# Patient Record
Sex: FEMALE | Race: WHITE | HISPANIC OR LATINO | Employment: UNEMPLOYED | URBAN - METROPOLITAN AREA
[De-identification: names, ages, dates, MRNs, and addresses within clinical notes are randomized per-mention and may not be internally consistent; named-entity substitution may affect disease eponyms.]

---

## 2017-06-06 ENCOUNTER — HOSPITAL ENCOUNTER (INPATIENT)
Facility: HOSPITAL | Age: 42
LOS: 7 days | Discharge: HOME | DRG: 753 | End: 2017-06-13
Attending: PSYCHIATRY & NEUROLOGY | Admitting: PSYCHIATRY & NEUROLOGY
Payer: COMMERCIAL

## 2017-06-06 ENCOUNTER — HOSPITAL ENCOUNTER (EMERGENCY)
Facility: HOSPITAL | Age: 42
End: 2017-06-06
Attending: EMERGENCY MEDICINE | Admitting: EMERGENCY MEDICINE
Payer: COMMERCIAL

## 2017-06-06 VITALS
SYSTOLIC BLOOD PRESSURE: 128 MMHG | TEMPERATURE: 97.6 F | RESPIRATION RATE: 16 BRPM | OXYGEN SATURATION: 97 % | WEIGHT: 190 LBS | DIASTOLIC BLOOD PRESSURE: 80 MMHG | HEART RATE: 77 BPM

## 2017-06-06 DIAGNOSIS — F43.10 PTSD (POST-TRAUMATIC STRESS DISORDER): ICD-10-CM

## 2017-06-06 DIAGNOSIS — F17.200 NICOTINE DEPENDENCE: ICD-10-CM

## 2017-06-06 DIAGNOSIS — J30.2 SEASONAL ALLERGIES: ICD-10-CM

## 2017-06-06 DIAGNOSIS — F31.60 BIPOLAR 1 DISORDER, MIXED (HCC): ICD-10-CM

## 2017-06-06 DIAGNOSIS — K21.9 GERD (GASTROESOPHAGEAL REFLUX DISEASE): ICD-10-CM

## 2017-06-06 DIAGNOSIS — G43.909 MIGRAINE: Primary | ICD-10-CM

## 2017-06-06 DIAGNOSIS — G47.00 INSOMNIA: ICD-10-CM

## 2017-06-06 DIAGNOSIS — R44.0 AUDITORY HALLUCINATIONS: ICD-10-CM

## 2017-06-06 DIAGNOSIS — F32.A DEPRESSED: Primary | ICD-10-CM

## 2017-06-06 DIAGNOSIS — K59.00 CONSTIPATION: ICD-10-CM

## 2017-06-06 DIAGNOSIS — F41.1 GENERALIZED ANXIETY DISORDER: ICD-10-CM

## 2017-06-06 DIAGNOSIS — E03.9 HYPOTHYROIDISM: ICD-10-CM

## 2017-06-06 DIAGNOSIS — R45.851 SUICIDAL THOUGHTS: ICD-10-CM

## 2017-06-06 PROBLEM — F14.10 COCAINE ABUSE (HCC): Status: ACTIVE | Noted: 2017-06-06

## 2017-06-06 LAB
AMPHETAMINES SERPL QL SCN: NEGATIVE
BACTERIA UR QL AUTO: ABNORMAL /HPF
BARBITURATES UR QL: NEGATIVE
BENZODIAZ UR QL: POSITIVE
BILIRUB UR QL STRIP: NEGATIVE
CLARITY UR: CLEAR
COCAINE UR QL: POSITIVE
COLOR UR: YELLOW
ETHANOL EXG-MCNC: 0 MG/DL
GLUCOSE UR STRIP-MCNC: NEGATIVE MG/DL
HCG UR QL: NEGATIVE
HGB UR QL STRIP.AUTO: ABNORMAL
KETONES UR STRIP-MCNC: ABNORMAL MG/DL
LEUKOCYTE ESTERASE UR QL STRIP: NEGATIVE
METHADONE UR QL: NEGATIVE
MUCOUS THREADS UR QL AUTO: ABNORMAL
NITRITE UR QL STRIP: NEGATIVE
NON-SQ EPI CELLS URNS QL MICRO: ABNORMAL /HPF
OPIATES UR QL SCN: NEGATIVE
PCP UR QL: NEGATIVE
PH UR STRIP.AUTO: 5.5 [PH] (ref 4.5–8)
PROT UR STRIP-MCNC: NEGATIVE MG/DL
RBC #/AREA URNS AUTO: ABNORMAL /HPF
SP GR UR STRIP.AUTO: >=1.03 (ref 1–1.03)
THC UR QL: NEGATIVE
UROBILINOGEN UR QL STRIP.AUTO: 1 E.U./DL
WBC #/AREA URNS AUTO: ABNORMAL /HPF

## 2017-06-06 PROCEDURE — 81001 URINALYSIS AUTO W/SCOPE: CPT | Performed by: EMERGENCY MEDICINE

## 2017-06-06 PROCEDURE — 99285 EMERGENCY DEPT VISIT HI MDM: CPT

## 2017-06-06 PROCEDURE — 80307 DRUG TEST PRSMV CHEM ANLYZR: CPT | Performed by: EMERGENCY MEDICINE

## 2017-06-06 PROCEDURE — 82075 ASSAY OF BREATH ETHANOL: CPT | Performed by: EMERGENCY MEDICINE

## 2017-06-06 PROCEDURE — 81025 URINE PREGNANCY TEST: CPT | Performed by: EMERGENCY MEDICINE

## 2017-06-06 RX ORDER — LORAZEPAM 0.5 MG/1
0.5 TABLET ORAL ONCE
Status: COMPLETED | OUTPATIENT
Start: 2017-06-06 | End: 2017-06-06

## 2017-06-06 RX ORDER — FLUOXETINE HYDROCHLORIDE 20 MG/1
20 CAPSULE ORAL DAILY
COMMUNITY
End: 2017-06-13 | Stop reason: HOSPADM

## 2017-06-06 RX ORDER — ESCITALOPRAM OXALATE 10 MG/1
10 TABLET ORAL DAILY
Status: DISCONTINUED | OUTPATIENT
Start: 2017-06-06 | End: 2017-06-07

## 2017-06-06 RX ORDER — LEVOTHYROXINE SODIUM 0.03 MG/1
50 TABLET ORAL
Status: DISCONTINUED | OUTPATIENT
Start: 2017-06-06 | End: 2017-06-13 | Stop reason: HOSPADM

## 2017-06-06 RX ORDER — HALOPERIDOL 5 MG
5 TABLET ORAL EVERY 6 HOURS PRN
Status: DISCONTINUED | OUTPATIENT
Start: 2017-06-06 | End: 2017-06-13 | Stop reason: HOSPADM

## 2017-06-06 RX ORDER — RISPERIDONE 1 MG/1
1 TABLET, ORALLY DISINTEGRATING ORAL
Status: DISCONTINUED | OUTPATIENT
Start: 2017-06-06 | End: 2017-06-13 | Stop reason: HOSPADM

## 2017-06-06 RX ORDER — RISPERIDONE 2 MG/1
2 TABLET, FILM COATED ORAL
Status: DISCONTINUED | OUTPATIENT
Start: 2017-06-06 | End: 2017-06-13 | Stop reason: HOSPADM

## 2017-06-06 RX ORDER — ACETAMINOPHEN 325 MG/1
650 TABLET ORAL EVERY 6 HOURS PRN
Status: CANCELLED | OUTPATIENT
Start: 2017-06-06

## 2017-06-06 RX ORDER — HYDROXYZINE PAMOATE 50 MG/1
50 CAPSULE ORAL 3 TIMES DAILY
COMMUNITY
End: 2017-06-13 | Stop reason: HOSPADM

## 2017-06-06 RX ORDER — OLANZAPINE 5 MG/1
5 TABLET ORAL DAILY
COMMUNITY
End: 2017-06-13 | Stop reason: HOSPADM

## 2017-06-06 RX ORDER — TOPIRAMATE 50 MG/1
150 TABLET, FILM COATED ORAL 2 TIMES DAILY
COMMUNITY
End: 2017-06-13 | Stop reason: HOSPADM

## 2017-06-06 RX ORDER — NICOTINE 21 MG/24HR
1 PATCH, TRANSDERMAL 24 HOURS TRANSDERMAL DAILY
Status: DISCONTINUED | OUTPATIENT
Start: 2017-06-06 | End: 2017-06-13 | Stop reason: HOSPADM

## 2017-06-06 RX ORDER — ALPRAZOLAM 1 MG/1
1 TABLET ORAL 3 TIMES DAILY PRN
COMMUNITY
End: 2017-06-13 | Stop reason: HOSPADM

## 2017-06-06 RX ORDER — LITHIUM CARBONATE 450 MG
450 TABLET, EXTENDED RELEASE ORAL 2 TIMES DAILY
Status: DISCONTINUED | OUTPATIENT
Start: 2017-06-06 | End: 2017-06-10

## 2017-06-06 RX ORDER — LORATADINE 10 MG/1
10 TABLET ORAL DAILY
Status: DISCONTINUED | OUTPATIENT
Start: 2017-06-06 | End: 2017-06-13 | Stop reason: HOSPADM

## 2017-06-06 RX ORDER — LORAZEPAM 1 MG/1
1 TABLET ORAL ONCE
Status: COMPLETED | OUTPATIENT
Start: 2017-06-06 | End: 2017-06-06

## 2017-06-06 RX ORDER — HALOPERIDOL 5 MG
5 TABLET ORAL EVERY 6 HOURS PRN
Status: CANCELLED | OUTPATIENT
Start: 2017-06-06

## 2017-06-06 RX ORDER — LEVOTHYROXINE SODIUM 0.05 MG/1
50 TABLET ORAL
COMMUNITY
End: 2017-06-13 | Stop reason: HOSPADM

## 2017-06-06 RX ORDER — TRAZODONE HYDROCHLORIDE 50 MG/1
50 TABLET ORAL
Status: CANCELLED | OUTPATIENT
Start: 2017-06-06

## 2017-06-06 RX ORDER — BENZTROPINE MESYLATE 0.5 MG/1
1 TABLET ORAL EVERY 6 HOURS PRN
Status: CANCELLED | OUTPATIENT
Start: 2017-06-06

## 2017-06-06 RX ORDER — LITHIUM CARBONATE 450 MG
450 TABLET, EXTENDED RELEASE ORAL 2 TIMES DAILY
COMMUNITY
End: 2017-06-13 | Stop reason: HOSPADM

## 2017-06-06 RX ORDER — ACETAMINOPHEN 325 MG/1
650 TABLET ORAL EVERY 6 HOURS PRN
Status: DISCONTINUED | OUTPATIENT
Start: 2017-06-06 | End: 2017-06-13 | Stop reason: HOSPADM

## 2017-06-06 RX ORDER — LORAZEPAM 2 MG/ML
1 INJECTION INTRAMUSCULAR EVERY 6 HOURS PRN
Status: CANCELLED | OUTPATIENT
Start: 2017-06-06

## 2017-06-06 RX ORDER — LORAZEPAM 1 MG/1
1 TABLET ORAL EVERY 6 HOURS PRN
Status: DISCONTINUED | OUTPATIENT
Start: 2017-06-06 | End: 2017-06-13 | Stop reason: HOSPADM

## 2017-06-06 RX ORDER — TRAZODONE HYDROCHLORIDE 50 MG/1
50 TABLET ORAL
Status: DISCONTINUED | OUTPATIENT
Start: 2017-06-06 | End: 2017-06-13 | Stop reason: HOSPADM

## 2017-06-06 RX ORDER — BENZTROPINE MESYLATE 1 MG/ML
1 INJECTION INTRAMUSCULAR; INTRAVENOUS EVERY 6 HOURS PRN
Status: CANCELLED | OUTPATIENT
Start: 2017-06-06

## 2017-06-06 RX ORDER — PRAZOSIN HYDROCHLORIDE 1 MG/1
1 CAPSULE ORAL
Status: DISCONTINUED | OUTPATIENT
Start: 2017-06-06 | End: 2017-06-13 | Stop reason: HOSPADM

## 2017-06-06 RX ORDER — CETIRIZINE HYDROCHLORIDE 10 MG/1
10 TABLET ORAL DAILY
Status: ON HOLD | COMMUNITY
End: 2017-06-13

## 2017-06-06 RX ORDER — NICOTINE 21 MG/24HR
1 PATCH, TRANSDERMAL 24 HOURS TRANSDERMAL DAILY
Status: CANCELLED | OUTPATIENT
Start: 2017-06-06

## 2017-06-06 RX ORDER — HALOPERIDOL 5 MG/ML
5 INJECTION INTRAMUSCULAR EVERY 6 HOURS PRN
Status: CANCELLED | OUTPATIENT
Start: 2017-06-06

## 2017-06-06 RX ORDER — LORAZEPAM 1 MG/1
1 TABLET ORAL EVERY 6 HOURS PRN
Status: CANCELLED | OUTPATIENT
Start: 2017-06-06

## 2017-06-06 RX ORDER — BENZTROPINE MESYLATE 1 MG/1
1 TABLET ORAL EVERY 6 HOURS PRN
Status: DISCONTINUED | OUTPATIENT
Start: 2017-06-06 | End: 2017-06-13 | Stop reason: HOSPADM

## 2017-06-06 RX ORDER — ALBUTEROL SULFATE 90 UG/1
2 AEROSOL, METERED RESPIRATORY (INHALATION) EVERY 6 HOURS PRN
COMMUNITY
End: 2017-11-27

## 2017-06-06 RX ORDER — BENZTROPINE MESYLATE 1 MG/ML
1 INJECTION INTRAMUSCULAR; INTRAVENOUS EVERY 6 HOURS PRN
Status: DISCONTINUED | OUTPATIENT
Start: 2017-06-06 | End: 2017-06-13 | Stop reason: HOSPADM

## 2017-06-06 RX ORDER — RISPERIDONE 1 MG/1
1 TABLET, ORALLY DISINTEGRATING ORAL
Status: CANCELLED | OUTPATIENT
Start: 2017-06-06

## 2017-06-06 RX ORDER — HALOPERIDOL 5 MG/ML
5 INJECTION INTRAMUSCULAR EVERY 6 HOURS PRN
Status: DISCONTINUED | OUTPATIENT
Start: 2017-06-06 | End: 2017-06-13 | Stop reason: HOSPADM

## 2017-06-06 RX ORDER — LORAZEPAM 1 MG/1
1 TABLET ORAL 3 TIMES DAILY
Status: DISCONTINUED | OUTPATIENT
Start: 2017-06-06 | End: 2017-06-07

## 2017-06-06 RX ORDER — ALBUTEROL SULFATE 90 UG/1
2 AEROSOL, METERED RESPIRATORY (INHALATION) EVERY 6 HOURS PRN
Status: DISCONTINUED | OUTPATIENT
Start: 2017-06-06 | End: 2017-06-13 | Stop reason: HOSPADM

## 2017-06-06 RX ORDER — LORAZEPAM 2 MG/ML
1 INJECTION INTRAMUSCULAR EVERY 6 HOURS PRN
Status: DISCONTINUED | OUTPATIENT
Start: 2017-06-06 | End: 2017-06-13 | Stop reason: HOSPADM

## 2017-06-06 RX ADMIN — ACETAMINOPHEN 650 MG: 325 TABLET ORAL at 16:51

## 2017-06-06 RX ADMIN — TOPIRAMATE 150 MG: 100 TABLET, FILM COATED ORAL at 18:01

## 2017-06-06 RX ADMIN — NICOTINE 7 MG: 7 PATCH, EXTENDED RELEASE TRANSDERMAL at 03:29

## 2017-06-06 RX ADMIN — LORAZEPAM 1 MG: 1 TABLET ORAL at 16:50

## 2017-06-06 RX ADMIN — LITHIUM CARBONATE 450 MG: 450 TABLET, EXTENDED RELEASE ORAL at 18:02

## 2017-06-06 RX ADMIN — PRAZOSIN HYDROCHLORIDE 1 MG: 1 CAPSULE ORAL at 21:32

## 2017-06-06 RX ADMIN — LORAZEPAM 0.5 MG: 0.5 TABLET ORAL at 03:29

## 2017-06-06 RX ADMIN — LEVOTHYROXINE SODIUM 50 MCG: 25 TABLET ORAL at 10:50

## 2017-06-06 RX ADMIN — NICOTINE 1 PATCH: 14 PATCH, EXTENDED RELEASE TRANSDERMAL at 10:52

## 2017-06-06 RX ADMIN — TOPIRAMATE 150 MG: 100 TABLET, FILM COATED ORAL at 10:49

## 2017-06-06 RX ADMIN — LITHIUM CARBONATE 450 MG: 450 TABLET, EXTENDED RELEASE ORAL at 10:52

## 2017-06-06 RX ADMIN — LORAZEPAM 1 MG: 1 TABLET ORAL at 10:52

## 2017-06-06 RX ADMIN — LORATADINE 10 MG: 10 TABLET ORAL at 10:52

## 2017-06-06 RX ADMIN — LORAZEPAM 1 MG: 1 TABLET ORAL at 06:29

## 2017-06-06 RX ADMIN — LORAZEPAM 1 MG: 1 TABLET ORAL at 21:33

## 2017-06-06 RX ADMIN — ESCITALOPRAM OXALATE 10 MG: 10 TABLET ORAL at 10:50

## 2017-06-06 RX ADMIN — RISPERIDONE 2 MG: 2 TABLET ORAL at 21:32

## 2017-06-07 LAB
ALBUMIN SERPL BCP-MCNC: 3.4 G/DL (ref 3.5–5)
ALP SERPL-CCNC: 62 U/L (ref 46–116)
ALT SERPL W P-5'-P-CCNC: 22 U/L (ref 12–78)
ANION GAP SERPL CALCULATED.3IONS-SCNC: 9 MMOL/L (ref 4–13)
AST SERPL W P-5'-P-CCNC: 17 U/L (ref 5–45)
BASOPHILS # BLD AUTO: 0.04 THOUSANDS/ΜL (ref 0–0.1)
BASOPHILS NFR BLD AUTO: 1 % (ref 0–1)
BILIRUB SERPL-MCNC: 0.2 MG/DL (ref 0.2–1)
BUN SERPL-MCNC: 10 MG/DL (ref 5–25)
CALCIUM SERPL-MCNC: 8.9 MG/DL (ref 8.3–10.1)
CHLORIDE SERPL-SCNC: 109 MMOL/L (ref 100–108)
CHOLEST SERPL-MCNC: 166 MG/DL (ref 50–200)
CO2 SERPL-SCNC: 24 MMOL/L (ref 21–32)
CREAT SERPL-MCNC: 1.14 MG/DL (ref 0.6–1.3)
EOSINOPHIL # BLD AUTO: 0.34 THOUSAND/ΜL (ref 0–0.61)
EOSINOPHIL NFR BLD AUTO: 5 % (ref 0–6)
ERYTHROCYTE [DISTWIDTH] IN BLOOD BY AUTOMATED COUNT: 14.5 % (ref 11.6–15.1)
GFR SERPL CREATININE-BSD FRML MDRD: 52.8 ML/MIN/1.73SQ M
GLUCOSE P FAST SERPL-MCNC: 87 MG/DL (ref 65–99)
GLUCOSE SERPL-MCNC: 87 MG/DL (ref 65–140)
HCT VFR BLD AUTO: 38 % (ref 34.8–46.1)
HDLC SERPL-MCNC: 51 MG/DL (ref 40–60)
HGB BLD-MCNC: 12.1 G/DL (ref 11.5–15.4)
LDLC SERPL CALC-MCNC: 99 MG/DL (ref 0–100)
LITHIUM SERPL-SCNC: 0.5 MMOL/L (ref 0.5–1)
LYMPHOCYTES # BLD AUTO: 2.52 THOUSANDS/ΜL (ref 0.6–4.47)
LYMPHOCYTES NFR BLD AUTO: 40 % (ref 14–44)
MCH RBC QN AUTO: 28.9 PG (ref 26.8–34.3)
MCHC RBC AUTO-ENTMCNC: 31.8 G/DL (ref 31.4–37.4)
MCV RBC AUTO: 91 FL (ref 82–98)
MONOCYTES # BLD AUTO: 0.44 THOUSAND/ΜL (ref 0.17–1.22)
MONOCYTES NFR BLD AUTO: 7 % (ref 4–12)
NEUTROPHILS # BLD AUTO: 2.93 THOUSANDS/ΜL (ref 1.85–7.62)
NEUTS SEG NFR BLD AUTO: 47 % (ref 43–75)
PLATELET # BLD AUTO: 228 THOUSANDS/UL (ref 149–390)
PMV BLD AUTO: 10.2 FL (ref 8.9–12.7)
POTASSIUM SERPL-SCNC: 3.5 MMOL/L (ref 3.5–5.3)
PROT SERPL-MCNC: 6.9 G/DL (ref 6.4–8.2)
RBC # BLD AUTO: 4.18 MILLION/UL (ref 3.81–5.12)
SODIUM SERPL-SCNC: 142 MMOL/L (ref 136–145)
T3 SERPL-MCNC: 0.8 NG/ML (ref 0.6–1.8)
T4 SERPL-MCNC: 8.2 UG/DL (ref 4.7–13.3)
TRIGL SERPL-MCNC: 78 MG/DL
TSH SERPL DL<=0.05 MIU/L-ACNC: 2.1 UIU/ML (ref 0.36–3.74)
WBC # BLD AUTO: 6.27 THOUSAND/UL (ref 4.31–10.16)

## 2017-06-07 PROCEDURE — 84443 ASSAY THYROID STIM HORMONE: CPT | Performed by: PSYCHIATRY & NEUROLOGY

## 2017-06-07 PROCEDURE — 84480 ASSAY TRIIODOTHYRONINE (T3): CPT | Performed by: PSYCHIATRY & NEUROLOGY

## 2017-06-07 PROCEDURE — 80053 COMPREHEN METABOLIC PANEL: CPT | Performed by: PSYCHIATRY & NEUROLOGY

## 2017-06-07 PROCEDURE — 84436 ASSAY OF TOTAL THYROXINE: CPT | Performed by: PSYCHIATRY & NEUROLOGY

## 2017-06-07 PROCEDURE — 86592 SYPHILIS TEST NON-TREP QUAL: CPT | Performed by: PSYCHIATRY & NEUROLOGY

## 2017-06-07 PROCEDURE — 80178 ASSAY OF LITHIUM: CPT | Performed by: PSYCHIATRY & NEUROLOGY

## 2017-06-07 PROCEDURE — 85025 COMPLETE CBC W/AUTO DIFF WBC: CPT | Performed by: PSYCHIATRY & NEUROLOGY

## 2017-06-07 PROCEDURE — 80061 LIPID PANEL: CPT | Performed by: PSYCHIATRY & NEUROLOGY

## 2017-06-07 RX ORDER — TOPIRAMATE 100 MG/1
200 TABLET, FILM COATED ORAL
Status: DISCONTINUED | OUTPATIENT
Start: 2017-06-08 | End: 2017-06-13 | Stop reason: HOSPADM

## 2017-06-07 RX ORDER — TOPIRAMATE 100 MG/1
100 TABLET, FILM COATED ORAL DAILY
Status: DISCONTINUED | OUTPATIENT
Start: 2017-06-07 | End: 2017-06-13 | Stop reason: HOSPADM

## 2017-06-07 RX ORDER — BUTALBITAL, ACETAMINOPHEN AND CAFFEINE 50; 325; 40 MG/1; MG/1; MG/1
1 TABLET ORAL EVERY 4 HOURS PRN
Status: DISCONTINUED | OUTPATIENT
Start: 2017-06-07 | End: 2017-06-13 | Stop reason: HOSPADM

## 2017-06-07 RX ORDER — LORAZEPAM 0.5 MG/1
0.5 TABLET ORAL 3 TIMES DAILY
Status: DISCONTINUED | OUTPATIENT
Start: 2017-06-07 | End: 2017-06-12

## 2017-06-07 RX ADMIN — LITHIUM CARBONATE 450 MG: 450 TABLET, EXTENDED RELEASE ORAL at 19:19

## 2017-06-07 RX ADMIN — LORAZEPAM 0.5 MG: 0.5 TABLET ORAL at 16:26

## 2017-06-07 RX ADMIN — TOPIRAMATE 150 MG: 100 TABLET, FILM COATED ORAL at 19:19

## 2017-06-07 RX ADMIN — TOPIRAMATE 100 MG: 100 TABLET, FILM COATED ORAL at 13:18

## 2017-06-07 RX ADMIN — PRAZOSIN HYDROCHLORIDE 1 MG: 1 CAPSULE ORAL at 22:05

## 2017-06-07 RX ADMIN — LORATADINE 10 MG: 10 TABLET ORAL at 09:15

## 2017-06-07 RX ADMIN — LORAZEPAM 0.5 MG: 0.5 TABLET ORAL at 22:00

## 2017-06-07 RX ADMIN — MAGNESIUM HYDROXIDE 30 ML: 400 SUSPENSION ORAL at 19:23

## 2017-06-07 RX ADMIN — ESCITALOPRAM OXALATE 10 MG: 10 TABLET ORAL at 09:15

## 2017-06-07 RX ADMIN — LORAZEPAM 1 MG: 1 TABLET ORAL at 09:15

## 2017-06-07 RX ADMIN — LEVOTHYROXINE SODIUM 50 MCG: 25 TABLET ORAL at 06:04

## 2017-06-07 RX ADMIN — RISPERIDONE 2 MG: 2 TABLET ORAL at 22:05

## 2017-06-07 RX ADMIN — LITHIUM CARBONATE 450 MG: 450 TABLET, EXTENDED RELEASE ORAL at 09:15

## 2017-06-07 RX ADMIN — NICOTINE 1 PATCH: 14 PATCH, EXTENDED RELEASE TRANSDERMAL at 09:16

## 2017-06-07 RX ADMIN — ACETAMINOPHEN 650 MG: 325 TABLET ORAL at 19:22

## 2017-06-07 RX ADMIN — TOPIRAMATE 150 MG: 100 TABLET, FILM COATED ORAL at 09:15

## 2017-06-08 LAB — RPR SER QL: NORMAL

## 2017-06-08 RX ORDER — ONDANSETRON 4 MG/1
4 TABLET, ORALLY DISINTEGRATING ORAL EVERY 6 HOURS PRN
Status: DISCONTINUED | OUTPATIENT
Start: 2017-06-08 | End: 2017-06-13 | Stop reason: HOSPADM

## 2017-06-08 RX ORDER — HYDROXYZINE HYDROCHLORIDE 25 MG/1
25 TABLET, FILM COATED ORAL EVERY 6 HOURS PRN
Status: DISCONTINUED | OUTPATIENT
Start: 2017-06-08 | End: 2017-06-13 | Stop reason: HOSPADM

## 2017-06-08 RX ADMIN — TOPIRAMATE 200 MG: 100 TABLET, FILM COATED ORAL at 17:07

## 2017-06-08 RX ADMIN — LORAZEPAM 0.5 MG: 0.5 TABLET ORAL at 16:31

## 2017-06-08 RX ADMIN — HYDROXYZINE HYDROCHLORIDE 25 MG: 25 TABLET ORAL at 21:59

## 2017-06-08 RX ADMIN — LITHIUM CARBONATE 450 MG: 450 TABLET, EXTENDED RELEASE ORAL at 17:07

## 2017-06-08 RX ADMIN — LORAZEPAM 0.5 MG: 0.5 TABLET ORAL at 21:59

## 2017-06-08 RX ADMIN — LORATADINE 10 MG: 10 TABLET ORAL at 09:20

## 2017-06-08 RX ADMIN — NICOTINE 1 PATCH: 14 PATCH, EXTENDED RELEASE TRANSDERMAL at 09:20

## 2017-06-08 RX ADMIN — PRAZOSIN HYDROCHLORIDE 1 MG: 1 CAPSULE ORAL at 21:59

## 2017-06-08 RX ADMIN — ONDANSETRON 4 MG: 4 TABLET, ORALLY DISINTEGRATING ORAL at 22:00

## 2017-06-08 RX ADMIN — RISPERIDONE 2 MG: 2 TABLET ORAL at 22:00

## 2017-06-08 RX ADMIN — LITHIUM CARBONATE 450 MG: 450 TABLET, EXTENDED RELEASE ORAL at 09:21

## 2017-06-08 RX ADMIN — TOPIRAMATE 100 MG: 100 TABLET, FILM COATED ORAL at 09:20

## 2017-06-08 RX ADMIN — MAGNESIUM HYDROXIDE 30 ML: 400 SUSPENSION ORAL at 17:06

## 2017-06-08 RX ADMIN — LORAZEPAM 0.5 MG: 0.5 TABLET ORAL at 09:20

## 2017-06-08 RX ADMIN — LEVOTHYROXINE SODIUM 50 MCG: 25 TABLET ORAL at 06:07

## 2017-06-09 RX ORDER — DULOXETIN HYDROCHLORIDE 30 MG/1
30 CAPSULE, DELAYED RELEASE ORAL DAILY
Status: DISCONTINUED | OUTPATIENT
Start: 2017-06-09 | End: 2017-06-13 | Stop reason: HOSPADM

## 2017-06-09 RX ADMIN — MAGNESIUM HYDROXIDE 30 ML: 400 SUSPENSION ORAL at 17:46

## 2017-06-09 RX ADMIN — LORATADINE 10 MG: 10 TABLET ORAL at 09:27

## 2017-06-09 RX ADMIN — TOPIRAMATE 200 MG: 100 TABLET, FILM COATED ORAL at 17:44

## 2017-06-09 RX ADMIN — RISPERIDONE 2 MG: 2 TABLET ORAL at 21:22

## 2017-06-09 RX ADMIN — TOPIRAMATE 100 MG: 100 TABLET, FILM COATED ORAL at 09:27

## 2017-06-09 RX ADMIN — NICOTINE 1 PATCH: 14 PATCH, EXTENDED RELEASE TRANSDERMAL at 09:27

## 2017-06-09 RX ADMIN — LITHIUM CARBONATE 450 MG: 450 TABLET, EXTENDED RELEASE ORAL at 17:44

## 2017-06-09 RX ADMIN — LITHIUM CARBONATE 450 MG: 450 TABLET, EXTENDED RELEASE ORAL at 09:27

## 2017-06-09 RX ADMIN — LORAZEPAM 0.5 MG: 0.5 TABLET ORAL at 09:26

## 2017-06-09 RX ADMIN — LORAZEPAM 0.5 MG: 0.5 TABLET ORAL at 21:22

## 2017-06-09 RX ADMIN — DULOXETINE 30 MG: 30 CAPSULE, DELAYED RELEASE ORAL at 10:07

## 2017-06-09 RX ADMIN — LEVOTHYROXINE SODIUM 50 MCG: 25 TABLET ORAL at 06:00

## 2017-06-09 RX ADMIN — PRAZOSIN HYDROCHLORIDE 1 MG: 1 CAPSULE ORAL at 21:20

## 2017-06-09 RX ADMIN — LORAZEPAM 0.5 MG: 0.5 TABLET ORAL at 16:12

## 2017-06-10 RX ORDER — LITHIUM CARBONATE 300 MG/1
600 TABLET, FILM COATED, EXTENDED RELEASE ORAL
Status: DISCONTINUED | OUTPATIENT
Start: 2017-06-10 | End: 2017-06-13 | Stop reason: HOSPADM

## 2017-06-10 RX ORDER — POLYETHYLENE GLYCOL 3350 17 G/17G
17 POWDER, FOR SOLUTION ORAL ONCE
Status: COMPLETED | OUTPATIENT
Start: 2017-06-10 | End: 2017-06-10

## 2017-06-10 RX ORDER — LITHIUM CARBONATE 450 MG
450 TABLET, EXTENDED RELEASE ORAL EVERY MORNING
Status: DISCONTINUED | OUTPATIENT
Start: 2017-06-11 | End: 2017-06-13 | Stop reason: HOSPADM

## 2017-06-10 RX ORDER — LANOLIN ALCOHOL/MO/W.PET/CERES
6 CREAM (GRAM) TOPICAL
Status: DISCONTINUED | OUTPATIENT
Start: 2017-06-10 | End: 2017-06-13 | Stop reason: HOSPADM

## 2017-06-10 RX ADMIN — NICOTINE 1 PATCH: 14 PATCH, EXTENDED RELEASE TRANSDERMAL at 09:57

## 2017-06-10 RX ADMIN — LORAZEPAM 0.5 MG: 0.5 TABLET ORAL at 16:15

## 2017-06-10 RX ADMIN — LEVOTHYROXINE SODIUM 50 MCG: 25 TABLET ORAL at 06:10

## 2017-06-10 RX ADMIN — LORAZEPAM 0.5 MG: 0.5 TABLET ORAL at 09:54

## 2017-06-10 RX ADMIN — LORATADINE 10 MG: 10 TABLET ORAL at 09:54

## 2017-06-10 RX ADMIN — TOPIRAMATE 100 MG: 100 TABLET, FILM COATED ORAL at 09:54

## 2017-06-10 RX ADMIN — DULOXETINE 30 MG: 30 CAPSULE, DELAYED RELEASE ORAL at 09:54

## 2017-06-10 RX ADMIN — MELATONIN TAB 3 MG 6 MG: 3 TAB at 21:12

## 2017-06-10 RX ADMIN — POLYETHYLENE GLYCOL 3350 17 G: 17 POWDER, FOR SOLUTION ORAL at 16:58

## 2017-06-10 RX ADMIN — RISPERIDONE 2 MG: 2 TABLET ORAL at 21:12

## 2017-06-10 RX ADMIN — PRAZOSIN HYDROCHLORIDE 1 MG: 1 CAPSULE ORAL at 21:11

## 2017-06-10 RX ADMIN — TOPIRAMATE 200 MG: 100 TABLET, FILM COATED ORAL at 17:57

## 2017-06-10 RX ADMIN — LITHIUM CARBONATE 450 MG: 450 TABLET, EXTENDED RELEASE ORAL at 17:57

## 2017-06-10 RX ADMIN — LITHIUM CARBONATE 600 MG: 300 TABLET, FILM COATED, EXTENDED RELEASE ORAL at 21:11

## 2017-06-10 RX ADMIN — LITHIUM CARBONATE 450 MG: 450 TABLET, EXTENDED RELEASE ORAL at 09:54

## 2017-06-10 RX ADMIN — LORAZEPAM 0.5 MG: 0.5 TABLET ORAL at 21:12

## 2017-06-11 RX ORDER — PANTOPRAZOLE SODIUM 40 MG/1
40 TABLET, DELAYED RELEASE ORAL
Status: DISCONTINUED | OUTPATIENT
Start: 2017-06-11 | End: 2017-06-13 | Stop reason: HOSPADM

## 2017-06-11 RX ORDER — POLYETHYLENE GLYCOL 3350 17 G/17G
17 POWDER, FOR SOLUTION ORAL ONCE
Status: COMPLETED | OUTPATIENT
Start: 2017-06-11 | End: 2017-06-11

## 2017-06-11 RX ADMIN — TOPIRAMATE 200 MG: 100 TABLET, FILM COATED ORAL at 17:40

## 2017-06-11 RX ADMIN — TOPIRAMATE 100 MG: 100 TABLET, FILM COATED ORAL at 09:28

## 2017-06-11 RX ADMIN — RISPERIDONE 2 MG: 2 TABLET ORAL at 21:43

## 2017-06-11 RX ADMIN — LORATADINE 10 MG: 10 TABLET ORAL at 09:28

## 2017-06-11 RX ADMIN — PRAZOSIN HYDROCHLORIDE 1 MG: 1 CAPSULE ORAL at 21:43

## 2017-06-11 RX ADMIN — MELATONIN TAB 3 MG 6 MG: 3 TAB at 21:43

## 2017-06-11 RX ADMIN — LORAZEPAM 0.5 MG: 0.5 TABLET ORAL at 09:28

## 2017-06-11 RX ADMIN — LITHIUM CARBONATE 600 MG: 300 TABLET, FILM COATED, EXTENDED RELEASE ORAL at 17:40

## 2017-06-11 RX ADMIN — LORAZEPAM 1 MG: 1 TABLET ORAL at 19:44

## 2017-06-11 RX ADMIN — LORAZEPAM 0.5 MG: 0.5 TABLET ORAL at 21:42

## 2017-06-11 RX ADMIN — PANTOPRAZOLE SODIUM 40 MG: 40 TABLET, DELAYED RELEASE ORAL at 12:17

## 2017-06-11 RX ADMIN — POLYETHYLENE GLYCOL 3350 17 G: 17 POWDER, FOR SOLUTION ORAL at 12:17

## 2017-06-11 RX ADMIN — HYDROXYZINE HYDROCHLORIDE 25 MG: 25 TABLET ORAL at 17:44

## 2017-06-11 RX ADMIN — LITHIUM CARBONATE 450 MG: 450 TABLET, EXTENDED RELEASE ORAL at 09:28

## 2017-06-11 RX ADMIN — LORAZEPAM 0.5 MG: 0.5 TABLET ORAL at 16:15

## 2017-06-11 RX ADMIN — LEVOTHYROXINE SODIUM 50 MCG: 25 TABLET ORAL at 06:03

## 2017-06-11 RX ADMIN — NICOTINE 1 PATCH: 14 PATCH, EXTENDED RELEASE TRANSDERMAL at 09:32

## 2017-06-11 RX ADMIN — DULOXETINE 30 MG: 30 CAPSULE, DELAYED RELEASE ORAL at 09:28

## 2017-06-12 LAB — LITHIUM SERPL-SCNC: 0.9 MMOL/L (ref 0.5–1)

## 2017-06-12 PROCEDURE — 80178 ASSAY OF LITHIUM: CPT | Performed by: PSYCHIATRY & NEUROLOGY

## 2017-06-12 RX ORDER — LORAZEPAM 1 MG/1
1 TABLET ORAL 3 TIMES DAILY
Status: DISCONTINUED | OUTPATIENT
Start: 2017-06-12 | End: 2017-06-13 | Stop reason: HOSPADM

## 2017-06-12 RX ORDER — DOCUSATE SODIUM 100 MG/1
100 CAPSULE, LIQUID FILLED ORAL 2 TIMES DAILY
Status: DISCONTINUED | OUTPATIENT
Start: 2017-06-12 | End: 2017-06-13 | Stop reason: HOSPADM

## 2017-06-12 RX ORDER — POLYETHYLENE GLYCOL 3350 17 G/17G
17 POWDER, FOR SOLUTION ORAL DAILY
Status: DISCONTINUED | OUTPATIENT
Start: 2017-06-12 | End: 2017-06-13 | Stop reason: HOSPADM

## 2017-06-12 RX ADMIN — HYDROXYZINE HYDROCHLORIDE 25 MG: 25 TABLET ORAL at 06:07

## 2017-06-12 RX ADMIN — LEVOTHYROXINE SODIUM 50 MCG: 25 TABLET ORAL at 06:00

## 2017-06-12 RX ADMIN — MAGNESIUM HYDROXIDE 30 ML: 400 SUSPENSION ORAL at 09:25

## 2017-06-12 RX ADMIN — DULOXETINE 30 MG: 30 CAPSULE, DELAYED RELEASE ORAL at 09:23

## 2017-06-12 RX ADMIN — LORAZEPAM 0.5 MG: 0.5 TABLET ORAL at 09:23

## 2017-06-12 RX ADMIN — TOPIRAMATE 200 MG: 100 TABLET, FILM COATED ORAL at 17:14

## 2017-06-12 RX ADMIN — MELATONIN TAB 3 MG 6 MG: 3 TAB at 21:15

## 2017-06-12 RX ADMIN — LORAZEPAM 1 MG: 1 TABLET ORAL at 21:13

## 2017-06-12 RX ADMIN — PRAZOSIN HYDROCHLORIDE 1 MG: 1 CAPSULE ORAL at 21:13

## 2017-06-12 RX ADMIN — RISPERIDONE 2 MG: 2 TABLET ORAL at 21:13

## 2017-06-12 RX ADMIN — LORATADINE 10 MG: 10 TABLET ORAL at 09:23

## 2017-06-12 RX ADMIN — LITHIUM CARBONATE 600 MG: 300 TABLET, FILM COATED, EXTENDED RELEASE ORAL at 17:14

## 2017-06-12 RX ADMIN — PANTOPRAZOLE SODIUM 40 MG: 40 TABLET, DELAYED RELEASE ORAL at 06:00

## 2017-06-12 RX ADMIN — LITHIUM CARBONATE 450 MG: 450 TABLET, EXTENDED RELEASE ORAL at 09:22

## 2017-06-12 RX ADMIN — TOPIRAMATE 100 MG: 100 TABLET, FILM COATED ORAL at 09:23

## 2017-06-12 RX ADMIN — DOCUSATE SODIUM 100 MG: 100 CAPSULE, LIQUID FILLED ORAL at 11:05

## 2017-06-12 RX ADMIN — DOCUSATE SODIUM 100 MG: 100 CAPSULE, LIQUID FILLED ORAL at 17:15

## 2017-06-12 RX ADMIN — NICOTINE 1 PATCH: 14 PATCH, EXTENDED RELEASE TRANSDERMAL at 09:24

## 2017-06-12 RX ADMIN — POLYETHYLENE GLYCOL 3350 17 G: 17 POWDER, FOR SOLUTION ORAL at 11:05

## 2017-06-12 RX ADMIN — LORAZEPAM 1 MG: 1 TABLET ORAL at 15:57

## 2017-06-13 VITALS
BODY MASS INDEX: 34.96 KG/M2 | OXYGEN SATURATION: 97 % | HEIGHT: 62 IN | TEMPERATURE: 98.4 F | SYSTOLIC BLOOD PRESSURE: 105 MMHG | WEIGHT: 190 LBS | RESPIRATION RATE: 18 BRPM | DIASTOLIC BLOOD PRESSURE: 64 MMHG | HEART RATE: 91 BPM

## 2017-06-13 LAB — LITHIUM SERPL-SCNC: 1 MMOL/L (ref 0.5–1)

## 2017-06-13 PROCEDURE — 80178 ASSAY OF LITHIUM: CPT | Performed by: PHYSICIAN ASSISTANT

## 2017-06-13 RX ORDER — TOPIRAMATE 100 MG/1
100 TABLET, FILM COATED ORAL DAILY
Qty: 1 TABLET | Refills: 0
Start: 2017-06-13 | End: 2018-09-19

## 2017-06-13 RX ORDER — LEVOTHYROXINE SODIUM 0.05 MG/1
50 TABLET ORAL
Qty: 30 TABLET | Refills: 0 | Status: SHIPPED | OUTPATIENT
Start: 2017-06-13 | End: 2018-09-19

## 2017-06-13 RX ORDER — CETIRIZINE HYDROCHLORIDE 10 MG/1
10 TABLET ORAL DAILY
Qty: 30 TABLET | Refills: 0 | Status: SHIPPED | OUTPATIENT
Start: 2017-06-13 | End: 2018-09-19

## 2017-06-13 RX ORDER — PRAZOSIN HYDROCHLORIDE 1 MG/1
1 CAPSULE ORAL
Qty: 30 CAPSULE | Refills: 0 | Status: SHIPPED | OUTPATIENT
Start: 2017-06-13 | End: 2018-09-19

## 2017-06-13 RX ORDER — RISPERIDONE 2 MG/1
2 TABLET, FILM COATED ORAL
Qty: 30 TABLET | Refills: 0 | Status: SHIPPED | OUTPATIENT
Start: 2017-06-13 | End: 2018-09-19

## 2017-06-13 RX ORDER — LANOLIN ALCOHOL/MO/W.PET/CERES
6 CREAM (GRAM) TOPICAL
Qty: 60 TABLET | Refills: 0 | Status: SHIPPED | OUTPATIENT
Start: 2017-06-13 | End: 2018-09-19

## 2017-06-13 RX ORDER — LORAZEPAM 1 MG/1
1 TABLET ORAL 3 TIMES DAILY
Qty: 30 TABLET | Refills: 2 | Status: SHIPPED | OUTPATIENT
Start: 2017-06-13 | End: 2018-09-19

## 2017-06-13 RX ORDER — PANTOPRAZOLE SODIUM 40 MG/1
40 TABLET, DELAYED RELEASE ORAL
Qty: 30 TABLET | Refills: 0 | Status: SHIPPED | OUTPATIENT
Start: 2017-06-13 | End: 2018-09-19

## 2017-06-13 RX ORDER — POLYETHYLENE GLYCOL 3350 17 G/17G
17 POWDER, FOR SOLUTION ORAL DAILY
Qty: 30 EACH | Refills: 0 | Status: SHIPPED | OUTPATIENT
Start: 2017-06-13 | End: 2018-09-19

## 2017-06-13 RX ORDER — DULOXETIN HYDROCHLORIDE 30 MG/1
30 CAPSULE, DELAYED RELEASE ORAL DAILY
Qty: 30 CAPSULE | Refills: 0 | Status: SHIPPED | OUTPATIENT
Start: 2017-06-13 | End: 2018-09-19

## 2017-06-13 RX ORDER — LITHIUM CARBONATE 450 MG
450 TABLET, EXTENDED RELEASE ORAL EVERY MORNING
Qty: 30 TABLET | Refills: 0 | Status: SHIPPED | OUTPATIENT
Start: 2017-06-13 | End: 2018-09-19

## 2017-06-13 RX ORDER — NICOTINE 21 MG/24HR
1 PATCH, TRANSDERMAL 24 HOURS TRANSDERMAL DAILY
Qty: 28 PATCH | Refills: 1 | Status: SHIPPED | OUTPATIENT
Start: 2017-06-13 | End: 2018-09-19

## 2017-06-13 RX ORDER — LITHIUM CARBONATE 300 MG/1
600 TABLET, FILM COATED, EXTENDED RELEASE ORAL
Qty: 60 TABLET | Refills: 0 | Status: SHIPPED | OUTPATIENT
Start: 2017-06-13 | End: 2018-09-19

## 2017-06-13 RX ADMIN — DULOXETINE 30 MG: 30 CAPSULE, DELAYED RELEASE ORAL at 08:43

## 2017-06-13 RX ADMIN — LEVOTHYROXINE SODIUM 50 MCG: 25 TABLET ORAL at 06:03

## 2017-06-13 RX ADMIN — TOPIRAMATE 100 MG: 100 TABLET, FILM COATED ORAL at 08:43

## 2017-06-13 RX ADMIN — PANTOPRAZOLE SODIUM 40 MG: 40 TABLET, DELAYED RELEASE ORAL at 05:37

## 2017-06-13 RX ADMIN — LITHIUM CARBONATE 450 MG: 450 TABLET, EXTENDED RELEASE ORAL at 08:44

## 2017-06-13 RX ADMIN — LORATADINE 10 MG: 10 TABLET ORAL at 08:43

## 2017-06-13 RX ADMIN — DOCUSATE SODIUM 100 MG: 100 CAPSULE, LIQUID FILLED ORAL at 08:43

## 2017-06-13 RX ADMIN — LORAZEPAM 1 MG: 1 TABLET ORAL at 08:43

## 2017-06-13 RX ADMIN — NICOTINE 1 PATCH: 14 PATCH, EXTENDED RELEASE TRANSDERMAL at 08:42

## 2017-06-13 RX ADMIN — POLYETHYLENE GLYCOL 3350 17 G: 17 POWDER, FOR SOLUTION ORAL at 08:42

## 2017-11-27 ENCOUNTER — HOSPITAL ENCOUNTER (EMERGENCY)
Facility: HOSPITAL | Age: 42
Discharge: HOME | End: 2017-11-27
Payer: COMMERCIAL

## 2017-11-27 ENCOUNTER — APPOINTMENT (EMERGENCY)
Dept: RADIOLOGY | Facility: HOSPITAL | Age: 42
End: 2017-11-27
Payer: COMMERCIAL

## 2017-11-27 VITALS
TEMPERATURE: 97.5 F | OXYGEN SATURATION: 98 % | BODY MASS INDEX: 37.73 KG/M2 | HEIGHT: 62 IN | HEART RATE: 71 BPM | RESPIRATION RATE: 18 BRPM | DIASTOLIC BLOOD PRESSURE: 68 MMHG | WEIGHT: 205 LBS | SYSTOLIC BLOOD PRESSURE: 126 MMHG

## 2017-11-27 DIAGNOSIS — J20.9 ACUTE BRONCHITIS: Primary | ICD-10-CM

## 2017-11-27 PROCEDURE — 71020 HB CHEST X-RAY 2VW FRONTAL&LATL: CPT

## 2017-11-27 PROCEDURE — 99283 EMERGENCY DEPT VISIT LOW MDM: CPT

## 2017-11-27 RX ORDER — AZITHROMYCIN 250 MG/1
TABLET, FILM COATED ORAL
Qty: 6 TABLET | Refills: 0 | Status: SHIPPED | OUTPATIENT
Start: 2017-11-27 | End: 2018-09-19

## 2017-11-27 RX ORDER — ALBUTEROL SULFATE 90 UG/1
2 AEROSOL, METERED RESPIRATORY (INHALATION) EVERY 6 HOURS PRN
Qty: 1 INHALER | Refills: 0 | Status: SHIPPED | OUTPATIENT
Start: 2017-11-27 | End: 2017-12-07

## 2017-11-27 RX ORDER — PROMETHAZINE HYDROCHLORIDE AND CODEINE PHOSPHATE 6.25; 1 MG/5ML; MG/5ML
5 SYRUP ORAL EVERY 4 HOURS PRN
Qty: 50 ML | Refills: 0 | Status: SHIPPED | OUTPATIENT
Start: 2017-11-27 | End: 2017-12-07

## 2017-11-27 NOTE — DISCHARGE INSTRUCTIONS
Tylenol or Motrin for fevers/pain  Saline spray for congestion you may use Mucinex for cough and congestion increase, fluids follow-up with the family doctor  Return to the emergency department for worsening symptoms  Acute Bronchitis   WHAT YOU SHOULD KNOW:   Acute bronchitis is swelling and irritation in the air passages of your lungs  This irritation may cause you to cough or have other breathing problems  Acute bronchitis often starts because of another viral illness, such as a cold or the flu  The illness spreads from your nose and throat to your windpipe and airways  Bronchitis is often called a chest cold  Acute bronchitis lasts about 2 weeks and is usually not a serious illness  AFTER YOU LEAVE:   Medicines:   · Ibuprofen or acetaminophen:  These medicines help lower a fever  They are available without a doctor's order  Ask your healthcare provider which medicine is right for you  Ask how much to take and how often to take it  Follow directions  These medicines can cause stomach bleeding if not taken correctly  Ibuprofen can cause kidney damage  Do not take ibuprofen if you have kidney disease, an ulcer, or allergies to aspirin  Acetaminophen can cause liver damage  Do not drink alcohol if you take acetaminophen  · Cough medicine: This medicine helps loosen mucus in your lungs and make it easier to cough up  This can help you breathe easier  · Inhalers: You may need one or more inhalers to help you breathe easier and cough less  An inhaler gives your medicine in a mist form so that you can breathe it into your lungs  Ask your healthcare provider to show you how to use your inhaler correctly  · Steroid medicine:  Steroid medicine helps open your air passages so you can breathe easier  · Take your medicine as directed  Call your healthcare provider if you think your medicine is not helping or if you have side effects  Tell him if you are allergic to any medicine   Keep a list of the medicines, vitamins, and herbs you take  Include the amounts, and when and why you take them  Bring the list or the pill bottles to follow-up visits  Carry your medicine list with you in case of an emergency  How to use an inhaler:   · Shake the inhaler well to make sure you get the correct amount of medicine per puff  Remove the cover from your inhaler's mouthpiece  If you are using a spacer, connect your inhaler to the flat end of the spacer  · Exhale as much air from your lungs as you can  Put the mouthpiece in your mouth past your front teeth and rest it on the top of your tongue  Do not block the mouthpiece opening with your tongue  · Breathe in through your mouth at a slow and steady rate  As you do this, press the inhaler to release the puff of medicine  Finish breathing in slowly and deeply as you inhale the medicine  When your lungs are full, hold your breath for 10 seconds  Then breathe out slowly through puckered lips or through your nose  · If you need to take more puffs, wait at least 1 minute between each puff  · Rinse your mouth with water after you use the inhaler  This may keep you from getting a mouth infection or irritation  · Follow the instructions that come with your inhaler to clean it  You should clean your inhaler at least once a week  Ways to care for yourself:   · Avoid alcohol:  Alcohol dulls your urge to cough and sneeze  When you have bronchitis, you need to be able to cough and sneeze to clear your air passages  Alcohol also causes your body to lose fluid  This can make the mucus in your lungs thicker and harder to cough up  · Avoid irritants in the air:  Do not smoke or allow others to smoke around you  Avoid chemicals, fumes, and dust  Wear a face mask if you must work around dust or fumes  Stay inside on days when air pollution levels are high  If you have allergies, stay inside when pollen counts are high  Avoid aerosol products   This includes spray-on deodorant, bug spray, and hair spray  · Drink more liquids:  Most people should drink at least 8 eight-ounce cups of water a day  You may need to drink more liquids when you have acute bronchitis  Liquids help keep your air passages moist and help you cough up mucus  · Get more rest:  You may feel like resting more  Slowly start to do more each day  Rest when you feel it is needed  · Eat healthy foods:  Eat a variety healthy foods every day  Your diet should include fruits, vegetables, breads, and protein (such as chicken, fish, and beans)  Dairy products (such as milk, cheese, and ice cream) can sometimes increase the amount of mucus your body makes  Ask if you should decrease your intake of dairy products  · Use a humidifier:  Use a cool mist humidifier to increase air moisture in your home  This may make it easier for you to breathe and help decrease your cough  Decrease your risk of acute bronchitis:   · Get the vaccinations you need:  Ask your healthcare provider if you should get vaccinated against the flu or pneumonia  · Avoid things that may irritate your lungs:  Stay inside or cover your mouth and nose with a scarf when you are outside during cold weather  You should also stay inside on days when air pollution levels are high  If you have allergies, stay inside when pollen counts are high  Avoid using aerosol products in your home  This includes spray-on deodorant, bug spray, and hair spray  · Avoid the spread of germs:        [facility] your hands often with soap and water  Carry germ-killing gel with you  You can use the gel to clean your hands when there is no soap and water available  ¨ Do not touch your eyes, nose, or mouth unless you have washed your hands first     ¨ Always cover your mouth when you cough  Cough into a tissue or your shirtsleeve so you do not spread germs from your hands  ¨ Try to avoid people who have a cold or the flu   If you are sick, stay away from others as much as possible  Follow up with your healthcare provider as directed:  Write down questions you have so you will remember to ask them during your follow-up visits  Contact your healthcare provider if:   · You have a fever  · Your skin becomes itchy or you have a rash after you take your medicine  · Your breathing problems do not go away or get worse  · Your cough does not get better with treatment  · You cough up blood  · You have questions or concerns about your condition or care  Seek care immediately or call 911 if:   · You faint  · Your lips or fingernails turn blue  · You feel like you are not getting enough air when you breathe  · You have swelling of your lips, tongue, or throat that makes it hard to breathe or swallow  © 2014 [address] is for End User's use only and may not be sold, redistributed or otherwise used for commercial purposes  All illustrations and images included in CareNotes® are the copyrighted property of [employer] M [employer]  or [address]  The above information is an  only  It is not intended as medical advice for individual conditions or treatments  Talk to your doctor, nurse or pharmacist before following any medical regimen to see if it is safe and effective for you

## 2017-11-27 NOTE — ED PROVIDER NOTES
History  Chief Complaint   Patient presents with    URI     productive cough x 3 days, with yellow sputum, subjective fevers, hoarse voice  Patient presents to the emergency department for upper respiratory symptoms  She has been coughing for 2 weeks seen initially seen by [facility] told to take allergy medication  Patient has a history of asthma and is using her albuterol inhaler  She is also using DayQuil and NyQuil without relief  Patient states her cough has been getting worse especially over the past 3-4 days and now has become productive  Patient also has had a couple episodes of post-tussive emesis  +smoking hx - no smoking x 5 days  Reports trying to quit  Prior to Admission Medications   Prescriptions Last Dose Informant Patient Reported? Taking?    DULoxetine (CYMBALTA) 30 mg delayed release capsule   No No   Sig: Take 1 capsule by mouth daily At 9AM   LORazepam (ATIVAN) 1 mg tablet   No No   Sig: Take 1 tablet by mouth 3 (three) times a day At 9AM, 4PM, and 9PM   cetirizine (ZyrTEC) 10 mg tablet   No No   Sig: Take 1 tablet by mouth daily At 9AM   levothyroxine 50 mcg tablet   No No   Sig: Take 1 tablet by mouth daily before breakfast   lithium carbonate (LITHOBID) 300 mg CR tablet   No No   Sig: Take 2 tablets by mouth daily after dinner   lithium carbonate (LITHOBID) 450 mg CR tablet   No No   Sig: Take 1 tablet by mouth every morning At 9AM   melatonin 3 mg   No No   Sig: Take 2 tablets by mouth daily at bedtime   nicotine (NICODERM CQ) 14 mg/24hr TD 24 hr patch   No No   Sig: Place 1 patch on the skin daily   pantoprazole (PROTONIX) 40 mg tablet   No No   Sig: Take 1 tablet by mouth daily in the early morning Before breakfast   polyethylene glycol (MIRALAX) 17 g packet   No No   Sig: Take 17 g by mouth daily   prazosin (MINIPRESS) 1 mg capsule   No No   Sig: Take 1 capsule by mouth daily at bedtime   risperiDONE (RisperDAL) 2 mg tablet   No No   Sig: Take 1 tablet by mouth daily at bedtime   topiramate (TOPAMAX) 100 mg tablet   No No   Sig: Take 1 tablet by mouth daily At 9AM   topiramate (TOPAMAX) 200 MG tablet   No No   Sig: Take 1 tablet by mouth daily after dinner      Facility-Administered Medications: None       Past Medical History:   Diagnosis Date    Anxiety     Asthma     Bipolar disorder ([facility] [address] )     Depression     Disease of thyroid gland     Endometriosis     Psychiatric illness     Psychosis     PTSD (post-traumatic stress disorder) 6/6/2017    Self-injurious behavior     Suicide attempt        Past Surgical History:   Procedure Laterality Date    ABDOMINAL SURGERY      HERNIA REPAIR         History reviewed  No pertinent family history  I have reviewed and agree with the history as documented  Social History   Substance Use Topics    Smoking status: Current Some Day Smoker     Packs/day: 0 50     Types: Cigarettes    Smokeless tobacco: Never Used    Alcohol use Yes      Comment: occasionally drinks socially        Review of Systems   All other systems reviewed and are negative  Physical Exam  ED Triage Vitals [11/27/17 1652]   Temperature Pulse Respirations Blood Pressure SpO2   97 5 °F (36 4 °C) 71 18 126/68 98 %      Temp Source Heart Rate Source Patient Position - Orthostatic VS BP Location FiO2 (%)   Temporal Monitor -- Right arm --      Pain Score       3           Orthostatic Vital Signs  Vitals:    11/27/17 1652   BP: 126/68   Pulse: 71       Physical Exam   Constitutional: She appears well-developed and well-nourished  HENT:   Head: Normocephalic  Right Ear: External ear normal    Left Ear: External ear normal    Mouth/Throat: Oropharynx is clear and moist    Eyes: Conjunctivae and EOM are normal    Neck: Neck supple  Cardiovascular: Normal rate, regular rhythm and normal heart sounds  Pulmonary/Chest: Effort normal and breath sounds normal  She exhibits tenderness  Abdominal: Soft   Bowel sounds are normal    Musculoskeletal: Normal range of motion  Neurological: She is alert  Skin: Skin is warm  Psychiatric: She has a normal mood and affect  Her behavior is normal    Nursing note and vitals reviewed  ED Medications  Medications - No data to display    Diagnostic Studies  Results Reviewed     None                 XR chest 2 views   ED Interpretation by Moe Dorado PA-C (11/27 1735)   ASHLEY                 Procedures  Procedures       Phone Contacts  ED Phone Contact    ED Course  ED Course as of Nov 27 1745 Mon Nov 27, 2017   1743 PAPDMP - codiene filled 12/2016  MDM  Number of Diagnoses or Management Options  Acute bronchitis: new and requires workup  Diagnosis management comments: Will x-ray to evaluate for pneumonia       Amount and/or Complexity of Data Reviewed  Independent visualization of images, tracings, or specimens: yes    Risk of Complications, Morbidity, and/or Mortality  General comments: Pt asking for cough meds to sleep   Instructions reviewed  Patient Progress  Patient progress: stable    CritCare Time    Disposition  Final diagnoses:   Acute bronchitis     Time reflects when diagnosis was documented in both MDM as applicable and the Disposition within this note     Time User Action Codes Description Comment    11/27/2017  5:35 PM Maddy Werner [[id number]] Acute bronchitis       ED Disposition     ED Disposition Condition Comment    Discharge  Clarisa Encarnacion discharge to home/self care      Condition at discharge: Good        Follow-up Information     Follow up With Specialties Details Why Contact Info    Elaine Ramirez DO    [facility] [address]  [phone]          Discharge Medication List as of 11/27/2017  5:36 PM      START taking these medications    Details   albuterol (PROVENTIL HFA,VENTOLIN HFA) 90 mcg/act inhaler Inhale 2 puffs every 6 (six) hours as needed for wheezing for up to 10 days, Starting Mon 11/27/2017, Until Thu 12/7/2017, Normal      azithromycin (ZITHROMAX) 250 mg tablet 2 PO QD X 1 day then 1 PO QD, Print         CONTINUE these medications which have NOT CHANGED    Details   cetirizine (ZyrTEC) 10 mg tablet Take 1 tablet by mouth daily At 9AM, Starting Tue 6/13/2017, Print      DULoxetine (CYMBALTA) 30 mg delayed release capsule Take 1 capsule by mouth daily At 9AM, Starting Tue 6/13/2017, Print      levothyroxine 50 mcg tablet Take 1 tablet by mouth daily before breakfast, Starting Tue 6/13/2017, Print      !! lithium carbonate (LITHOBID) 300 mg CR tablet Take 2 tablets by mouth daily after dinner, Starting Tue 6/13/2017, Print      !! lithium carbonate (LITHOBID) 450 mg CR tablet Take 1 tablet by mouth every morning At 9AM, Starting Tue 6/13/2017, Print      LORazepam (ATIVAN) 1 mg tablet Take 1 tablet by mouth 3 (three) times a day At 9AM, 4PM, and 9PM, Starting Tue 6/13/2017, Print      melatonin 3 mg Take 2 tablets by mouth daily at bedtime, Starting Tue 6/13/2017, Print      nicotine (NICODERM CQ) 14 mg/24hr TD 24 hr patch Place 1 patch on the skin daily, Starting Tue 6/13/2017, Print      pantoprazole (PROTONIX) 40 mg tablet Take 1 tablet by mouth daily in the early morning Before breakfast, Starting Tue 6/13/2017, Print      polyethylene glycol (MIRALAX) 17 g packet Take 17 g by mouth daily, Starting Tue 6/13/2017, Print      prazosin (MINIPRESS) 1 mg capsule Take 1 capsule by mouth daily at bedtime, Starting Tue 6/13/2017, Print      risperiDONE (RisperDAL) 2 mg tablet Take 1 tablet by mouth daily at bedtime, Starting Tue 6/13/2017, Print      !! topiramate (TOPAMAX) 100 mg tablet Take 1 tablet by mouth daily At 9AM, Starting Tue 6/13/2017, No Print      !! topiramate (TOPAMAX) 200 MG tablet Take 1 tablet by mouth daily after dinner, Starting Tue 6/13/2017, No Print       !! - Potential duplicate medications found  Please discuss with provider  No discharge procedures on file      ED Provider  Electronically Signed by           Dafne Sheets PA-C  11/27/17 1734       Maddy Werner PA-C  11/27/17 3225

## 2018-02-08 ENCOUNTER — HOSPITAL ENCOUNTER (EMERGENCY)
Facility: HOSPITAL | Age: 43
Discharge: HOME | End: 2018-02-08
Admitting: EMERGENCY MEDICINE
Payer: COMMERCIAL

## 2018-02-08 ENCOUNTER — APPOINTMENT (EMERGENCY)
Dept: RADIOLOGY | Facility: HOSPITAL | Age: 43
End: 2018-02-08
Payer: COMMERCIAL

## 2018-02-08 VITALS
BODY MASS INDEX: 39.32 KG/M2 | RESPIRATION RATE: 20 BRPM | DIASTOLIC BLOOD PRESSURE: 65 MMHG | WEIGHT: 215 LBS | HEART RATE: 82 BPM | TEMPERATURE: 98.9 F | OXYGEN SATURATION: 100 % | SYSTOLIC BLOOD PRESSURE: 112 MMHG

## 2018-02-08 DIAGNOSIS — F31.60 BIPOLAR 1 DISORDER, MIXED (HCC): ICD-10-CM

## 2018-02-08 DIAGNOSIS — R05.9 COUGH: Primary | ICD-10-CM

## 2018-02-08 DIAGNOSIS — F41.1 GENERALIZED ANXIETY DISORDER: ICD-10-CM

## 2018-02-08 PROCEDURE — 99283 EMERGENCY DEPT VISIT LOW MDM: CPT

## 2018-02-08 PROCEDURE — 71046 X-RAY EXAM CHEST 2 VIEWS: CPT

## 2018-02-08 RX ORDER — ACETAMINOPHEN 500 MG
500 TABLET ORAL EVERY 6 HOURS PRN
Qty: 30 TABLET | Refills: 0 | Status: SHIPPED | OUTPATIENT
Start: 2018-02-08 | End: 2018-09-19

## 2018-02-08 RX ORDER — DEXAMETHASONE 4 MG/1
8 TABLET ORAL ONCE
Status: COMPLETED | OUTPATIENT
Start: 2018-02-08 | End: 2018-02-08

## 2018-02-08 RX ORDER — DEXAMETHASONE 6 MG/1
6 TABLET ORAL
Qty: 9 TABLET | Refills: 0 | Status: SHIPPED | OUTPATIENT
Start: 2018-02-08 | End: 2018-09-19

## 2018-02-08 RX ORDER — IBUPROFEN 400 MG/1
400 TABLET ORAL EVERY 6 HOURS PRN
Qty: 30 TABLET | Refills: 0 | Status: SHIPPED | OUTPATIENT
Start: 2018-02-08 | End: 2018-09-19

## 2018-02-08 RX ORDER — DEXAMETHASONE 4 MG/1
6 TABLET ORAL ONCE
Status: DISCONTINUED | OUTPATIENT
Start: 2018-02-08 | End: 2018-02-08

## 2018-02-08 RX ORDER — LORATADINE 10 MG/1
10 TABLET ORAL DAILY
Qty: 6 TABLET | Refills: 0 | Status: SHIPPED | OUTPATIENT
Start: 2018-02-08 | End: 2018-09-19

## 2018-02-08 RX ORDER — ALBUTEROL SULFATE 90 UG/1
2 AEROSOL, METERED RESPIRATORY (INHALATION) ONCE
Status: COMPLETED | OUTPATIENT
Start: 2018-02-08 | End: 2018-02-08

## 2018-02-08 RX ORDER — GUAIFENESIN 600 MG
1200 TABLET, EXTENDED RELEASE 12 HR ORAL EVERY 12 HOURS SCHEDULED
Qty: 10 TABLET | Refills: 0 | Status: SHIPPED | OUTPATIENT
Start: 2018-02-08 | End: 2018-09-19

## 2018-02-08 RX ADMIN — DEXAMETHASONE 8 MG: 4 TABLET ORAL at 22:38

## 2018-02-08 RX ADMIN — ALBUTEROL SULFATE 2 PUFF: 90 AEROSOL, METERED RESPIRATORY (INHALATION) at 22:37

## 2018-02-09 NOTE — DISCHARGE INSTRUCTIONS
Acute Cough   AMBULATORY CARE:   An acute cough  can last up to 3 weeks  Common causes of an acute cough include a cold, allergies, or a lung infection  Seek care immediately if:   · You have trouble breathing or feel short of breath  · You cough up blood, or you see blood in your mucus  · You faint or feel weak or dizzy  · You have chest pain when you cough or take a deep breath  · You have new wheezing  Contact your healthcare provider if:   · You have a fever  · Your cough lasts longer than 4 weeks  · Your symptoms do not improve with treatment  · You have questions or concerns about your condition or care  Treatment:  An acute cough usually goes away on its own  Ask your healthcare provider about medicines you can take to decrease your cough  You may need medicine to stop the cough, decrease swelling in your airways, or help open your airways  Medicine may also be given to help you cough up mucus  If you have an infection caused by bacteria, you may need antibiotics  Manage your symptoms:   · Do not smoke and stay away from others who smoke  Nicotine and other chemicals in cigarettes and cigars can cause lung damage and make your cough worse  Ask your healthcare provider for information if you currently smoke and need help to quit  E-cigarettes or smokeless tobacco still contain nicotine  Talk to your healthcare provider before you use these products  · Drink extra liquids as directed  Liquids will help thin and loosen mucus so you can cough it up  Liquids will also help prevent dehydration  Examples of good liquids to drink include water, fruit juice, and broth  Do not drink liquids that contain caffeine  Caffeine can increase your risk for dehydration  Ask your healthcare provider how much liquid to drink each day  · Rest as directed  Do not do activities that make your cough worse, such as exercise  · Use a humidifier or vaporizer    Use a cool mist humidifier or a vaporizer to increase air moisture in your home  This may make it easier for you to breathe and help decrease your cough  · Eat 2 to 5 mL of honey 2 times each day  Honey can help thin mucus and decrease your cough  · Use cough drops or lozenges  These can help decrease throat irritation and your cough  Follow up with your healthcare provider as directed:  Write down your questions so you remember to ask them during your visits  © 2017 [facility] Information is for End User's use only and may not be sold, redistributed or otherwise used for commercial purposes  All illustrations and images included in CareNotes® are the copyrighted property of [employer]  or [employer]  The above information is an  only  It is not intended as medical advice for individual conditions or treatments  Talk to your doctor, nurse or pharmacist before following any medical regimen to see if it is safe and effective for you

## 2018-02-09 NOTE — ED PROVIDER NOTES
History  Chief Complaint   Patient presents with    Cough     yellow mucus with cough for one week  "chest burning with the coughing "       Cough   Cough characteristics:  Non-productive  Sputum characteristics:  Yellow  Severity:  Mild  Onset quality:  Gradual  Duration:  1 week  Timing:  Constant  Progression:  Worsening  Chronicity:  New  Smoker: yes    Context: upper respiratory infection    Relieved by:  Nothing  Worsened by:  Nothing  Associated symptoms: sinus congestion    Associated symptoms: no chest pain and no chills        Prior to Admission Medications   Prescriptions Last Dose Informant Patient Reported? Taking?    DULoxetine (CYMBALTA) 30 mg delayed release capsule   No No   Sig: Take 1 capsule by mouth daily At 9AM   LORazepam (ATIVAN) 1 mg tablet   No No   Sig: Take 1 tablet by mouth 3 (three) times a day At 9AM, 4PM, and 9PM   azithromycin (ZITHROMAX) 250 mg tablet   No No   Si PO QD X 1 day then 1 PO QD   cetirizine (ZyrTEC) 10 mg tablet   No No   Sig: Take 1 tablet by mouth daily At 9AM   levothyroxine 50 mcg tablet   No No   Sig: Take 1 tablet by mouth daily before breakfast   lithium carbonate (LITHOBID) 300 mg CR tablet   No No   Sig: Take 2 tablets by mouth daily after dinner   lithium carbonate (LITHOBID) 450 mg CR tablet   No No   Sig: Take 1 tablet by mouth every morning At 9AM   melatonin 3 mg   No No   Sig: Take 2 tablets by mouth daily at bedtime   nicotine (NICODERM CQ) 14 mg/24hr TD 24 hr patch   No No   Sig: Place 1 patch on the skin daily   pantoprazole (PROTONIX) 40 mg tablet   No No   Sig: Take 1 tablet by mouth daily in the early morning Before breakfast   polyethylene glycol (MIRALAX) 17 g packet   No No   Sig: Take 17 g by mouth daily   prazosin (MINIPRESS) 1 mg capsule   No No   Sig: Take 1 capsule by mouth daily at bedtime   risperiDONE (RisperDAL) 2 mg tablet   No No   Sig: Take 1 tablet by mouth daily at bedtime   topiramate (TOPAMAX) 100 mg tablet   No No   Sig: Take 1 tablet by mouth daily At 9AM   topiramate (TOPAMAX) 200 MG tablet   No No   Sig: Take 1 tablet by mouth daily after dinner      Facility-Administered Medications: None       Past Medical History:   Diagnosis Date    Anxiety     Asthma     Bipolar disorder ([address] )     Depression     Disease of thyroid gland     Endometriosis     Psychiatric illness     Psychosis     PTSD (post-traumatic stress disorder) 6/6/2017    Self-injurious behavior     Suicide attempt        Past Surgical History:   Procedure Laterality Date    ABDOMINAL SURGERY      HERNIA REPAIR         History reviewed  No pertinent family history  I have reviewed and agree with the history as documented  Social History   Substance Use Topics    Smoking status: Current Some Day Smoker     Packs/day: 0 50     Types: Cigarettes    Smokeless tobacco: Never Used    Alcohol use Yes      Comment: occasionally drinks socially        Review of Systems   Constitutional: Negative for chills  HENT: Positive for sinus pressure  Eyes: Negative for pain  Respiratory: Positive for cough  Cardiovascular: Negative for chest pain  Gastrointestinal: Negative for abdominal pain  Genitourinary: Negative for dysuria  Musculoskeletal: Negative for back pain  Allergic/Immunologic: Negative for food allergies  Neurological: Negative for light-headedness  Hematological: Negative for adenopathy  Psychiatric/Behavioral: Positive for behavioral problems  Physical Exam  ED Triage Vitals [02/08/18 2007]   Temperature Pulse Respirations Blood Pressure SpO2   98 9 °F (37 2 °C) 82 20 112/65 100 %      Temp src Heart Rate Source Patient Position - Orthostatic VS BP Location FiO2 (%)   -- Monitor -- Right arm --      Pain Score       5           Orthostatic Vital Signs  Vitals:    02/08/18 2007   BP: 112/65   Pulse: 82       Physical Exam   Constitutional: She appears well-developed and well-nourished  No distress     HENT:   Head: Normocephalic and atraumatic  Eyes: Conjunctivae are normal    Neck: Neck supple  No JVD present  Cardiovascular: Normal rate and regular rhythm  Pulmonary/Chest: Effort normal    Abdominal: Soft  Musculoskeletal: She exhibits no edema or deformity  Neurological: She is alert  Skin: Skin is warm  Capillary refill takes less than 2 seconds  She is not diaphoretic  No erythema  Psychiatric: She has a normal mood and affect  ED Medications  Medications   albuterol (PROVENTIL HFA,VENTOLIN HFA) inhaler 2 puff (2 puffs Inhalation Given 2/8/18 2237)   dexamethasone (DECADRON) tablet 8 mg (8 mg Oral Given 2/8/18 2238)       Diagnostic Studies  Results Reviewed     None                 XR chest 2 views   ED Interpretation by Deneen Johnson PA-C (02/08 2249)   No change when compared to prior  No evidence of acute process appreciated  Final Result by Hawa Quiñones MD (02/09 0086)      No active pulmonary disease  Workstation performed: [id number]                    Procedures  Procedures       Phone Contacts  ED Phone Contact    ED Course  ED Course                                MDM  Number of Diagnoses or Management Options  Bipolar 1 disorder, mixed ([facility] [address] ):   Cough:   Generalized anxiety disorder:   Diagnosis management comments: 43-year-old female presents emergency department for nonproductive cough for about one week  Patient denies home management  Patient with benign exam at this time will treat conservatively  Educated patient of diagnosis and home management  Encourage close follow-up with primary care  Encourage patient take medicines as prescribed  Educated patient persistent or worsening signs symptoms and to follow up with primary care and/or return to the emergency department  Patient admits understanding and agreement         Amount and/or Complexity of Data Reviewed  Tests in the radiology section of CPT®: ordered and reviewed      CritCare Time    Disposition  Final diagnoses:   Cough   Bipolar 1 disorder, mixed ([address] )   Generalized anxiety disorder     Time reflects when diagnosis was documented in both MDM as applicable and the Disposition within this note     Time User Action Codes Description Comment    2/8/2018 10:30 PM Baylee Castelan Add [R05] Cough     2/8/2018 10:30 PM Baylee Hookerman Add [F31 60] Bipolar 1 disorder, mixed ([facility] [address] )     2/8/2018 10:30 PM Baylee Castelan Add [F41 1] Generalized anxiety disorder       ED Disposition     ED Disposition Condition Comment    Discharge  Storm Caper discharge to home/self care      Condition at discharge: Stable        Follow-up Information     Follow up With Specialties Details Why [address] [facility] Emergency Department Emergency Medicine   [address] [facility]  [phone] [place] ED, [address] [facility] , [place], [place], [address] [place] [address],   In 1 week  [place] [address]  [phone]           Discharge Medication List as of 2/8/2018 11:47 PM      START taking these medications    Details   acetaminophen (TYLENOL) 500 mg tablet Take 1 tablet (500 mg total) by mouth every 6 (six) hours as needed for mild pain, moderate pain, headaches or fever, Starting u 2/8/2018, Normal      dexamethasone (DECADRON) 6 mg tablet Take 1 tablet (6 mg total) by mouth daily with breakfast, Starting u 2/8/2018, Normal      guaiFENesin (MUCINEX) 600 mg 12 hr tablet Take 2 tablets (1,200 mg total) by mouth every 12 (twelve) hours, Starting Thu 2/8/2018, Normal      ibuprofen (MOTRIN) 400 mg tablet Take 1 tablet (400 mg total) by mouth every 6 (six) hours as needed for mild pain, moderate pain or fever, Starting Thu 2/8/2018, Normal      loratadine (CLARITIN) 10 mg tablet Take 1 tablet (10 mg total) by mouth daily, Starting Thu 2/8/2018, Normal         CONTINUE these medications which have NOT CHANGED    Details   azithromycin (ZITHROMAX) 250 mg tablet 2 PO QD X 1 day then 1 PO QD, Print      cetirizine (ZyrTEC) 10 mg tablet Take 1 tablet by mouth daily At 9AM, Starting Tue 6/13/2017, Print      DULoxetine (CYMBALTA) 30 mg delayed release capsule Take 1 capsule by mouth daily At 9AM, Starting Tue 6/13/2017, Print      levothyroxine 50 mcg tablet Take 1 tablet by mouth daily before breakfast, Starting Tue 6/13/2017, Print      !! lithium carbonate (LITHOBID) 300 mg CR tablet Take 2 tablets by mouth daily after dinner, Starting Tue 6/13/2017, Print      !! lithium carbonate (LITHOBID) 450 mg CR tablet Take 1 tablet by mouth every morning At 9AM, Starting Tue 6/13/2017, Print      LORazepam (ATIVAN) 1 mg tablet Take 1 tablet by mouth 3 (three) times a day At 9AM, 4PM, and 9PM, Starting Tue 6/13/2017, Print      melatonin 3 mg Take 2 tablets by mouth daily at bedtime, Starting Tue 6/13/2017, Print      nicotine (NICODERM CQ) 14 mg/24hr TD 24 hr patch Place 1 patch on the skin daily, Starting Tue 6/13/2017, Print      pantoprazole (PROTONIX) 40 mg tablet Take 1 tablet by mouth daily in the early morning Before breakfast, Starting Tue 6/13/2017, Print      polyethylene glycol (MIRALAX) 17 g packet Take 17 g by mouth daily, Starting Tue 6/13/2017, Print      prazosin (MINIPRESS) 1 mg capsule Take 1 capsule by mouth daily at bedtime, Starting Tue 6/13/2017, Print      risperiDONE (RisperDAL) 2 mg tablet Take 1 tablet by mouth daily at bedtime, Starting Tue 6/13/2017, Print      !! topiramate (TOPAMAX) 100 mg tablet Take 1 tablet by mouth daily At 9AM, Starting Tue 6/13/2017, No Print      !! topiramate (TOPAMAX) 200 MG tablet Take 1 tablet by mouth daily after dinner, Starting Tue 6/13/2017, No Print       !! - Potential duplicate medications found  Please discuss with provider  No discharge procedures on file      ED Provider  Electronically Signed by           Andrea Ramirez PA-C  02/10/18 9554

## 2018-02-09 NOTE — ED NOTES
Pt given spacer with inhaler, instructed on use  Pt verbalizes understanding, return demonstration complete        Carlee Givens, GARRETT  02/08/18 5363

## 2018-09-19 ENCOUNTER — HOSPITAL ENCOUNTER (EMERGENCY)
Facility: HOSPITAL | Age: 43
Discharge: HOME | End: 2018-09-19
Attending: EMERGENCY MEDICINE | Admitting: EMERGENCY MEDICINE
Payer: COMMERCIAL

## 2018-09-19 ENCOUNTER — APPOINTMENT (EMERGENCY)
Dept: CT IMAGING | Facility: HOSPITAL | Age: 43
End: 2018-09-19
Payer: COMMERCIAL

## 2018-09-19 VITALS
RESPIRATION RATE: 16 BRPM | HEART RATE: 86 BPM | BODY MASS INDEX: 42.03 KG/M2 | HEIGHT: 62 IN | DIASTOLIC BLOOD PRESSURE: 78 MMHG | TEMPERATURE: 97.6 F | WEIGHT: 228.4 LBS | OXYGEN SATURATION: 97 % | SYSTOLIC BLOOD PRESSURE: 122 MMHG

## 2018-09-19 DIAGNOSIS — G43.909 MIGRAINE: ICD-10-CM

## 2018-09-19 DIAGNOSIS — R51.9 HEADACHE: Primary | ICD-10-CM

## 2018-09-19 PROCEDURE — 70450 CT HEAD/BRAIN W/O DYE: CPT

## 2018-09-19 PROCEDURE — 96365 THER/PROPH/DIAG IV INF INIT: CPT

## 2018-09-19 PROCEDURE — 96367 TX/PROPH/DG ADDL SEQ IV INF: CPT

## 2018-09-19 PROCEDURE — 96375 TX/PRO/DX INJ NEW DRUG ADDON: CPT

## 2018-09-19 PROCEDURE — 99284 EMERGENCY DEPT VISIT MOD MDM: CPT

## 2018-09-19 RX ORDER — MAGNESIUM SULFATE HEPTAHYDRATE 40 MG/ML
INJECTION, SOLUTION INTRAVENOUS
Status: COMPLETED
Start: 2018-09-19 | End: 2018-09-19

## 2018-09-19 RX ORDER — METOCLOPRAMIDE HYDROCHLORIDE 5 MG/ML
10 INJECTION INTRAMUSCULAR; INTRAVENOUS ONCE
Status: COMPLETED | OUTPATIENT
Start: 2018-09-19 | End: 2018-09-19

## 2018-09-19 RX ORDER — MAGNESIUM SULFATE HEPTAHYDRATE 40 MG/ML
2 INJECTION, SOLUTION INTRAVENOUS ONCE
Status: COMPLETED | OUTPATIENT
Start: 2018-09-19 | End: 2018-09-19

## 2018-09-19 RX ORDER — DIPHENHYDRAMINE HYDROCHLORIDE 50 MG/ML
INJECTION INTRAMUSCULAR; INTRAVENOUS
Status: COMPLETED
Start: 2018-09-19 | End: 2018-09-19

## 2018-09-19 RX ORDER — METOCLOPRAMIDE HYDROCHLORIDE 5 MG/ML
INJECTION INTRAMUSCULAR; INTRAVENOUS
Status: COMPLETED
Start: 2018-09-19 | End: 2018-09-19

## 2018-09-19 RX ORDER — BUTALBITAL, ACETAMINOPHEN AND CAFFEINE 50; 325; 40 MG/1; MG/1; MG/1
1 TABLET ORAL EVERY 4 HOURS PRN
Qty: 30 TABLET | Refills: 0 | Status: SHIPPED | OUTPATIENT
Start: 2018-09-19 | End: 2020-02-06

## 2018-09-19 RX ORDER — DIPHENHYDRAMINE HYDROCHLORIDE 50 MG/ML
25 INJECTION INTRAMUSCULAR; INTRAVENOUS ONCE
Status: COMPLETED | OUTPATIENT
Start: 2018-09-19 | End: 2018-09-19

## 2018-09-19 RX ORDER — ALPRAZOLAM 1 MG/1
1 TABLET ORAL 3 TIMES DAILY
COMMUNITY
Start: 2018-03-15 | End: 2019-04-19 | Stop reason: SDUPTHER

## 2018-09-19 RX ADMIN — MAGNESIUM SULFATE HEPTAHYDRATE 2 G: 40 INJECTION, SOLUTION INTRAVENOUS at 21:53

## 2018-09-19 RX ADMIN — DIPHENHYDRAMINE HYDROCHLORIDE 25 MG: 50 INJECTION INTRAMUSCULAR; INTRAVENOUS at 21:14

## 2018-09-19 RX ADMIN — METOCLOPRAMIDE HYDROCHLORIDE 10 MG: 5 INJECTION INTRAMUSCULAR; INTRAVENOUS at 21:15

## 2018-09-19 RX ADMIN — SODIUM CHLORIDE 1000 ML: 9 INJECTION, SOLUTION INTRAVENOUS at 21:14

## 2018-09-19 RX ADMIN — SODIUM CHLORIDE 500 MG: 9 INJECTION, SOLUTION INTRAVENOUS at 21:16

## 2018-09-19 RX ADMIN — DIPHENHYDRAMINE HYDROCHLORIDE 25 MG: 50 INJECTION, SOLUTION INTRAMUSCULAR; INTRAVENOUS at 21:14

## 2018-09-19 RX ADMIN — METOCLOPRAMIDE 10 MG: 5 INJECTION, SOLUTION INTRAMUSCULAR; INTRAVENOUS at 21:15

## 2018-09-20 NOTE — ED NOTES
Patient given pillow, lights dimmed for comfort  Magnesium and solu-medrol not compatible together, will admin separately       Ervin Villanueva RN  09/19/18 8198

## 2018-09-20 NOTE — DISCHARGE INSTRUCTIONS
Acute Headache, Ambulatory Care   GENERAL INFORMATION:   An acute headache  is pain or discomfort that starts suddenly and gets worse quickly  The cause of an acute headache may not be known  It may be triggered by stress, fatigue, hormones, food, or trauma  Common related symptoms include the following:   · Fever    · Sinus pressure    · Loss of memory    · Nausea or vomiting    · Problems with your vision, such as watery or red eyes, loss of vision, or pain in bright light    · Stiff neck    · Tenderness of the head and neck area    · Trouble staying awake, or being less alert than usual     · Weakness or less energy  Seek immediate care for the following symptoms:   · Severe pain    · A headache that occurs after a blow to the head, a fall, or other trauma     · Confusion or forgetfulness    · Numbness on one side of your face or body  Treatment for an acute headache  may include medicine to decrease pain  You may also need biofeedback or cognitive behavioral therapy  Ask your healthcare provider about these and other treatments for an acute headache  Manage my symptoms:   · Apply heat  on your head for 20 to 30 minutes every 2 hours for as many days as directed  Heat helps decrease pain and muscle spasms  You may alternate heat and ice  · Apply ice  on your head for 15 to 20 minutes every hour or as directed  Use an ice pack, or put crushed ice in a plastic bag  Cover it with a towel  Ice helps decrease pain  · Relax your muscles  Lie down in a comfortable position and close your eyes  Relax your muscles slowly  Start at your toes and work your way up your body  · Keep a record of your headaches  Write down when your headaches start and stop  Include your symptoms and what you were doing when the headache began  Record what you ate or drank for 24 hours before the headache started  Describe the pain and where it hurts  Keep track of what you did to treat your headache and whether it worked    Follow up with your healthcare provider as directed:  Bring your headache record with you when you see your healthcare provider  Write down your questions so you remember to ask them during your visits  CARE AGREEMENT:   You have the right to help plan your care  Learn about your health condition and how it may be treated  Discuss treatment options with your caregivers to decide what care you want to receive  You always have the right to refuse treatment  The above information is an  only  It is not intended as medical advice for individual conditions or treatments  Talk to your doctor, nurse or pharmacist before following any medical regimen to see if it is safe and effective for you  © 2014 [address] is for End User's use only and may not be sold, redistributed or otherwise used for commercial purposes  All illustrations and images included in CareNotes® are the copyrighted property of A D A M [employer]  or [employer]

## 2018-09-20 NOTE — ED TRIAGE NOTES
"I have a headache, it started 4 days ago, I think I have migraine headaches  I took a sinus headache pill this afternoon, took nothing else before that    I cannot take immetrex, last time I was here took that and it made the headache worse "

## 2018-09-24 NOTE — ED PROVIDER NOTES
History  Chief Complaint   Patient presents with    Headache     headache times 4 days       History provided by:  Patient  Headache   Pain location:  Generalized  Quality:  Dull  Radiates to:  Does not radiate  Onset quality:  Gradual  Duration:  4 days  Timing:  Constant  Progression:  Worsening  Chronicity:  New  Relieved by:  Nothing  Worsened by:  Nothing  Ineffective treatments:  None tried  Associated symptoms: no abdominal pain, no back pain, no blurred vision, no congestion, no cough, no diarrhea, no dizziness, no eye pain, no fever, no myalgias, no nausea, no neck pain, no neck stiffness, no numbness, no paresthesias, no photophobia, no sore throat, no swollen glands, no syncope, no tingling and no weakness        Prior to Admission Medications   Prescriptions Last Dose Informant Patient Reported? Taking? ALPRAZolam (XANAX) 1 mg tablet   Yes Yes   Sig: Take 1 mg by mouth Three times a day      Facility-Administered Medications: None       Past Medical History:   Diagnosis Date    Anxiety     Asthma     Bipolar disorder ([facility] [address] )     Depression     Disease of thyroid gland     Endometriosis     Psychiatric illness     Psychosis     PTSD (post-traumatic stress disorder) 6/6/2017    Self-injurious behavior     Suicide attempt [facility])        Past Surgical History:   Procedure Laterality Date    ABDOMINAL SURGERY      HERNIA REPAIR         History reviewed  No pertinent family history  I have reviewed and agree with the history as documented  Social History   Substance Use Topics    Smoking status: Current Some Day Smoker     Packs/day: 0 50     Types: Cigarettes    Smokeless tobacco: Never Used    Alcohol use No        Review of Systems   Constitutional: Negative for chills and fever  HENT: Negative for congestion, rhinorrhea, sore throat and trouble swallowing  Eyes: Negative for blurred vision, photophobia and pain     Respiratory: Negative for cough, shortness of breath, wheezing and stridor  Cardiovascular: Negative for chest pain, leg swelling and syncope  Gastrointestinal: Negative for abdominal pain, diarrhea and nausea  Endocrine: Negative for polyuria  Genitourinary: Negative for dysuria, flank pain and urgency  Musculoskeletal: Negative for back pain, joint swelling, myalgias, neck pain and neck stiffness  Skin: Negative for rash  Allergic/Immunologic: Negative for immunocompromised state  Neurological: Positive for headaches  Negative for dizziness, syncope, weakness, numbness and paresthesias  Psychiatric/Behavioral: Negative for confusion and suicidal ideas  All other systems reviewed and are negative  Physical Exam  Physical Exam   Constitutional: She is oriented to person, place, and time  She appears well-developed and well-nourished  HENT:   Head: Normocephalic and atraumatic  Eyes: EOM are normal  Pupils are equal, round, and reactive to light  Neck: Normal range of motion  Neck supple  Cardiovascular: Normal rate and regular rhythm  Exam reveals no friction rub  No murmur heard  Pulmonary/Chest: Breath sounds normal  No respiratory distress  She has no wheezes  She has no rales  Abdominal: Soft  Bowel sounds are normal  She exhibits no distension  There is no tenderness  Musculoskeletal: Normal range of motion  She exhibits no edema or tenderness  Neurological: She is alert and oriented to person, place, and time  GCS 15  AAOx4  No focal neuro deficits  CN II-XII intact  PERRL  EOMI  Crescencio Hinton No pronator drift   strength 5/5 bilaterally  B/L UE strength 5/5 throughout  Finger to nose normal  Cerebellar function normal  Ambulates without difficulty  B/L LE strength 5/5 throughout  Gross sensation to b/l upper and lower extremities intact  Skin: Skin is warm  No rash noted  Psychiatric: She has a normal mood and affect  Nursing note and vitals reviewed        Vital Signs  ED Triage Vitals [09/19/18 2029]   Temperature Pulse Respirations Blood Pressure SpO2   97 6 °F (36 4 °C) 88 18 156/100 99 %      Temp Source Heart Rate Source Patient Position - Orthostatic VS BP Location FiO2 (%)   Temporal Monitor Sitting Left arm --      Pain Score       9           Vitals:    09/19/18 2029 09/19/18 2239   BP: 156/100 122/78   Pulse: 88 86   Patient Position - Orthostatic VS: Sitting Sitting       Visual Acuity      ED Medications  Medications   sodium chloride 0 9 % bolus 1,000 mL (0 mL Intravenous Stopped 9/19/18 2239)   diphenhydrAMINE (BENADRYL) injection 25 mg (25 mg Intravenous Given 9/19/18 2114)   metoclopramide (REGLAN) injection 10 mg (10 mg Intravenous Given 9/19/18 2115)   methylPREDNISolone sodium succinate (Solu-MEDROL) 500 mg in sodium chloride 0 9 % 250 mL IVPB (0 mg Intravenous Stopped 9/19/18 2153)   magnesium sulfate 2 g/50 mL IVPB (premix) 2 g (0 g Intravenous Stopped 9/19/18 2239)       Diagnostic Studies  Results Reviewed     None                 CT head wo contrast   Final Result by Oanh Jalloh DO (09/19 2130)   No acute intracranial abnormality  Workstation performed: [id number]                    Procedures  Procedures       Phone Contacts  ED Phone Contact    ED Course                               MDM  Number of Diagnoses or Management Options  Headache: new and requires workup  Migraine: new and requires workup  Diagnosis management comments: 79-year-old female presents to the emergency department with a previous history of headaches as well as migraines in the past she presents with noted symptoms of headache over the last 4 days with no improvement of symptoms as an outpatient management with medications  No neurological deficits at this point time no blurry vision but noted sensitivities to light her MRI was performed several several years ago when she saw a neurologist in the past however no recent imaging of head within the system    CT scan to be performed at this point time which is unremarkable  Medications given with improvement symptoms at this point plan outpatient management follow up with Neurology given strict instructions when to return back to the emergency department  Pt re-examined and evaluated after testing and treatment  Spoke with the patient and feeling improved and sxs have resolved  Will discharge home with close f/u with pcp and instructed to return to the ED if sxs worsen or continue  Pt agrees with the plan for discharge and feels comfortable to go home with proper f/u  Advised to return for worsening or additional problems  Diagnostic tests were reviewed and questions answered  Diagnosis, care plan and treatment options were discussed  The patient understand instructions and will follow up as directed  Amount and/or Complexity of Data Reviewed  Clinical lab tests: ordered and reviewed  Tests in the radiology section of CPT®: ordered and reviewed  Review and summarize past medical records: yes      CritCare Time    Disposition  Final diagnoses:   Headache   Migraine     Time reflects when diagnosis was documented in both MDM as applicable and the Disposition within this note     Time User Action Codes Description Comment    9/19/2018 10:33 PM Raffi Sherwood [R51] Headache     9/19/2018 10:33 PM Raffi Sherwood [G43 909] Migraine       ED Disposition     ED Disposition Condition Comment    Discharge  Jannetabraham Magui discharge to home/self care      Condition at discharge: Stable        Follow-up Information     Follow up With Specialties Details Why Contact Maria G Yee, DO Family Medicine Call in 2 days If symptoms worsen [address] [place] [address]  [phone]            Discharge Medication List as of 9/19/2018 10:34 PM      START taking these medications    Details   butalbital-acetaminophen-caffeine (FIORICET,ESGIC) -40 mg per tablet Take 1 tablet by mouth every 4 (four) hours as needed for headaches, Starting Wed 9/19/2018, Print         CONTINUE these medications which have NOT CHANGED    Details   acetaminophen (TYLENOL) 500 mg tablet Take 1 tablet (500 mg total) by mouth every 6 (six) hours as needed for mild pain, moderate pain, headaches or fever, Starting Thu 2/8/2018, Normal      azithromycin (ZITHROMAX) 250 mg tablet 2 PO QD X 1 day then 1 PO QD, Print      cetirizine (ZyrTEC) 10 mg tablet Take 1 tablet by mouth daily At 9AM, Starting Tue 6/13/2017, Print      dexamethasone (DECADRON) 6 mg tablet Take 1 tablet (6 mg total) by mouth daily with breakfast, Starting Thu 2/8/2018, Normal      DULoxetine (CYMBALTA) 30 mg delayed release capsule Take 1 capsule by mouth daily At 9AM, Starting Tue 6/13/2017, Print      guaiFENesin (MUCINEX) 600 mg 12 hr tablet Take 2 tablets (1,200 mg total) by mouth every 12 (twelve) hours, Starting Thu 2/8/2018, Normal      ibuprofen (MOTRIN) 400 mg tablet Take 1 tablet (400 mg total) by mouth every 6 (six) hours as needed for mild pain, moderate pain or fever, Starting Thu 2/8/2018, Normal      levothyroxine 50 mcg tablet Take 1 tablet by mouth daily before breakfast, Starting Tue 6/13/2017, Print      !! lithium carbonate (LITHOBID) 300 mg CR tablet Take 2 tablets by mouth daily after dinner, Starting Tue 6/13/2017, Print      !! lithium carbonate (LITHOBID) 450 mg CR tablet Take 1 tablet by mouth every morning At 9AM, Starting Tue 6/13/2017, Print      loratadine (CLARITIN) 10 mg tablet Take 1 tablet (10 mg total) by mouth daily, Starting Thu 2/8/2018, Normal      LORazepam (ATIVAN) 1 mg tablet Take 1 tablet by mouth 3 (three) times a day At 9AM, 4PM, and 9PM, Starting Tue 6/13/2017, Print      melatonin 3 mg Take 2 tablets by mouth daily at bedtime, Starting Tue 6/13/2017, Print      nicotine (NICODERM CQ) 14 mg/24hr TD 24 hr patch Place 1 patch on the skin daily, Starting Tue 6/13/2017, Print      pantoprazole (PROTONIX) 40 mg tablet Take 1 tablet by mouth daily in the early morning Before breakfast, Starting Tue 6/13/2017, Print      polyethylene glycol (MIRALAX) 17 g packet Take 17 g by mouth daily, Starting Tue 6/13/2017, Print      prazosin (MINIPRESS) 1 mg capsule Take 1 capsule by mouth daily at bedtime, Starting Tue 6/13/2017, Print      risperiDONE (RisperDAL) 2 mg tablet Take 1 tablet by mouth daily at bedtime, Starting Tue 6/13/2017, Print      !! topiramate (TOPAMAX) 100 mg tablet Take 1 tablet by mouth daily At 9AM, Starting Tue 6/13/2017, No Print      !! topiramate (TOPAMAX) 200 MG tablet Take 1 tablet by mouth daily after dinner, Starting Tue 6/13/2017, No Print       !! - Potential duplicate medications found  Please discuss with provider  No discharge procedures on file      ED Provider  Electronically Signed by           Eva Arias DO  09/24/18 5268

## 2018-10-22 ENCOUNTER — OFFICE VISIT (OUTPATIENT)
Dept: FAMILY MEDICINE CLINIC | Facility: CLINIC | Age: 43
End: 2018-10-22
Payer: COMMERCIAL

## 2018-10-22 VITALS
WEIGHT: 234 LBS | RESPIRATION RATE: 16 BRPM | TEMPERATURE: 97.6 F | HEIGHT: 64 IN | OXYGEN SATURATION: 96 % | HEART RATE: 87 BPM | BODY MASS INDEX: 39.95 KG/M2 | SYSTOLIC BLOOD PRESSURE: 138 MMHG | DIASTOLIC BLOOD PRESSURE: 80 MMHG

## 2018-10-22 DIAGNOSIS — G43.811 OTHER MIGRAINE WITH STATUS MIGRAINOSUS, INTRACTABLE: Primary | ICD-10-CM

## 2018-10-22 DIAGNOSIS — H66.90 ACUTE OTITIS MEDIA, UNSPECIFIED OTITIS MEDIA TYPE: ICD-10-CM

## 2018-10-22 DIAGNOSIS — Z23 NEED FOR VACCINATION: ICD-10-CM

## 2018-10-22 PROCEDURE — 99213 OFFICE O/P EST LOW 20 MIN: CPT | Performed by: PHYSICIAN ASSISTANT

## 2018-10-22 PROCEDURE — 3008F BODY MASS INDEX DOCD: CPT | Performed by: PHYSICIAN ASSISTANT

## 2018-10-22 RX ORDER — AMOXICILLIN 500 MG/1
500 CAPSULE ORAL EVERY 12 HOURS SCHEDULED
Qty: 20 CAPSULE | Refills: 0 | Status: SHIPPED | OUTPATIENT
Start: 2018-10-22 | End: 2018-11-01

## 2018-10-22 NOTE — PROGRESS NOTES
Assessment/Plan:       Diagnoses and all orders for this visit:    Other migraine with status migrainosus, intractable  -     Ambulatory referral to Neurology; Future    Acute otitis media, unspecified otitis media type  Comments:  - can take ibuprofen for pain   Orders:  -     amoxicillin (AMOXIL) 500 mg capsule; Take 1 capsule (500 mg total) by mouth every 12 (twelve) hours for 10 days    Need for vaccination  -     Cancel: TDAP VACCINE GREATER THAN OR EQUAL TO 6YO IM  -     Cancel: SYRINGE/SINGLE-DOSE VIAL: influenza vaccine, [id number], quadrivalent, 0 5 mL, preservative-free, for patients 3+ yr (FLUZONE)          Subjective: Patient needs a referral to neurology for her migraines  Migraines associated with photophobia, nausea, and vomiting  She was given Fioricet in the ER to keep her migraines controlled until she could see neurology, but she states this medication has not been effective  She is also complaining of left ear pain and sore throat that started about one day ago  She denies cough, SOB  Deferring flu shot and cannot get Tdap at this time because it is contraindicated in patients with migraines  Patient ID: Juan Diego Napoles is a 41 y o  female  HPI    The following portions of the patient's history were reviewed and updated as appropriate: She  has a past medical history of Anxiety; Asthma; Bipolar disorder ([facility] [address] ); Depression; Disease of thyroid gland; Endometriosis; Psychiatric illness; Psychosis ([facility] [address] ); PTSD (post-traumatic stress disorder) (6/6/2017); Self-injurious behavior; and Suicide attempt ([facility] [address] )  Patient Active Problem List    Diagnosis Date Noted    Bipolar 1 disorder, mixed ([facility] [address] ) 06/06/2017    Generalized anxiety disorder 06/06/2017    Cocaine abuse ([facility] [address] ) 06/06/2017    PTSD (post-traumatic stress disorder) 06/06/2017     She  has a past surgical history that includes Abdominal surgery and Hernia repair  Her family history is not on file    She  reports that she has been smoking Cigarettes  She has been smoking about 0 25 packs per day  She has never used smokeless tobacco  She reports that she does not drink alcohol or use drugs  Current Outpatient Prescriptions   Medication Sig Dispense Refill    ALPRAZolam (XANAX) 1 mg tablet Take 1 mg by mouth Three times a day      amoxicillin (AMOXIL) 500 mg capsule Take 1 capsule (500 mg total) by mouth every 12 (twelve) hours for 10 days 20 capsule 0    butalbital-acetaminophen-caffeine (FIORICET,ESGIC) -40 mg per tablet Take 1 tablet by mouth every 4 (four) hours as needed for headaches 30 tablet 0     No current facility-administered medications for this visit  Current Outpatient Prescriptions on File Prior to Visit   Medication Sig    ALPRAZolam (XANAX) 1 mg tablet Take 1 mg by mouth Three times a day    butalbital-acetaminophen-caffeine (FIORICET,ESGIC) -40 mg per tablet Take 1 tablet by mouth every 4 (four) hours as needed for headaches     No current facility-administered medications on file prior to visit  She is allergic to aleve [naproxen]; benzonatate; guaifenesin; and latuda [lurasidone]       Review of Systems   Constitutional: Negative  HENT: Positive for ear pain and sore throat  Respiratory: Negative for cough, shortness of breath and wheezing  Cardiovascular: Negative for chest pain and palpitations  Gastrointestinal: Positive for nausea and vomiting  Negative for diarrhea  Musculoskeletal: Negative for back pain and myalgias  Skin: Negative for color change and pallor  Neurological: Positive for headaches  Negative for dizziness, tremors, syncope and weakness  Objective:      /80   Pulse 87   Temp 97 6 °F (36 4 °C)   Resp 16   Ht 5' 4 25" (1 632 m)   Wt 106 kg (234 lb)   SpO2 96%   BMI 39 85 kg/m²          Physical Exam   Constitutional: She is oriented to person, place, and time  She appears well-developed and well-nourished  No distress     HENT:   Head: Normocephalic and atraumatic  Right Ear: External ear normal    Left Ear: External ear normal    Eyes: Pupils are equal, round, and reactive to light  Conjunctivae and EOM are normal    Right ear canal and TM appear to be normal; left ear canal and TM are erythematous and outer ear is painful to the touch   Neck: Normal range of motion  Neck supple  Cardiovascular: Normal rate, regular rhythm and normal heart sounds  Pulmonary/Chest: Effort normal and breath sounds normal  No respiratory distress  Lymphadenopathy:     She has cervical adenopathy  Neurological: She is alert and oriented to person, place, and time  No cranial nerve deficit  Skin: Skin is warm and dry  She is not diaphoretic

## 2018-10-24 ENCOUNTER — OFFICE VISIT (OUTPATIENT)
Dept: NEUROLOGY | Facility: CLINIC | Age: 43
End: 2018-10-24
Payer: COMMERCIAL

## 2018-10-24 VITALS
HEART RATE: 76 BPM | HEIGHT: 63 IN | BODY MASS INDEX: 41.75 KG/M2 | RESPIRATION RATE: 18 BRPM | DIASTOLIC BLOOD PRESSURE: 75 MMHG | WEIGHT: 235.6 LBS | SYSTOLIC BLOOD PRESSURE: 126 MMHG

## 2018-10-24 DIAGNOSIS — G43.019 INTRACTABLE MIGRAINE WITHOUT AURA AND WITHOUT STATUS MIGRAINOSUS: Primary | ICD-10-CM

## 2018-10-24 DIAGNOSIS — G43.811 OTHER MIGRAINE WITH STATUS MIGRAINOSUS, INTRACTABLE: ICD-10-CM

## 2018-10-24 PROCEDURE — 99243 OFF/OP CNSLTJ NEW/EST LOW 30: CPT | Performed by: PSYCHIATRY & NEUROLOGY

## 2018-10-24 RX ORDER — SERTRALINE HYDROCHLORIDE 25 MG/1
25 TABLET, FILM COATED ORAL EVERY MORNING
Refills: 1 | COMMUNITY
Start: 2018-10-17 | End: 2019-02-25 | Stop reason: DRUGHIGH

## 2018-10-24 RX ORDER — TOPIRAMATE 50 MG/1
TABLET, FILM COATED ORAL
Qty: 60 TABLET | Refills: 2 | Status: SHIPPED | OUTPATIENT
Start: 2018-10-24 | End: 2018-12-26 | Stop reason: SDUPTHER

## 2018-10-24 RX ORDER — LEVOTHYROXINE SODIUM 0.05 MG/1
50 TABLET ORAL DAILY
COMMUNITY
End: 2019-04-19 | Stop reason: DRUGHIGH

## 2018-10-24 RX ORDER — ATOMOXETINE 40 MG/1
40 CAPSULE ORAL DAILY
COMMUNITY
End: 2022-02-01

## 2018-10-24 RX ORDER — CETIRIZINE HYDROCHLORIDE 10 MG/1
10 TABLET ORAL EVERY 24 HOURS
COMMUNITY
Start: 2018-10-03 | End: 2019-09-24 | Stop reason: SDUPTHER

## 2018-10-24 RX ORDER — TOPIRAMATE 25 MG/1
25 TABLET ORAL 2 TIMES DAILY
Refills: 1 | COMMUNITY
Start: 2018-10-17 | End: 2018-10-24 | Stop reason: DRUGHIGH

## 2018-10-24 NOTE — PROGRESS NOTES
Patient is here today as a new patient for her  headaches    Patient ID: Marti Corrales is a 41 y o  female  Assessment/Plan:    Intractable migraine without aura and without status migrainosus  Patient provides a history quite typical for migraine without aura originating in her mid to late 25s  Unfortunately, in the recent 6+ months, she has had escalating issues where she is experiencing 3-4 days per week with headache  Interestingly, when Psychiatry was originally treating her for her bipolar disease, she was on topiramate at a dose of 100 mg twice daily with her headaches at that point in time well controlled  Unfortunately, her psychiatrist passed away and topiramate coverage was discontinued until recently when her new psychiatrist placed her on 25 mg daily  This has had no benefit as yet  Hopefully, as the topiramate is advanced, her headaches will come under improved control   --advance topiramate to 25 mg twice daily  --when her current supply of 25 mg tablets has been used up she will then switch to topiramate 50 mg tablets 1 twice daily  --she was instructed to keep a headache diary  --she was given a handout with a migraine avoidance diet to review  --she was told to use her Fioricet very sparingly, much less so than currently, as it can create the environment for rebound headaches  --instructed to call the office in 2 weeks with a status update  Prior to leaving the office she did comment that she would like to have an additional problem evaluated, i e  apparent issues with the distal extremity intermittent numbness  She will be brought back for an additional appointment for further evaluation  She will follow up in 5-6 weeks  Subjective:    HPI  Patient, 41years of age and right-handed, presents for further evaluation regarding ongoing headache difficulties  She relates that her headaches started in her mid to late 25s    These headaches have generally been right hemicranial although occasionally spread to a holocranial location  The quality is described as sharp and pulsatile and severity as moderately severe to at times severe  Her headaches are unassociated with any specific aura  With her headaches she does experience light sensitivity as well as nausea and occasional vomiting  Her headaches when present will generally persist for a day and on occasion longer  In the past 6+ months she has been experiencing probably 3-4 days per week with headache  Interestingly, a past psychiatrist, treating her bipolar disease, had her on topiramate at a schedule of 100 mg twice daily  She recalls at that point in time her headaches being well controlled  Unfortunately, that psychiatrist passed away and up until recently she was not on topiramate until her new psychiatrist just recently started 25 mg daily  This has as yet had no significant benefit  Recent studies reviewed  CT brain unremarkable  CBC with hemoglobin 12 1, hematocrit 38 0, white count 6 27 and platelet count 371  TSH normal at 2 095  CMP essentially unremarkable      Past Medical History:   Diagnosis Date    Anxiety     Asthma     Bipolar disorder ([facility] [facility] )     Depression     Disease of thyroid gland     Endometriosis     Psychiatric illness     Psychosis ([facility] [place] [facility] )     PTSD (post-traumatic stress disorder) 6/6/2017    Self-injurious behavior     Suicide attempt ([facility] [place] [facility] )      Past Surgical History:   Procedure Laterality Date    ABDOMINAL SURGERY      HERNIA REPAIR       Social History     Social History    Marital status: Single     Spouse name: N/A    Number of children: N/A    Years of education: N/A     Social History Main Topics    Smoking status: Current Every Day Smoker     Packs/day: 0 25     Types: Cigarettes    Smokeless tobacco: Never Used    Alcohol use No    Drug use: No    Sexual activity: Not Asked     Other Topics Concern    None     Social History Narrative    None History reviewed  No pertinent family history  Allergies   Allergen Reactions    Aleve [Naproxen] Shortness Of Breath    Benzonatate Swelling     Throat closing    Guaifenesin Other (See Comments)     Note - patient states she IS able to take robitussin  [place] [Lurasidone] Swelling      stuttering        Current Outpatient Prescriptions:     ALPRAZolam (XANAX) 1 mg tablet, Take 1 mg by mouth Three times a day, Disp: , Rfl:     amoxicillin (AMOXIL) 500 mg capsule, Take 1 capsule (500 mg total) by mouth every 12 (twelve) hours for 10 days, Disp: 20 capsule, Rfl: 0    atoMOXetine (STRATTERA) 40 mg capsule, Take 40 mg by mouth daily, Disp: , Rfl:     butalbital-acetaminophen-caffeine (FIORICET,ESGIC) -40 mg per tablet, Take 1 tablet by mouth every 4 (four) hours as needed for headaches, Disp: 30 tablet, Rfl: 0    cetirizine (ZyrTEC) 10 mg tablet, Take 10 mg by mouth every 24 hours, Disp: , Rfl:     levothyroxine 50 mcg tablet, Take 50 mcg by mouth daily, Disp: , Rfl:     sertraline (ZOLOFT) 25 mg tablet, 25 mg every morning, Disp: , Rfl: 1    topiramate (TOPAMAX) 50 MG tablet, By oral route take 1 tablet twice daily or as directed, Disp: 60 tablet, Rfl: 2    Objective:    Blood pressure 126/75, pulse 76, resp  rate 18, height 5' 3" (1 6 m), weight 107 kg (235 lb 9 6 oz)  Physical Exam  Head normocephalic  Eyes nonicteric  No audible anterior neck bruits or head bruits  Lungs clear to auscultation  Rhythm regular  GI (abdomen) soft nontender with bowel sounds present  Lower extremities with no significant edema  Neurological Exam  Alert  Pleasantly and appropriately conversational   Fully oriented  No dysarthria  Unremarkable spontaneous gait  Able to tandem walk  Able heel and toe stand bilaterally  Romberg maneuver performed unremarkably  Cranial Nerves:   I: Olfactory sense intact bilaterally  II: Visual fields full to confrontation   Pupils equal, round, reactive to light with normal accomodation  Fundus:   Bilaterally marginated discs  III,IV,VI: Extraocular muscles EOMI, no nystagmus  V: [place] and pterygoid strength  Sensation in the V1 through V3 distributions intact to pinprick and light touch bilaterally  VII: Face is symmetric with no weakness noted  VIII: Audition intact to finger rub bilaterally  IX/X: Uvula midline  Soft palate elevation symmetric  XI: Trapezius and SCM strength 5/5 bilaterally  XII: Tongue midline with no atrophy or fasciculations with appropriate movement  Accurate with finger-to-nose and heel-to-shin maneuvers bilaterally  Full symmetrical strength throughout the 4 extremities with no upper extremity drift  Sensory testing grossly intact to pin, position and vibration throughout  Muscle stretch reflexes bilaterally 1+ throughout the upper extremities, at the knees and at the ankles  Toe response is downgoing bilaterally  ROS:    Review of Systems   Constitutional: Positive for fatigue  Negative for appetite change and fever  HENT: Negative  Negative for hearing loss, tinnitus, trouble swallowing and voice change  Eyes: Negative  Negative for photophobia and pain  Respiratory: Negative  Negative for shortness of breath  Cardiovascular: Negative  Negative for palpitations  Gastrointestinal: Positive for constipation  Negative for nausea and vomiting  Endocrine: Negative  Negative for cold intolerance and heat intolerance  Genitourinary: Positive for frequency and urgency  Negative for dysuria  Musculoskeletal: Positive for neck pain  Negative for myalgias  Skin: Negative  Negative for rash  Allergic/Immunologic: Negative  Neurological: Positive for numbness (hands and feet) and headaches (migraine's)  Negative for dizziness, tremors, seizures, syncope, facial asymmetry, speech difficulty, weakness and light-headedness  Hematological: Negative  Does not bruise/bleed easily  Psychiatric/Behavioral: Positive for agitation, behavioral problems, confusion, hallucinations and sleep disturbance  The patient is nervous/anxious and is hyperactive  I personally reviewed the ROS that was entered by the medical assistant

## 2018-10-24 NOTE — LETTER
October 24, 2018     Liudmila Sanderson PA-C  [facility]    Patient: Mike Lang   YOB: 1975   Date of Visit: 10/24/2018       Dear Dr Adams Nicely:    Thank you for referring Mike Lang to me for evaluation  Below are my notes for this consultation  If you have questions, please do not hesitate to call me  I look forward to following your patient along with you           Sincerely,        Estrellita Salas MD        CC: No Recipients

## 2018-10-24 NOTE — PATIENT INSTRUCTIONS
Using year current topiramate 25 mg tablets changed to 1 tablet in the morning and 1 tablet in the evening until the supply is gone  Then switch to topiramate 50 mg tablets taking 1 in the morning and 1 in the evening daily  Please use your Fioricet very, very sparingly as it can create the environment for increased headaches if use too much  Please keep a headache diary  Call in 2 weeks after you begin the new topiramate schedule with a status update

## 2018-10-24 NOTE — ASSESSMENT & PLAN NOTE
Patient provides a history quite typical for migraine without aura originating in her mid to late 25s  Unfortunately, in the recent 6+ months, she has had escalating issues where she is experiencing 3-4 days per week with headache  Interestingly, when Psychiatry was originally treating her for her bipolar disease, she was on topiramate at a dose of 100 mg twice daily with her headaches at that point in time well controlled  Unfortunately, her psychiatrist passed away and topiramate coverage was discontinued until recently when her new psychiatrist placed her on 25 mg daily  This has had no benefit as yet  Hopefully, as the topiramate is advanced, her headaches will come under improved control   --advance topiramate to 25 mg twice daily  --when her current supply of 25 mg tablets has been used up she will then switch to topiramate 50 mg tablets 1 twice daily  --she was instructed to keep a headache diary  --she was given a handout with a migraine avoidance diet to review  --she was told to use her Fioricet very sparingly, much less so than currently, as it can create the environment for rebound headaches  --instructed to call the office in 2 weeks with a status update  Prior to leaving the office she did comment that she would like to have an additional problem evaluated, i e  apparent issues with the distal extremity intermittent numbness  She will be brought back for an additional appointment for further evaluation

## 2018-12-26 ENCOUNTER — OFFICE VISIT (OUTPATIENT)
Dept: NEUROLOGY | Facility: CLINIC | Age: 43
End: 2018-12-26
Payer: COMMERCIAL

## 2018-12-26 VITALS
HEIGHT: 63 IN | BODY MASS INDEX: 41.78 KG/M2 | SYSTOLIC BLOOD PRESSURE: 124 MMHG | DIASTOLIC BLOOD PRESSURE: 88 MMHG | RESPIRATION RATE: 18 BRPM | HEART RATE: 76 BPM | WEIGHT: 235.8 LBS

## 2018-12-26 DIAGNOSIS — R20.2 RIGHT LEG PARESTHESIAS: ICD-10-CM

## 2018-12-26 DIAGNOSIS — G56.01 RIGHT CARPAL TUNNEL SYNDROME: ICD-10-CM

## 2018-12-26 DIAGNOSIS — G43.019 INTRACTABLE MIGRAINE WITHOUT AURA AND WITHOUT STATUS MIGRAINOSUS: Primary | ICD-10-CM

## 2018-12-26 PROCEDURE — 99214 OFFICE O/P EST MOD 30 MIN: CPT | Performed by: PSYCHIATRY & NEUROLOGY

## 2018-12-26 RX ORDER — ALBUTEROL SULFATE 90 UG/1
2 AEROSOL, METERED RESPIRATORY (INHALATION) EVERY 4 HOURS PRN
COMMUNITY
Start: 2015-10-16 | End: 2019-05-29 | Stop reason: SDUPTHER

## 2018-12-26 RX ORDER — TOPIRAMATE 50 MG/1
TABLET, FILM COATED ORAL
Qty: 90 TABLET | Refills: 2 | Status: SHIPPED | OUTPATIENT
Start: 2018-12-26 | End: 2019-10-08 | Stop reason: DRUGHIGH

## 2018-12-26 NOTE — ASSESSMENT & PLAN NOTE
Unclear as to origin  No findings on examination to suggest a central or radicular origin  No findings on examination to support a sensory polyneuropathy  Will need further testing to more properly assess  --EMG/NCV right lower extremity

## 2018-12-26 NOTE — ASSESSMENT & PLAN NOTE
History quite suggestive as well as potential additional support in the presence of a positive Phalen's maneuver  Fortunately at this point in time maintains full testable strength and pin appreciation in the right median distribution  --EMG/NCV right upper extremity  --to wear a neutral position right wrist splint while sleeping   --will add TSH to her above blood work

## 2018-12-26 NOTE — PROGRESS NOTES
Patient is here today for her headaches    Patient ID: Lindsay Loomis is a 41 y o  female  Assessment/Plan:    Intractable migraine without aura and without status migrainosus  Improving with now a 50% reduction in headache frequency and a 50% reduction in headache intensity  Tolerating her current topiramate schedule with no adverse side effects  --advance topiramate to 50 mg tablets one in the morning and two late evening daily  --to continue a headache diary  --with her now chronically on topiramate, CBC and CMP  --to call the office in 3-4 weeks with a status report or before then should she have any questions or concerns  Right carpal tunnel syndrome  History quite suggestive as well as potential additional support in the presence of a positive Phalen's maneuver  Fortunately at this point in time maintains full testable strength and pin appreciation in the right median distribution  --EMG/NCV right upper extremity  --to wear a neutral position right wrist splint while sleeping   --will add TSH to her above blood work  Right leg paresthesias  Unclear as to origin  No findings on examination to suggest a central or radicular origin  No findings on examination to support a sensory polyneuropathy  Will need further testing to more properly assess  --EMG/NCV right lower extremity  I spent a total of 30 min with the patient with greater than 50% of that time spent counseling and coordinating her care, specifically discussing her diagnosis, additional tests, and discussing the case with her care team, as detailed above  She will follow up in eight weeks  Subjective:    HPI  Patient, 41years of age, is being followed here for her migraine headache circumstance, characterized as migraine without aura  She has done better with the advancement in her topiramate to most recently 50 mg twice daily    Her headache frequency is now down from four on average per week to two and her intensity of each headache is significantly reduced  She is tolerating the topiramate schedule without any adverse side effects  She is, however, hopeful for yet better control  She brought to my attention today additional issues she would like addressed  Approximately 5-6 months ago, she experienced the appearance of a tingling numbness involving the sole of the right foot  This has had a persistent presence  It has been unassociated with any evolving weakness  She experiences no similar symptoms on the left  She has had no significant low back or neck area pain  An additional issue is that of several months ago the appearance intermittently of numbness involving the right hand  his predominantly apparent when she awakens from sleep  She is quickly able to [facility] it off    This has been unassociated with any evolving weakness  Has not had electrodiagnostic studies performed with regard to her sensory symptoms  In review of past studies, in the not too distant past she had an unremarkable CT brain  Past Medical History:   Diagnosis Date    Anxiety     Asthma     [facility] disorder ([facility] [address] )     Depression     Disease of thyroid gland     Endometriosis     Psychiatric illness     Psychosis ([facility] )     PTSD (post-traumatic stress disorder) 6/6/2017    Right carpal tunnel syndrome 12/26/2018    Self-injurious behavior     Suicide attempt [facility])      Past Surgical History:   Procedure Laterality Date    ABDOMINAL SURGERY      HERNIA REPAIR       Social History     Social History    Marital status: Single     Spouse name: N/A    Number of children: N/A    Years of education: N/A     Social History Main Topics    Smoking status: Current Every Day Smoker     Packs/day: 0 25     Types: Cigarettes    Smokeless tobacco: Never Used    Alcohol use No    Drug use: No    Sexual activity: Not Asked     Other Topics Concern    None     Social History Narrative    None     History reviewed   No pertinent family history  Allergies   Allergen Reactions    Aleve [Naproxen] Shortness Of Breath    Benzonatate Swelling     Throat closing    Guaifenesin Other (See Comments)     Note - patient states she IS able to take robitussin  [place] [Lurasidone] Swelling      stuttering        Current Outpatient Prescriptions:     albuterol (VENTOLIN HFA) 90 mcg/act inhaler, Inhale 2 puffs every 4 (four) hours as needed, Disp: , Rfl:     ALPRAZolam (XANAX) 1 mg tablet, Take 1 mg by mouth Three times a day, Disp: , Rfl:     atoMOXetine (STRATTERA) 40 mg capsule, Take 40 mg by mouth daily, Disp: , Rfl:     butalbital-acetaminophen-caffeine (FIORICET,ESGIC) -40 mg per tablet, Take 1 tablet by mouth every 4 (four) hours as needed for headaches, Disp: 30 tablet, Rfl: 0    cetirizine (ZyrTEC) 10 mg tablet, Take 10 mg by mouth every 24 hours, Disp: , Rfl:     levothyroxine 50 mcg tablet, Take 50 mcg by mouth daily, Disp: , Rfl:     sertraline (ZOLOFT) 25 mg tablet, 25 mg every morning, Disp: , Rfl: 1    topiramate (TOPAMAX) 50 MG tablet, By oral route take 1 tablet in the morning and two tablets in the late evening daily, Disp: 90 tablet, Rfl: 2    Objective:    Blood pressure 124/88, pulse 76, resp  rate 18, height 5' 2 5" (1 588 m), weight 107 kg (235 lb 12 8 oz)  Physical Exam  Lungs clear to auscultation  Rhythm regular  Tinel's maneuvers are negative bilaterally  However, Phalen's maneuver positive on her symptomatic right side  No evident hand atrophy noted  Negative tarsal tunnel tap  Neurological Exam  Alert  Pleasantly interactive  Fully oriented  Unremarkable spontaneous gait  Cranial nerves two through 12 tested and grossly intact  Funduscopic examination with marginated discs  Accurate with finger-to-nose bilaterally  Full symmetrical strength throughout the four extremities  Sensory testing with pin revealed full appreciation in the median distributions bilaterally    However, she did describe a subtle reduction in pin appreciation over the right sole as compared to left  Muscle stretch reflexes bilaterally two at biceps, brachioradialis and triceps and bilaterally 2+ at knees and ankles  Toe response is downgoing bilaterally  ROS:    Review of Systems   Constitutional: Negative  Negative for appetite change and fever  HENT: Negative  Negative for hearing loss, tinnitus, trouble swallowing and voice change  Eyes: Negative  Negative for photophobia and pain  Respiratory: Negative  Negative for shortness of breath  Cardiovascular: Negative  Negative for palpitations  Gastrointestinal: Positive for constipation  Negative for nausea and vomiting  Endocrine: Negative  Negative for cold intolerance and heat intolerance  Genitourinary: Positive for frequency and urgency  Negative for dysuria  Musculoskeletal: Negative  Negative for myalgias and neck pain  Skin: Negative  Negative for rash  Neurological: Positive for numbness (right foot and right hand) and headaches  Negative for dizziness, tremors, seizures, syncope, facial asymmetry, speech difficulty, weakness and light-headedness  Hematological: Negative  Does not bruise/bleed easily  Psychiatric/Behavioral: Positive for sleep disturbance  Negative for confusion and hallucinations  The patient is nervous/anxious  I personally reviewed the ROS that was entered by the medical assistant  *Please note this document was created using voice recognition software and may contain sound-alike word errors  *

## 2018-12-26 NOTE — ASSESSMENT & PLAN NOTE
Improving with now a 50% reduction in headache frequency and a 50% reduction in headache intensity  Tolerating her current topiramate schedule with no adverse side effects  --advance topiramate to 50 mg tablets one in the morning and two late evening daily  --to continue a headache diary  --with her now chronically on topiramate, CBC and CMP  --to call the office in 3-4 weeks with a status report or before then should she have any questions or concerns

## 2018-12-26 NOTE — PATIENT INSTRUCTIONS
Change topiramate using 50 mg tablets to one tablet in the morning and two tablets late evening daily  Please continue to keep a headache diary  Call the office in 3-4 weeks with a status report or before then should she have any questions or concerns  Continue to try to drink extra fluids while on topiramate  Please wear your right wrist splint while sleeping

## 2018-12-26 NOTE — LETTER
December 26, 2018     [facility], PA-C  [address]    Patient: Sb Lopez   YOB: 1975   Date of Visit: 12/26/2018       Dear Dr Jose Francis:    Thank you for referring Sb Lopez to me for evaluation  Below are my notes for this consultation  If you have questions, please do not hesitate to call me  I look forward to following your patient along with you  Sincerely,        Sylvester Zaragoza MD        CC: No Recipients  Sylvester Zaragoza MD  12/26/2018  8:52 AM  Sign at close encounter  Patient is here today for her headaches    Patient ID: Sb Lopez is a 41 y o  female  Assessment/Plan:    Intractable migraine without aura and without status migrainosus  Improving with now a 50% reduction in headache frequency and a 50% reduction in headache intensity  Tolerating her current topiramate schedule with no adverse side effects  --advance topiramate to 50 mg tablets one in the morning and two late evening daily  --to continue a headache diary  --with her now chronically on topiramate, CBC and CMP  --to call the office in 3-4 weeks with a status report or before then should she have any questions or concerns  Right carpal tunnel syndrome  History quite suggestive as well as potential additional support in the presence of a positive Phalen's maneuver  Fortunately at this point in time maintains full testable strength and pin appreciation in the right median distribution  --EMG/NCV right upper extremity  --to wear a neutral position right wrist splint while sleeping   --will add TSH to her above blood work  Right leg paresthesias  Unclear as to origin  No findings on examination to suggest a central or radicular origin  No findings on examination to support a sensory polyneuropathy  Will need further testing to more properly assess  --EMG/NCV right lower extremity      I spent a total of 30 min with the patient with greater than 50% of that time spent counseling and coordinating her care, specifically discussing her diagnosis, additional tests, and discussing the case with her care team, as detailed above  She will follow up in eight weeks  Subjective:    HPI  Patient, 41years of age, is being followed here for her migraine headache circumstance, characterized as migraine without aura  She has done better with the advancement in her topiramate to most recently 50 mg twice daily  Her headache frequency is now down from four on average per week to two and her intensity of each headache is significantly reduced  She is tolerating the topiramate schedule without any adverse side effects  She is, however, hopeful for yet better control  She brought to my attention today additional issues she would like addressed  Approximately 5-6 months ago, she experienced the appearance of a tingling numbness involving the sole of the right foot  This has had a persistent presence  It has been unassociated with any evolving weakness  She experiences no similar symptoms on the left  She has had no significant low back or neck area pain  An additional issue is that of several months ago the appearance intermittently of numbness involving the right hand  his predominantly apparent when she awakens from sleep  She is quickly able to [facility] it off    This has been unassociated with any evolving weakness  Has not had electrodiagnostic studies performed with regard to her sensory symptoms  In review of past studies, in the not too distant past she had an unremarkable CT brain      Past Medical History:   Diagnosis Date    Anxiety     Asthma     [facility]     Depression     Disease of thyroid gland     Endometriosis     Psychiatric illness     Psychosis ([facility]     PTSD (post-traumatic stress disorder) 6/6/2017    Right carpal tunnel syndrome 12/26/2018    Self-injurious behavior     [facility])      Past Surgical History:   Procedure Laterality Date    ABDOMINAL SURGERY      HERNIA REPAIR       Social History     Social History    Marital status: Single     Spouse name: N/A    Number of children: N/A    Years of education: N/A     Social History Main Topics    Smoking status: Current Every Day Smoker     Packs/day: 0 25     Types: Cigarettes    Smokeless tobacco: Never Used    Alcohol use No    Drug use: No    Sexual activity: Not Asked     Other Topics Concern    None     Social History Narrative    None     History reviewed  No pertinent family history  Allergies   Allergen Reactions    Aleve [Naproxen] Shortness Of Breath    Benzonatate Swelling     Throat closing    Guaifenesin Other (See Comments)     Note - patient states she IS able to take robitussin  Andres Bang [Lurasidone] Swelling      stuttering        Current Outpatient Prescriptions:     albuterol (VENTOLIN HFA) 90 mcg/act inhaler, Inhale 2 puffs every 4 (four) hours as needed, Disp: , Rfl:     ALPRAZolam (XANAX) 1 mg tablet, Take 1 mg by mouth Three times a day, Disp: , Rfl:     atoMOXetine (STRATTERA) 40 mg capsule, Take 40 mg by mouth daily, Disp: , Rfl:     butalbital-acetaminophen-caffeine (FIORICET,ESGIC) -40 mg per tablet, Take 1 tablet by mouth every 4 (four) hours as needed for headaches, Disp: 30 tablet, Rfl: 0    cetirizine (ZyrTEC) 10 mg tablet, Take 10 mg by mouth every 24 hours, Disp: , Rfl:     levothyroxine 50 mcg tablet, Take 50 mcg by mouth daily, Disp: , Rfl:     sertraline (ZOLOFT) 25 mg tablet, 25 mg every morning, Disp: , Rfl: 1    topiramate (TOPAMAX) 50 MG tablet, By oral route take 1 tablet in the morning and two tablets in the late evening daily, Disp: 90 tablet, Rfl: 2    Objective:    Blood pressure 124/88, pulse 76, resp  rate 18, height 5' 2 5" (1 588 m), weight 107 kg (235 lb 12 8 oz)  Physical Exam  Lungs clear to auscultation  Rhythm regular  Tinel's maneuvers are negative bilaterally  However, Phalen's maneuver positive on her symptomatic right side  No evident hand atrophy noted  Negative tarsal tunnel tap  Neurological Exam  Alert  Pleasantly interactive  Fully oriented  Unremarkable spontaneous gait  Cranial nerves two through 12 tested and grossly intact  Funduscopic examination with marginated discs  Accurate with finger-to-nose bilaterally  Full symmetrical strength throughout the four extremities  Sensory testing with pin revealed full appreciation in the median distributions bilaterally  However, she did describe a subtle reduction in pin appreciation over the right sole as compared to left  Muscle stretch reflexes bilaterally two at biceps, brachioradialis and triceps and bilaterally 2+ at knees and ankles  Toe response is downgoing bilaterally  ROS:    Review of Systems   Constitutional: Negative  Negative for appetite change and fever  HENT: Negative  Negative for hearing loss, tinnitus, trouble swallowing and voice change  Eyes: Negative  Negative for photophobia and pain  Respiratory: Negative  Negative for shortness of breath  Cardiovascular: Negative  Negative for palpitations  Gastrointestinal: Positive for constipation  Negative for nausea and vomiting  Endocrine: Negative  Negative for cold intolerance and heat intolerance  Genitourinary: Positive for frequency and urgency  Negative for dysuria  Musculoskeletal: Negative  Negative for myalgias and neck pain  Skin: Negative  Negative for rash  Neurological: Positive for numbness (right foot and right hand) and headaches  Negative for dizziness, tremors, seizures, syncope, facial asymmetry, speech difficulty, weakness and light-headedness  Hematological: Negative  Does not bruise/bleed easily  Psychiatric/Behavioral: Positive for sleep disturbance  Negative for confusion and hallucinations  The patient is nervous/anxious          I personally reviewed the ROS that was entered by the medical assistant  *Please note this document was created using voice recognition software and may contain sound-alike word errors  *

## 2019-02-25 ENCOUNTER — OFFICE VISIT (OUTPATIENT)
Dept: NEUROLOGY | Facility: CLINIC | Age: 44
End: 2019-02-25
Payer: COMMERCIAL

## 2019-02-25 VITALS
BODY MASS INDEX: 41.25 KG/M2 | HEART RATE: 84 BPM | WEIGHT: 232.8 LBS | RESPIRATION RATE: 18 BRPM | SYSTOLIC BLOOD PRESSURE: 146 MMHG | HEIGHT: 63 IN | DIASTOLIC BLOOD PRESSURE: 88 MMHG

## 2019-02-25 DIAGNOSIS — G43.019 INTRACTABLE MIGRAINE WITHOUT AURA AND WITHOUT STATUS MIGRAINOSUS: Primary | ICD-10-CM

## 2019-02-25 DIAGNOSIS — R20.2 RIGHT LEG PARESTHESIAS: ICD-10-CM

## 2019-02-25 DIAGNOSIS — G56.01 RIGHT CARPAL TUNNEL SYNDROME: ICD-10-CM

## 2019-02-25 PROCEDURE — 99213 OFFICE O/P EST LOW 20 MIN: CPT | Performed by: PSYCHIATRY & NEUROLOGY

## 2019-02-25 RX ORDER — FLUTICASONE PROPIONATE 50 MCG
1 SPRAY, SUSPENSION (ML) NASAL
COMMUNITY
Start: 2015-10-08 | End: 2019-05-29 | Stop reason: SDUPTHER

## 2019-02-25 RX ORDER — SERTRALINE HYDROCHLORIDE 100 MG/1
100 TABLET, FILM COATED ORAL DAILY
COMMUNITY
Start: 2019-02-18 | End: 2019-04-19

## 2019-02-25 NOTE — PATIENT INSTRUCTIONS
Change topiramate using 50 mg tablets to 1 tablet in the morning and 2 tablets in the late evening daily  Continue to keep a headache diary  Please have your blood work done

## 2019-02-25 NOTE — LETTER
February 25, 2019     [facility], [address]  [address] [address] [address] [employer]    Patient: Rg Pratt   YOB: 1975   Date of Visit: 2/25/2019       Dear Dr Regina Batres:    Thank you for referring Rg Pratt to me for evaluation  Below are my notes for this consultation  If you have questions, please do not hesitate to call me  I look forward to following your patient along with you  Sincerely,        Edna Oh MD        CC: No Recipients  Edna Oh MD  2/25/2019  3:01 PM  Sign at close encounter  Patient is here today for her migraine's    Patient ID: Rg Pratt is a 41 y o  female  Assessment/Plan:    Intractable migraine without aura and without status migrainosus  Certainly compared to pre initiation of topiramate doing better  However, unfortunately, did not as directed advanced her topiramate to 50 mg in the morning and 100 mg in the evening daily and has continued just 50 mg twice daily  Her headache circumstance has been cut in half, but certainly she would like to see yet further improvement  I am hopeful that will occur as she advances under instruction her topiramate dose  --reviewed for her the appropriate topiramate schedule of 50 mg tablets 1 in the morning and 2 in the late evening daily  --to continue a headache diary  --did not have her CBC and CMP performed as requested on last appointment but will do so now  Right carpal tunnel syndrome  Suspected by history  Kit continues to maintain full testable distal median motor and sensory function  --scheduled for electrodiagnostic study on March 13th   --also will have her TSH drawn along with her above outlined blood work  Right leg paresthesias  Still intermittently symptomatic  Origin unclear  --await upcoming electrodiagnostic study  She will follow up in 6 weeks  Subjective:    HPI  Patient, 41years of age, is followed here primarily for her migraine    She has gone from an intractable circumstance of 12-15 migraines on a monthly basis to now 8-9  Perhaps, she has not had further improvement over her last appointment as she did not follow the instructions when last seen to increase her topiramate to 50 mg in the morning and 100 mg in the evening and has maintained just 50 mg twice daily  With regard to her additional symptoms, she has a suspected right carpal tunnel syndrome, still intermittently symptomatic and also describes paresthesias involving her right lower extremity, origin unclear  She is scheduled for electrodiagnostic evaluation for both on March 13th  She was scheduled to have CBC, CMP and TSH performed but has not yet done so      Past Medical History:   Diagnosis Date    Anxiety     Asthma     [facility]     Depression     Disease of thyroid gland     Endometriosis     Psychiatric illness     Psychosis ([facility] )     PTSD (post-traumatic stress disorder) 6/6/2017    Right carpal tunnel syndrome 12/26/2018    Self-injurious behavior     [facility])      Past Surgical History:   Procedure Laterality Date    ABDOMINAL SURGERY      HERNIA REPAIR       Social History     Socioeconomic History    Marital status: Single     Spouse name: None    Number of children: None    Years of education: None    Highest education level: None   Occupational History    None   Social Needs    Financial resource strain: None    Food insecurity:     Worry: None     Inability: None    Transportation needs:     Medical: None     Non-medical: None   Tobacco Use    Smoking status: Current Every Day Smoker     Packs/day: 0 25     Types: Cigarettes    Smokeless tobacco: Never Used   Substance and Sexual Activity    Alcohol use: No    Drug use: No    Sexual activity: None   Lifestyle    Physical activity:     Days per week: None     Minutes per session: None    Stress: None   Relationships    Social connections:     Talks on phone: None Gets together: None     Attends Taoism service: None     Active member of club or organization: None     Attends meetings of clubs or organizations: None     Relationship status: None    Intimate partner violence:     Fear of current or ex partner: None     Emotionally abused: None     Physically abused: None     Forced sexual activity: None   Other Topics Concern    None   Social History Narrative    None     History reviewed  No pertinent family history  Allergies   Allergen Reactions    Aleve [Naproxen] Shortness Of Breath    Benzonatate Swelling     Throat closing    Guaifenesin Other (See Comments)     Note - patient states she IS able to take robitussin  Ryan Favors [Lurasidone] Swelling      stuttering        Current Outpatient Medications:     albuterol (VENTOLIN HFA) 90 mcg/act inhaler, Inhale 2 puffs every 4 (four) hours as needed, Disp: , Rfl:     ALPRAZolam (XANAX) 1 mg tablet, Take 1 mg by mouth Three times a day, Disp: , Rfl:     atoMOXetine (STRATTERA) 40 mg capsule, Take 40 mg by mouth daily, Disp: , Rfl:     butalbital-acetaminophen-caffeine (FIORICET,ESGIC) -40 mg per tablet, Take 1 tablet by mouth every 4 (four) hours as needed for headaches, Disp: 30 tablet, Rfl: 0    cetirizine (ZyrTEC) 10 mg tablet, Take 10 mg by mouth every 24 hours, Disp: , Rfl:     fluticasone (FLONASE) 50 mcg/act nasal spray, 1 spray into each nostril, Disp: , Rfl:     fluticasone-salmeterol (ADVAIR DISKUS) 250-50 mcg/dose inhaler, Inhale 1 puff, Disp: , Rfl:     levothyroxine 50 mcg tablet, Take 50 mcg by mouth daily, Disp: , Rfl:     sertraline (ZOLOFT) 100 mg tablet, , Disp: , Rfl:     topiramate (TOPAMAX) 50 MG tablet, By oral route take 1 tablet in the morning and two tablets in the late evening daily, Disp: 90 tablet, Rfl: 2    Objective:    Blood pressure 146/88, pulse 84, resp  rate 18, height 5' 2 5" (1 588 m), weight 106 kg (232 lb 12 8 oz)      Physical Exam  Lungs clear to auscultation  Rhythm regular  Neurological Exam  Alert  Pleasantly interactive  Fully oriented  Unremarkable spontaneous gait  Cranial nerves 2-12 tested and grossly intact  Funduscopic examination with marginated discs bilaterally  Accurate with finger-to-nose and heel-to-shin maneuvers bilaterally  Full symmetrical strength throughout the 4 extremities including fully retained strength in the distal right median distribution  With sensory testing she again had full pin appreciation throughout although once again described modest reduction in appreciation in the right median distribution as compared to left  Muscle stretch reflexes bilaterally 2 at biceps, brachioradialis and triceps, bilaterally 2 at knees and bilaterally 2+ at ankles  Toe response downgoing bilaterally  ROS:    Review of Systems   Constitutional: Positive for fatigue  Negative for appetite change and fever  HENT: Negative  Negative for hearing loss, tinnitus, trouble swallowing and voice change  Eyes: Negative  Negative for photophobia and pain  Respiratory: Negative  Negative for shortness of breath  Cardiovascular: Negative  Negative for palpitations  Gastrointestinal: Negative  Negative for nausea and vomiting  Endocrine: Negative  Negative for cold intolerance and heat intolerance  Genitourinary: Positive for frequency and urgency  Negative for dysuria  Musculoskeletal: Negative  Negative for myalgias and neck pain  Skin: Negative  Negative for rash  Neurological: Positive for dizziness (at times), light-headedness (at times), numbness (right foot and hand) and headaches  Negative for tremors, seizures, syncope, facial asymmetry, speech difficulty and weakness  Hematological: Negative  Does not bruise/bleed easily  Psychiatric/Behavioral: Positive for sleep disturbance  Negative for confusion and hallucinations  The patient is nervous/anxious          I personally reviewed the ROS that was entered by the medical assistant  *Please note this document was created using voice recognition software and may contain sound-alike word errors  *

## 2019-02-25 NOTE — ASSESSMENT & PLAN NOTE
Certainly compared to pre initiation of topiramate doing better  However, unfortunately, did not as directed advanced her topiramate to 50 mg in the morning and 100 mg in the evening daily and has continued just 50 mg twice daily  Her headache circumstance has been cut in half, but certainly she would like to see yet further improvement  I am hopeful that will occur as she advances under instruction her topiramate dose  --reviewed for her the appropriate topiramate schedule of 50 mg tablets 1 in the morning and 2 in the late evening daily  --to continue a headache diary  --did not have her CBC and CMP performed as requested on last appointment but will do so now

## 2019-02-25 NOTE — PROGRESS NOTES
Patient is here today for her migraine's    Patient ID: Marjan Mckeon is a 41 y o  female  Assessment/Plan:    Intractable migraine without aura and without status migrainosus  Certainly compared to pre initiation of topiramate doing better  However, unfortunately, did not as directed advanced her topiramate to 50 mg in the morning and 100 mg in the evening daily and has continued just 50 mg twice daily  Her headache circumstance has been cut in half, but certainly she would like to see yet further improvement  I am hopeful that will occur as she advances under instruction her topiramate dose  --reviewed for her the appropriate topiramate schedule of 50 mg tablets 1 in the morning and 2 in the late evening daily  --to continue a headache diary  --did not have her CBC and CMP performed as requested on last appointment but will do so now  Right carpal tunnel syndrome  Suspected by history  Kit continues to maintain full testable distal median motor and sensory function  --scheduled for electrodiagnostic study on March 13th   --also will have her TSH drawn along with her above outlined blood work  Right leg paresthesias  Still intermittently symptomatic  Origin unclear  --await upcoming electrodiagnostic study  She will follow up in 6 weeks  Subjective:    HPI  Patient, 41years of age, is followed here primarily for her migraine  She has gone from an intractable circumstance of 12-15 migraines on a monthly basis to now 8-9  Perhaps, she has not had further improvement over her last appointment as she did not follow the instructions when last seen to increase her topiramate to 50 mg in the morning and 100 mg in the evening and has maintained just 50 mg twice daily  With regard to her additional symptoms, she has a suspected right carpal tunnel syndrome, still intermittently symptomatic and also describes paresthesias involving her right lower extremity, origin unclear    She is scheduled for electrodiagnostic evaluation for both on March 13th  She was scheduled to have CBC, CMP and TSH performed but has not yet done so  Past Medical History:   Diagnosis Date    Anxiety     Asthma     [facility] disorder ([facility] )     Depression     Disease of thyroid gland     Endometriosis     Psychiatric illness     Psychosis ([facility] )     PTSD (post-traumatic stress disorder) 6/6/2017    Right carpal tunnel syndrome 12/26/2018    Self-injurious behavior     Su[facility] attempt [facility])      Past Surgical History:   Procedure Laterality Date    ABDOMINAL SURGERY      HERNIA REPAIR       Social History     Socioeconomic History    Marital status: Single     Spouse name: None    Number of children: None    Years of education: None    Highest education level: None   Occupational History    None   Social Needs    Financial resource strain: None    Food insecurity:     Worry: None     Inability: None    Transportation needs:     Medical: None     Non-medical: None   Tobacco Use    Smoking status: Current Every Day Smoker     Packs/day: 0 25     Types: Cigarettes    Smokeless tobacco: Never Used   Substance and Sexual Activity    Alcohol use: No    Drug use: No    Sexual activity: None   Lifestyle    Physical activity:     Days per week: None     Minutes per session: None    Stress: None   Relationships    Social connections:     Talks on phone: None     Gets together: None     Attends Rastafari service: None     Active member of club or organization: None     Attends meetings of clubs or organizations: None     Relationship status: None    Intimate partner violence:     Fear of current or ex partner: None     Emotionally abused: None     Physically abused: None     Forced sexual activity: None   Other Topics Concern    None   Social History Narrative    None     History reviewed  No pertinent family history    Allergies   Allergen Reactions    Aleve [Naproxen] Shortness Of Breath    Benzonatate Swelling     Throat closing    Guaifenesin Other (See Comments)     Note - patient states she IS able to take robitussin  Latrell Shames [Lurasidone] Swelling      stuttering        Current Outpatient Medications:     albuterol (VENTOLIN HFA) 90 mcg/act inhaler, Inhale 2 puffs every 4 (four) hours as needed, Disp: , Rfl:     ALPRAZolam (XANAX) 1 mg tablet, Take 1 mg by mouth Three times a day, Disp: , Rfl:     atoMOXetine (STRATTERA) 40 mg capsule, Take 40 mg by mouth daily, Disp: , Rfl:     butalbital-acetaminophen-caffeine (FIORICET,ESGIC) -40 mg per tablet, Take 1 tablet by mouth every 4 (four) hours as needed for headaches, Disp: 30 tablet, Rfl: 0    cetirizine (ZyrTEC) 10 mg tablet, Take 10 mg by mouth every 24 hours, Disp: , Rfl:     fluticasone (FLONASE) 50 mcg/act nasal spray, 1 spray into each nostril, Disp: , Rfl:     fluticasone-salmeterol (ADVAIR DISKUS) 250-50 mcg/dose inhaler, Inhale 1 puff, Disp: , Rfl:     levothyroxine 50 mcg tablet, Take 50 mcg by mouth daily, Disp: , Rfl:     sertraline (ZOLOFT) 100 mg tablet, , Disp: , Rfl:     topiramate (TOPAMAX) 50 MG tablet, By oral route take 1 tablet in the morning and two tablets in the late evening daily, Disp: 90 tablet, Rfl: 2    Objective:    Blood pressure 146/88, pulse 84, resp  rate 18, height 5' 2 5" (1 588 m), weight 106 kg (232 lb 12 8 oz)  Physical Exam  Lungs clear to auscultation  Rhythm regular  Neurological Exam  Alert  Pleasantly interactive  Fully oriented  Unremarkable spontaneous gait  Cranial nerves 2-12 tested and grossly intact  Funduscopic examination with marginated discs bilaterally  Accurate with finger-to-nose and heel-to-shin maneuvers bilaterally  Full symmetrical strength throughout the 4 extremities including fully retained strength in the distal right median distribution    With sensory testing she again had full pin appreciation throughout although once again described modest reduction in appreciation in the right median distribution as compared to left  Muscle stretch reflexes bilaterally 2 at biceps, brachioradialis and triceps, bilaterally 2 at knees and bilaterally 2+ at ankles  Toe response downgoing bilaterally  ROS:    Review of Systems   Constitutional: Positive for fatigue  Negative for appetite change and fever  HENT: Negative  Negative for hearing loss, tinnitus, trouble swallowing and voice change  Eyes: Negative  Negative for photophobia and pain  Respiratory: Negative  Negative for shortness of breath  Cardiovascular: Negative  Negative for palpitations  Gastrointestinal: Negative  Negative for nausea and vomiting  Endocrine: Negative  Negative for cold intolerance and heat intolerance  Genitourinary: Positive for frequency and urgency  Negative for dysuria  Musculoskeletal: Negative  Negative for myalgias and neck pain  Skin: Negative  Negative for rash  Neurological: Positive for dizziness (at times), light-headedness (at times), numbness (right foot and hand) and headaches  Negative for tremors, seizures, syncope, facial asymmetry, speech difficulty and weakness  Hematological: Negative  Does not bruise/bleed easily  Psychiatric/Behavioral: Positive for sleep disturbance  Negative for confusion and hallucinations  The patient is nervous/anxious  I personally reviewed the ROS that was entered by the medical assistant  *Please note this document was created using voice recognition software and may contain sound-alike word errors  *

## 2019-03-13 ENCOUNTER — HOSPITAL ENCOUNTER (OUTPATIENT)
Dept: NEUROLOGY | Facility: CLINIC | Age: 44
Discharge: HOME | End: 2019-03-13
Payer: COMMERCIAL

## 2019-03-13 DIAGNOSIS — R20.2 RIGHT LEG PARESTHESIAS: ICD-10-CM

## 2019-03-13 DIAGNOSIS — G56.01 RIGHT CARPAL TUNNEL SYNDROME: ICD-10-CM

## 2019-03-13 PROCEDURE — 95886 MUSC TEST DONE W/N TEST COMP: CPT | Performed by: PHYSICAL MEDICINE & REHABILITATION

## 2019-03-13 PROCEDURE — 95912 NRV CNDJ TEST 11-12 STUDIES: CPT | Performed by: PHYSICAL MEDICINE & REHABILITATION

## 2019-03-13 NOTE — PROCEDURES
Nani Gomes   [facility] [address], [address]   [phone]        Name: Joey Brennan  Patient ID: [id number]   Age: 41 Years 8 Months  YOB: 1975   Account Number:    Gender: Female   Technologist:    Date of Exam: 3/13/2019 11:11   Referring Physician: Martina Jacobs MD  Temperature: 29 RUE, 28 RLE   Examining Physician: Latha Peacock MD  Height:                 Patient History:   41 y o female with chronic right hand and foot numbness  It is affecting digit 4 primarily in the upper extremity  Ongoing for approximately 1 year  Denies neck and back pain  Referred for carpal tunnel vs neuropathy evaluation  5/5 bilateral finger flexion/abduction, wrist extension, elbow flexion/extension, shoulder abduction, hip flexion, knee extension, dorsi/plantarflexion, EHL  negative phalen right         MNC      Nerve / Sites Muscle Latency Ref  Amplitude Ref  Duration Rel Amp Distance Lat Diff Velocity Ref  ms ms mV mV ms % mm ms m/s m/s   R Median - APB      Wrist APB 5 26 ?4 20 9 3 ?4 0 7 24 100 70         Elbow APB 9 22  9 4  7 29 101 190 3 96 48 ?50   R Ulnar - ADM      Wrist ADM 2 40 ?3 30 9 4 ?5 0 6 93 100 70         B  Elbow ADM 5 42  8 2  6 61 87 4 175 3 02 58 ?50      A  Elbow ADM 6 77  8 1  6 88 98 5 100 1 35 74 ?49            4 38     R Peroneal - EDB      Ankle EDB 3 80 ?5 50 2 0 ?2 0 5 16 100 80         Fib head EDB 9 90  1 9  5 57 94 6 260 6 09 43 ?40      Pop fossa EDB 12 03  1 8  5 26 96 6 100 2 14 47 ?40            8 23     R Tibial - AH      Ankle AH 4 69 ?6 00 4 9 ?4 0 4 69 100 100          SNC      Nerve / Sites Rec  Site Onset Lat Peak Lat Ref  Amp Ref  Distance Peak Diff Velocity Ref       ms ms ms µV µV mm ms m/s m/s   R Median - Digit II (Antidromic)      Wrist Dig II 3 59 5 10 ?3 50 10 5 ?10 0 130  36 ?50   R Ulnar - Digit V (Antidromic)      Wrist Dig V 1 61 2 45 ?3 10 37 9 ?10 0 110  68 ?50   R Radial - Anatomical snuff box (Forearm) Forearm Wrist 1 35 2 19 ?2 90 23 7 ?10 0 100  74 ?50   R Sural - Ankle (Calf)      Calf Ankle 2 71 3 44 ?4 00 7 7 ?6 0 140  52 ?40   R Superficial peroneal - Ankle      Lat leg Ankle 2 14 2 97 ?3 50 6 6 ?6 0 100  47 ?40   L Medial plantar, Lateral plantar - Ankle (Medial, lateral sole)      Medial plantar Sole Ankle 4 27 4 95 ?3 70 3 4 ?2 0 140  33 ?45      Lateral plantar Sole Ankle NR NR ?3 70 NR ?2 0 140  NR ?45           NR     R Medial plantar, Lateral plantar - Ankle (Medial, lateral sole)      Medial plantar Sole Ankle 2 60 3 33 ?3 70 4 0 ?2 0 140  54 ?45      Lateral plantar Sole Ankle NR NR ?3 70 NR ?2 0 140  NR ?45           NR         EMG         Needle EMG Examination     Insertional Spontaneous MUAP   Muscle Nerve Roots Activity Fib PSW Fasc Dur  Amp Poly Config Recruitment   R  Deltoid Axillary C5-C6 Normal None None None Normal Normal None Normal Normal   R  Biceps brachii Musculocutaneous C5-C6 Normal None None None Normal Normal None Normal Normal   R  Triceps brachii Radial C6-C8 Normal None None None Normal Normal None Normal Normal   R  Pronator teres Median C6-C7 Normal None None None Normal Normal None Normal Normal   R  First dorsal interosseous Ulnar C8-T1 Normal None None None Normal Normal None Normal Normal   R  Abductor pollicis brevis Median B9-D5 Normal None None None Normal Normal None Normal Normal   R  Vastus medialis Femoral L2-L4 Normal None None None Normal Normal None Normal Normal   R  Tibialis anterior Peroneal L4-L5 Normal None None None Normal Normal None Normal Normal   R  Gastrocnemius (Medial head) Tibial S1-S2 Normal None None None Normal Normal None Normal Normal   R  Peroneus longus Perineal L5-S1 Normal None None None Normal Normal None Normal Normal   R  Gluteus medius Superior gluteal L4-S1 Normal None None None Normal Normal None Normal Normal   R   Biceps femoris (short head) Sciatic (peroneal division) L5-S2 Normal None None None Normal Normal None Normal Normal R  Abductor hallucis Tibial S1-S2 Normal None None None Normal Normal None Normal Normal         Findings:   Motor:  Right median compound motor action potential (CMAP) demonstrated prolonged distal latency, normal amplitude, and decreased conduction velocity across the forearm  Right ulnar compound motor action potential (CMAP) demonstrated normal distal latency, normal amplitude, and normal conduction velocity across the elbow and forearm  Right peroneal compound motor action potential (CMAP) to the extensor digitorum brevis demonstrated normal distal latency, normal amplitude, and normal conduction velocity across the fibular head and lower leg  Right tibial compound motor action potential (CMAP) to the abductor hallucis demonstrated normal distal latency, normal amplitude, and normal conduction velocity across the lower leg  Sensory:  Right median sensory nerve action potential (SNAP) demonstrated normal amplitude and prolonged peak latency  Right ulnar sensory nerve action potential (SNAP) demonstrated normal amplitude and normal peak latency  Right radial sensory nerve action potential (SNAP) demonstrated normal amplitude and normal peak latency  Right sural sensory nerve action potential (SNAP) demonstrated normal peak latency and normal amplitude  Right superficial peroneal sensory nerve action potential (SNAP) demonstrated normal peak latency and normal amplitude  Right medial plantar SNAP demonstrated normal amplitude and normal peak latency  Left medial plantar SNAP demonstrated normal amplitude and prolonged peak latency  Left and right lateral plantar SNAPs unobtainable  EMG:  A disposable monopolar needle was used to study selected muscles in the right upper extremity including the deltoid, biceps, triceps, pronator teres, first dorsal interosseous, and abductor pollicis brevis   All muscles tested demonstrated normal insertional activity, no abnormal spontaneous activity, and normal volitional motor unit action potentials  A disposable monopolar needle was used to study selected muscles in the right lower extremity including the tibialis anterior, medial gastrocnemius, peroneus longus, vastus medialis, abductor hallucis, and gluteus medius  All muscles tested demonstrated normal insertional activity, no abnormal spontaneous activity, and normal volitional motor unit action potentials  Impression: Abnormal study  1  There is electrodiagnostic evidence of a right median demyelinating sensorimotor mononeuropathy across the wrist, most consistent with clinical diagnosis of moderate right carpal tunnel syndrome  2  There is no electrodiagnostic evidence of a right ulnar, radial, peroneal, tibial, or sural mononeuropathy  In particular, there is no evidence of a right tibial entrapment mononeuropathy across the ankle to suggest tarsal tunnel syndrome  Although the right lateral plantar sensory study was absent, it is of unclear clinical significance when absent bilaterally  3  There is no electrodiagnostic evidence of a right brachial plexopathy or cervical radiculopathy  4  There is no electrodiagnostic evidence of a right lumbosacral plexopathy or lumbar radiculopathy               ___________________________  Willie Willett MD

## 2019-04-04 ENCOUNTER — TELEPHONE (OUTPATIENT)
Dept: FAMILY MEDICINE CLINIC | Facility: CLINIC | Age: 44
End: 2019-04-04

## 2019-04-05 DIAGNOSIS — G56.01 CARPAL TUNNEL SYNDROME ON RIGHT: Primary | ICD-10-CM

## 2019-04-08 ENCOUNTER — OFFICE VISIT (OUTPATIENT)
Dept: NEUROLOGY | Facility: CLINIC | Age: 44
End: 2019-04-08
Payer: COMMERCIAL

## 2019-04-08 VITALS
BODY MASS INDEX: 43.06 KG/M2 | HEART RATE: 88 BPM | HEIGHT: 62 IN | SYSTOLIC BLOOD PRESSURE: 124 MMHG | DIASTOLIC BLOOD PRESSURE: 90 MMHG | WEIGHT: 234 LBS | RESPIRATION RATE: 16 BRPM

## 2019-04-08 DIAGNOSIS — G43.009 MIGRAINE WITHOUT AURA AND WITHOUT STATUS MIGRAINOSUS, NOT INTRACTABLE: Primary | ICD-10-CM

## 2019-04-08 DIAGNOSIS — G56.01 CARPAL TUNNEL SYNDROME ON RIGHT: ICD-10-CM

## 2019-04-08 PROCEDURE — 99213 OFFICE O/P EST LOW 20 MIN: CPT | Performed by: PSYCHIATRY & NEUROLOGY

## 2019-04-15 ENCOUNTER — HOSPITAL ENCOUNTER (EMERGENCY)
Facility: HOSPITAL | Age: 44
Discharge: HOME | End: 2019-04-15
Attending: EMERGENCY MEDICINE
Payer: COMMERCIAL

## 2019-04-15 VITALS
OXYGEN SATURATION: 96 % | RESPIRATION RATE: 18 BRPM | WEIGHT: 234.13 LBS | HEART RATE: 99 BPM | BODY MASS INDEX: 42.75 KG/M2 | SYSTOLIC BLOOD PRESSURE: 148 MMHG | DIASTOLIC BLOOD PRESSURE: 80 MMHG

## 2019-04-15 DIAGNOSIS — R07.89 LEFT-SIDED CHEST WALL PAIN: Primary | ICD-10-CM

## 2019-04-15 DIAGNOSIS — M94.0 COSTOCHONDRITIS, ACUTE: ICD-10-CM

## 2019-04-15 LAB
ALBUMIN SERPL BCP-MCNC: 4.2 G/DL (ref 3–5.2)
ALP SERPL-CCNC: 83 U/L (ref 43–122)
ALT SERPL W P-5'-P-CCNC: 16 U/L (ref 9–52)
ANION GAP SERPL CALCULATED.3IONS-SCNC: 9 MMOL/L (ref 5–14)
AST SERPL W P-5'-P-CCNC: 21 U/L (ref 14–36)
ATRIAL RATE: 87 BPM
BASOPHILS # BLD AUTO: 0.1 THOUSANDS/ΜL (ref 0–0.1)
BASOPHILS NFR BLD AUTO: 1 % (ref 0–1)
BILIRUB SERPL-MCNC: 0.2 MG/DL
BUN SERPL-MCNC: 16 MG/DL (ref 5–25)
CALCIUM SERPL-MCNC: 9.1 MG/DL (ref 8.4–10.2)
CHLORIDE SERPL-SCNC: 107 MMOL/L (ref 97–108)
CO2 SERPL-SCNC: 24 MMOL/L (ref 22–30)
CREAT SERPL-MCNC: 0.97 MG/DL (ref 0.6–1.2)
D-DIMER: 0.32 MG/L
EOSINOPHIL # BLD AUTO: 0.2 THOUSAND/ΜL (ref 0–0.4)
EOSINOPHIL NFR BLD AUTO: 2 % (ref 0–6)
ERYTHROCYTE [DISTWIDTH] IN BLOOD BY AUTOMATED COUNT: 14.5 %
GFR SERPL CREATININE-BSD FRML MDRD: 73 ML/MIN/1.73SQ M
GLUCOSE SERPL-MCNC: 98 MG/DL (ref 70–99)
HCT VFR BLD AUTO: 39.3 % (ref 36–46)
HGB BLD-MCNC: 12.8 G/DL (ref 12–16)
LYMPHOCYTES # BLD AUTO: 3 THOUSANDS/ΜL (ref 0.5–4)
LYMPHOCYTES NFR BLD AUTO: 34 % (ref 25–45)
MCH RBC QN AUTO: 29.5 PG (ref 26–34)
MCHC RBC AUTO-ENTMCNC: 32.6 G/DL (ref 31–36)
MCV RBC AUTO: 91 FL (ref 80–100)
MONOCYTES # BLD AUTO: 0.5 THOUSAND/ΜL (ref 0.2–0.9)
MONOCYTES NFR BLD AUTO: 6 % (ref 1–10)
NEUTROPHILS # BLD AUTO: 5 THOUSANDS/ΜL (ref 1.8–7.8)
NEUTS SEG NFR BLD AUTO: 57 % (ref 45–65)
P AXIS: 38 DEGREES
PLATELET # BLD AUTO: 241 THOUSANDS/UL (ref 150–450)
PMV BLD AUTO: 8.4 FL (ref 8.9–12.7)
POTASSIUM SERPL-SCNC: 3.8 MMOL/L (ref 3.6–5)
PR INTERVAL: 140 MS
PROT SERPL-MCNC: 7.3 G/DL (ref 5.9–8.4)
QRS AXIS: 22 DEGREES
QRSD INTERVAL: 78 MS
QT INTERVAL: 372 MS
QTC INTERVAL: 447 MS
RBC # BLD AUTO: 4.34 MILLION/UL (ref 4–5.2)
SODIUM SERPL-SCNC: 140 MMOL/L (ref 137–147)
T WAVE AXIS: 28 DEGREES
VENTRICULAR RATE: 87 BPM
WBC # BLD AUTO: 8.8 THOUSAND/UL (ref 4.5–11)

## 2019-04-15 PROCEDURE — 36415 COLL VENOUS BLD VENIPUNCTURE: CPT | Performed by: EMERGENCY MEDICINE

## 2019-04-15 PROCEDURE — 99285 EMERGENCY DEPT VISIT HI MDM: CPT

## 2019-04-15 PROCEDURE — 99284 EMERGENCY DEPT VISIT MOD MDM: CPT | Performed by: EMERGENCY MEDICINE

## 2019-04-15 PROCEDURE — 85025 COMPLETE CBC W/AUTO DIFF WBC: CPT | Performed by: EMERGENCY MEDICINE

## 2019-04-15 PROCEDURE — 85379 FIBRIN DEGRADATION QUANT: CPT | Performed by: EMERGENCY MEDICINE

## 2019-04-15 PROCEDURE — 80053 COMPREHEN METABOLIC PANEL: CPT | Performed by: EMERGENCY MEDICINE

## 2019-04-15 PROCEDURE — 96372 THER/PROPH/DIAG INJ SC/IM: CPT

## 2019-04-15 PROCEDURE — 93010 ELECTROCARDIOGRAM REPORT: CPT | Performed by: INTERNAL MEDICINE

## 2019-04-15 PROCEDURE — 93005 ELECTROCARDIOGRAM TRACING: CPT

## 2019-04-15 RX ORDER — IBUPROFEN 600 MG/1
600 TABLET ORAL EVERY 8 HOURS PRN
Qty: 30 TABLET | Refills: 0 | Status: SHIPPED | OUTPATIENT
Start: 2019-04-15 | End: 2020-08-17 | Stop reason: SDUPTHER

## 2019-04-15 RX ORDER — KETOROLAC TROMETHAMINE 30 MG/ML
30 INJECTION, SOLUTION INTRAMUSCULAR; INTRAVENOUS ONCE
Status: COMPLETED | OUTPATIENT
Start: 2019-04-15 | End: 2019-04-15

## 2019-04-15 RX ADMIN — KETOROLAC TROMETHAMINE 30 MG: 30 INJECTION, SOLUTION INTRAMUSCULAR; INTRAVENOUS at 00:40

## 2019-04-16 ENCOUNTER — APPOINTMENT (OUTPATIENT)
Dept: LAB | Facility: IMAGING CENTER | Age: 44
End: 2019-04-16
Payer: COMMERCIAL

## 2019-04-16 ENCOUNTER — TRANSCRIBE ORDERS (OUTPATIENT)
Dept: ADMINISTRATIVE | Facility: HOSPITAL | Age: 44
End: 2019-04-16

## 2019-04-16 DIAGNOSIS — G43.019 INTRACTABLE MIGRAINE WITHOUT AURA AND WITHOUT STATUS MIGRAINOSUS: ICD-10-CM

## 2019-04-16 DIAGNOSIS — G56.01 RIGHT CARPAL TUNNEL SYNDROME: ICD-10-CM

## 2019-04-16 LAB
ALBUMIN SERPL BCP-MCNC: 4.1 G/DL (ref 3.5–5)
ALP SERPL-CCNC: 111 U/L (ref 46–116)
ALT SERPL W P-5'-P-CCNC: 28 U/L (ref 12–78)
ANION GAP SERPL CALCULATED.3IONS-SCNC: 7 MMOL/L (ref 4–13)
AST SERPL W P-5'-P-CCNC: 17 U/L (ref 5–45)
BASOPHILS # BLD AUTO: 0.07 THOUSANDS/ΜL (ref 0–0.1)
BASOPHILS NFR BLD AUTO: 1 % (ref 0–1)
BILIRUB SERPL-MCNC: 0.18 MG/DL (ref 0.2–1)
BUN SERPL-MCNC: 20 MG/DL (ref 5–25)
CALCIUM SERPL-MCNC: 8.8 MG/DL (ref 8.3–10.1)
CHLORIDE SERPL-SCNC: 108 MMOL/L (ref 100–108)
CO2 SERPL-SCNC: 24 MMOL/L (ref 21–32)
CREAT SERPL-MCNC: 1.02 MG/DL (ref 0.6–1.3)
EOSINOPHIL # BLD AUTO: 0.26 THOUSAND/ΜL (ref 0–0.61)
EOSINOPHIL NFR BLD AUTO: 3 % (ref 0–6)
ERYTHROCYTE [DISTWIDTH] IN BLOOD BY AUTOMATED COUNT: 13.5 % (ref 11.6–15.1)
GFR SERPL CREATININE-BSD FRML MDRD: 68 ML/MIN/1.73SQ M
GLUCOSE P FAST SERPL-MCNC: 93 MG/DL (ref 65–99)
HCT VFR BLD AUTO: 42.7 % (ref 34.8–46.1)
HGB BLD-MCNC: 13.6 G/DL (ref 11.5–15.4)
IMM GRANULOCYTES # BLD AUTO: 0.05 THOUSAND/UL (ref 0–0.2)
IMM GRANULOCYTES NFR BLD AUTO: 1 % (ref 0–2)
LYMPHOCYTES # BLD AUTO: 2.39 THOUSANDS/ΜL (ref 0.6–4.47)
LYMPHOCYTES NFR BLD AUTO: 25 % (ref 14–44)
MCH RBC QN AUTO: 29.4 PG (ref 26.8–34.3)
MCHC RBC AUTO-ENTMCNC: 31.9 G/DL (ref 31.4–37.4)
MCV RBC AUTO: 92 FL (ref 82–98)
MONOCYTES # BLD AUTO: 0.62 THOUSAND/ΜL (ref 0.17–1.22)
MONOCYTES NFR BLD AUTO: 7 % (ref 4–12)
NEUTROPHILS # BLD AUTO: 6.13 THOUSANDS/ΜL (ref 1.85–7.62)
NEUTS SEG NFR BLD AUTO: 63 % (ref 43–75)
NRBC BLD AUTO-RTO: 0 /100 WBCS
PLATELET # BLD AUTO: 270 THOUSANDS/UL (ref 149–390)
PMV BLD AUTO: 10.5 FL (ref 8.9–12.7)
POTASSIUM SERPL-SCNC: 4 MMOL/L (ref 3.5–5.3)
PROT SERPL-MCNC: 8.2 G/DL (ref 6.4–8.2)
RBC # BLD AUTO: 4.62 MILLION/UL (ref 3.81–5.12)
SODIUM SERPL-SCNC: 139 MMOL/L (ref 136–145)
T4 FREE SERPL-MCNC: 0.82 NG/DL (ref 0.76–1.46)
TSH SERPL DL<=0.05 MIU/L-ACNC: 8.76 UIU/ML (ref 0.36–3.74)
WBC # BLD AUTO: 9.52 THOUSAND/UL (ref 4.31–10.16)

## 2019-04-16 PROCEDURE — 36415 COLL VENOUS BLD VENIPUNCTURE: CPT

## 2019-04-16 PROCEDURE — 84439 ASSAY OF FREE THYROXINE: CPT

## 2019-04-16 PROCEDURE — 84443 ASSAY THYROID STIM HORMONE: CPT

## 2019-04-16 PROCEDURE — 80053 COMPREHEN METABOLIC PANEL: CPT

## 2019-04-16 PROCEDURE — 85025 COMPLETE CBC W/AUTO DIFF WBC: CPT

## 2019-04-19 ENCOUNTER — OFFICE VISIT (OUTPATIENT)
Dept: FAMILY MEDICINE CLINIC | Facility: CLINIC | Age: 44
End: 2019-04-19
Payer: COMMERCIAL

## 2019-04-19 VITALS
HEART RATE: 88 BPM | RESPIRATION RATE: 16 BRPM | BODY MASS INDEX: 43.06 KG/M2 | WEIGHT: 234 LBS | DIASTOLIC BLOOD PRESSURE: 80 MMHG | OXYGEN SATURATION: 98 % | TEMPERATURE: 98.8 F | HEIGHT: 62 IN | SYSTOLIC BLOOD PRESSURE: 132 MMHG

## 2019-04-19 DIAGNOSIS — F41.1 GENERALIZED ANXIETY DISORDER: ICD-10-CM

## 2019-04-19 DIAGNOSIS — E66.01 CLASS 3 SEVERE OBESITY DUE TO EXCESS CALORIES WITHOUT SERIOUS COMORBIDITY WITH BODY MASS INDEX (BMI) OF 45.0 TO 49.9 IN ADULT (HCC): ICD-10-CM

## 2019-04-19 DIAGNOSIS — E03.9 ACQUIRED HYPOTHYROIDISM: Primary | ICD-10-CM

## 2019-04-19 PROCEDURE — 99214 OFFICE O/P EST MOD 30 MIN: CPT | Performed by: NURSE PRACTITIONER

## 2019-04-19 RX ORDER — ALPRAZOLAM 1 MG/1
1 TABLET ORAL 3 TIMES DAILY PRN
Qty: 50 TABLET | Refills: 0 | Status: SHIPPED | OUTPATIENT
Start: 2019-04-19 | End: 2019-05-20 | Stop reason: SDUPTHER

## 2019-04-19 RX ORDER — LEVOTHYROXINE SODIUM 0.1 MG/1
100 TABLET ORAL
Qty: 30 TABLET | Refills: 5 | Status: SHIPPED | OUTPATIENT
Start: 2019-04-19 | End: 2019-11-11 | Stop reason: SDUPTHER

## 2019-04-26 ENCOUNTER — TELEPHONE (OUTPATIENT)
Dept: FAMILY MEDICINE CLINIC | Facility: CLINIC | Age: 44
End: 2019-04-26

## 2019-05-15 ENCOUNTER — TELEPHONE (OUTPATIENT)
Dept: FAMILY MEDICINE CLINIC | Facility: CLINIC | Age: 44
End: 2019-05-15

## 2019-05-15 DIAGNOSIS — F41.1 GENERALIZED ANXIETY DISORDER: ICD-10-CM

## 2019-05-16 RX ORDER — ALPRAZOLAM 1 MG/1
1 TABLET ORAL 3 TIMES DAILY PRN
Qty: 50 TABLET | Refills: 0 | OUTPATIENT
Start: 2019-05-16

## 2019-05-17 DIAGNOSIS — F41.1 GENERALIZED ANXIETY DISORDER: ICD-10-CM

## 2019-05-20 DIAGNOSIS — F41.1 GENERALIZED ANXIETY DISORDER: ICD-10-CM

## 2019-05-20 RX ORDER — ALPRAZOLAM 1 MG/1
TABLET ORAL
Qty: 50 TABLET | Refills: 0 | Status: SHIPPED | OUTPATIENT
Start: 2019-05-20 | End: 2019-06-18 | Stop reason: SDUPTHER

## 2019-05-20 RX ORDER — ALPRAZOLAM 1 MG/1
1 TABLET ORAL 3 TIMES DAILY PRN
Qty: 30 TABLET | Refills: 0 | OUTPATIENT
Start: 2019-05-20

## 2019-05-24 DIAGNOSIS — F41.1 GENERALIZED ANXIETY DISORDER: ICD-10-CM

## 2019-05-24 RX ORDER — ALPRAZOLAM 1 MG/1
TABLET ORAL
Qty: 50 TABLET | Refills: 0 | OUTPATIENT
Start: 2019-05-24

## 2019-05-29 ENCOUNTER — TRANSCRIBE ORDERS (OUTPATIENT)
Dept: ADMINISTRATIVE | Facility: HOSPITAL | Age: 44
End: 2019-05-29

## 2019-05-29 ENCOUNTER — OFFICE VISIT (OUTPATIENT)
Dept: FAMILY MEDICINE CLINIC | Facility: CLINIC | Age: 44
End: 2019-05-29
Payer: COMMERCIAL

## 2019-05-29 ENCOUNTER — APPOINTMENT (OUTPATIENT)
Dept: LAB | Facility: IMAGING CENTER | Age: 44
End: 2019-05-29
Payer: COMMERCIAL

## 2019-05-29 VITALS
WEIGHT: 236.2 LBS | RESPIRATION RATE: 16 BRPM | HEIGHT: 62 IN | HEART RATE: 86 BPM | SYSTOLIC BLOOD PRESSURE: 126 MMHG | BODY MASS INDEX: 43.47 KG/M2 | OXYGEN SATURATION: 98 % | DIASTOLIC BLOOD PRESSURE: 80 MMHG | TEMPERATURE: 98.2 F

## 2019-05-29 DIAGNOSIS — E66.01 CLASS 3 SEVERE OBESITY DUE TO EXCESS CALORIES WITHOUT SERIOUS COMORBIDITY WITH BODY MASS INDEX (BMI) OF 45.0 TO 49.9 IN ADULT (HCC): ICD-10-CM

## 2019-05-29 DIAGNOSIS — J02.9 SORE THROAT: Primary | ICD-10-CM

## 2019-05-29 DIAGNOSIS — H66.001 NON-RECURRENT ACUTE SUPPURATIVE OTITIS MEDIA OF RIGHT EAR WITHOUT SPONTANEOUS RUPTURE OF TYMPANIC MEMBRANE: ICD-10-CM

## 2019-05-29 DIAGNOSIS — E03.9 ACQUIRED HYPOTHYROIDISM: ICD-10-CM

## 2019-05-29 DIAGNOSIS — Z12.31 ENCOUNTER FOR SCREENING MAMMOGRAM FOR MALIGNANT NEOPLASM OF BREAST: ICD-10-CM

## 2019-05-29 DIAGNOSIS — J01.10 ACUTE NON-RECURRENT FRONTAL SINUSITIS: ICD-10-CM

## 2019-05-29 DIAGNOSIS — J30.2 SEASONAL ALLERGIES: ICD-10-CM

## 2019-05-29 LAB
CHOLEST SERPL-MCNC: 178 MG/DL (ref 50–200)
HDLC SERPL-MCNC: 41 MG/DL (ref 40–60)
LDLC SERPL CALC-MCNC: 117 MG/DL (ref 0–100)
S PYO AG THROAT QL: NEGATIVE
TRIGL SERPL-MCNC: 100 MG/DL
TSH SERPL DL<=0.05 MIU/L-ACNC: 0.75 UIU/ML (ref 0.36–3.74)

## 2019-05-29 PROCEDURE — 36415 COLL VENOUS BLD VENIPUNCTURE: CPT

## 2019-05-29 PROCEDURE — 87880 STREP A ASSAY W/OPTIC: CPT | Performed by: NURSE PRACTITIONER

## 2019-05-29 PROCEDURE — 99214 OFFICE O/P EST MOD 30 MIN: CPT | Performed by: NURSE PRACTITIONER

## 2019-05-29 PROCEDURE — 84443 ASSAY THYROID STIM HORMONE: CPT

## 2019-05-29 PROCEDURE — 3008F BODY MASS INDEX DOCD: CPT | Performed by: NURSE PRACTITIONER

## 2019-05-29 PROCEDURE — 3725F SCREEN DEPRESSION PERFORMED: CPT | Performed by: NURSE PRACTITIONER

## 2019-05-29 PROCEDURE — 80061 LIPID PANEL: CPT

## 2019-05-29 RX ORDER — FLUTICASONE PROPIONATE 50 MCG
1 SPRAY, SUSPENSION (ML) NASAL DAILY
Qty: 1 BOTTLE | Refills: 2 | Status: SHIPPED | OUTPATIENT
Start: 2019-05-29 | End: 2020-02-06 | Stop reason: SDUPTHER

## 2019-05-29 RX ORDER — AMOXICILLIN 500 MG/1
500 TABLET, FILM COATED ORAL 2 TIMES DAILY
Qty: 14 TABLET | Refills: 0 | Status: SHIPPED | OUTPATIENT
Start: 2019-05-29 | End: 2019-06-05

## 2019-05-29 RX ORDER — ALBUTEROL SULFATE 90 UG/1
2 AEROSOL, METERED RESPIRATORY (INHALATION) EVERY 4 HOURS PRN
Qty: 1 INHALER | Refills: 2 | Status: SHIPPED | OUTPATIENT
Start: 2019-05-29 | End: 2020-07-09 | Stop reason: SDUPTHER

## 2019-05-29 RX ORDER — CROMOLYN SODIUM 40 MG/ML
1 SOLUTION/ DROPS OPHTHALMIC 4 TIMES DAILY
Qty: 10 ML | Refills: 1 | Status: SHIPPED | OUTPATIENT
Start: 2019-05-29 | End: 2020-08-17

## 2019-06-02 ENCOUNTER — HOSPITAL ENCOUNTER (EMERGENCY)
Facility: HOSPITAL | Age: 44
Discharge: HOME | End: 2019-06-02
Attending: EMERGENCY MEDICINE
Payer: COMMERCIAL

## 2019-06-02 VITALS
WEIGHT: 235.89 LBS | HEART RATE: 100 BPM | BODY MASS INDEX: 43.08 KG/M2 | TEMPERATURE: 98.5 F | OXYGEN SATURATION: 100 % | RESPIRATION RATE: 18 BRPM | SYSTOLIC BLOOD PRESSURE: 131 MMHG | DIASTOLIC BLOOD PRESSURE: 77 MMHG

## 2019-06-02 DIAGNOSIS — H10.10 ALLERGIC CONJUNCTIVITIS: Primary | ICD-10-CM

## 2019-06-02 PROCEDURE — 99283 EMERGENCY DEPT VISIT LOW MDM: CPT

## 2019-06-02 PROCEDURE — 99282 EMERGENCY DEPT VISIT SF MDM: CPT | Performed by: PHYSICIAN ASSISTANT

## 2019-06-17 ENCOUNTER — TELEPHONE (OUTPATIENT)
Dept: FAMILY MEDICINE CLINIC | Facility: CLINIC | Age: 44
End: 2019-06-17

## 2019-06-17 RX ORDER — HYDROXYZINE PAMOATE 50 MG/1
50 CAPSULE ORAL 2 TIMES DAILY PRN
Refills: 1 | COMMUNITY
Start: 2019-05-31 | End: 2022-02-01

## 2019-06-17 RX ORDER — DULOXETIN HYDROCHLORIDE 60 MG/1
CAPSULE, DELAYED RELEASE ORAL
Refills: 1 | COMMUNITY
Start: 2019-05-31 | End: 2020-08-17 | Stop reason: ALTCHOICE

## 2019-06-17 RX ORDER — ALPRAZOLAM 0.5 MG/1
0.5 TABLET ORAL 2 TIMES DAILY PRN
Refills: 1 | COMMUNITY
Start: 2019-05-31 | End: 2020-05-11 | Stop reason: ALTCHOICE

## 2019-06-24 ENCOUNTER — TELEPHONE (OUTPATIENT)
Dept: FAMILY MEDICINE CLINIC | Facility: CLINIC | Age: 44
End: 2019-06-24

## 2019-07-19 ENCOUNTER — TELEPHONE (OUTPATIENT)
Dept: FAMILY MEDICINE CLINIC | Facility: CLINIC | Age: 44
End: 2019-07-19

## 2019-07-19 DIAGNOSIS — G43.009 MIGRAINE WITHOUT AURA AND WITHOUT STATUS MIGRAINOSUS, NOT INTRACTABLE: ICD-10-CM

## 2019-07-19 DIAGNOSIS — G56.01 CARPAL TUNNEL SYNDROME ON RIGHT: Primary | ICD-10-CM

## 2019-07-19 DIAGNOSIS — G43.811 OTHER MIGRAINE WITH STATUS MIGRAINOSUS, INTRACTABLE: ICD-10-CM

## 2019-07-19 NOTE — TELEPHONE ENCOUNTER
Deb Tompkins from [facility] neurology l/m requesting referral as pt has appt with Dr Paola Martinez 7/25/19  Dx: g56 01, g43 009  Referral placed, neurology informed

## 2019-09-24 DIAGNOSIS — J30.2 SEASONAL ALLERGIES: Primary | ICD-10-CM

## 2019-09-25 RX ORDER — CETIRIZINE HYDROCHLORIDE 10 MG/1
TABLET, FILM COATED ORAL
Qty: 90 TABLET | Refills: 1 | Status: SHIPPED | OUTPATIENT
Start: 2019-09-25 | End: 2020-03-30 | Stop reason: SDUPTHER

## 2019-10-07 DIAGNOSIS — G43.019 INTRACTABLE MIGRAINE WITHOUT AURA AND WITHOUT STATUS MIGRAINOSUS: ICD-10-CM

## 2019-10-08 DIAGNOSIS — G43.009 MIGRAINE WITHOUT AURA AND WITHOUT STATUS MIGRAINOSUS, NOT INTRACTABLE: Primary | ICD-10-CM

## 2019-10-08 RX ORDER — TOPIRAMATE 50 MG/1
TABLET, FILM COATED ORAL
Qty: 90 TABLET | Refills: 0 | OUTPATIENT
Start: 2019-10-08

## 2019-10-08 RX ORDER — TOPIRAMATE 100 MG/1
100 TABLET, FILM COATED ORAL 2 TIMES DAILY
Qty: 60 TABLET | Refills: 3 | Status: SHIPPED | OUTPATIENT
Start: 2019-10-08 | End: 2020-03-26 | Stop reason: SDUPTHER

## 2019-10-08 NOTE — TELEPHONE ENCOUNTER
Telephone call to the patient  Patient stated she takes Topiramate 100 mg 1 tablet in the am and 1 tablet in the pm  # 60 with 3 refills  He appointment with you is on 123/26/19

## 2019-10-08 NOTE — TELEPHONE ENCOUNTER
Please check patient's actual topiramate schedule please  I believe she is actually taking 100 mg twice daily  Thanks

## 2019-10-17 ENCOUNTER — APPOINTMENT (EMERGENCY)
Dept: RADIOLOGY | Facility: HOSPITAL | Age: 44
End: 2019-10-17
Payer: COMMERCIAL

## 2019-10-17 ENCOUNTER — HOSPITAL ENCOUNTER (EMERGENCY)
Facility: HOSPITAL | Age: 44
Discharge: HOME | End: 2019-10-17
Attending: EMERGENCY MEDICINE | Admitting: EMERGENCY MEDICINE
Payer: COMMERCIAL

## 2019-10-17 VITALS
DIASTOLIC BLOOD PRESSURE: 56 MMHG | RESPIRATION RATE: 16 BRPM | SYSTOLIC BLOOD PRESSURE: 110 MMHG | OXYGEN SATURATION: 98 % | TEMPERATURE: 98.1 F | HEART RATE: 82 BPM | WEIGHT: 233.69 LBS | BODY MASS INDEX: 42.67 KG/M2

## 2019-10-17 DIAGNOSIS — R51.9 HEADACHE: ICD-10-CM
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
## 2019-10-17 PROCEDURE — 99284 EMERGENCY DEPT VISIT MOD MDM: CPT | Performed by: EMERGENCY MEDICINE

## 2019-10-17 PROCEDURE — 99283 EMERGENCY DEPT VISIT LOW MDM: CPT

## 2019-10-17 PROCEDURE — 96361 HYDRATE IV INFUSION ADD-ON: CPT

## 2019-10-17 PROCEDURE — 90715 TDAP VACCINE 7 YRS/> IM: CPT | Performed by: EMERGENCY MEDICINE

## 2019-10-17 PROCEDURE — 73090 X-RAY EXAM OF FOREARM: CPT

## 2019-10-17 PROCEDURE — 96374 THER/PROPH/DIAG INJ IV PUSH: CPT

## 2019-10-17 PROCEDURE — 90471 IMMUNIZATION ADMIN: CPT

## 2019-10-17 RX ORDER — METOCLOPRAMIDE HYDROCHLORIDE 5 MG/ML
10 INJECTION INTRAMUSCULAR; INTRAVENOUS ONCE
Status: COMPLETED | OUTPATIENT
Start: 2019-10-17 | End: 2019-10-17

## 2019-10-17 RX ORDER — AMOXICILLIN AND CLAVULANATE POTASSIUM 875; 125 MG/1; MG/1
1 TABLET, FILM COATED ORAL EVERY 12 HOURS
Qty: 14 TABLET | Refills: 0 | Status: SHIPPED | OUTPATIENT
Start: 2019-10-17 | End: 2019-10-24

## 2019-10-17 RX ORDER — ACETAMINOPHEN 325 MG/1
650 TABLET ORAL ONCE
Status: DISCONTINUED | OUTPATIENT
Start: 2019-10-17 | End: 2019-10-17

## 2019-10-17 RX ADMIN — METOCLOPRAMIDE 10 MG: 5 INJECTION, SOLUTION INTRAMUSCULAR; INTRAVENOUS at 22:15

## 2019-10-17 RX ADMIN — TETANUS TOXOID, REDUCED DIPHTHERIA TOXOID AND ACELLULAR PERTUSSIS VACCINE, ADSORBED 0.5 ML: 5; 2.5; 8; 8; 2.5 SUSPENSION INTRAMUSCULAR at 22:11

## 2019-10-17 RX ADMIN — SODIUM CHLORIDE 1000 ML: 0.9 INJECTION, SOLUTION INTRAVENOUS at 22:10

## 2019-10-18 NOTE — ED PROVIDER NOTES
History  Chief Complaint   Patient presents with    Cat Scratch     Cat scratches on right arm  No s/s infx  Cat not utd on vaccines  Reporting nausea and headache  A 68-year-old female with past medical history of anxiety, asthma, bipolar disorder, depression, endometriosis, PTSD, hypothyroidism and migraines; presents with a cat scratch to her right forearm that she sustained yesterday  Patient states her cat attempted to run outside, and upon attempting to stop him she sustained scratches to her right forearm  Patient believes they were scratches and not bites  Patient did clean the wound with peroxide  Patient has not noticed redness or drainage since sustaining the wounds yesterday  Patient reports it is her cat (of 2 years), who was a predominantly indoor cat  Cat is however not up-to-date on vaccines  Patient also complains of a headache, consistent with prior migraines that began yesterday  Patient states the headache was of gradual onset  Headache is associated with nausea and vomiting, along with photophobia and phonophobia  Patient otherwise denies dizziness, lightheadedness, fever, chills, neck pain/stiffness, visual disturbances, chest pain, shortness of breath, abdominal pain, diarrhea, peripheral edema, paresthesias and focal weakness  Patient has been taking her Topamax for her migraines  A/P:  Cat bite/scratch to right forearm  Wounds are scabbed over at this time  No signs of infection  Will obtain x-ray to rule out foreign body/teeth  Will plan to start Augmentin  Will update tetanus  Discussed the possibility of receiving the rabies vaccine series, however patient opts to hold as it is her personal cat and she will continue to observe at home  Headache, consistent with prior migraines  Neurologically intact  Will treat with Reglan and Tylenol (as patient has allergy to Naprosyn)        History provided by:  Patient      Prior to Admission Medications   Prescriptions Last Dose Informant Patient Reported? Taking?    ALL DAY ALLERGY 10 MG tablet   No No   Sig: TAKE ONE TABLET BY MOUTH ONCE DAILY AS NEEDED FOR ALLERGIES   ALPRAZolam (XANAX) 0 5 mg tablet   Yes No   Sig: Take 0 5 mg by mouth 2 (two) times a day as needed   DULoxetine (CYMBALTA) 60 mg delayed release capsule   Yes No   Sig: TAKE 1 CAPSULE BY MOUTH DAIY   albuterol (VENTOLIN HFA) 90 mcg/act inhaler   No No   Sig: Inhale 2 puffs every 4 (four) hours as needed for wheezing   atoMOXetine (STRATTERA) 40 mg capsule   Yes No   Sig: Take 40 mg by mouth daily   butalbital-acetaminophen-caffeine (FIORICET,ESGIC) -40 mg per tablet   No No   Sig: Take 1 tablet by mouth every 4 (four) hours as needed for headaches   cromolyn (OPTICROM) 4 % ophthalmic solution   No No   Sig: Administer 1 drop to both eyes 4 (four) times a day   fluticasone (FLONASE) 50 mcg/act nasal spray   No No   Si spray into each nostril daily   fluticasone-salmeterol (ADVAIR DISKUS) 250-50 mcg/dose inhaler   Yes No   Sig: Inhale 1 puff   hydrOXYzine pamoate (VISTARIL) 50 mg capsule   Yes No   Sig: Take 50 mg by mouth 2 (two) times a day as needed   ibuprofen (MOTRIN) 600 mg tablet   No No   Sig: Take 1 tablet (600 mg total) by mouth every 8 (eight) hours as needed for mild pain or fever   levothyroxine 100 mcg tablet   No No   Sig: Take 1 tablet (100 mcg total) by mouth daily in the early morning   topiramate (TOPAMAX) 100 mg tablet   No No   Sig: Take 1 tablet (100 mg total) by mouth 2 (two) times a day      Facility-Administered Medications: None       Past Medical History:   Diagnosis Date    Anxiety     Asthma     Bipolar disorder (HCC)     Depression     Disease of thyroid gland     Endometriosis     Psychiatric illness     Psychosis ([facility] [address] [facility] )     PTSD (post-traumatic stress disorder) 2017    Right carpal tunnel syndrome 2018    Self-injurious behavior     [facility] attempt [facility])        Past Surgical History:   Procedure Laterality Date    ABDOMINAL SURGERY      HERNIA REPAIR         History reviewed  No pertinent family history  I have reviewed and agree with the history as documented  Social History     Tobacco Use    Smoking status: Current Some Day Smoker     Packs/day: 0 25     Types: Cigarettes    Smokeless tobacco: Never Used   Substance Use Topics    Alcohol use: No    Drug use: No        Review of Systems   Eyes: Positive for photophobia  Gastrointestinal: Positive for nausea and vomiting  Skin: Positive for wound  Neurological: Positive for headaches  All other systems reviewed and are negative  Physical Exam  Physical Exam  General Appearance: alert and oriented, nad, non toxic appearing  Skin:  Warm, dry    Scattered linear scabbed abrasions to right forearm; no active bleeding or drainage; no surrounding erythema  HEENT: atraumatic, normocephalic  Neck: Supple, trachea midline  Cardiac: RRR; no murmurs, rub, gallops  Pulmonary: lungs CTAB; no wheezes, rales, rhonchi  Gastrointestinal: abdomen soft, nontender, nondistended; no guarding or rebound tenderness; good bowel sounds, no mass or bruits  Extremities:  no pedal edema, 2+ pulses; no calf tenderness, no clubbing, no cyanosis  Neuro:  no focal motor or sensory deficits, CN 2-12 grossly intact  Psych:  Normal mood and affect, normal judgement and insight      Vital Signs  ED Triage Vitals [10/17/19 2059]   Temperature Pulse Respirations Blood Pressure SpO2   98 1 °F (36 7 °C) 71 16 128/59 99 %      Temp Source Heart Rate Source Patient Position - Orthostatic VS BP Location FiO2 (%)   Temporal Monitor Sitting Right arm --      Pain Score       No Pain           Vitals:    10/17/19 2059 10/17/19 2305   BP: 128/59 110/56   Pulse: 71 82   Patient Position - Orthostatic VS: Sitting Lying         Visual Acuity      ED Medications  Medications   metoclopramide (REGLAN) injection 10 mg (10 mg Intravenous Given 10/17/19 2215)   sodium chloride 0 9 % bolus 1,000 mL (1,000 mL Intravenous New Bag 10/17/19 2210)   tetanus-diphtheria-acellular pertussis (BOOSTRIX) IM injection 0 5 mL (0 5 mL Intramuscular Given 10/17/19 2211)       Diagnostic Studies  Results Reviewed     None                 XR forearm 2 views RIGHT   Final Result by Carola Villalobos DO (10/17 2134)      No acute osseous abnormality  No radiopaque foreign body identified  Workstation performed: [id number]                    Procedures  Procedures       ED Course  ED Course as of Oct 17 2321   Thu Oct 17, 2019   2204 Pt refusing tylenol, reporting it gives her nose bleeds  Will proceed with IVF and reglan  2303 Pt reports feeling better at this time, requesting discharge home  Will start augmentin for cat bite ppx  MDM    Disposition  Final diagnoses:   Cat scratch   Headache     Time reflects when diagnosis was documented in both MDM as applicable and the Disposition within this note     Time User Action Codes Description Comment    10/17/2019 11:04 PM Fantasma Sherwood [[id number] [id number]] Cat scratch     10/17/2019 11:04 PM [place], [facility] Headache       ED Disposition     ED Disposition Condition Date/Time Comment    Discharge Stable Thu Oct 17, 2019 11:04 PM Loan Schreiber discharge to home/self care              Follow-up Information     Follow up With Specialties Details Why Contact Info Additional Information    JUSTO Osborn Nurse Practitioner Schedule an appointment as soon as possible for a visit in 2 days For re-evaluation [address]  [address] Jonoe Anum [address] [place] [address] - [address] [place] [address] Emergency Department Emergency Medicine Go to  If you develop redness or drainage from cat stratches AND/OR fevers, persistent vomiting [facility] [id number] [phone] [place] [facility], [address]  , [facility] [place], [place], [address]          Patient's Medications   Discharge Prescriptions AMOXICILLIN-CLAVULANATE (AUGMENTIN) 875-125 MG PER TABLET    Take 1 tablet by mouth every 12 (twelve) hours for 7 days       Start Date: 10/17/2019End Date: 10/24/2019       Order Dose: 1 tablet       Quantity: 14 tablet    Refills: 0     No discharge procedures on file      ED Provider  Electronically Signed by           Gorge Griffith DO  10/17/19 1056

## 2019-11-11 DIAGNOSIS — E03.9 ACQUIRED HYPOTHYROIDISM: ICD-10-CM

## 2019-11-11 NOTE — TELEPHONE ENCOUNTER
Pt requesting refill of levothhyroxine 100mcg    [facility]

## 2019-11-12 RX ORDER — LEVOTHYROXINE SODIUM 0.1 MG/1
100 TABLET ORAL
Qty: 30 TABLET | Refills: 0 | Status: SHIPPED | OUTPATIENT
Start: 2019-11-12 | End: 2020-02-08 | Stop reason: SDUPTHER

## 2019-11-12 NOTE — TELEPHONE ENCOUNTER
Patient was last seen in May  She is due for an appointment this month for her thyroid    I did give her a 30 day supply of the levothyroxine with no refills

## 2019-12-22 DIAGNOSIS — E03.9 ACQUIRED HYPOTHYROIDISM: ICD-10-CM

## 2019-12-26 ENCOUNTER — OFFICE VISIT (OUTPATIENT)
Dept: NEUROLOGY | Facility: CLINIC | Age: 44
End: 2019-12-26
Payer: COMMERCIAL

## 2019-12-26 VITALS
HEART RATE: 72 BPM | WEIGHT: 225 LBS | BODY MASS INDEX: 41.41 KG/M2 | SYSTOLIC BLOOD PRESSURE: 122 MMHG | DIASTOLIC BLOOD PRESSURE: 60 MMHG | HEIGHT: 62 IN

## 2019-12-26 DIAGNOSIS — G56.01 CARPAL TUNNEL SYNDROME ON RIGHT: ICD-10-CM

## 2019-12-26 DIAGNOSIS — G56.01 RIGHT CARPAL TUNNEL SYNDROME: Primary | ICD-10-CM

## 2019-12-26 DIAGNOSIS — G43.009 MIGRAINE WITHOUT AURA AND WITHOUT STATUS MIGRAINOSUS, NOT INTRACTABLE: ICD-10-CM

## 2019-12-26 PROCEDURE — 99213 OFFICE O/P EST LOW 20 MIN: CPT | Performed by: PSYCHIATRY & NEUROLOGY

## 2019-12-26 RX ORDER — BUSPIRONE HYDROCHLORIDE 5 MG/1
5 TABLET ORAL 2 TIMES DAILY
Refills: 1 | COMMUNITY
Start: 2019-09-20

## 2019-12-26 RX ORDER — CLONAZEPAM 0.5 MG/1
TABLET ORAL
COMMUNITY
Start: 2019-11-26

## 2019-12-26 RX ORDER — LEVOTHYROXINE SODIUM 0.1 MG/1
100 TABLET ORAL
Qty: 30 TABLET | Refills: 0 | OUTPATIENT
Start: 2019-12-26

## 2019-12-26 NOTE — ASSESSMENT & PLAN NOTE
Confirmed by electrodiagnostics  Continues to describe intermittent sensory symptoms, despite her use of splinting  Fortunately, continues to maintain full testable distal median motor and sensory function  Patient now interested in a more aggressive approach to treatment  --refer to Orthopedics for potential carpal tunnel release surgery

## 2019-12-26 NOTE — ASSESSMENT & PLAN NOTE
On the topiramate 100 mg twice daily she has actually done well  She describes perhaps once maybe twice per month having a migraine episode which she is able to sleep off after using an over-the-counter Benadryl  Describing no symptoms to suggest any adverse side effects from the topiramate  --she has an apparent family history of glaucoma and is being followed regularly by Ophthalmology as a result  She has an upcoming appointment with Dr Tami Garber on January 9th  I will be checking with Dr Maldonado prior to her appointment to discuss the safety of ongoing use of topiramate  --in the interim she will remain on topiramate 100 mg twice daily  She will be calling the office after the ophthalmology appointment with an update  --check CBC, CMP and topiramate level

## 2019-12-26 NOTE — PROGRESS NOTES
Patient ID: Sonia Mcelroy is a 45 y o  female  Assessment/Plan:    Migraine without aura and without status migrainosus, not intractable  On the topiramate 100 mg twice daily she has actually done well  She describes perhaps once maybe twice per month having a migraine episode which she is able to sleep off after using an over-the-counter Benadryl  Describing no symptoms to suggest any adverse side effects from the topiramate  --she has an apparent family history of glaucoma and is being followed regularly by Ophthalmology as a result  She has an upcoming appointment with Dr Justin Julian on January 9th  I will be checking with Dr Maldonado prior to her appointment to discuss the safety of ongoing use of topiramate  --in the interim she will remain on topiramate 100 mg twice daily  She will be calling the office after the ophthalmology appointment with an update  --check CBC, CMP and topiramate level  Right carpal tunnel syndrome  Confirmed by electrodiagnostics  Continues to describe intermittent sensory symptoms, despite her use of splinting  Fortunately, continues to maintain full testable distal median motor and sensory function  Patient now interested in a more aggressive approach to treatment  --refer to Orthopedics for potential carpal tunnel release surgery  She will follow up in 2 months  Subjective:    HPI  Patient, age 41 years, follows for her migraine as well as her right carpal tunnel syndrome, symptomatic  With regard to her migraines, she has been on topiramate 100 mg twice daily  With the increase in topiramate, she is now down to 1 and on occasion 2 migraines per month  Able to sleep it off after taking a Benadryl  Pleased with her improvement  No specific symptoms to suggest adverse side effects from the topiramate  Specifically no eye pain  Patient does have a family history of glaucoma apparently    She is being followed regularly by Ophthalmology to assure that she has no similar issues  Her next appointment is on January 9th  She promises to update us after that appointment  With regard to her right carpal tunnel, she continues to have intermittent sensory symptoms despite splinting  Fortunately, no static sensory symptoms or evolving weakness  However, now interested in a more aggressive approach to treatment      Past Medical History:   Diagnosis Date    Anxiety     Asthma     [facility] disorder ([facility] )     Depression     Disease of thyroid gland     Endometriosis     Psychiatric illness     Psychosis ([facility] )     PTSD (post-traumatic stress disorder) 6/6/2017    Right carpal tunnel syndrome 12/26/2018    Self-injurious behavior     Suicide attempt [facility])      Past Surgical History:   Procedure Laterality Date    ABDOMINAL SURGERY      HERNIA REPAIR       Social History     Socioeconomic History    Marital status: Single     Spouse name: None    Number of children: None    Years of education: None    Highest education level: None   Occupational History    None   Social Needs    Financial resource strain: None    Food insecurity:     Worry: None     Inability: None    Transportation needs:     Medical: None     Non-medical: None   Tobacco Use    Smoking status: Former Smoker     Packs/day: 0 25     Types: Cigarettes    Smokeless tobacco: Never Used   Substance and Sexual Activity    Alcohol use: No    Drug use: No    Sexual activity: None   Lifestyle    Physical activity:     Days per week: None     Minutes per session: None    Stress: None   Relationships    Social connections:     Talks on phone: None     Gets together: None     Attends Anglican service: None     Active member of club or organization: None     Attends meetings of clubs or organizations: None     Relationship status: None    Intimate partner violence:     Fear of current or ex partner: None     Emotionally abused: None     Physically abused: None     Forced sexual activity: None Other Topics Concern    None   Social History Narrative    Flu shot:no     Family History   Problem Relation Age of Onset    Hypertension Mother     Asthma Mother     Fibromyalgia Mother      Allergies   Allergen Reactions    Aleve [Naproxen] Shortness Of Breath    Benzonatate Swelling     Throat closing    Guaifenesin Other (See Comments)     Note - patient states she IS able to take robitussin   Latuda [Lurasidone] Swelling      stuttering     Imitrex [Sumatriptan]      Patient reports that this medication "makes me sick"         Current Outpatient Medications:     albuterol (VENTOLIN HFA) 90 mcg/act inhaler, Inhale 2 puffs every 4 (four) hours as needed for wheezing, Disp: 1 Inhaler, Rfl: 2    ALL DAY ALLERGY 10 MG tablet, TAKE ONE TABLET BY MOUTH ONCE DAILY AS NEEDED FOR ALLERGIES, Disp: 90 tablet, Rfl: 1    ALPRAZolam (XANAX) 0 5 mg tablet, Take 0 5 mg by mouth 2 (two) times a day as needed, Disp: , Rfl: 1    atoMOXetine (STRATTERA) 40 mg capsule, Take 40 mg by mouth daily, Disp: , Rfl:     busPIRone (BUSPAR) 5 mg tablet, Take 5 mg by mouth 2 (two) times a day, Disp: , Rfl: 1    butalbital-acetaminophen-caffeine (FIORICET,ESGIC) -40 mg per tablet, Take 1 tablet by mouth every 4 (four) hours as needed for headaches, Disp: 30 tablet, Rfl: 0    clonazePAM (KlonoPIN) 0 5 mg tablet, , Disp: , Rfl:     DULoxetine (CYMBALTA) 60 mg delayed release capsule, TAKE 1 CAPSULE BY MOUTH CHASITY, Disp: , Rfl: 1    fluticasone (FLONASE) 50 mcg/act nasal spray, 1 spray into each nostril daily, Disp: 1 Bottle, Rfl: 2    fluticasone-salmeterol (ADVAIR DISKUS) 250-50 mcg/dose inhaler, Inhale 1 puff, Disp: , Rfl:     hydrOXYzine pamoate (VISTARIL) 50 mg capsule, Take 50 mg by mouth 2 (two) times a day as needed, Disp: , Rfl: 1    ibuprofen (MOTRIN) 600 mg tablet, Take 1 tablet (600 mg total) by mouth every 8 (eight) hours as needed for mild pain or fever, Disp: 30 tablet, Rfl: 0    levothyroxine 100 mcg tablet, Take 1 tablet (100 mcg total) by mouth daily in the early morning, Disp: 30 tablet, Rfl: 0    topiramate (TOPAMAX) 100 mg tablet, Take 1 tablet (100 mg total) by mouth 2 (two) times a day, Disp: 60 tablet, Rfl: 3    cromolyn (OPTICROM) 4 % ophthalmic solution, Administer 1 drop to both eyes 4 (four) times a day (Patient not taking: Reported on 12/26/2019), Disp: 10 mL, Rfl: 1    Objective:    Blood pressure 122/60, pulse 72, height 5' 2 05" (1 576 m), weight 102 kg (225 lb)  Physical Exam  Head normocephalic  Eyes nonicteric  No audible head or anterior neck bruits  Lungs clear to auscultation  Rhythm regular  GI (abdomen) soft nontender with bowel sounds present  No significant lower extremity edema  Neurological Exam  Alert  Pleasantly interactive  Fully oriented  Spontaneous gait unremarkable  Cranial nerves 2-12 tested and grossly intact  Funduscopic examination with marginated discs bilaterally  Full symmetrical strength throughout the 4 extremities including fully retained distal median motor function on the right  Sensory testing grossly intact to pin and position throughout, including full pin appreciation in the right distal median distribution  Muscle stretch reflexes bilaterally 2 at biceps, brachioradialis and triceps, bilaterally 1+ at knees and bilaterally 2 at ankles  Toe response downgoing bilaterally  ROS:    Review of Systems   Constitutional: Negative  Negative for appetite change and fever  HENT: Negative  Negative for hearing loss, tinnitus, trouble swallowing and voice change  Eyes: Negative  Negative for photophobia and pain  Respiratory: Negative  Negative for shortness of breath  Cardiovascular: Negative  Negative for palpitations  Gastrointestinal: Negative  Negative for nausea and vomiting  Endocrine: Negative  Negative for cold intolerance and heat intolerance  Genitourinary: Negative  Negative for dysuria, frequency and urgency  Musculoskeletal: Negative  Negative for myalgias and neck pain  Skin: Negative  Negative for rash  Neurological: Negative  Negative for dizziness, tremors, seizures, syncope, facial asymmetry, speech difficulty, weakness, light-headedness, numbness and headaches  Hematological: Negative  Does not bruise/bleed easily  Psychiatric/Behavioral: Negative  Negative for confusion, hallucinations and sleep disturbance  I personally reviewed the ROS as entered by the medical assistant  *Please note this document was created using voice recognition software and may contain sound-alike word errors  *

## 2019-12-26 NOTE — LETTER
December 26, 2019     JUSTO Merrill    Patient: Radha Pop   YOB: 1975   Date of Visit: 12/26/2019       Dear Dr Azra Hall: Thank you for referring Radha Pop to me for evaluation  Below are my notes for this consultation  If you have questions, please do not hesitate to call me  I look forward to following your patient along with you  Sincerely,        Eugenio Lopez MD        CC: No Recipients  Eugenio Lopez MD  12/26/2019  2:23 PM  Signed  Patient ID: Radha Pop is a 45 y o  female  Assessment/Plan:    Migraine without aura and without status migrainosus, not intractable  On the topiramate 100 mg twice daily she has actually done well  She describes perhaps once maybe twice per month having a migraine episode which she is able to sleep off after using an over-the-counter Benadryl  Describing no symptoms to suggest any adverse side effects from the topiramate  --she has an apparent family history of glaucoma and is being followed regularly by Ophthalmology as a result  She has an upcoming appointment with Dr mAna Gold on January 9th  I will be checking with Dr Maldonado prior to her appointment to discuss the safety of ongoing use of topiramate  --in the interim she will remain on topiramate 100 mg twice daily  She will be calling the office after the ophthalmology appointment with an update  --check CBC, CMP and topiramate level  Right carpal tunnel syndrome  Confirmed by electrodiagnostics  Continues to describe intermittent sensory symptoms, despite her use of splinting  Fortunately, continues to maintain full testable distal median motor and sensory function  Patient now interested in a more aggressive approach to treatment  --refer to Orthopedics for potential carpal tunnel release surgery  She will follow up in 2 months      Subjective:    HPI  Patient, age 41 years, follows for her migraine as well as her right carpal tunnel syndrome, symptomatic  With regard to her migraines, she has been on topiramate 100 mg twice daily  With the increase in topiramate, she is now down to 1 and on occasion 2 migraines per month  Able to sleep it off after taking a Benadryl  Pleased with her improvement  No specific symptoms to suggest adverse side effects from the topiramate  Specifically no eye pain  Patient does have a family history of glaucoma apparently  She is being followed regularly by Ophthalmology to assure that she has no similar issues  Her next appointment is on January 9th  She promises to update us after that appointment  With regard to her right carpal tunnel, she continues to have intermittent sensory symptoms despite splinting  Fortunately, no static sensory symptoms or evolving weakness  However, now interested in a more aggressive approach to treatment      Past Medical History:   Diagnosis Date    Anxiety     Asthma     [facility]     Depression     Disease of thyroid gland     Endometriosis     Psychiatric illness     Psychosis ([facility] )     PTSD (post-traumatic stress disorder) 6/6/2017    Right carpal tunnel syndrome 12/26/2018    Self-injurious behavior     Suicide attempt [facility])      Past Surgical History:   Procedure Laterality Date    ABDOMINAL SURGERY      HERNIA REPAIR       Social History     Socioeconomic History    Marital status: Single     Spouse name: None    Number of children: None    Years of education: None    Highest education level: None   Occupational History    None   Social Needs    Financial resource strain: None    Food insecurity:     Worry: None     Inability: None    Transportation needs:     Medical: None     Non-medical: None   Tobacco Use    Smoking status: Former Smoker     Packs/day: 0 25     Types: Cigarettes    Smokeless tobacco: Never Used   Substance and Sexual Activity    Alcohol use: No    Drug use: No    Sexual activity: None   Lifestyle    Physical activity:     Days per week: None     Minutes per session: None    Stress: None   Relationships    Social connections:     Talks on phone: None     Gets together: None     Attends Protestant service: None     Active member of club or organization: None     Attends meetings of clubs or organizations: None     Relationship status: None    Intimate partner violence:     Fear of current or ex partner: None     Emotionally abused: None     Physically abused: None     Forced sexual activity: None   Other Topics Concern    None   Social History Narrative    Flu shot:no     Family History   Problem Relation Age of Onset    Hypertension Mother     Asthma Mother     Fibromyalgia Mother      Allergies   Allergen Reactions    Aleve [Naproxen] Shortness Of Breath    Benzonatate Swelling     Throat closing    Guaifenesin Other (See Comments)     Note - patient states she IS able to take robitussin   Latuda [Lurasidone] Swelling      stuttering     Imitrex [Sumatriptan]      Patient reports that this medication "makes me sick"         Current Outpatient Medications:     albuterol (VENTOLIN HFA) 90 mcg/act inhaler, Inhale 2 puffs every 4 (four) hours as needed for wheezing, Disp: 1 Inhaler, Rfl: 2    ALL DAY ALLERGY 10 MG tablet, TAKE ONE TABLET BY MOUTH ONCE DAILY AS NEEDED FOR ALLERGIES, Disp: 90 tablet, Rfl: 1    ALPRAZolam (XANAX) 0 5 mg tablet, Take 0 5 mg by mouth 2 (two) times a day as needed, Disp: , Rfl: 1    atoMOXetine (STRATTERA) 40 mg capsule, Take 40 mg by mouth daily, Disp: , Rfl:     busPIRone (BUSPAR) 5 mg tablet, Take 5 mg by mouth 2 (two) times a day, Disp: , Rfl: 1    butalbital-acetaminophen-caffeine (FIORICET,ESGIC) -40 mg per tablet, Take 1 tablet by mouth every 4 (four) hours as needed for headaches, Disp: 30 tablet, Rfl: 0    clonazePAM (KlonoPIN) 0 5 mg tablet, , Disp: , Rfl:     DULoxetine (CYMBALTA) 60 mg delayed release capsule, TAKE 1 CAPSULE BY MOUTH CHASITY, Disp: , Rfl: 1   fluticasone (FLONASE) 50 mcg/act nasal spray, 1 spray into each nostril daily, Disp: 1 Bottle, Rfl: 2    fluticasone-salmeterol (ADVAIR DISKUS) 250-50 mcg/dose inhaler, Inhale 1 puff, Disp: , Rfl:     hydrOXYzine pamoate (VISTARIL) 50 mg capsule, Take 50 mg by mouth 2 (two) times a day as needed, Disp: , Rfl: 1    ibuprofen (MOTRIN) 600 mg tablet, Take 1 tablet (600 mg total) by mouth every 8 (eight) hours as needed for mild pain or fever, Disp: 30 tablet, Rfl: 0    levothyroxine 100 mcg tablet, Take 1 tablet (100 mcg total) by mouth daily in the early morning, Disp: 30 tablet, Rfl: 0    topiramate (TOPAMAX) 100 mg tablet, Take 1 tablet (100 mg total) by mouth 2 (two) times a day, Disp: 60 tablet, Rfl: 3    cromolyn (OPTICROM) 4 % ophthalmic solution, Administer 1 drop to both eyes 4 (four) times a day (Patient not taking: Reported on 12/26/2019), Disp: 10 mL, Rfl: 1    Objective:    Blood pressure 122/60, pulse 72, height 5' 2 05" (1 576 m), weight 102 kg (225 lb)  Physical Exam  Head normocephalic  Eyes nonicteric  No audible head or anterior neck bruits  Lungs clear to auscultation  Rhythm regular  GI (abdomen) soft nontender with bowel sounds present  No significant lower extremity edema  Neurological Exam  Alert  Pleasantly interactive  Fully oriented  Spontaneous gait unremarkable  Cranial nerves 2-12 tested and grossly intact  Funduscopic examination with marginated discs bilaterally  Full symmetrical strength throughout the 4 extremities including fully retained distal median motor function on the right  Sensory testing grossly intact to pin and position throughout, including full pin appreciation in the right distal median distribution  Muscle stretch reflexes bilaterally 2 at biceps, brachioradialis and triceps, bilaterally 1+ at knees and bilaterally 2 at ankles  Toe response downgoing bilaterally  ROS:    Review of Systems   Constitutional: Negative    Negative for appetite change and fever  HENT: Negative  Negative for hearing loss, tinnitus, trouble swallowing and voice change  Eyes: Negative  Negative for photophobia and pain  Respiratory: Negative  Negative for shortness of breath  Cardiovascular: Negative  Negative for palpitations  Gastrointestinal: Negative  Negative for nausea and vomiting  Endocrine: Negative  Negative for cold intolerance and heat intolerance  Genitourinary: Negative  Negative for dysuria, frequency and urgency  Musculoskeletal: Negative  Negative for myalgias and neck pain  Skin: Negative  Negative for rash  Neurological: Negative  Negative for dizziness, tremors, seizures, syncope, facial asymmetry, speech difficulty, weakness, light-headedness, numbness and headaches  Hematological: Negative  Does not bruise/bleed easily  Psychiatric/Behavioral: Negative  Negative for confusion, hallucinations and sleep disturbance  I personally reviewed the ROS as entered by the medical assistant  *Please note this document was created using voice recognition software and may contain sound-alike word errors  *

## 2019-12-26 NOTE — PATIENT INSTRUCTIONS
Please call the office after your ophthalmology appointment on January 9th with an update as to whether or not there will be a potential problem with topiramate

## 2019-12-27 ENCOUNTER — TELEPHONE (OUTPATIENT)
Dept: NEUROLOGY | Facility: CLINIC | Age: 44
End: 2019-12-27

## 2019-12-27 NOTE — TELEPHONE ENCOUNTER
Discussed by phone with Ophthalmology, Lloyd Maxwell, and patient being monitored for possible glaucoma  He feels that okay to continue her on the topiramate  He will be rechecking her pressures in early January and getting back to the office here

## 2019-12-29 ENCOUNTER — HOSPITAL ENCOUNTER (EMERGENCY)
Facility: HOSPITAL | Age: 44
Discharge: HOME | End: 2019-12-29
Attending: EMERGENCY MEDICINE
Payer: COMMERCIAL

## 2019-12-29 VITALS
HEART RATE: 71 BPM | DIASTOLIC BLOOD PRESSURE: 93 MMHG | TEMPERATURE: 98 F | SYSTOLIC BLOOD PRESSURE: 155 MMHG | BODY MASS INDEX: 40.9 KG/M2 | RESPIRATION RATE: 14 BRPM | OXYGEN SATURATION: 97 % | WEIGHT: 223.99 LBS

## 2019-12-29 DIAGNOSIS — M54.9 BACK PAIN: Primary | ICD-10-CM

## 2019-12-29 DIAGNOSIS — R20.0 NUMBNESS OF RIGHT FOOT: ICD-10-CM

## 2019-12-29 LAB — GLUCOSE SERPL-MCNC: 81 MG/DL (ref 65–140)

## 2019-12-29 PROCEDURE — 99283 EMERGENCY DEPT VISIT LOW MDM: CPT

## 2019-12-29 PROCEDURE — 99282 EMERGENCY DEPT VISIT SF MDM: CPT | Performed by: STUDENT IN AN ORGANIZED HEALTH CARE EDUCATION/TRAINING PROGRAM

## 2019-12-29 PROCEDURE — 82948 REAGENT STRIP/BLOOD GLUCOSE: CPT

## 2019-12-29 NOTE — DISCHARGE INSTRUCTIONS
Please follow up with podiatry and the [facility] at the contact information below  Follow up with her primary care physician as well if her symptoms continue  Return to the emergency department for any worsening or otherwise concerning symptoms

## 2019-12-30 NOTE — ED ATTENDING ATTESTATION
12/29/2019  IYoly DO, saw and evaluated the patient  I have discussed the patient with the resident/non-physician practitioner and agree with the resident's/non-physician practitioner's findings, Plan of Care, and MDM as documented in the resident's/non-physician practitioner's note, except where noted  All available labs and Radiology studies were reviewed  I was present for key portions of any procedure(s) performed by the resident/non-physician practitioner and I was immediately available to provide assistance  At this point I agree with the current assessment done in the Emergency Department  I have conducted an independent evaluation of this patient a history and physical is as follows:    ED Course         Critical Care Time  Procedures    70-year-old female presents with a one-week history of atraumatic right foot pain and numbness  She feels like the foot is tight like a band around the front of her foot and then feels like the entire bottom of her right foot is numb  She denies new shoes  No increased activity  No swelling of the foot  On exam she is alert in no distress  She has had prior bunion surgery  The foot itself is not swollen  There is no erythema  She does have decreased sensation across the entire sole of the foot  No bony tenderness  Will refer to podiatry  Will also check blood sugar to rule out the possibility of peripheral neuropathy caused by diabetes

## 2019-12-31 ENCOUNTER — TELEPHONE (OUTPATIENT)
Dept: PHYSICAL THERAPY | Facility: OTHER | Age: 44
End: 2019-12-31

## 2019-12-31 NOTE — TELEPHONE ENCOUNTER
Voice mail/message left for patient to return call to [place] [id number] program including our hours of business and phone number  First attempt to contact patient  Deferred per protocol for f/u

## 2020-01-02 NOTE — ED PROVIDER NOTES
History  Chief Complaint   Patient presents with    Foot Swelling     right foot swelling, reports "feels tight in shoe" denies injury or trauma, pins and needlees sensation to foot  symptoms x 2 days  This is a 19-year-old female with a past medical history of bipolar disorder, depression/anxiety, and PTSD who presents to the emergency department this evening with one week right foot numbness  Patient states she has not suffered any trauma recently but the bottom of her right foot feels numb to the touch  She denies any difficulty walking  She does note some lower right-sided back pain as well in occasional shooting pain down her leg  Patient has not taken any medications for this and has not seen anyone for it as of yet  Patient denies any history of diabetes  Prior to Admission Medications   Prescriptions Last Dose Informant Patient Reported? Taking?    ALL DAY ALLERGY 10 MG tablet   No No   Sig: TAKE ONE TABLET BY MOUTH ONCE DAILY AS NEEDED FOR ALLERGIES   ALPRAZolam (XANAX) 0 5 mg tablet   Yes Yes   Sig: Take 0 5 mg by mouth 2 (two) times a day as needed   DULoxetine (CYMBALTA) 60 mg delayed release capsule   Yes No   Sig: TAKE 1 CAPSULE BY MOUTH DAIY   albuterol (VENTOLIN HFA) 90 mcg/act inhaler   No No   Sig: Inhale 2 puffs every 4 (four) hours as needed for wheezing   atoMOXetine (STRATTERA) 40 mg capsule   Yes No   Sig: Take 40 mg by mouth daily   busPIRone (BUSPAR) 5 mg tablet   Yes No   Sig: Take 5 mg by mouth 2 (two) times a day   butalbital-acetaminophen-caffeine (FIORICET,ESGIC) -40 mg per tablet   No No   Sig: Take 1 tablet by mouth every 4 (four) hours as needed for headaches   clonazePAM (KlonoPIN) 0 5 mg tablet   Yes No   cromolyn (OPTICROM) 4 % ophthalmic solution   No No   Sig: Administer 1 drop to both eyes 4 (four) times a day   Patient not taking: Reported on 2019   fluticasone (FLONASE) 50 mcg/act nasal spray   No No   Si spray into each nostril daily fluticasone-salmeterol (ADVAIR DISKUS) 250-50 mcg/dose inhaler   Yes No   Sig: Inhale 1 puff   hydrOXYzine pamoate (VISTARIL) 50 mg capsule   Yes No   Sig: Take 50 mg by mouth 2 (two) times a day as needed   ibuprofen (MOTRIN) 600 mg tablet   No Yes   Sig: Take 1 tablet (600 mg total) by mouth every 8 (eight) hours as needed for mild pain or fever   levothyroxine 100 mcg tablet   No No   Sig: Take 1 tablet (100 mcg total) by mouth daily in the early morning   topiramate (TOPAMAX) 100 mg tablet   No No   Sig: Take 1 tablet (100 mg total) by mouth 2 (two) times a day      Facility-Administered Medications: None       Past Medical History:   Diagnosis Date    Anxiety     Asthma     [facility] disorder ([facility] )     Depression     Disease of thyroid gland     Endometriosis     Psychiatric illness     Psychosis ([facility] )     PTSD (post-traumatic stress disorder) 6/6/2017    Right carpal tunnel syndrome 12/26/2018    Self-injurious behavior     [facility]icide attempt [facility])        Past Surgical History:   Procedure Laterality Date    ABDOMINAL SURGERY      HERNIA REPAIR         Family History   Problem Relation Age of Onset    Hypertension Mother     Asthma Mother     Fibromyalgia Mother      I have reviewed and agree with the history as documented  Social History     Tobacco Use    Smoking status: Former Smoker     Packs/day: 0 25     Types: Cigarettes    Smokeless tobacco: Never Used   Substance Use Topics    Alcohol use: No    Drug use: No        Review of Systems   Constitutional: Negative for chills, fatigue and fever  HENT: Negative for congestion, rhinorrhea, sinus pressure and sore throat  Eyes: Negative for visual disturbance  Respiratory: Negative for cough and shortness of breath  Cardiovascular: Negative for chest pain  Gastrointestinal: Negative for abdominal pain, constipation, diarrhea, nausea and vomiting  Genitourinary: Negative for dysuria, frequency, hematuria and urgency  Musculoskeletal: Negative for arthralgias and myalgias  Skin: Negative for color change and rash  Neurological: Positive for numbness  Negative for dizziness and light-headedness  Physical Exam  ED Triage Vitals [12/29/19 1704]   Temperature Pulse Respirations Blood Pressure SpO2   98 °F (36 7 °C) 71 14 155/93 97 %      Temp Source Heart Rate Source Patient Position - Orthostatic VS BP Location FiO2 (%)   Temporal Monitor -- Right arm --      Pain Score       No Pain             Orthostatic Vital Signs  Vitals:    12/29/19 1704   BP: 155/93   Pulse: 71       Physical Exam   Constitutional: She is oriented to person, place, and time  She appears well-developed and well-nourished  No distress  HENT:   Head: Normocephalic and atraumatic  Eyes: Pupils are equal, round, and reactive to light  Conjunctivae are normal    Cardiovascular: Normal rate, regular rhythm and normal heart sounds  Exam reveals no gallop and no friction rub  No murmur heard  Pulmonary/Chest: Effort normal and breath sounds normal  No respiratory distress  She has no wheezes  She has no rales  Abdominal: Soft  Bowel sounds are normal    Musculoskeletal: Normal range of motion  Neurological: She is alert and oriented to person, place, and time  Patient with two point discrimination of approximately 2-3 cm on plantar surface of her right foot  She had some difficulty determining sharp versus dull using a cotton tip applicator  Skin: Skin is warm and dry  She is not diaphoretic  Psychiatric: She has a normal mood and affect  Her behavior is normal    Nursing note and vitals reviewed        ED Medications  Medications - No data to display    Diagnostic Studies  Results Reviewed     Procedure Component Value Units Date/Time    Fingerstick Glucose (POCT) [[id number]]  (Normal) Collected:  12/29/19 1835    Lab Status:  Final result Updated:  12/29/19 1836     POC Glucose 81 mg/dl                  No orders to display Procedures  Procedures      ED Course                               MDM  Number of Diagnoses or Management Options  Back pain:   Numbness of right foot:   Diagnosis management comments: Unclear etiology of the patient's numbness at this time but suspect possible sciatica  Will refer the patient to [facility] for further workup and management  Patient discharged home in good condition instructions to return to the ED should she develop any worsening otherwise concerning symptoms  Disposition  Final diagnoses:   Back pain   Numbness of right foot     Time reflects when diagnosis was documented in both MDM as applicable and the Disposition within this note     Time User Action Codes Description Comment    12/29/2019  6:37 PM Qudeandrean Alosa Add [M54 9] Back pain     12/29/2019  6:37 PM Quillian Alosa Add [R20 0] Numbness of right foot       ED Disposition     ED Disposition Condition Date/Time Comment    Discharge Stable Sun Dec 29, 2019  6:37 PM Nohemi Metcalf discharge to home/self care              Follow-up Information     Follow up With Specialties Details Why Contact Info Additional Information    Frederick Olea Nurse Practitioner   [address]  [facility] [address] [facility] Emergency Department Emergency Medicine  If symptoms worsen [facility] [address]  [phone] [place] ED, [address] [address]  [address] [place], [place], [address]          Discharge Medication List as of 12/29/2019  6:42 PM      CONTINUE these medications which have NOT CHANGED    Details   ALPRAZolam Jacek Zuñiga) 0 5 mg tablet Take 0 5 mg by mouth 2 (two) times a day as needed, Starting Fri 5/31/2019, Historical Med      ibuprofen (MOTRIN) 600 mg tablet Take 1 tablet (600 mg total) by mouth every 8 (eight) hours as needed for mild pain or fever, Starting Mon 4/15/2019, Print      albuterol (VENTOLIN HFA) 90 mcg/act inhaler Inhale 2 puffs every 4 (four) hours as needed for wheezing, Starting Wed 5/29/2019, Normal      ALL DAY ALLERGY 10 MG tablet TAKE ONE TABLET BY MOUTH ONCE DAILY AS NEEDED FOR ALLERGIES, Normal      atoMOXetine (STRATTERA) 40 mg capsule Take 40 mg by mouth daily, Historical Med      busPIRone (BUSPAR) 5 mg tablet Take 5 mg by mouth 2 (two) times a day, Starting Fri 9/20/2019, Historical Med      butalbital-acetaminophen-caffeine (FIORICET,ESGIC) -40 mg per tablet Take 1 tablet by mouth every 4 (four) hours as needed for headaches, Starting Wed 9/19/2018, Print      clonazePAM (KlonoPIN) 0 5 mg tablet Starting Tue 11/26/2019, Historical Med      cromolyn (OPTICROM) 4 % ophthalmic solution Administer 1 drop to both eyes 4 (four) times a day, Starting Wed 5/29/2019, Normal      DULoxetine (CYMBALTA) 60 mg delayed release capsule TAKE 1 CAPSULE BY MOUTH CHASITY, Historical Med      fluticasone (FLONASE) 50 mcg/act nasal spray 1 spray into each nostril daily, Starting Wed 5/29/2019, Normal      fluticasone-salmeterol (ADVAIR DISKUS) 250-50 mcg/dose inhaler Inhale 1 puff, Starting Fri 5/12/2017, Historical Med      hydrOXYzine pamoate (VISTARIL) 50 mg capsule Take 50 mg by mouth 2 (two) times a day as needed, Starting Fri 5/31/2019, Historical Med      levothyroxine 100 mcg tablet Take 1 tablet (100 mcg total) by mouth daily in the early morning, Starting Tue 11/12/2019, Normal      topiramate (TOPAMAX) 100 mg tablet Take 1 tablet (100 mg total) by mouth 2 (two) times a day, Starting Tue 10/8/2019, Normal               ED Provider  Attending physically available and evaluated Alonnancy Norma BARNES managed the patient along with the ED Attending      Electronically Signed by         Romario Whitlock, MD  01/01/20 2203

## 2020-01-07 ENCOUNTER — TELEPHONE (OUTPATIENT)
Dept: PHYSICAL THERAPY | Facility: OTHER | Age: 45
End: 2020-01-07

## 2020-01-10 DIAGNOSIS — E03.9 ACQUIRED HYPOTHYROIDISM: ICD-10-CM

## 2020-01-10 RX ORDER — LEVOTHYROXINE SODIUM 0.1 MG/1
100 TABLET ORAL
Qty: 30 TABLET | Refills: 0 | OUTPATIENT
Start: 2020-01-10

## 2020-01-16 ENCOUNTER — APPOINTMENT (OUTPATIENT)
Dept: LAB | Facility: HOSPITAL | Age: 45
End: 2020-01-16
Payer: COMMERCIAL

## 2020-01-16 ENCOUNTER — TELEPHONE (OUTPATIENT)
Dept: PHYSICAL THERAPY | Facility: OTHER | Age: 45
End: 2020-01-16

## 2020-01-16 DIAGNOSIS — G43.009 MIGRAINE WITHOUT AURA AND WITHOUT STATUS MIGRAINOSUS, NOT INTRACTABLE: ICD-10-CM

## 2020-01-16 LAB
ALBUMIN SERPL BCP-MCNC: 4.3 G/DL (ref 3–5.2)
ALP SERPL-CCNC: 73 U/L (ref 43–122)
ALT SERPL W P-5'-P-CCNC: 31 U/L (ref 9–52)
ANION GAP SERPL CALCULATED.3IONS-SCNC: 10 MMOL/L (ref 5–14)
AST SERPL W P-5'-P-CCNC: 22 U/L (ref 14–36)
BASOPHILS # BLD AUTO: 0 THOUSANDS/ΜL (ref 0–0.1)
BASOPHILS NFR BLD AUTO: 1 % (ref 0–1)
BILIRUB SERPL-MCNC: 0.4 MG/DL
BUN SERPL-MCNC: 11 MG/DL (ref 5–25)
CALCIUM SERPL-MCNC: 9.2 MG/DL (ref 8.4–10.2)
CHLORIDE SERPL-SCNC: 104 MMOL/L (ref 97–108)
CO2 SERPL-SCNC: 26 MMOL/L (ref 22–30)
CREAT SERPL-MCNC: 1.06 MG/DL (ref 0.6–1.2)
EOSINOPHIL # BLD AUTO: 0.1 THOUSAND/ΜL (ref 0–0.4)
EOSINOPHIL NFR BLD AUTO: 1 % (ref 0–6)
ERYTHROCYTE [DISTWIDTH] IN BLOOD BY AUTOMATED COUNT: 14.6 %
GFR SERPL CREATININE-BSD FRML MDRD: 65 ML/MIN/1.73SQ M
GLUCOSE P FAST SERPL-MCNC: 83 MG/DL (ref 70–99)
HCT VFR BLD AUTO: 40.3 % (ref 36–46)
HGB BLD-MCNC: 13.3 G/DL (ref 12–16)
LYMPHOCYTES # BLD AUTO: 1.8 THOUSANDS/ΜL (ref 0.5–4)
LYMPHOCYTES NFR BLD AUTO: 27 % (ref 25–45)
MCH RBC QN AUTO: 29 PG (ref 26–34)
MCHC RBC AUTO-ENTMCNC: 32.9 G/DL (ref 31–36)
MCV RBC AUTO: 88 FL (ref 80–100)
MONOCYTES # BLD AUTO: 0.5 THOUSAND/ΜL (ref 0.2–0.9)
MONOCYTES NFR BLD AUTO: 7 % (ref 1–10)
NEUTROPHILS # BLD AUTO: 4.2 THOUSANDS/ΜL (ref 1.8–7.8)
NEUTS SEG NFR BLD AUTO: 64 % (ref 45–65)
PLATELET # BLD AUTO: 252 THOUSANDS/UL (ref 150–450)
PMV BLD AUTO: 8.6 FL (ref 8.9–12.7)
POTASSIUM SERPL-SCNC: 4.1 MMOL/L (ref 3.6–5)
PROT SERPL-MCNC: 7.9 G/DL (ref 5.9–8.4)
RBC # BLD AUTO: 4.57 MILLION/UL (ref 4–5.2)
SODIUM SERPL-SCNC: 140 MMOL/L (ref 137–147)
WBC # BLD AUTO: 6.7 THOUSAND/UL (ref 4.5–11)

## 2020-01-16 PROCEDURE — 85025 COMPLETE CBC W/AUTO DIFF WBC: CPT

## 2020-01-16 PROCEDURE — 80053 COMPREHEN METABOLIC PANEL: CPT

## 2020-01-16 PROCEDURE — 36415 COLL VENOUS BLD VENIPUNCTURE: CPT

## 2020-01-16 PROCEDURE — 80201 ASSAY OF TOPIRAMATE: CPT

## 2020-01-16 NOTE — TELEPHONE ENCOUNTER
Voice message left for patient to call back  Phone number and hours of business provided  This is the 3rd attempt to reach the patient  Referral closed

## 2020-01-20 LAB — TOPIRAMATE SERPL-MCNC: 2.5 UG/ML (ref 2–25)

## 2020-01-30 ENCOUNTER — TELEPHONE (OUTPATIENT)
Dept: FAMILY MEDICINE CLINIC | Facility: CLINIC | Age: 45
End: 2020-01-30

## 2020-01-30 DIAGNOSIS — E03.9 ACQUIRED HYPOTHYROIDISM: ICD-10-CM

## 2020-01-30 RX ORDER — LEVOTHYROXINE SODIUM 0.1 MG/1
100 TABLET ORAL
Qty: 30 TABLET | Refills: 0 | OUTPATIENT
Start: 2020-01-30

## 2020-01-30 NOTE — TELEPHONE ENCOUNTER
Emmy did leave a message on January 10th asking her to call for appointment  I do not see any upcoming appointments  Please call her again  If there is no return call on the 3rd attempt please let Calvin know    In the meantime I will refuse the prescription and ask the pharmacist to have the patient call us

## 2020-01-30 NOTE — TELEPHONE ENCOUNTER
pc to pt re: no show appt  Andres Padron states she did not call to change her pcp, she will call & then call back to sched another appt

## 2020-02-06 ENCOUNTER — OFFICE VISIT (OUTPATIENT)
Dept: FAMILY MEDICINE CLINIC | Facility: CLINIC | Age: 45
End: 2020-02-06

## 2020-02-06 VITALS
WEIGHT: 229 LBS | DIASTOLIC BLOOD PRESSURE: 72 MMHG | RESPIRATION RATE: 15 BRPM | OXYGEN SATURATION: 98 % | TEMPERATURE: 97 F | BODY MASS INDEX: 41.82 KG/M2 | SYSTOLIC BLOOD PRESSURE: 122 MMHG | HEART RATE: 78 BPM

## 2020-02-06 DIAGNOSIS — F43.10 PTSD (POST-TRAUMATIC STRESS DISORDER): ICD-10-CM

## 2020-02-06 DIAGNOSIS — E03.9 HYPOTHYROIDISM, UNSPECIFIED TYPE: ICD-10-CM

## 2020-02-06 DIAGNOSIS — E78.2 MIXED HYPERLIPIDEMIA: ICD-10-CM

## 2020-02-06 DIAGNOSIS — J45.20 MILD INTERMITTENT ASTHMA WITHOUT COMPLICATION: Primary | ICD-10-CM

## 2020-02-06 DIAGNOSIS — J01.10 ACUTE NON-RECURRENT FRONTAL SINUSITIS: ICD-10-CM

## 2020-02-06 DIAGNOSIS — I10 BENIGN ESSENTIAL HYPERTENSION: ICD-10-CM

## 2020-02-06 DIAGNOSIS — R05.9 COUGH: ICD-10-CM

## 2020-02-06 PROCEDURE — 3074F SYST BP LT 130 MM HG: CPT | Performed by: FAMILY MEDICINE

## 2020-02-06 PROCEDURE — 99213 OFFICE O/P EST LOW 20 MIN: CPT | Performed by: FAMILY MEDICINE

## 2020-02-06 PROCEDURE — 1036F TOBACCO NON-USER: CPT | Performed by: FAMILY MEDICINE

## 2020-02-06 PROCEDURE — 3078F DIAST BP <80 MM HG: CPT | Performed by: FAMILY MEDICINE

## 2020-02-06 PROCEDURE — T1015 CLINIC SERVICE: HCPCS | Performed by: FAMILY MEDICINE

## 2020-02-06 RX ORDER — FLUTICASONE PROPIONATE 110 UG/1
2 AEROSOL, METERED RESPIRATORY (INHALATION) 2 TIMES DAILY
Qty: 1 INHALER | Refills: 5 | Status: SHIPPED | OUTPATIENT
Start: 2020-02-06 | End: 2020-08-17

## 2020-02-06 RX ORDER — FLUTICASONE PROPIONATE 50 MCG
1 SPRAY, SUSPENSION (ML) NASAL DAILY
Qty: 1 BOTTLE | Refills: 2 | Status: SHIPPED | OUTPATIENT
Start: 2020-02-06 | End: 2021-06-30 | Stop reason: SDUPTHER

## 2020-02-06 NOTE — ASSESSMENT & PLAN NOTE
Lab Results   Component Value Date    [id number] 0 750 05/29/2019   Currently on levothyroxine 100  Will get TSH  Continue current management

## 2020-02-06 NOTE — PATIENT INSTRUCTIONS
Lifestyle Medicine Tip Sheet   1  Eat predominantly less processed foods such as fast food, T V  dinners, and an  2  Eat Close to [employer], [employer] or [employer])     3  Eat a predominantly plant based diet   a  Dark Leafy Greens   b  Fruits/Vegetables   c  Whole Grains: Whole wheat, barely, wheat berries, quinoa, steel cut oats, brown rice, whole wheat pasta  d  Legumes: kidney beans, hunter beans, white beans, black beans, garbanzo beans (chickpeas), lima beans (mature, dried), split peas, lentils, and edamame (green soybeans)       4  At least half of the plate should contain fruits or vegetables     5  Liquid should be predominantly water (limit soda and juice)     6  Watch portion size  7  Foods you should avoid or limit?   - Fats - Specifically saturated and trans-fats  They are found in margarines, many fast foods, and some store-bought baked goods  Saturated and Trans-fats can raise your cholesterol level and your chance of getting heart disease        - When you cook, it's best to use no oils but if needed try to limit the amount of oil used as oil contains many calories per volume and is very unhealthy when heated during cooking    - Sugar -Limit or avoid sugar, sweets, and refined grains  Refined grains are found in white bread, white rice, most forms of pasta, and most packaged "snack" foods    - Try not to cook with salt and avoid adding extra salt to your meals   - Meat - Studies have shown that eating a lot of red meat and poultry can increase your risk of certain health problems, including heart disease, diabetes, obesity and cancer  So try to limit the intake of it  8  Practice good sleep hygiene by getting 7-9 hours of sleep a night     9  Daily exercise minimum of 30 minutes (walking around the block)     10  Socialization (friends and family)   - Explore your neighborhood   Go to the park, spend time at [employer]   - Consider taking a class or volunteering to connect with new people     If you are interested you can read more about healthy food choices at the following websites:   a  [employer]  org   b  Home cooking recipes: https://www [employer]/   c  http://tristian info/   d  Familydoctor  org

## 2020-02-06 NOTE — ASSESSMENT & PLAN NOTE
Patient currently being seen by psychiatry and therapist every 2 weeks  Recommended she see a PTSD specialist and gave the PTSD  dayna

## 2020-02-06 NOTE — ASSESSMENT & PLAN NOTE
BP Readings from Last 1 Encounters:   02/06/20 122/72   Currently controlled  Continue current management  Reassess at next visit

## 2020-02-06 NOTE — PROGRESS NOTES
Assessment/Plan:    Mild intermittent asthma without complication  Will DC Advair  Will start Flovent  Continue Albuterol PRN  RTC in 4 weeks to reassess  Benign essential hypertension  BP Readings from Last 1 Encounters:   02/06/20 122/72   Currently controlled  Continue current management  Reassess at next visit  PTSD (post-traumatic stress disorder)  Patient currently being seen by psychiatry and therapist every 2 weeks  Recommended she see a PTSD specialist and gave the PTSD  dayna  Hypothyroidism  Lab Results   Component Value Date    [id number] 0 750 05/29/2019   Currently on levothyroxine 100  Will get TSH  Continue current management  Diagnoses and all orders for this visit:    Mild intermittent asthma without complication  -     fluticasone (FLOVENT HFA) 110 MCG/ACT inhaler; Inhale 2 puffs 2 (two) times a day Rinse mouth after use  Cough  -     XR chest pa & lateral; Future    Acute non-recurrent frontal sinusitis  Comments: Will treat with amoxicillin, flonase and Zyrtec  To increase fluids and take Tylenol for headache and fever  Orders:  -     fluticasone (FLONASE) 50 mcg/act nasal spray; 1 spray into each nostril daily    Benign essential hypertension    PTSD (post-traumatic stress disorder)    Hypothyroidism, unspecified type  -     TSH, 3rd generation; Future    Mixed hyperlipidemia  -     Lipid Panel with Direct LDL reflex; Future          Subjective:      Patient ID: Sonia Mcelroy is a 45 y o  female  Who presents for follow up on their chronic conditions  Complains of nothing  Patient Active Problem List:     Bipolar 1 disorder, mixed ([facility] ), controlled, that follows with Psychiatry, last hospitalization was 1 year ago, follows with therapist every 2 weeks, has a prior history of PTSD is not seeing a PTSD specialist patient downloaded PTSD  dayna in office today and states she will begin to use it       Generalized anxiety disorder     Cocaine abuse ([facility] ), in remission last use 2 years ago     PTSD (post-traumatic stress disorder)     Migraine without aura and without status migrainosus, not intractable, controlled with topiramate, currently sees neurology  Mild intermittent asthma without complication, on Advair, still using albuterol greater than 5 times per week, with now chronic cough, using albuterol for cough not shortness of breath or wheezing, after counseling patient on the crack use of albuterol agreed to only use albuterol for shortness of breath and wheezing, agreed to stop Advair and start Flovent  Benign essential hypertension, well controlled off medication  The following portions of the patient's history were reviewed and updated as appropriate: allergies, current medications, past family history, past medical history, past social history, past surgical history and problem list     Review of Systems   Constitutional: Negative for chills and fever  HENT: Negative for ear pain and sore throat  Eyes: Negative for pain and redness  Respiratory: Negative for cough and shortness of breath  Cardiovascular: Negative for chest pain, palpitations and leg swelling  Gastrointestinal: Negative for abdominal pain, diarrhea and nausea  Genitourinary: Negative for dysuria and hematuria  Musculoskeletal: Negative for back pain and neck pain  Neurological: Negative for dizziness and headaches  Psychiatric/Behavioral: Negative for dysphoric mood  The patient is not nervous/anxious  Objective:      /72 (BP Location: Right arm, Patient Position: Sitting, Cuff Size: Standard)   Pulse 78   Temp (!) 97 °F (36 1 °C) (Temporal)   Resp 15   Wt 104 kg (229 lb)   SpO2 98%   BMI 41 82 kg/m²          Physical Exam   Constitutional: She is oriented to person, place, and time  She appears well-developed and well-nourished  HENT:   Head: Normocephalic and atraumatic     Right Ear: External ear normal    Left Ear: External ear normal    Nose: Nose normal    Mouth/Throat: Oropharynx is clear and moist    Eyes: Pupils are equal, round, and reactive to light  Conjunctivae and EOM are normal    Neck: Normal range of motion  Neck supple  Cardiovascular: Normal rate, regular rhythm, normal heart sounds and intact distal pulses  Pulmonary/Chest: Effort normal and breath sounds normal  She has no wheezes  Abdominal: Soft  Bowel sounds are normal  There is no tenderness  Musculoskeletal: Normal range of motion  She exhibits no edema or tenderness  Lymphadenopathy:     She has no cervical adenopathy  Neurological: She is alert and oriented to person, place, and time  Skin: Skin is warm and dry  Psychiatric: She has a normal mood and affect   Her behavior is normal

## 2020-02-08 ENCOUNTER — NURSE TRIAGE (OUTPATIENT)
Dept: OTHER | Facility: OTHER | Age: 45
End: 2020-02-08

## 2020-02-08 ENCOUNTER — TELEPHONE (OUTPATIENT)
Dept: FAMILY MEDICINE CLINIC | Facility: CLINIC | Age: 45
End: 2020-02-08

## 2020-02-08 DIAGNOSIS — E03.9 ACQUIRED HYPOTHYROIDISM: ICD-10-CM

## 2020-02-08 RX ORDER — LEVOTHYROXINE SODIUM 0.1 MG/1
100 TABLET ORAL
Qty: 30 TABLET | Refills: 0 | Status: SHIPPED | OUTPATIENT
Start: 2020-02-08 | End: 2020-03-26 | Stop reason: SDUPTHER

## 2020-02-08 NOTE — TELEPHONE ENCOUNTER
Dr Penny Nino returned call at [phone]  He stated that he just sent in a refill for patient's levothroxine

## 2020-02-08 NOTE — TELEPHONE ENCOUNTER
Reason for Disposition   [1] Prescription not at pharmacy AND [2] was prescribed today by PCP    Answer Assessment - Initial Assessment Questions  1  SYMPTOMS: "Do you have any symptoms?"      Sore throat  2  SEVERITY: If symptoms are present, ask "Are they mild, moderate or severe?"      Persistent throat pain rated 7/10  Hasn't taken synthroid for 2 weeks because she doesn't have any medication  Discussed needing a refill with PCP on Wednesday, 2/5/2020    3  Other symptoms: Post nasal drip  Persistent, dry cough  Denies production of sputum      Protocols used: MEDICATION QUESTION CALL-ADULT-

## 2020-02-08 NOTE — TELEPHONE ENCOUNTER
Dr Paula Ramachandran was paged via [facility] Text @ [phone]: [phone]/ Nickie Barahona RN/ [employer]/ [facility]/ 1975/ refill of synthroid not at pharmacy, pt was seen in office this week

## 2020-02-08 NOTE — TELEPHONE ENCOUNTER
Regarding: Synthroid (send to [facility] on [address], [address])  ----- Message from Jeffrey Hogue sent at 2/8/2020 11:08 AM EST -----  "My Synthroid was not refilled   My throat hurts and I need it "

## 2020-02-08 NOTE — TELEPHONE ENCOUNTER
I called patient and relayed that Dr Meri Anderson send in the prescription and it should be ready to pick-up in about 30-45 minutes  Advised to call back if there are any further issues

## 2020-02-17 ENCOUNTER — APPOINTMENT (OUTPATIENT)
Dept: LAB | Facility: HOSPITAL | Age: 45
End: 2020-02-17
Payer: COMMERCIAL

## 2020-02-17 ENCOUNTER — HOSPITAL ENCOUNTER (OUTPATIENT)
Dept: RADIOLOGY | Facility: HOSPITAL | Age: 45
Discharge: HOME | End: 2020-02-17
Payer: COMMERCIAL

## 2020-02-17 DIAGNOSIS — R05.9 COUGH: ICD-10-CM

## 2020-02-17 DIAGNOSIS — E03.9 HYPOTHYROIDISM, UNSPECIFIED TYPE: ICD-10-CM

## 2020-02-17 DIAGNOSIS — E78.2 MIXED HYPERLIPIDEMIA: ICD-10-CM

## 2020-02-17 LAB
CHOLEST SERPL-MCNC: 195 MG/DL
HDLC SERPL-MCNC: 41 MG/DL
LDLC SERPL CALC-MCNC: 106 MG/DL
TRIGL SERPL-MCNC: 238 MG/DL
TSH SERPL DL<=0.05 MIU/L-ACNC: 0.98 UIU/ML (ref 0.47–4.68)

## 2020-02-17 PROCEDURE — 80061 LIPID PANEL: CPT

## 2020-02-17 PROCEDURE — 36415 COLL VENOUS BLD VENIPUNCTURE: CPT

## 2020-02-17 PROCEDURE — 84443 ASSAY THYROID STIM HORMONE: CPT

## 2020-02-17 PROCEDURE — 71046 X-RAY EXAM CHEST 2 VIEWS: CPT

## 2020-02-20 DIAGNOSIS — G47.30 SLEEP APNEA, UNSPECIFIED TYPE: Primary | ICD-10-CM

## 2020-03-25 ENCOUNTER — TELEPHONE (OUTPATIENT)
Dept: NEUROLOGY | Facility: CLINIC | Age: 45
End: 2020-03-25

## 2020-03-25 NOTE — TELEPHONE ENCOUNTER
I called and left a message on the patients voicemail asking the patient to return my call  I left a message offering the patient an telephone interview with Dr Siddhartha Mukherjee on the phone instead of coming into the office or she can come into the office for her appointment  I asked the patient to please return my call with her decision

## 2020-03-26 ENCOUNTER — TELEMEDICINE (OUTPATIENT)
Dept: NEUROLOGY | Facility: CLINIC | Age: 45
End: 2020-03-26
Payer: COMMERCIAL

## 2020-03-26 ENCOUNTER — TELEPHONE (OUTPATIENT)
Dept: NEUROLOGY | Facility: CLINIC | Age: 45
End: 2020-03-26

## 2020-03-26 DIAGNOSIS — E03.9 ACQUIRED HYPOTHYROIDISM: ICD-10-CM

## 2020-03-26 DIAGNOSIS — G43.009 MIGRAINE WITHOUT AURA AND WITHOUT STATUS MIGRAINOSUS, NOT INTRACTABLE: ICD-10-CM

## 2020-03-26 PROCEDURE — G2012 BRIEF CHECK IN BY MD/QHP: HCPCS | Performed by: PSYCHIATRY & NEUROLOGY

## 2020-03-26 RX ORDER — TOPIRAMATE 100 MG/1
100 TABLET, FILM COATED ORAL 2 TIMES DAILY
Qty: 60 TABLET | Refills: 3 | Status: SHIPPED | OUTPATIENT
Start: 2020-03-26 | End: 2020-07-06 | Stop reason: SDUPTHER

## 2020-03-26 RX ORDER — LEVOTHYROXINE SODIUM 0.1 MG/1
100 TABLET ORAL
Qty: 30 TABLET | Refills: 0 | Status: SHIPPED | OUTPATIENT
Start: 2020-03-26 | End: 2020-03-30 | Stop reason: SDUPTHER

## 2020-03-26 NOTE — PROGRESS NOTES
Virtual Regular Visit    Problem List Items Addressed This Visit        Cardiovascular and Mediastinum    Migraine without aura and without status migrainosus, not intractable     Continues to do very well on topiramate 100 mg twice daily with only a very infrequent migraine episode  Generally able to get away with simply acetaminophen as a symptomatic measure  Describes no symptoms that would suggest any adverse side effects from her topiramate  Has been followed by Ophthalmology who does not feel there is any problem with the continued use of topiramate  --blood work results reviewed with patient  --continue topiramate 100 mg tablets taking 1 tablet twice daily  Relevant Medications    topiramate (TOPAMAX) 100 mg tablet      Neurology follow-up set for 3 months or as needed  Reason for visit is to assess the status of her migraine headaches  Encounter provider Juan Mauro MD    Provider located at [facility] [address]      Recent Visits  Date Type Provider Dept   03/25/20 Telephone Inés Sahni MA [facility] [address]   Showing recent visits within past 7 days and meeting all other requirements     Today's Visits  Date Type Provider Dept   03/26/20 [address], MD [facility] [address]   Showing today's visits and meeting all other requirements     Future Appointments  No visits were found meeting these conditions  Showing future appointments within next 150 days and meeting all other requirements        After connecting through [employer], the patient was identified by name and date of birth  Luisa Magallanes was informed that this is a telemedicine visit and that the visit is being conducted through  which may not be secure and therefore, might not be HIPAA-compliant  My office door was closed  No one else was in the room    She acknowledged consent and understanding of privacy and security of the video platform  The patient has agreed to participate and understands they can discontinue the visit at any time  Subjective  Luisa Magallanes is a 45 y o  female who follows with her diagnosed migraine without aura  She has done very well on topiramate 100 mg twice daily  She reports only very infrequent migraine  She is usually able to take care of the migraine simply with the use of acetaminophen  She expresses no symptoms that would suggest any adverse side effects from the topiramate  She has been following with Ophthalmology  I did have the opportunity of discussing with her ophthalmologist and he does not feel there is any problem using the topiramate here  Patient also with a diagnosis of her right carpal tunnel syndrome electrodiagnostically demonstrated  Only occasional transient symptoms  No static motor or sensory difficulties  Is awaiting further evaluation by Orthopedics         Past Medical History:   Diagnosis Date    Anxiety     Asthma     [facility]     Depression     Disease of thyroid gland     Endometriosis     Psychiatric illness     Psychosis ([facility] )     PTSD (post-traumatic stress disorder) 6/6/2017    Right carpal tunnel syndrome 12/26/2018    Self-injurious behavior     [facility])        Past Surgical History:   Procedure Laterality Date    ABDOMINAL SURGERY      HERNIA REPAIR         Current Outpatient Medications   Medication Sig Dispense Refill    albuterol (VENTOLIN HFA) 90 mcg/act inhaler Inhale 2 puffs every 4 (four) hours as needed for wheezing 1 Inhaler 2    ALL DAY ALLERGY 10 MG tablet TAKE ONE TABLET BY MOUTH ONCE DAILY AS NEEDED FOR ALLERGIES 90 tablet 1    ALPRAZolam (XANAX) 0 5 mg tablet Take 0 5 mg by mouth 2 (two) times a day as needed  1    atoMOXetine (STRATTERA) 40 mg capsule Take 40 mg by mouth daily      busPIRone (BUSPAR) 5 mg tablet Take 5 mg by mouth 2 (two) times a day  1    clonazePAM (KlonoPIN) 0 5 mg tablet       cromolyn (OPTICROM) 4 % ophthalmic solution Administer 1 drop to both eyes 4 (four) times a day 10 mL 1    DULoxetine (CYMBALTA) 60 mg delayed release capsule TAKE 1 CAPSULE BY MOUTH DAIY  1    fluticasone (FLONASE) 50 mcg/act nasal spray 1 spray into each nostril daily 1 Bottle 2    fluticasone (FLOVENT HFA) 110 MCG/ACT inhaler Inhale 2 puffs 2 (two) times a day Rinse mouth after use  1 Inhaler 5    hydrOXYzine pamoate (VISTARIL) 50 mg capsule Take 50 mg by mouth 2 (two) times a day as needed  1    ibuprofen (MOTRIN) 600 mg tablet Take 1 tablet (600 mg total) by mouth every 8 (eight) hours as needed for mild pain or fever 30 tablet 0    levothyroxine 100 mcg tablet Take 1 tablet (100 mcg total) by mouth daily in the early morning 30 tablet 0    topiramate (TOPAMAX) 100 mg tablet Take 1 tablet (100 mg total) by mouth 2 (two) times a day 60 tablet 3     No current facility-administered medications for this visit  Allergies   Allergen Reactions    Aleve [Naproxen] Shortness Of Breath    Benzonatate Swelling     Throat closing    Guaifenesin Other (See Comments)     Note - patient states she IS able to take robitussin   Latuda [Lurasidone] Swelling      stuttering     Imitrex [Sumatriptan]      Patient reports that this medication "makes me sick"  Review of Systems   Constitutional: Negative  HENT: Positive for nosebleeds  Respiratory: Positive for cough and wheezing  Cardiovascular: Negative  Gastrointestinal: Negative  Endocrine: Negative  Genitourinary: Negative  Musculoskeletal: Positive for back pain  Allergic/Immunologic: Positive for environmental allergies  Neurological: Positive for headaches  Hematological: Negative  Psychiatric/Behavioral: Positive for dysphoric mood  The patient is nervous/anxious  I spent 9 minutes with the patient during this visit

## 2020-03-26 NOTE — ASSESSMENT & PLAN NOTE
Continues to do very well on topiramate 100 mg twice daily with only a very infrequent migraine episode  Generally able to get away with simply acetaminophen as a symptomatic measure  Describes no symptoms that would suggest any adverse side effects from her topiramate  Has been followed by Ophthalmology who does not feel there is any problem with the continued use of topiramate  --blood work results reviewed with patient  --continue topiramate 100 mg tablets taking 1 tablet twice daily  no

## 2020-03-30 ENCOUNTER — TELEMEDICINE (OUTPATIENT)
Dept: FAMILY MEDICINE CLINIC | Facility: CLINIC | Age: 45
End: 2020-03-30

## 2020-03-30 DIAGNOSIS — K21.9 GASTROESOPHAGEAL REFLUX DISEASE, ESOPHAGITIS PRESENCE NOT SPECIFIED: ICD-10-CM

## 2020-03-30 DIAGNOSIS — J30.2 SEASONAL ALLERGIES: ICD-10-CM

## 2020-03-30 DIAGNOSIS — E03.9 ACQUIRED HYPOTHYROIDISM: Primary | ICD-10-CM

## 2020-03-30 PROCEDURE — G2012 BRIEF CHECK IN BY MD/QHP: HCPCS | Performed by: FAMILY MEDICINE

## 2020-03-30 PROCEDURE — T1015 CLINIC SERVICE: HCPCS | Performed by: FAMILY MEDICINE

## 2020-03-30 RX ORDER — FAMOTIDINE 20 MG/1
20 TABLET, FILM COATED ORAL 2 TIMES DAILY
Qty: 60 TABLET | Refills: 1 | Status: SHIPPED | OUTPATIENT
Start: 2020-03-30 | End: 2020-08-17

## 2020-03-30 RX ORDER — LEVOTHYROXINE SODIUM 0.1 MG/1
100 TABLET ORAL
Qty: 30 TABLET | Refills: 0 | Status: SHIPPED | OUTPATIENT
Start: 2020-03-30 | End: 2020-05-18 | Stop reason: SDUPTHER

## 2020-03-30 RX ORDER — CETIRIZINE HYDROCHLORIDE 10 MG/1
10 TABLET ORAL DAILY
Qty: 90 TABLET | Refills: 1 | Status: SHIPPED | OUTPATIENT
Start: 2020-03-30 | End: 2020-07-09 | Stop reason: SDUPTHER

## 2020-03-30 NOTE — PROGRESS NOTES
Virtual Brief Visit    Problem List Items Addressed This Visit        Endocrine    Hypothyroidism - Primary    Relevant Medications    levothyroxine 100 mcg tablet      Other Visit Diagnoses     Seasonal allergies        Relevant Medications    cetirizine (All Day Allergy) 10 mg tablet    Gastroesophageal reflux disease, esophagitis presence not specified        Relevant Medications    famotidine (PEPCID) 20 mg tablet                Reason for visit is follow up on chronic conditions    Encounter provider Kirt Rooney MD    Provider located at [address] [place] [id number] [phone]      Recent Visits  No visits were found meeting these conditions  Showing recent visits within past 7 days and meeting all other requirements     Today's Visits  Date Type Provider Dept   03/30/20 Telemedicine Kirt Rooney MD  [facility]   Showing today's visits and meeting all other requirements     Future Appointments  Date Type Provider Dept   03/30/20 Telemedicine Kirt Rooney MD  [facility]   Showing future appointments within next 150 days and meeting all other requirements        After connecting through telephone, the patient was identified by name and date of birth  Torrie Green was informed that this is a telemedicine visit and that the visit is being conducted through telephone  My office door was closed  No one else was in the room  She acknowledged consent and understanding of privacy and security of the video platform  The patient has agreed to participate and understands they can discontinue the visit at any time  Patient is aware this is a billable service  Subjective  Torrie Grene is a 45 y o  female seen virtually for follow up on chronic conditions  She states she has had intermittent heart burn   She denies fever, chills, ear pain, eye pain, sore throat, cough, wheezing, shortness of breath, chest pain, palpitations, ab pain, nausea, vomiting, diarrhea, constipation, blood in stool, blood in urine, dysuria, leg swelling, or rash         Past Medical History:   Diagnosis Date    Anxiety     Asthma     Bipolar disorder ([facility] )     Depression     Disease of thyroid gland     Endometriosis     Psychiatric illness     Psychosis ([facility] )     PTSD (post-traumatic stress disorder) 6/6/2017    Right carpal tunnel syndrome 12/26/2018    Self-injurious behavior     Su[facility])        Past Surgical History:   Procedure Laterality Date    ABDOMINAL SURGERY      HERNIA REPAIR         Current Outpatient Medications   Medication Sig Dispense Refill    albuterol (VENTOLIN HFA) 90 mcg/act inhaler Inhale 2 puffs every 4 (four) hours as needed for wheezing 1 Inhaler 2    ALPRAZolam (XANAX) 0 5 mg tablet Take 0 5 mg by mouth 2 (two) times a day as needed  1    atoMOXetine (STRATTERA) 40 mg capsule Take 40 mg by mouth daily      busPIRone (BUSPAR) 5 mg tablet Take 5 mg by mouth 2 (two) times a day  1    cetirizine (All Day Allergy) 10 mg tablet Take 1 tablet (10 mg total) by mouth daily 90 tablet 1    clonazePAM (KlonoPIN) 0 5 mg tablet       cromolyn (OPTICROM) 4 % ophthalmic solution Administer 1 drop to both eyes 4 (four) times a day 10 mL 1    DULoxetine (CYMBALTA) 60 mg delayed release capsule TAKE 1 CAPSULE BY MOUTH DAIY  1    famotidine (PEPCID) 20 mg tablet Take 1 tablet (20 mg total) by mouth 2 (two) times a day 60 tablet 1    fluticasone (FLONASE) 50 mcg/act nasal spray 1 spray into each nostril daily 1 Bottle 2    fluticasone (FLOVENT HFA) 110 MCG/ACT inhaler Inhale 2 puffs 2 (two) times a day Rinse mouth after use   1 Inhaler 5    hydrOXYzine pamoate (VISTARIL) 50 mg capsule Take 50 mg by mouth 2 (two) times a day as needed  1    ibuprofen (MOTRIN) 600 mg tablet Take 1 tablet (600 mg total) by mouth every 8 (eight) hours as needed for mild pain or fever 30 tablet 0    levothyroxine 100 mcg tablet Take 1 tablet (100 mcg total) by mouth daily in the early morning 30 tablet 0    topiramate (TOPAMAX) 100 mg tablet Take 1 tablet (100 mg total) by mouth 2 (two) times a day 60 tablet 3     No current facility-administered medications for this visit  Allergies   Allergen Reactions    Aleve [Naproxen] Shortness Of Breath    Benzonatate Swelling     Throat closing    Guaifenesin Other (See Comments)     Note - patient states she IS able to take robitussin   Latuda [Lurasidone] Swelling      stuttering     Imitrex [Sumatriptan]      Patient reports that this medication "makes me sick"  Review of Systems   Constitutional: Negative for chills and fever  HENT: Negative for ear pain and sore throat  Eyes: Negative for pain and redness  Respiratory: Negative for cough and shortness of breath  Cardiovascular: Negative for chest pain, palpitations and leg swelling  Gastrointestinal: Negative for abdominal pain, diarrhea and nausea  Genitourinary: Negative for dysuria and hematuria  Musculoskeletal: Negative for back pain and neck pain  Neurological: Negative for dizziness and headaches  Psychiatric/Behavioral: Negative for dysphoric mood  The patient is not nervous/anxious  Telephone Exam:    No acute distress, no conversational dyspnea, no cough  I spent 15 minutes with the patient during this visit

## 2020-04-22 ENCOUNTER — TELEMEDICINE (OUTPATIENT)
Dept: FAMILY MEDICINE CLINIC | Facility: CLINIC | Age: 45
End: 2020-04-22

## 2020-04-22 DIAGNOSIS — Z20.822 SUSPECTED COVID-19 VIRUS INFECTION: ICD-10-CM

## 2020-04-22 DIAGNOSIS — Z20.828 EXPOSURE TO SARS-ASSOCIATED CORONAVIRUS: Primary | ICD-10-CM

## 2020-04-22 DIAGNOSIS — Z20.828 EXPOSURE TO SARS-ASSOCIATED CORONAVIRUS: ICD-10-CM

## 2020-04-22 PROCEDURE — 87635 SARS-COV-2 COVID-19 AMP PRB: CPT

## 2020-04-22 PROCEDURE — G2012 BRIEF CHECK IN BY MD/QHP: HCPCS | Performed by: FAMILY MEDICINE

## 2020-04-22 PROCEDURE — T1015 CLINIC SERVICE: HCPCS | Performed by: FAMILY MEDICINE

## 2020-04-23 ENCOUNTER — TELEPHONE (OUTPATIENT)
Dept: FAMILY MEDICINE CLINIC | Facility: CLINIC | Age: 45
End: 2020-04-23

## 2020-04-23 LAB — SARS-COV-2 RNA SPEC QL NAA+PROBE: DETECTED

## 2020-04-27 ENCOUNTER — TELEMEDICINE (OUTPATIENT)
Dept: FAMILY MEDICINE CLINIC | Facility: CLINIC | Age: 45
End: 2020-04-27

## 2020-04-27 DIAGNOSIS — U07.1 COVID-19 VIRUS INFECTION: Primary | ICD-10-CM

## 2020-04-27 PROCEDURE — T1015 CLINIC SERVICE: HCPCS | Performed by: INTERNAL MEDICINE

## 2020-04-27 PROCEDURE — G2012 BRIEF CHECK IN BY MD/QHP: HCPCS | Performed by: INTERNAL MEDICINE

## 2020-05-11 ENCOUNTER — TELEMEDICINE (OUTPATIENT)
Dept: SLEEP CENTER | Facility: CLINIC | Age: 45
End: 2020-05-11
Payer: COMMERCIAL

## 2020-05-11 VITALS — BODY MASS INDEX: 41.64 KG/M2 | WEIGHT: 235 LBS | HEIGHT: 63 IN

## 2020-05-11 DIAGNOSIS — G47.30 SLEEP APNEA, UNSPECIFIED TYPE: Primary | ICD-10-CM

## 2020-05-11 DIAGNOSIS — G47.21 CIRCADIAN RHYTHM SLEEP DISORDER, DELAYED SLEEP PHASE TYPE: ICD-10-CM

## 2020-05-11 PROCEDURE — 99213 OFFICE O/P EST LOW 20 MIN: CPT | Performed by: NURSE PRACTITIONER

## 2020-05-18 DIAGNOSIS — E03.9 ACQUIRED HYPOTHYROIDISM: ICD-10-CM

## 2020-05-19 RX ORDER — LEVOTHYROXINE SODIUM 0.1 MG/1
100 TABLET ORAL
Qty: 30 TABLET | Refills: 0 | Status: SHIPPED | OUTPATIENT
Start: 2020-05-19 | End: 2020-07-09 | Stop reason: SDUPTHER

## 2020-06-08 ENCOUNTER — TELEPHONE (OUTPATIENT)
Dept: FAMILY MEDICINE CLINIC | Facility: CLINIC | Age: 45
End: 2020-06-08

## 2020-06-08 DIAGNOSIS — J30.2 SEASONAL ALLERGIES: Primary | ICD-10-CM

## 2020-06-08 RX ORDER — OLOPATADINE HYDROCHLORIDE 1 MG/ML
1 SOLUTION/ DROPS OPHTHALMIC 2 TIMES DAILY
Qty: 5 ML | Refills: 3 | Status: SHIPPED | OUTPATIENT
Start: 2020-06-08 | End: 2020-08-17 | Stop reason: ALTCHOICE

## 2020-06-11 ENCOUNTER — TELEPHONE (OUTPATIENT)
Dept: NEUROLOGY | Facility: CLINIC | Age: 45
End: 2020-06-11

## 2020-06-11 ENCOUNTER — HOSPITAL ENCOUNTER (OUTPATIENT)
Dept: SLEEP CENTER | Facility: CLINIC | Age: 45
Discharge: HOME | End: 2020-06-11
Payer: COMMERCIAL

## 2020-06-11 DIAGNOSIS — G47.30 SLEEP APNEA, UNSPECIFIED TYPE: ICD-10-CM

## 2020-06-11 PROCEDURE — 95810 POLYSOM 6/> YRS 4/> PARAM: CPT | Performed by: INTERNAL MEDICINE

## 2020-06-11 PROCEDURE — 95810 POLYSOM 6/> YRS 4/> PARAM: CPT

## 2020-06-12 PROBLEM — G47.33 OSA (OBSTRUCTIVE SLEEP APNEA): Status: ACTIVE | Noted: 2020-05-11

## 2020-06-16 DIAGNOSIS — G47.30 SLEEP APNEA, UNSPECIFIED TYPE: Primary | ICD-10-CM

## 2020-06-18 ENCOUNTER — TELEPHONE (OUTPATIENT)
Dept: SLEEP CENTER | Facility: CLINIC | Age: 45
End: 2020-06-18

## 2020-06-23 ENCOUNTER — TELEPHONE (OUTPATIENT)
Dept: NEUROLOGY | Facility: CLINIC | Age: 45
End: 2020-06-23

## 2020-07-06 DIAGNOSIS — J30.2 SEASONAL ALLERGIES: ICD-10-CM

## 2020-07-06 DIAGNOSIS — K21.9 GASTROESOPHAGEAL REFLUX DISEASE, ESOPHAGITIS PRESENCE NOT SPECIFIED: Primary | ICD-10-CM

## 2020-07-06 DIAGNOSIS — E03.9 ACQUIRED HYPOTHYROIDISM: ICD-10-CM

## 2020-07-06 DIAGNOSIS — G43.009 MIGRAINE WITHOUT AURA AND WITHOUT STATUS MIGRAINOSUS, NOT INTRACTABLE: ICD-10-CM

## 2020-07-06 RX ORDER — TOPIRAMATE 100 MG/1
100 TABLET, FILM COATED ORAL 2 TIMES DAILY
Qty: 60 TABLET | Refills: 2 | Status: SHIPPED | OUTPATIENT
Start: 2020-07-06 | End: 2020-08-17 | Stop reason: SDUPTHER

## 2020-07-06 NOTE — TELEPHONE ENCOUNTER
[facility], omeprazole, albuterol and certrizine  I do not see omeprazole on pt's current medication list and other meds were previously sent to [address] [place] [address], including 6  Month supply of certrizine which pt should not need refill for until September 2020  LM on pt's VM requesting she call back and confirm she would like refills sent to [facility] and to advise her she has several refills left @ [address] [place] [address] Pharmacy for certrizine

## 2020-07-06 NOTE — TELEPHONE ENCOUNTER
[facility] called and states that pt switched pharmacy  Requesting topiramate refill be sent to [facility]  Rx entered   Pls review and sign off      thanks

## 2020-07-08 NOTE — TELEPHONE ENCOUNTER
[facility] [address] and spoke with pharmacist who advised me famotidine is on back order and there is a shortage so pt needs alternate therapy  Please advise on what can be sent to replace famotidine for the time being, thanks so much

## 2020-07-09 RX ORDER — CETIRIZINE HYDROCHLORIDE 10 MG/1
10 TABLET ORAL DAILY
Qty: 30 TABLET | Refills: 0 | Status: SHIPPED | OUTPATIENT
Start: 2020-07-09 | End: 2020-08-03

## 2020-07-09 RX ORDER — LEVOTHYROXINE SODIUM 0.1 MG/1
100 TABLET ORAL
Qty: 30 TABLET | Refills: 0 | Status: SHIPPED | OUTPATIENT
Start: 2020-07-09 | End: 2020-08-03

## 2020-07-09 RX ORDER — BUSPIRONE HYDROCHLORIDE 5 MG/1
5 TABLET ORAL 2 TIMES DAILY
Refills: 1 | OUTPATIENT
Start: 2020-07-09

## 2020-07-09 RX ORDER — OMEPRAZOLE 20 MG/1
20 CAPSULE, DELAYED RELEASE ORAL DAILY
Qty: 30 CAPSULE | Refills: 0 | Status: SHIPPED | OUTPATIENT
Start: 2020-07-09 | End: 2020-08-03

## 2020-07-09 RX ORDER — LEVOTHYROXINE SODIUM 0.1 MG/1
100 TABLET ORAL
Qty: 30 TABLET | Refills: 0 | OUTPATIENT
Start: 2020-07-09

## 2020-07-09 RX ORDER — ALBUTEROL SULFATE 90 UG/1
2 AEROSOL, METERED RESPIRATORY (INHALATION) EVERY 4 HOURS PRN
Qty: 1 INHALER | Refills: 2 | OUTPATIENT
Start: 2020-07-09

## 2020-07-09 RX ORDER — ATOMOXETINE 40 MG/1
40 CAPSULE ORAL DAILY
OUTPATIENT
Start: 2020-07-09

## 2020-07-09 RX ORDER — ALBUTEROL SULFATE 90 UG/1
2 AEROSOL, METERED RESPIRATORY (INHALATION) EVERY 4 HOURS PRN
Qty: 1 INHALER | Refills: 0 | Status: SHIPPED | OUTPATIENT
Start: 2020-07-09 | End: 2020-08-17

## 2020-07-09 RX ORDER — CLONAZEPAM 0.5 MG/1
TABLET ORAL
OUTPATIENT
Start: 2020-07-09

## 2020-07-09 NOTE — TELEPHONE ENCOUNTER
Spoke with Niesha Fox / Pharmacist @ [facility] and advised her of the meds Dr Musa Ng sent/approved and the ones that she did not approve would have to be requested by pt's psychiatrist   I advised her they haven't been filled in a while so I did not have info as to phone # of psychiatrist but pt should provide them with that info

## 2020-07-16 ENCOUNTER — TELEPHONE (OUTPATIENT)
Dept: FAMILY MEDICINE CLINIC | Facility: CLINIC | Age: 45
End: 2020-07-16

## 2020-07-16 DIAGNOSIS — G43.811 OTHER MIGRAINE WITH STATUS MIGRAINOSUS, INTRACTABLE: Primary | ICD-10-CM

## 2020-07-16 NOTE — TELEPHONE ENCOUNTER
Carola Emery contacted office requesting ambulatory physicians order for neurology       Dx [id number] [phone] [phone]

## 2020-08-03 DIAGNOSIS — J30.2 SEASONAL ALLERGIES: ICD-10-CM

## 2020-08-03 DIAGNOSIS — E03.9 ACQUIRED HYPOTHYROIDISM: ICD-10-CM

## 2020-08-03 DIAGNOSIS — K21.9 GASTROESOPHAGEAL REFLUX DISEASE, ESOPHAGITIS PRESENCE NOT SPECIFIED: ICD-10-CM

## 2020-08-03 RX ORDER — CETIRIZINE HYDROCHLORIDE 10 MG/1
10 TABLET ORAL DAILY
Qty: 30 TABLET | Refills: 0 | Status: SHIPPED | OUTPATIENT
Start: 2020-08-03 | End: 2020-08-17 | Stop reason: SDUPTHER

## 2020-08-03 RX ORDER — OMEPRAZOLE 20 MG/1
20 CAPSULE, DELAYED RELEASE ORAL DAILY
Qty: 30 CAPSULE | Refills: 0 | Status: SHIPPED | OUTPATIENT
Start: 2020-08-03 | End: 2020-08-17 | Stop reason: SDUPTHER

## 2020-08-03 RX ORDER — LEVOTHYROXINE SODIUM 0.1 MG/1
100 TABLET ORAL
Qty: 30 TABLET | Refills: 0 | Status: SHIPPED | OUTPATIENT
Start: 2020-08-03 | End: 2020-08-17 | Stop reason: SDUPTHER

## 2020-08-03 NOTE — TELEPHONE ENCOUNTER
Reviewed chart  Patient needs follow up appointment  Filled meds and asked staff to schedule follow up appointment

## 2020-08-15 DIAGNOSIS — J30.2 SEASONAL ALLERGIES: ICD-10-CM

## 2020-08-17 ENCOUNTER — OFFICE VISIT (OUTPATIENT)
Dept: FAMILY MEDICINE CLINIC | Facility: CLINIC | Age: 45
End: 2020-08-17

## 2020-08-17 VITALS
BODY MASS INDEX: 41.09 KG/M2 | OXYGEN SATURATION: 99 % | DIASTOLIC BLOOD PRESSURE: 80 MMHG | RESPIRATION RATE: 18 BRPM | SYSTOLIC BLOOD PRESSURE: 128 MMHG | HEART RATE: 83 BPM | TEMPERATURE: 96.6 F | WEIGHT: 231.9 LBS | HEIGHT: 63 IN

## 2020-08-17 DIAGNOSIS — J45.20 MILD INTERMITTENT ASTHMA WITHOUT COMPLICATION: ICD-10-CM

## 2020-08-17 DIAGNOSIS — N95.1 PERIMENOPAUSE: ICD-10-CM

## 2020-08-17 DIAGNOSIS — J30.2 SEASONAL ALLERGIES: ICD-10-CM

## 2020-08-17 DIAGNOSIS — K21.9 GASTROESOPHAGEAL REFLUX DISEASE, ESOPHAGITIS PRESENCE NOT SPECIFIED: ICD-10-CM

## 2020-08-17 DIAGNOSIS — M54.31 SCIATICA OF RIGHT SIDE: Primary | ICD-10-CM

## 2020-08-17 DIAGNOSIS — G43.009 MIGRAINE WITHOUT AURA AND WITHOUT STATUS MIGRAINOSUS, NOT INTRACTABLE: ICD-10-CM

## 2020-08-17 DIAGNOSIS — M94.0 COSTOCHONDRITIS, ACUTE: ICD-10-CM

## 2020-08-17 DIAGNOSIS — E03.9 ACQUIRED HYPOTHYROIDISM: ICD-10-CM

## 2020-08-17 PROCEDURE — 3079F DIAST BP 80-89 MM HG: CPT | Performed by: FAMILY MEDICINE

## 2020-08-17 PROCEDURE — 99213 OFFICE O/P EST LOW 20 MIN: CPT | Performed by: FAMILY MEDICINE

## 2020-08-17 PROCEDURE — 3008F BODY MASS INDEX DOCD: CPT | Performed by: FAMILY MEDICINE

## 2020-08-17 PROCEDURE — 1036F TOBACCO NON-USER: CPT | Performed by: FAMILY MEDICINE

## 2020-08-17 PROCEDURE — 3074F SYST BP LT 130 MM HG: CPT | Performed by: FAMILY MEDICINE

## 2020-08-17 RX ORDER — ALBUTEROL SULFATE 90 UG/1
2 AEROSOL, METERED RESPIRATORY (INHALATION) EVERY 4 HOURS PRN
Qty: 18 G | Refills: 2 | Status: SHIPPED | OUTPATIENT
Start: 2020-08-17 | End: 2021-06-10 | Stop reason: SDUPTHER

## 2020-08-17 RX ORDER — OMEPRAZOLE 20 MG/1
20 CAPSULE, DELAYED RELEASE ORAL DAILY
Qty: 30 CAPSULE | Refills: 0 | Status: SHIPPED | OUTPATIENT
Start: 2020-08-17 | End: 2020-09-21 | Stop reason: SDUPTHER

## 2020-08-17 RX ORDER — IBUPROFEN 600 MG/1
600 TABLET ORAL EVERY 8 HOURS PRN
Qty: 30 TABLET | Refills: 0 | Status: SHIPPED | OUTPATIENT
Start: 2020-08-17 | End: 2020-11-19 | Stop reason: SDUPTHER

## 2020-08-17 RX ORDER — CETIRIZINE HYDROCHLORIDE 10 MG/1
10 TABLET ORAL DAILY
Qty: 30 TABLET | Refills: 0 | Status: SHIPPED | OUTPATIENT
Start: 2020-08-17 | End: 2021-03-12

## 2020-08-17 RX ORDER — TOPIRAMATE 100 MG/1
100 TABLET, FILM COATED ORAL 2 TIMES DAILY
Qty: 60 TABLET | Refills: 2 | Status: SHIPPED | OUTPATIENT
Start: 2020-08-17 | End: 2020-09-28 | Stop reason: SDUPTHER

## 2020-08-17 RX ORDER — ALBUTEROL SULFATE 90 UG/1
2 AEROSOL, METERED RESPIRATORY (INHALATION) EVERY 4 HOURS PRN
Qty: 18 G | Refills: 2 | Status: SHIPPED | OUTPATIENT
Start: 2020-08-17 | End: 2020-08-17 | Stop reason: SDUPTHER

## 2020-08-17 RX ORDER — LEVOTHYROXINE SODIUM 0.1 MG/1
100 TABLET ORAL
Qty: 30 TABLET | Refills: 0 | Status: SHIPPED | OUTPATIENT
Start: 2020-08-17 | End: 2020-09-21 | Stop reason: SDUPTHER

## 2020-08-17 RX ORDER — VENLAFAXINE HYDROCHLORIDE 37.5 MG/1
37.5 CAPSULE, EXTENDED RELEASE ORAL
Qty: 30 CAPSULE | Refills: 1 | Status: SHIPPED | OUTPATIENT
Start: 2020-08-17 | End: 2022-02-01

## 2020-08-17 NOTE — PATIENT INSTRUCTIONS
Menopause   WHAT YOU NEED TO KNOW:   What is menopause? Menopause is a normal stage in a woman's life when her monthly periods stop  Menopause starts when the ovaries slowly stop making the female hormones estrogen and progesterone  After menopause, a woman is no longer able to become pregnant  A woman who has not had a period for a full year after the age of 39 is considered to be in menopause  Perimenopause is a stage before menopause that may cause signs and symptoms similar to menopause  Perimenopause can last an average of 4 to 5 years  What are the signs and symptoms of menopause? The signs and symptoms of menopause can be different from woman to woman:  · Menstrual period changes such as skipped periods or periods that are closer together, or lighter or heavier than usual    · Hot flashes (feeling warm, flushed, and sweaty)     · Mood changes such as irritability or decreased desire to have sex    · Breast changes such as tenderness or pain    · Hair changes such as thinning hair or increased hair on your face    · Vaginal changes such as increased dryness     · Urinary changes such as increased urinary tract infections (UTIs) or urgency (feeling that you need to urinate right away)    · Other symptoms such as headaches, trouble sleeping, fatigue, or heart palpitations (strong, fast heartbeats)  What do I need to know about menopause? · You can still get pregnant while you have periods  Continue to use birth control if you do not want to get pregnant  You may need to use birth control until it has been 1 year since your periods stopped  Ask your healthcare provider when you can stop using birth control to prevent pregnancy  · Hormone replacement therapy can be used to treat symptoms of menopause  Hormone replacement therapy (HRT) is medicine that replaces your low hormone levels  HRT contains estrogen and sometimes progestin  HRT has benefits and risks   HRT decreases your risk for bone fractures by helping to prevent osteoporosis  HRT also protects you from colon cancer  HRT may increase your risk for breast cancer, blood clots, heart disease, and stroke  Ask your healthcare provider if HRT is right for you  How can I live a healthy lifestyle during and after menopause? After menopause, your risk for heart disease and bone loss increases  Ask about these and other ways to stay healthy:  · Exercise regularly  Exercise helps you maintain a healthy weight  Exercise can also help to control your blood pressure and cholesterol levels  Include weight-bearing exercise for strong bones  Ask your healthcare provider about the best exercise plan for you  · Eat a variety of healthy foods  Include fruits, vegetables, whole grains (whole-wheat bread, pasta, and cereals), low-fat dairy, and lean protein foods (beans, poultry, and fish)  Limit foods high in sodium (salt)  Ask your healthcare provider for more information about a meal plan that is right for you  · Maintain a healthy weight  Check with your healthcare provider before you start any weight loss program      · Take supplements as directed  You may need extra calcium and vitamin D to help prevent osteoporosis  · Limit alcohol and caffeine  Alcohol and caffeine may worsen your symptoms  · Do not smoke  If you smoke, it is never too late to quit  You are more likely to have a heart attack, lung disease, blood clots, and cancer if you smoke  Ask your healthcare provider for information if you need help quitting  When should I contact my healthcare provider? · You have vaginal bleeding after menopause  · You have questions or concerns about your condition or care  CARE AGREEMENT:   You have the right to help plan your care  Learn about your health condition and how it may be treated  Discuss treatment options with your caregivers to decide what care you want to receive  You always have the right to refuse treatment   The above information is an  only  It is not intended as medical advice for individual conditions or treatments  Talk to your doctor, nurse or pharmacist before following any medical regimen to see if it is safe and effective for you  © 2017 [address] [employer] Information is for End User's use only and may not be sold, redistributed or otherwise used for commercial purposes  All illustrations and images included in CareNotes® are the copyrighted property of [employer]  or [employer]

## 2020-08-17 NOTE — PROGRESS NOTES
Assessment/Plan:    Sciatica of right side  Low back pain with chronic numbness/tingling in the right foot  S/p EMG study (2018) which showed no electrodiagnostic evidence of a right lumbosacral plexopathy or lumbar radiculopathy  Gabapentin and cymbalta have been minimally effective for pain relief  Will refer to physical therapy for additional management  Perimenopause  Vasomotor symptoms and age suggestive of perimenopause  Patient is amenorrheic due to history of endometrial ablation procedure  Unsure of what age her mother experienced menopause  Discussed options for symptomatic treatment including lifestyle changes: wearing lighter clothing, fan/AC at night to keep her cool, avoiding caffeine/alcohol  Also discussed medication management with venlafaxine if symptoms become particularly bothersome  Patient elected to try venlafaxine  Will discontinue cymbalta, which she was taking for sciatica pain  Mild intermittent asthma without complication  Currently managed with albuterol prn  Short acting beta agonist use for symptom control 1-2 times per week  Symptoms are currently controlled at this time  Avoid exposure to tobacco smoke, polluted air and other known asthma triggers  Monitor albuterol use to report back at follow up  Patient aware that increased albuterol use indicates poor control and may need further medication adjustment  Return in about 3 months (around 11/17/2020) for Next scheduled follow up asthma, perimenopause  Patient Instructions   Menopause   WHAT YOU NEED TO KNOW:   What is menopause? Menopause is a normal stage in a woman's life when her monthly periods stop  Menopause starts when the ovaries slowly stop making the female hormones estrogen and progesterone  After menopause, a woman is no longer able to become pregnant  A woman who has not had a period for a full year after the age of 39 is considered to be in menopause   Perimenopause is a stage before menopause that may cause signs and symptoms similar to menopause  Perimenopause can last an average of 4 to 5 years  What are the signs and symptoms of menopause? The signs and symptoms of menopause can be different from woman to woman:  · Menstrual period changes such as skipped periods or periods that are closer together, or lighter or heavier than usual    · Hot flashes (feeling warm, flushed, and sweaty)     · Mood changes such as irritability or decreased desire to have sex    · Breast changes such as tenderness or pain    · Hair changes such as thinning hair or increased hair on your face    · Vaginal changes such as increased dryness     · Urinary changes such as increased urinary tract infections (UTIs) or urgency (feeling that you need to urinate right away)    · Other symptoms such as headaches, trouble sleeping, fatigue, or heart palpitations (strong, fast heartbeats)  What do I need to know about menopause? · You can still get pregnant while you have periods  Continue to use birth control if you do not want to get pregnant  You may need to use birth control until it has been 1 year since your periods stopped  Ask your healthcare provider when you can stop using birth control to prevent pregnancy  · Hormone replacement therapy can be used to treat symptoms of menopause  Hormone replacement therapy (HRT) is medicine that replaces your low hormone levels  HRT contains estrogen and sometimes progestin  HRT has benefits and risks  HRT decreases your risk for bone fractures by helping to prevent osteoporosis  HRT also protects you from colon cancer  HRT may increase your risk for breast cancer, blood clots, heart disease, and stroke  Ask your healthcare provider if HRT is right for you  How can I live a healthy lifestyle during and after menopause? After menopause, your risk for heart disease and bone loss increases  Ask about these and other ways to stay healthy:  · Exercise regularly    Exercise helps you maintain a healthy weight  Exercise can also help to control your blood pressure and cholesterol levels  Include weight-bearing exercise for strong bones  Ask your healthcare provider about the best exercise plan for you  · Eat a variety of healthy foods  Include fruits, vegetables, whole grains (whole-wheat bread, pasta, and cereals), low-fat dairy, and lean protein foods (beans, poultry, and fish)  Limit foods high in sodium (salt)  Ask your healthcare provider for more information about a meal plan that is right for you  · Maintain a healthy weight  Check with your healthcare provider before you start any weight loss program      · Take supplements as directed  You may need extra calcium and vitamin D to help prevent osteoporosis  · Limit alcohol and caffeine  Alcohol and caffeine may worsen your symptoms  · Do not smoke  If you smoke, it is never too late to quit  You are more likely to have a heart attack, lung disease, blood clots, and cancer if you smoke  Ask your healthcare provider for information if you need help quitting  When should I contact my healthcare provider? · You have vaginal bleeding after menopause  · You have questions or concerns about your condition or care  CARE AGREEMENT:   You have the right to help plan your care  Learn about your health condition and how it may be treated  Discuss treatment options with your caregivers to decide what care you want to receive  You always have the right to refuse treatment  The above information is an  only  It is not intended as medical advice for individual conditions or treatments  Talk to your doctor, nurse or pharmacist before following any medical regimen to see if it is safe and effective for you  © 2017 [address] Information is for End User's use only and may not be sold, redistributed or otherwise used for commercial purposes   All illustrations and images included in CareNotes® are the copyrighted property of [employer] , [employer]  or [employer]  Diagnoses and all orders for this visit:    Sciatica of right side  -     Ambulatory referral to Physical Therapy; Future    Perimenopause  -     venlafaxine (EFFEXOR-XR) 37 5 mg 24 hr capsule; Take 1 capsule (37 5 mg total) by mouth daily with breakfast    Acquired hypothyroidism  Comments:  TSH is 8 7  Will adjust meds to 100mcg of levothyroxine  Will repeat TSH in 4 weeks  Orders:  -     levothyroxine 100 mcg tablet; Take 1 tablet (100 mcg total) by mouth daily in the early morning    Migraine without aura and without status migrainosus, not intractable  -     topiramate (TOPAMAX) 100 mg tablet; Take 1 tablet (100 mg total) by mouth 2 (two) times a day    Gastroesophageal reflux disease, esophagitis presence not specified  -     omeprazole (PriLOSEC) 20 mg delayed release capsule; Take 1 capsule (20 mg total) by mouth daily    Costochondritis, acute  -     ibuprofen (MOTRIN) 600 mg tablet; Take 1 tablet (600 mg total) by mouth every 8 (eight) hours as needed for mild pain or fever    Seasonal allergies  -     cetirizine (ZyrTEC) 10 mg tablet; Take 1 tablet (10 mg total) by mouth daily  -     albuterol (PROVENTIL HFA,VENTOLIN HFA) 90 mcg/act inhaler; Inhale 2 puffs every 4 (four) hours as needed for wheezing    Seasonal allergies  Comments: Will renew meds for seasonal allergies and asthma  Orders:  -     cetirizine (ZyrTEC) 10 mg tablet; Take 1 tablet (10 mg total) by mouth daily  -     albuterol (PROVENTIL HFA,VENTOLIN HFA) 90 mcg/act inhaler; Inhale 2 puffs every 4 (four) hours as needed for wheezing    Mild intermittent asthma without complication    Other orders  -     Cancel: Mammo screening bilateral w cad;  Future          Subjective:     Henok Chao is a 39 y o  female who  has a past medical history of Anxiety, Asthma, Bipolar disorder ([address] ), Depression, Disease of thyroid gland, Endometriosis, Psychiatric illness, Psychosis ([facility] ), PTSD (post-traumatic stress disorder), Right carpal tunnel syndrome, Self-injurious behavior, and Suicide attempt ([facility] )  She also has no past medical history of ADHD (attention deficit hyperactivity disorder), Alcohol abuse, Alcoholism ([facility] ), Anorexia nervosa, Autism spectrum disorder, Borderline personality disorder ([facility] ), Bulimia nervosa, Cancer ([facility] ), Chronic pain disorder, Head injury, HIV disease ([facility] ), Liver disease, Obsessive-compulsive disorder, Oppositional defiant disorder, Panic disorder, Schizoaffective disorder ([facility] ), Seizures ([facility] ), Violence, history of, Withdrawal symptoms, alcohol ([place] [address] ), or Withdrawal symptoms, drug or narcotic ([place] [address] )  who presented to the office today for low back pain and hot flashes  HPI    Her asthma is managed with albuterol prn  Previously prescribed flovent, but she has not been taking it  Her most symptomatic season is spring  She uses her inhaler on average 1-2 times per week  Low back pain with chronic numbness/tingling in the right foot  S/p EMG study (2018) which showed no electrodiagnostic evidence of a right lumbosacral plexopathy or lumbar radiculopathy  Gabapentin and cymbalta have been minimally effective for pain relief  Also complaining of hot flashes  These are particularly bothersome at night  Denies weight loss, fever, cough, myalgias  Menstruation stopped after an endometrial ablation years ago  She is not sure when her mother went through menopause  She denies any chest pain, SOB, palpitations, headaches, or any other symptoms at this time  Review of Systems   Constitutional: Negative for fatigue and fever  Hot flashes   Respiratory: Negative for cough and shortness of breath  Cardiovascular: Negative for chest pain and palpitations  Gastrointestinal: Negative for abdominal pain  Genitourinary: Negative for vaginal bleeding  Musculoskeletal: Positive for back pain  Negative for myalgias     Skin: Negative for rash    Neurological: Negative for dizziness and weakness  Objective:    /80 (BP Location: Left arm, Patient Position: Sitting, Cuff Size: Large)   Pulse 83   Temp (!) 96 6 °F (35 9 °C) (Temporal)   Resp 18   Ht 5' 2 5" (1 588 m)   Wt 105 kg (231 lb 14 4 oz)   SpO2 99%   BMI 41 74 kg/m²     Physical Exam  Constitutional:       General: She is not in acute distress  Appearance: She is well-developed  HENT:      Head: Normocephalic and atraumatic  Eyes:      General: No scleral icterus  Conjunctiva/sclera: Conjunctivae normal    Neck:      Musculoskeletal: Normal range of motion and neck supple  Trachea: No tracheal deviation  Cardiovascular:      Rate and Rhythm: Normal rate and regular rhythm  Heart sounds: Normal heart sounds  No murmur  No friction rub  Pulmonary:      Effort: Pulmonary effort is normal  No respiratory distress  Breath sounds: Normal breath sounds  No wheezing  Musculoskeletal:         General: No deformity  Lymphadenopathy:      Cervical: No cervical adenopathy  Skin:     General: Skin is warm and dry  Findings: No rash  Neurological:      Mental Status: She is alert and oriented to person, place, and time           Lynnea Schwab, MD  08/18/20  2:11 PM

## 2020-08-18 PROBLEM — N95.1 PERIMENOPAUSE: Status: ACTIVE | Noted: 2020-08-18

## 2020-08-18 PROBLEM — M54.31 SCIATICA OF RIGHT SIDE: Status: ACTIVE | Noted: 2020-08-18

## 2020-08-18 NOTE — ASSESSMENT & PLAN NOTE
Vasomotor symptoms and age suggestive of perimenopause  Patient is amenorrheic due to history of endometrial ablation procedure  Unsure of what age her mother experienced menopause  Discussed options for symptomatic treatment including lifestyle changes: wearing lighter clothing, fan/AC at night to keep her cool, avoiding caffeine/alcohol  Also discussed medication management with venlafaxine if symptoms become particularly bothersome  Patient elected to try venlafaxine  Will discontinue cymbalta, which she was taking for sciatica pain

## 2020-08-18 NOTE — ASSESSMENT & PLAN NOTE
Low back pain with chronic numbness/tingling in the right foot  S/p EMG study (2018) which showed no electrodiagnostic evidence of a right lumbosacral plexopathy or lumbar radiculopathy  Gabapentin and cymbalta have been minimally effective for pain relief  Will refer to physical therapy for additional management

## 2020-09-21 DIAGNOSIS — K21.9 GASTROESOPHAGEAL REFLUX DISEASE, ESOPHAGITIS PRESENCE NOT SPECIFIED: ICD-10-CM

## 2020-09-21 DIAGNOSIS — E03.9 ACQUIRED HYPOTHYROIDISM: ICD-10-CM

## 2020-09-21 RX ORDER — OMEPRAZOLE 20 MG/1
20 CAPSULE, DELAYED RELEASE ORAL DAILY
Qty: 30 CAPSULE | Refills: 0 | Status: SHIPPED | OUTPATIENT
Start: 2020-09-21 | End: 2020-10-22

## 2020-09-21 RX ORDER — LEVOTHYROXINE SODIUM 0.1 MG/1
100 TABLET ORAL
Qty: 30 TABLET | Refills: 0 | Status: SHIPPED | OUTPATIENT
Start: 2020-09-21 | End: 2020-10-22

## 2020-09-28 ENCOUNTER — OFFICE VISIT (OUTPATIENT)
Dept: NEUROLOGY | Facility: CLINIC | Age: 45
End: 2020-09-28
Payer: COMMERCIAL

## 2020-09-28 VITALS
BODY MASS INDEX: 42.69 KG/M2 | DIASTOLIC BLOOD PRESSURE: 80 MMHG | WEIGHT: 232 LBS | TEMPERATURE: 98.5 F | HEIGHT: 62 IN | SYSTOLIC BLOOD PRESSURE: 143 MMHG | HEART RATE: 77 BPM | RESPIRATION RATE: 18 BRPM

## 2020-09-28 DIAGNOSIS — G43.811 OTHER MIGRAINE WITH STATUS MIGRAINOSUS, INTRACTABLE: ICD-10-CM

## 2020-09-28 DIAGNOSIS — G43.009 MIGRAINE WITHOUT AURA AND WITHOUT STATUS MIGRAINOSUS, NOT INTRACTABLE: ICD-10-CM

## 2020-09-28 PROCEDURE — 1036F TOBACCO NON-USER: CPT | Performed by: PSYCHIATRY & NEUROLOGY

## 2020-09-28 PROCEDURE — 3077F SYST BP >= 140 MM HG: CPT | Performed by: PSYCHIATRY & NEUROLOGY

## 2020-09-28 PROCEDURE — 3079F DIAST BP 80-89 MM HG: CPT | Performed by: PSYCHIATRY & NEUROLOGY

## 2020-09-28 PROCEDURE — 99212 OFFICE O/P EST SF 10 MIN: CPT | Performed by: PSYCHIATRY & NEUROLOGY

## 2020-09-28 RX ORDER — TOPIRAMATE 100 MG/1
100 TABLET, FILM COATED ORAL 2 TIMES DAILY
Qty: 60 TABLET | Refills: 5 | Status: SHIPPED | OUTPATIENT
Start: 2020-09-28 | End: 2021-07-22 | Stop reason: SDUPTHER

## 2020-09-28 NOTE — LETTER
September 28, 2020     MD John Marie [address]    Patient: Kristie Osborne   YOB: 1975   Date of Visit: 9/28/2020       Dear Dr Sandra Yadav: Thank you for referring Kristie Osborne to me for evaluation  Below are my notes for this consultation  If you have questions, please do not hesitate to call me  I look forward to following your patient along with you  Sincerely,        Bobby Marshall MD        CC: MD Bobby Wise MD  9/28/2020  3:09 PM  Sign when Signing Visit  Patient ID: Kristie Osborne is a 39 y o  female  Assessment/Plan:    Migraine without aura and without status migrainosus, not intractable  She remains very pleased with her migraine control on her current topiramate schedule, consisting of 100 mg twice daily  Describes only a very rare migraine event  Expressing no symptoms to suggest any adverse side effects from the topiramate  Again has been seen by Ophthalmology does not feel there is any problem with the continued use of topiramate here  --with her chronically on topiramate, check CBC, CMP and topiramate level  --continue topiramate 100 mg twice daily  She will follow up in 6 months or as needed  Subjective:    HPI  Patient, 39years of age, continues her ongoing follow-up with Neurology given her diagnosed migraine without aura  She has done very well in the interval time since last seen  She has had only a very, very rare migraine  She continues on her topiramate 100 mg twice daily and offers no symptoms that would suggest any adverse side effects  She has followed with Ophthalmology and Ophthalmology does not feel there is any problem using the topiramate here  Has not had recent blood work performed      Past Medical History:   Diagnosis Date    Anxiety     Asthma     B[facility] )     Depression     Disease of thyroid gland     Endometriosis     Psychiatric illness     [facility] )     PTSD (post-traumatic stress disorder) 6/6/2017    Right carpal tunnel syndrome 12/26/2018    Self-injurious behavior     Suicide attempt [facility])      Past Surgical History:   Procedure Laterality Date    ABDOMINAL SURGERY      HERNIA REPAIR       Social History     Socioeconomic History    Marital status: Single     Spouse name: None    Number of children: None    Years of education: None    Highest education level: None   Occupational History    None   Social Needs    Financial resource strain: None    Food insecurity     Worry: None     Inability: None    Transportation needs     Medical: None     Non-medical: None   Tobacco Use    Smoking status: Former Smoker     Packs/day: 0 25     Types: Cigarettes    Smokeless tobacco: Never Used   Substance and Sexual Activity    Alcohol use: No    Drug use: No    Sexual activity: None   Lifestyle    Physical activity     Days per week: None     Minutes per session: None    Stress: None   Relationships    Social connections     Talks on phone: None     Gets together: None     Attends [employer] service: None     Active member of club or organization: None     Attends meetings of clubs or organizations: None     Relationship status: None    Intimate partner violence     Fear of current or ex partner: None     Emotionally abused: None     Physically abused: None     Forced sexual activity: None   Other Topics Concern    None   Social History Narrative    Flu shot:no     Family History   Problem Relation Age of Onset    Hypertension Mother     Asthma Mother     Fibromyalgia Mother      Allergies   Allergen Reactions    Aleve [Naproxen] Shortness Of Breath    Benzonatate Swelling     Throat closing    Guaifenesin Other (See Comments)     Note - patient states she IS able to take robitussin      Latuda [Lurasidone] Swelling      stuttering     Imitrex [Sumatriptan]      Patient reports that this medication "makes me sick"  Current Outpatient Medications:     albuterol (PROVENTIL HFA,VENTOLIN HFA) 90 mcg/act inhaler, Inhale 2 puffs every 4 (four) hours as needed for wheezing, Disp: 18 g, Rfl: 2    atoMOXetine (STRATTERA) 40 mg capsule, Take 40 mg by mouth daily, Disp: , Rfl:     busPIRone (BUSPAR) 5 mg tablet, Take 5 mg by mouth 2 (two) times a day, Disp: , Rfl: 1    cetirizine (ZyrTEC) 10 mg tablet, Take 1 tablet (10 mg total) by mouth daily, Disp: 30 tablet, Rfl: 0    clonazePAM (KlonoPIN) 0 5 mg tablet, , Disp: , Rfl:     fluticasone (FLONASE) 50 mcg/act nasal spray, 1 spray into each nostril daily, Disp: 1 Bottle, Rfl: 2    hydrOXYzine pamoate (VISTARIL) 50 mg capsule, Take 50 mg by mouth 2 (two) times a day as needed, Disp: , Rfl: 1    ibuprofen (MOTRIN) 600 mg tablet, Take 1 tablet (600 mg total) by mouth every 8 (eight) hours as needed for mild pain or fever, Disp: 30 tablet, Rfl: 0    levothyroxine 100 mcg tablet, Take 1 tablet (100 mcg total) by mouth daily in the early morning, Disp: 30 tablet, Rfl: 0    omeprazole (PriLOSEC) 20 mg delayed release capsule, Take 1 capsule (20 mg total) by mouth daily, Disp: 30 capsule, Rfl: 0    topiramate (TOPAMAX) 100 mg tablet, Take 1 tablet (100 mg total) by mouth 2 (two) times a day, Disp: 60 tablet, Rfl: 5    venlafaxine (EFFEXOR-XR) 37 5 mg 24 hr capsule, Take 1 capsule (37 5 mg total) by mouth daily with breakfast, Disp: 30 capsule, Rfl: 1    Objective:    Blood pressure 143/80, pulse 77, temperature 98 5 °F (36 9 °C), resp  rate 18, height 5' 2" (1 575 m), weight 105 kg (232 lb)  Physical Exam  BMI 42 43  Head normocephalic  Eyes nonicteric  No audible head or anterior neck bruits  Rhythm regular  Lungs clear to auscultation  GI (abdomen) soft nontender  Bowel sounds present  No significant lower extremity edema  Neurological Exam  Alert  Fully oriented  Pleasantly interactive  Spontaneous gait unremarkable    Romberg maneuver performed unremarkably  Cranial nerves 2-12 tested and grossly intact  Funduscopic examination with marginated discs bilaterally  Full symmetrical strength throughout the 4 extremities  Sensory testing grossly intact to pin throughout  Muscle stretch reflexes bilaterally 2 throughout the upper extremities, bilaterally 1+ at the knees and bilaterally 2 at the ankles  ROS:    Review of Systems   Constitutional: Negative  Negative for appetite change and fever  HENT: Negative  Negative for hearing loss, tinnitus, trouble swallowing and voice change  Eyes: Negative  Negative for photophobia and pain  Respiratory: Negative  Negative for shortness of breath  Cardiovascular: Negative  Negative for palpitations  Gastrointestinal: Negative  Negative for nausea and vomiting  Endocrine: Negative  Negative for cold intolerance  Genitourinary: Negative  Negative for dysuria, frequency and urgency  Musculoskeletal: Negative  Negative for myalgias and neck pain  Skin: Negative  Negative for rash  Allergic/Immunologic: Negative  Neurological: Positive for headaches  Negative for dizziness, tremors, seizures, syncope, facial asymmetry, speech difficulty, weakness, light-headedness and numbness  Hematological: Negative  Does not bruise/bleed easily  Psychiatric/Behavioral: Positive for sleep disturbance  Negative for confusion and hallucinations  The patient is nervous/anxious  I personally reviewed the ROS as entered by the medical assistant  *Please note this document was created using voice recognition software and may contain sound-alike word errors  *

## 2020-09-28 NOTE — ASSESSMENT & PLAN NOTE
She remains very pleased with her migraine control on her current topiramate schedule, consisting of 100 mg twice daily  Describes only a very rare migraine event  Expressing no symptoms to suggest any adverse side effects from the topiramate  Again has been seen by Ophthalmology does not feel there is any problem with the continued use of topiramate here  --with her chronically on topiramate, check CBC, CMP and topiramate level  --continue topiramate 100 mg twice daily

## 2020-09-28 NOTE — PROGRESS NOTES
Patient ID: Henok Chao is a 39 y o  female  Assessment/Plan:    Migraine without aura and without status migrainosus, not intractable  She remains very pleased with her migraine control on her current topiramate schedule, consisting of 100 mg twice daily  Describes only a very rare migraine event  Expressing no symptoms to suggest any adverse side effects from the topiramate  Again has been seen by Ophthalmology does not feel there is any problem with the continued use of topiramate here  --with her chronically on topiramate, check CBC, CMP and topiramate level  --continue topiramate 100 mg twice daily  She will follow up in 6 months or as needed  Subjective:    HPI  Patient, 39years of age, continues her ongoing follow-up with Neurology given her diagnosed migraine without aura  She has done very well in the interval time since last seen  She has had only a very, very rare migraine  She continues on her topiramate 100 mg twice daily and offers no symptoms that would suggest any adverse side effects  She has followed with Ophthalmology and Ophthalmology does not feel there is any problem using the topiramate here  Has not had recent blood work performed      Past Medical History:   Diagnosis Date    Anxiety     Asthma     [facility] disorder [facility]     Depression     Disease of thyroid gland     Endometriosis     Psychiatric illness     Psychosis ([facility] )     PTSD (post-traumatic stress disorder) 6/6/2017    Right carpal tunnel syndrome 12/26/2018    Self-injurious behavior     Suicide attempt [facility])      Past Surgical History:   Procedure Laterality Date    ABDOMINAL SURGERY      HERNIA REPAIR       Social History     Socioeconomic History    Marital status: Single     Spouse name: None    Number of children: None    Years of education: None    Highest education level: None   Occupational History    None   Social Needs    Financial resource strain: None    Food insecurity Worry: None     Inability: None    Transportation needs     Medical: None     Non-medical: None   Tobacco Use    Smoking status: Former Smoker     Packs/day: 0 25     Types: Cigarettes    Smokeless tobacco: Never Used   Substance and Sexual Activity    Alcohol use: No    Drug use: No    Sexual activity: None   Lifestyle    Physical activity     Days per week: None     Minutes per session: None    Stress: None   Relationships    Social connections     Talks on phone: None     Gets together: None     Attends L[employer] day service: None     Active member of club or organization: None     Attends meetings of clubs or organizations: None     Relationship status: None    Intimate partner violence     Fear of current or ex partner: None     Emotionally abused: None     Physically abused: None     Forced sexual activity: None   Other Topics Concern    None   Social History Narrative    Flu shot:no     Family History   Problem Relation Age of Onset    Hypertension Mother     Asthma Mother     Fibromyalgia Mother      Allergies   Allergen Reactions    Aleve [Naproxen] Shortness Of Breath    Benzonatate Swelling     Throat closing    Guaifenesin Other (See Comments)     Note - patient states she IS able to take robitussin   Latuda [Lurasidone] Swelling      stuttering     Imitrex [Sumatriptan]      Patient reports that this medication "makes me sick"         Current Outpatient Medications:     albuterol (PROVENTIL HFA,VENTOLIN HFA) 90 mcg/act inhaler, Inhale 2 puffs every 4 (four) hours as needed for wheezing, Disp: 18 g, Rfl: 2    atoMOXetine (STRATTERA) 40 mg capsule, Take 40 mg by mouth daily, Disp: , Rfl:     busPIRone (BUSPAR) 5 mg tablet, Take 5 mg by mouth 2 (two) times a day, Disp: , Rfl: 1    cetirizine (ZyrTEC) 10 mg tablet, Take 1 tablet (10 mg total) by mouth daily, Disp: 30 tablet, Rfl: 0    clonazePAM (KlonoPIN) 0 5 mg tablet, , Disp: , Rfl:     fluticasone (FLONASE) 50 mcg/act nasal spray, 1 spray into each nostril daily, Disp: 1 Bottle, Rfl: 2    hydrOXYzine pamoate (VISTARIL) 50 mg capsule, Take 50 mg by mouth 2 (two) times a day as needed, Disp: , Rfl: 1    ibuprofen (MOTRIN) 600 mg tablet, Take 1 tablet (600 mg total) by mouth every 8 (eight) hours as needed for mild pain or fever, Disp: 30 tablet, Rfl: 0    levothyroxine 100 mcg tablet, Take 1 tablet (100 mcg total) by mouth daily in the early morning, Disp: 30 tablet, Rfl: 0    omeprazole (PriLOSEC) 20 mg delayed release capsule, Take 1 capsule (20 mg total) by mouth daily, Disp: 30 capsule, Rfl: 0    topiramate (TOPAMAX) 100 mg tablet, Take 1 tablet (100 mg total) by mouth 2 (two) times a day, Disp: 60 tablet, Rfl: 5    venlafaxine (EFFEXOR-XR) 37 5 mg 24 hr capsule, Take 1 capsule (37 5 mg total) by mouth daily with breakfast, Disp: 30 capsule, Rfl: 1    Objective:    Blood pressure 143/80, pulse 77, temperature 98 5 °F (36 9 °C), resp  rate 18, height 5' 2" (1 575 m), weight 105 kg (232 lb)  Physical Exam  BMI 42 43  Head normocephalic  Eyes nonicteric  No audible head or anterior neck bruits  Rhythm regular  Lungs clear to auscultation  GI (abdomen) soft nontender  Bowel sounds present  No significant lower extremity edema  Neurological Exam  Alert  Fully oriented  Pleasantly interactive  Spontaneous gait unremarkable  Romberg maneuver performed unremarkably  Cranial nerves 2-12 tested and grossly intact  Funduscopic examination with marginated discs bilaterally  Full symmetrical strength throughout the 4 extremities  Sensory testing grossly intact to pin throughout  Muscle stretch reflexes bilaterally 2 throughout the upper extremities, bilaterally 1+ at the knees and bilaterally 2 at the ankles  ROS:    Review of Systems   Constitutional: Negative  Negative for appetite change and fever  HENT: Negative  Negative for hearing loss, tinnitus, trouble swallowing and voice change  Eyes: Negative    Negative for photophobia and pain  Respiratory: Negative  Negative for shortness of breath  Cardiovascular: Negative  Negative for palpitations  Gastrointestinal: Negative  Negative for nausea and vomiting  Endocrine: Negative  Negative for cold intolerance  Genitourinary: Negative  Negative for dysuria, frequency and urgency  Musculoskeletal: Negative  Negative for myalgias and neck pain  Skin: Negative  Negative for rash  Allergic/Immunologic: Negative  Neurological: Positive for headaches  Negative for dizziness, tremors, seizures, syncope, facial asymmetry, speech difficulty, weakness, light-headedness and numbness  Hematological: Negative  Does not bruise/bleed easily  Psychiatric/Behavioral: Positive for sleep disturbance  Negative for confusion and hallucinations  The patient is nervous/anxious  I personally reviewed the ROS as entered by the medical assistant  *Please note this document was created using voice recognition software and may contain sound-alike word errors  *

## 2020-10-22 DIAGNOSIS — E03.9 HYPOTHYROIDISM, UNSPECIFIED TYPE: Primary | ICD-10-CM

## 2020-10-22 DIAGNOSIS — E03.9 ACQUIRED HYPOTHYROIDISM: ICD-10-CM

## 2020-10-22 DIAGNOSIS — K21.9 GASTROESOPHAGEAL REFLUX DISEASE: ICD-10-CM

## 2020-10-22 RX ORDER — OMEPRAZOLE 20 MG/1
CAPSULE, DELAYED RELEASE ORAL
Qty: 30 CAPSULE | Refills: 0 | Status: SHIPPED | OUTPATIENT
Start: 2020-10-22 | End: 2020-11-25 | Stop reason: SDUPTHER

## 2020-10-22 RX ORDER — LEVOTHYROXINE SODIUM 0.1 MG/1
TABLET ORAL
Qty: 30 TABLET | Refills: 0 | Status: SHIPPED | OUTPATIENT
Start: 2020-10-22 | End: 2020-11-25 | Stop reason: SDUPTHER

## 2020-11-19 ENCOUNTER — OFFICE VISIT (OUTPATIENT)
Dept: FAMILY MEDICINE CLINIC | Facility: CLINIC | Age: 45
End: 2020-11-19

## 2020-11-19 VITALS
BODY MASS INDEX: 41.99 KG/M2 | HEIGHT: 62 IN | SYSTOLIC BLOOD PRESSURE: 128 MMHG | WEIGHT: 228.2 LBS | TEMPERATURE: 97.4 F | OXYGEN SATURATION: 98 % | DIASTOLIC BLOOD PRESSURE: 70 MMHG | RESPIRATION RATE: 18 BRPM | HEART RATE: 106 BPM

## 2020-11-19 DIAGNOSIS — N95.1 PERIMENOPAUSE: ICD-10-CM

## 2020-11-19 DIAGNOSIS — Z23 NEED FOR VACCINATION: ICD-10-CM

## 2020-11-19 DIAGNOSIS — G44.209 ACUTE NON INTRACTABLE TENSION-TYPE HEADACHE: Primary | ICD-10-CM

## 2020-11-19 DIAGNOSIS — K08.9 POOR DENTITION: ICD-10-CM

## 2020-11-19 DIAGNOSIS — K59.00 CONSTIPATION, UNSPECIFIED CONSTIPATION TYPE: ICD-10-CM

## 2020-11-19 PROCEDURE — 3078F DIAST BP <80 MM HG: CPT | Performed by: FAMILY MEDICINE

## 2020-11-19 PROCEDURE — 3074F SYST BP LT 130 MM HG: CPT | Performed by: FAMILY MEDICINE

## 2020-11-19 PROCEDURE — 99213 OFFICE O/P EST LOW 20 MIN: CPT | Performed by: FAMILY MEDICINE

## 2020-11-19 PROCEDURE — 1036F TOBACCO NON-USER: CPT | Performed by: FAMILY MEDICINE

## 2020-11-19 PROCEDURE — 3008F BODY MASS INDEX DOCD: CPT | Performed by: FAMILY MEDICINE

## 2020-11-19 RX ORDER — DIPHENHYDRAMINE HCL 25 MG
25 TABLET ORAL EVERY 6 HOURS PRN
Qty: 30 TABLET | Refills: 0 | Status: SHIPPED | OUTPATIENT
Start: 2020-11-19 | End: 2021-01-05 | Stop reason: SDUPTHER

## 2020-11-19 RX ORDER — ONDANSETRON 4 MG/1
4 TABLET, ORALLY DISINTEGRATING ORAL EVERY 6 HOURS PRN
Qty: 10 TABLET | Refills: 0 | Status: SHIPPED | OUTPATIENT
Start: 2020-11-19 | End: 2020-12-21

## 2020-11-19 RX ORDER — IBUPROFEN 600 MG/1
600 TABLET ORAL EVERY 8 HOURS PRN
Qty: 30 TABLET | Refills: 0 | Status: SHIPPED | OUTPATIENT
Start: 2020-11-19 | End: 2020-11-25 | Stop reason: SDUPTHER

## 2020-11-20 ENCOUNTER — TELEPHONE (OUTPATIENT)
Dept: FAMILY MEDICINE CLINIC | Facility: CLINIC | Age: 45
End: 2020-11-20

## 2020-11-20 PROBLEM — G44.209 ACUTE NON INTRACTABLE TENSION-TYPE HEADACHE: Status: ACTIVE | Noted: 2020-11-20

## 2020-11-20 PROBLEM — Z23 NEED FOR VACCINATION: Status: ACTIVE | Noted: 2020-11-20

## 2020-11-20 PROBLEM — K59.00 CONSTIPATION: Status: ACTIVE | Noted: 2020-11-20

## 2020-11-20 PROBLEM — K08.9 POOR DENTITION: Status: ACTIVE | Noted: 2020-11-20

## 2020-11-24 DIAGNOSIS — E03.9 ACQUIRED HYPOTHYROIDISM: Primary | ICD-10-CM

## 2020-11-25 DIAGNOSIS — E03.9 ACQUIRED HYPOTHYROIDISM: ICD-10-CM

## 2020-11-25 DIAGNOSIS — G44.209 ACUTE NON INTRACTABLE TENSION-TYPE HEADACHE: ICD-10-CM

## 2020-11-25 DIAGNOSIS — K21.9 GASTROESOPHAGEAL REFLUX DISEASE: ICD-10-CM

## 2020-11-27 RX ORDER — OMEPRAZOLE 20 MG/1
20 CAPSULE, DELAYED RELEASE ORAL DAILY
Qty: 30 CAPSULE | Refills: 2 | Status: SHIPPED | OUTPATIENT
Start: 2020-11-27 | End: 2021-01-05 | Stop reason: SDUPTHER

## 2020-11-27 RX ORDER — LEVOTHYROXINE SODIUM 0.1 MG/1
100 TABLET ORAL EVERY MORNING
Qty: 30 TABLET | Refills: 2 | Status: SHIPPED | OUTPATIENT
Start: 2020-11-27 | End: 2021-01-05 | Stop reason: SDUPTHER

## 2020-11-27 RX ORDER — IBUPROFEN 600 MG/1
600 TABLET ORAL EVERY 8 HOURS PRN
Qty: 30 TABLET | Refills: 0 | Status: SHIPPED | OUTPATIENT
Start: 2020-11-27 | End: 2020-12-02 | Stop reason: SDUPTHER

## 2020-11-27 RX ORDER — LEVOTHYROXINE SODIUM 0.1 MG/1
TABLET ORAL
Qty: 30 TABLET | Refills: 0 | OUTPATIENT
Start: 2020-11-27

## 2020-12-02 DIAGNOSIS — G44.209 ACUTE NON INTRACTABLE TENSION-TYPE HEADACHE: ICD-10-CM

## 2020-12-02 RX ORDER — IBUPROFEN 600 MG/1
600 TABLET ORAL EVERY 8 HOURS PRN
Qty: 30 TABLET | Refills: 0 | Status: SHIPPED | OUTPATIENT
Start: 2020-12-02 | End: 2021-04-28 | Stop reason: SDUPTHER

## 2020-12-18 DIAGNOSIS — G44.209 ACUTE NON INTRACTABLE TENSION-TYPE HEADACHE: ICD-10-CM

## 2020-12-21 RX ORDER — ONDANSETRON 4 MG/1
TABLET, ORALLY DISINTEGRATING ORAL
Qty: 10 TABLET | Refills: 0 | Status: SHIPPED | OUTPATIENT
Start: 2020-12-21 | End: 2021-02-10

## 2021-01-05 DIAGNOSIS — E03.9 ACQUIRED HYPOTHYROIDISM: ICD-10-CM

## 2021-01-05 DIAGNOSIS — G44.209 ACUTE NON INTRACTABLE TENSION-TYPE HEADACHE: ICD-10-CM

## 2021-01-05 DIAGNOSIS — K21.9 GASTROESOPHAGEAL REFLUX DISEASE: ICD-10-CM

## 2021-01-06 RX ORDER — DIPHENHYDRAMINE HCL 25 MG
25 TABLET ORAL EVERY 6 HOURS PRN
Qty: 30 TABLET | Refills: 0 | Status: SHIPPED | OUTPATIENT
Start: 2021-01-06 | End: 2022-02-01

## 2021-01-06 RX ORDER — LEVOTHYROXINE SODIUM 0.1 MG/1
100 TABLET ORAL EVERY MORNING
Qty: 30 TABLET | Refills: 2 | Status: SHIPPED | OUTPATIENT
Start: 2021-01-06 | End: 2021-04-14 | Stop reason: SDUPTHER

## 2021-01-06 RX ORDER — OMEPRAZOLE 20 MG/1
20 CAPSULE, DELAYED RELEASE ORAL DAILY
Qty: 30 CAPSULE | Refills: 2 | Status: SHIPPED | OUTPATIENT
Start: 2021-01-06 | End: 2021-04-29 | Stop reason: SDUPTHER

## 2021-02-08 DIAGNOSIS — G44.209 ACUTE NON INTRACTABLE TENSION-TYPE HEADACHE: ICD-10-CM

## 2021-02-10 RX ORDER — ONDANSETRON 4 MG/1
TABLET, ORALLY DISINTEGRATING ORAL
Qty: 10 TABLET | Refills: 0 | Status: SHIPPED | OUTPATIENT
Start: 2021-02-10 | End: 2021-04-28 | Stop reason: SDUPTHER

## 2021-02-22 ENCOUNTER — TELEPHONE (OUTPATIENT)
Dept: FAMILY MEDICINE CLINIC | Facility: CLINIC | Age: 46
End: 2021-02-22

## 2021-03-12 DIAGNOSIS — J30.2 SEASONAL ALLERGIES: ICD-10-CM

## 2021-03-12 RX ORDER — CETIRIZINE HYDROCHLORIDE 10 MG/1
TABLET ORAL
Qty: 30 TABLET | Refills: 5 | Status: SHIPPED | OUTPATIENT
Start: 2021-03-12 | End: 2021-06-30

## 2021-04-14 ENCOUNTER — TELEPHONE (OUTPATIENT)
Dept: FAMILY MEDICINE CLINIC | Facility: CLINIC | Age: 46
End: 2021-04-14

## 2021-04-14 ENCOUNTER — OFFICE VISIT (OUTPATIENT)
Dept: FAMILY MEDICINE CLINIC | Facility: CLINIC | Age: 46
End: 2021-04-14

## 2021-04-14 VITALS
HEIGHT: 62 IN | BODY MASS INDEX: 43.79 KG/M2 | DIASTOLIC BLOOD PRESSURE: 82 MMHG | HEART RATE: 98 BPM | SYSTOLIC BLOOD PRESSURE: 116 MMHG | RESPIRATION RATE: 20 BRPM | WEIGHT: 238 LBS | OXYGEN SATURATION: 97 % | TEMPERATURE: 98 F

## 2021-04-14 DIAGNOSIS — E03.9 ACQUIRED HYPOTHYROIDISM: Primary | ICD-10-CM

## 2021-04-14 DIAGNOSIS — K59.00 CONSTIPATION, UNSPECIFIED CONSTIPATION TYPE: ICD-10-CM

## 2021-04-14 PROCEDURE — 99213 OFFICE O/P EST LOW 20 MIN: CPT | Performed by: FAMILY MEDICINE

## 2021-04-14 RX ORDER — LEVOTHYROXINE SODIUM 0.1 MG/1
100 TABLET ORAL EVERY MORNING
Qty: 30 TABLET | Refills: 2 | Status: SHIPPED | OUTPATIENT
Start: 2021-04-14 | End: 2021-08-10 | Stop reason: SDUPTHER

## 2021-04-14 NOTE — ASSESSMENT & PLAN NOTE
Has not had a bowel movement in 4 days, and reports mild abdominal pain  She has been drinking more water with no relief  Will provide prescription for psyllium  Encouraged to continue increased water intake for best effect

## 2021-04-14 NOTE — ASSESSMENT & PLAN NOTE
Managed with levothyroxine 100 mcg daily  Reviewed most recent TSH (Feb 2020)  Due for repeat  Orders placed in Sept 2020  Advised patient to have the level checked  Adjust dose if necessary

## 2021-04-14 NOTE — PROGRESS NOTES
Assessment/Plan:    Hypothyroidism  Managed with levothyroxine 100 mcg daily  Reviewed most recent TSH (Feb 2020)  Due for repeat  Orders placed in Sept 2020  Advised patient to have the level checked  Adjust dose if necessary  Constipation  Has not had a bowel movement in 4 days, and reports mild abdominal pain  She has been drinking more water with no relief  Will provide prescription for psyllium  Encouraged to continue increased water intake for best effect  Diagnoses and all orders for this visit:    Acquired hypothyroidism  Comments:  TSH is 8 7  Will adjust meds to 100mcg of levothyroxine  Will repeat TSH in 4 weeks  Orders:  -     levothyroxine 100 mcg tablet; Take 1 tablet (100 mcg total) by mouth every morning    Constipation, unspecified constipation type  -     psyllium (METAMUCIL SMOOTH TEXTURE) 28 % packet; Take 1 packet by mouth 2 (two) times a day    Other orders  -     Cancel: Mammo screening bilateral w cad; Future          Subjective:     Xiomara Mathur is a 39 y o  female who  has a past medical history of Anxiety, Asthma, Bipolar disorder ([address] ), Depression, Disease of thyroid gland, Endometriosis, Psychiatric illness, Psychosis ([address] ), PTSD (post-traumatic stress disorder), Right carpal tunnel syndrome, Self-injurious behavior, and Suicide attempt ([address] )  She also has no past medical history of ADHD (attention deficit hyperactivity disorder), Alcohol abuse, Alcoholism ([address] ), Anorexia nervosa, Autism spectrum disorder, Borderline personality disorder ([address] ), Bulimia nervosa, Cancer ([address] ), Chronic pain disorder, Head injury, HIV disease ([address] ), Liver disease, Obsessive-compulsive disorder, Oppositional defiant disorder, Panic disorder, Schizoaffective disorder ([address] ), Seizures ([address] ), Violence, history of, Withdrawal symptoms, alcohol ([address] ), or Withdrawal symptoms, drug or narcotic ([address] )  who presented to the office today for follow up      HPI    Her hypothyroidism is managed with levothyroxine 100 mcg daily  Last TSH was in Feb 2020  She has run out of her medication  She is asymptomatic  She is complaining today of mild abdominal pain  She has not had a bowel movement for the last 4 days  Her last bowel movement was hard  She has been drinking more water to help relieve the constipation to no avail  She is currently out of work due to her psychiatric history of bipolar 1 d/o, PTSD, DULCE MARIA  She sees her psychiatrist Dr Alice Horner once a month, and her therapist once a week  She presented with a form to be completed  Review of Systems   Constitutional: Negative for fatigue and fever  Respiratory: Negative for cough and shortness of breath  Cardiovascular: Negative for chest pain and palpitations  Gastrointestinal: Positive for abdominal pain and constipation  Musculoskeletal: Negative for back pain and myalgias  Skin: Negative for rash  Neurological: Negative for dizziness and weakness  Psychiatric/Behavioral: The patient is nervous/anxious  Objective:    /82 (BP Location: Right arm, Patient Position: Sitting, Cuff Size: Large)   Pulse 98   Temp 98 °F (36 7 °C) (Temporal)   Resp 20   Ht 5' 2" (1 575 m)   Wt 108 kg (238 lb)   SpO2 97%   BMI 43 53 kg/m²     Physical Exam  Constitutional:       General: She is not in acute distress  Appearance: She is well-developed  HENT:      Head: Normocephalic and atraumatic  Eyes:      General: No scleral icterus  Conjunctiva/sclera: Conjunctivae normal    Neck:      Musculoskeletal: Normal range of motion and neck supple  Trachea: No tracheal deviation  Cardiovascular:      Rate and Rhythm: Normal rate  Pulmonary:      Effort: Pulmonary effort is normal  No respiratory distress  Abdominal:      General: Bowel sounds are normal       Palpations: Abdomen is soft  There is no hepatomegaly or splenomegaly  Tenderness: There is no abdominal tenderness     Musculoskeletal:         General: No deformity  Skin:     General: Skin is warm and dry  Findings: No rash  Neurological:      Mental Status: She is alert and oriented to person, place, and time        Gait: Gait normal          Newton Carey MD  04/14/21  5:53 PM

## 2021-04-28 DIAGNOSIS — G44.209 ACUTE NON INTRACTABLE TENSION-TYPE HEADACHE: ICD-10-CM

## 2021-04-29 DIAGNOSIS — K21.9 GASTROESOPHAGEAL REFLUX DISEASE: ICD-10-CM

## 2021-04-29 RX ORDER — DIPHENHYDRAMINE HCL 25 MG
25 TABLET ORAL EVERY 6 HOURS PRN
Qty: 30 TABLET | Refills: 0 | OUTPATIENT
Start: 2021-04-29

## 2021-04-29 RX ORDER — IBUPROFEN 600 MG/1
600 TABLET ORAL EVERY 8 HOURS PRN
Qty: 30 TABLET | Refills: 0 | Status: SHIPPED | OUTPATIENT
Start: 2021-04-29 | End: 2022-02-01

## 2021-04-29 RX ORDER — ONDANSETRON 4 MG/1
4 TABLET, ORALLY DISINTEGRATING ORAL EVERY 8 HOURS PRN
Qty: 10 TABLET | Refills: 0 | Status: SHIPPED | OUTPATIENT
Start: 2021-04-29 | End: 2021-05-19

## 2021-04-29 NOTE — TELEPHONE ENCOUNTER
Pt called for refills on Zofran, Omeprazole and Benadryl  Pt has had zofran recently refilled Omeprazole request sent today however the Benadryl was denied  Ibuprofen 600 also recently approved as well

## 2021-04-30 RX ORDER — OMEPRAZOLE 20 MG/1
20 CAPSULE, DELAYED RELEASE ORAL DAILY
Qty: 30 CAPSULE | Refills: 2 | Status: SHIPPED | OUTPATIENT
Start: 2021-04-30 | End: 2021-08-05

## 2021-05-19 ENCOUNTER — HOSPITAL ENCOUNTER (EMERGENCY)
Facility: HOSPITAL | Age: 46
Discharge: HOME | End: 2021-05-19
Attending: EMERGENCY MEDICINE | Admitting: EMERGENCY MEDICINE
Payer: COMMERCIAL

## 2021-05-19 ENCOUNTER — APPOINTMENT (EMERGENCY)
Dept: RADIOLOGY | Facility: HOSPITAL | Age: 46
End: 2021-05-19
Payer: COMMERCIAL

## 2021-05-19 VITALS
HEART RATE: 106 BPM | TEMPERATURE: 98.3 F | BODY MASS INDEX: 43.1 KG/M2 | SYSTOLIC BLOOD PRESSURE: 141 MMHG | WEIGHT: 235.67 LBS | RESPIRATION RATE: 20 BRPM | DIASTOLIC BLOOD PRESSURE: 75 MMHG | OXYGEN SATURATION: 100 %

## 2021-05-19 DIAGNOSIS — J45.909 ASTHMATIC BRONCHITIS: Primary | ICD-10-CM

## 2021-05-19 LAB — SARS-COV-2 RNA RESP QL NAA+PROBE: NEGATIVE

## 2021-05-19 PROCEDURE — 99284 EMERGENCY DEPT VISIT MOD MDM: CPT | Performed by: EMERGENCY MEDICINE

## 2021-05-19 PROCEDURE — U0005 INFEC AGEN DETEC AMPLI PROBE: HCPCS | Performed by: EMERGENCY MEDICINE

## 2021-05-19 PROCEDURE — U0003 INFECTIOUS AGENT DETECTION BY NUCLEIC ACID (DNA OR RNA); SEVERE ACUTE RESPIRATORY SYNDROME CORONAVIRUS 2 (SARS-COV-2) (CORONAVIRUS DISEASE [COVID-19]), AMPLIFIED PROBE TECHNIQUE, MAKING USE OF HIGH THROUGHPUT TECHNOLOGIES AS DESCRIBED BY CMS-2020-01-R: HCPCS | Performed by: EMERGENCY MEDICINE

## 2021-05-19 PROCEDURE — 71045 X-RAY EXAM CHEST 1 VIEW: CPT

## 2021-05-19 PROCEDURE — 94640 AIRWAY INHALATION TREATMENT: CPT

## 2021-05-19 PROCEDURE — 99283 EMERGENCY DEPT VISIT LOW MDM: CPT

## 2021-05-19 RX ORDER — ALBUTEROL SULFATE 90 UG/1
2 AEROSOL, METERED RESPIRATORY (INHALATION) EVERY 4 HOURS PRN
Qty: 8 G | Refills: 0 | Status: SHIPPED | OUTPATIENT
Start: 2021-05-19 | End: 2021-11-05 | Stop reason: SDUPTHER

## 2021-05-19 RX ORDER — IPRATROPIUM BROMIDE AND ALBUTEROL SULFATE 2.5; .5 MG/3ML; MG/3ML
3 SOLUTION RESPIRATORY (INHALATION) ONCE
Status: COMPLETED | OUTPATIENT
Start: 2021-05-19 | End: 2021-05-19

## 2021-05-19 RX ORDER — AZITHROMYCIN 250 MG/1
TABLET, FILM COATED ORAL
Qty: 6 TABLET | Refills: 0 | Status: SHIPPED | OUTPATIENT
Start: 2021-05-19 | End: 2021-05-23

## 2021-05-19 RX ORDER — PREDNISONE 20 MG/1
60 TABLET ORAL ONCE
Status: COMPLETED | OUTPATIENT
Start: 2021-05-19 | End: 2021-05-19

## 2021-05-19 RX ORDER — PREDNISONE 20 MG/1
40 TABLET ORAL DAILY
Qty: 8 TABLET | Refills: 0 | Status: SHIPPED | OUTPATIENT
Start: 2021-05-19 | End: 2021-05-23

## 2021-05-19 RX ORDER — AZITHROMYCIN 250 MG/1
500 TABLET, FILM COATED ORAL ONCE
Status: COMPLETED | OUTPATIENT
Start: 2021-05-19 | End: 2021-05-19

## 2021-05-19 RX ADMIN — AZITHROMYCIN MONOHYDRATE 500 MG: 250 TABLET ORAL at 20:43

## 2021-05-19 RX ADMIN — IPRATROPIUM BROMIDE AND ALBUTEROL SULFATE 3 ML: 2.5; .5 SOLUTION RESPIRATORY (INHALATION) at 19:30

## 2021-05-19 RX ADMIN — PREDNISONE 60 MG: 20 TABLET ORAL at 19:29

## 2021-05-19 NOTE — ED PROVIDER NOTES
History  Chief Complaint   Patient presents with    Cough     patient reports productive cough for the past four days with yellow sputum production, thick  Asthma hx  no relief with inhaler  C/o productive cough and wheezing for the past 4 days, no fevers  Pt  Has a hx of asthma and states that it's been acting up without much relief from her inhaler  Nonsmoker, doesn't vape  No definite exposure to covid  Prior to Admission Medications   Prescriptions Last Dose Informant Patient Reported? Taking?    Banophen 25 MG capsule   Yes No   DULoxetine (CYMBALTA) 60 mg delayed release capsule   Yes No   albuterol (PROVENTIL HFA,VENTOLIN HFA) 90 mcg/act inhaler   No No   Sig: Inhale 2 puffs every 4 (four) hours as needed for wheezing   atoMOXetine (STRATTERA) 40 mg capsule   Yes No   Sig: Take 40 mg by mouth daily   busPIRone (BUSPAR) 15 mg tablet   Yes No   busPIRone (BUSPAR) 5 mg tablet   Yes No   Sig: Take 5 mg by mouth 2 (two) times a day   cetirizine (ZyrTEC) 10 mg tablet   No No   Sig: TAKE ONE TABLET BY MOUTH EVERY DAY   clonazePAM (KlonoPIN) 0 5 mg tablet   Yes No   diphenhydrAMINE (BENADRYL) 25 mg tablet   No No   Sig: Take 1 tablet (25 mg total) by mouth every 6 (six) hours as needed for itching   fluticasone (FLONASE) 50 mcg/act nasal spray   No No   Si spray into each nostril daily   hydrOXYzine pamoate (VISTARIL) 50 mg capsule   Yes No   Sig: Take 50 mg by mouth 2 (two) times a day as needed   ibuprofen (MOTRIN) 600 mg tablet   No No   Sig: Take 1 tablet (600 mg total) by mouth every 8 (eight) hours as needed for mild pain or fever   levothyroxine 100 mcg tablet   No No   Sig: Take 1 tablet (100 mcg total) by mouth every morning   omeprazole (PriLOSEC) 20 mg delayed release capsule   No No   Sig: Take 1 capsule (20 mg total) by mouth daily   psyllium (METAMUCIL SMOOTH TEXTURE) 28 % packet   No No   Sig: Take 1 packet by mouth 2 (two) times a day   topiramate (TOPAMAX) 100 mg tablet   No No Sig: Take 1 tablet (100 mg total) by mouth 2 (two) times a day   venlafaxine (EFFEXOR-XR) 37 5 mg 24 hr capsule   No No   Sig: Take 1 capsule (37 5 mg total) by mouth daily with breakfast      Facility-Administered Medications: None       Past Medical History:   Diagnosis Date    Anxiety     Asthma     Bipolar disorder ([facility] )     Depression     Disease of thyroid gland     Endometriosis     Psychiatric illness     Psychosis ([facility] )     PTSD (post-traumatic stress disorder) 6/6/2017    Right carpal tunnel syndrome 12/26/2018    Self-injurious behavior     [facility])        Past Surgical History:   Procedure Laterality Date    ABDOMINAL SURGERY      HERNIA REPAIR         Family History   Problem Relation Age of Onset    Hypertension Mother     Asthma Mother     Fibromyalgia Mother      I have reviewed and agree with the history as documented  E-Cigarette/Vaping     E-Cigarette/Vaping Substances     Social History     Tobacco Use    Smoking status: Former Smoker     Packs/day: 0 25     Types: Cigarettes    Smokeless tobacco: Never Used   Substance Use Topics    Alcohol use: No    Drug use: No       Review of Systems   Constitutional: Negative for appetite change, fatigue and fever  HENT: Negative for rhinorrhea and sore throat  Eyes: Negative for pain  Respiratory: Positive for cough and wheezing  Negative for shortness of breath  Cardiovascular: Negative for chest pain and leg swelling  Gastrointestinal: Negative for abdominal pain, diarrhea and vomiting  Genitourinary: Negative for dysuria and flank pain  Musculoskeletal: Negative for back pain and neck pain  Skin: Negative for rash  Neurological: Negative for syncope and headaches  Psychiatric/Behavioral:        Mood normal       Physical Exam  Physical Exam  Vitals signs and nursing note reviewed  Constitutional:       Appearance: She is well-developed  HENT:      Head: Normocephalic and atraumatic  Neck:      Musculoskeletal: Normal range of motion and neck supple  Cardiovascular:      Rate and Rhythm: Regular rhythm  Tachycardia present  Pulmonary:      Effort: Pulmonary effort is normal       Comments: Decreased breath sounds  Abdominal:      Palpations: Abdomen is soft  Tenderness: There is no abdominal tenderness  Musculoskeletal: Normal range of motion  Skin:     General: Skin is warm and dry  Neurological:      Mental Status: She is alert and oriented to person, place, and time  Vital Signs  ED Triage Vitals   Temperature Pulse Respirations Blood Pressure SpO2   05/19/21 1907 05/19/21 1900 05/19/21 1900 05/19/21 1900 05/19/21 1900   98 3 °F (36 8 °C) (!) 106 20 141/75 100 %      Temp Source Heart Rate Source Patient Position - Orthostatic VS BP Location FiO2 (%)   05/19/21 1907 05/19/21 1900 05/19/21 1900 05/19/21 1900 --   Oral Monitor Sitting Right arm       Pain Score       05/19/21 1900       7           Vitals:    05/19/21 1900   BP: 141/75   Pulse: (!) 106   Patient Position - Orthostatic VS: Sitting         Visual Acuity      ED Medications  Medications   predniSONE tablet 60 mg (60 mg Oral Given 5/19/21 1929)   ipratropium-albuterol (DUO-NEB) 0 5-2 5 mg/3 mL inhalation solution 3 mL (3 mL Nebulization Given 5/19/21 1930)   azithromycin (ZITHROMAX) tablet 500 mg (500 mg Oral Given 5/19/21 2043)       Diagnostic Studies  Results Reviewed     Procedure Component Value Units Date/Time    Novel Coronavirus (Covid-19),PCR [facility] - 24 Hour Routine [[id number]]  (Normal) Collected: 05/19/21 1928    Lab Status: Final result Specimen: Nares from Nasopharyngeal Swab Updated: 05/19/21 2041     SARS-CoV-2 Negative    Narrative:       The specimen collection materials, transport medium, and/or testing methodology utilized in the production of these test results have been proven to be reliable in a limited validation with an abbreviated program under the Emergency Utilization Authorization provided by the FDA  Testing reported as "Presumptive positive" will be confirmed with secondary testing to ensure result accuracy  Clinical caution and judgement should be used with the interpretation of these results with consideration of the clinical impression and other laboratory testing  Testing reported as "Positive" or "Negative" has been proven to be accurate according to standard laboratory validation requirements  All testing is performed with control materials showing appropriate reactivity at standard intervals  XR chest 1 view portable    (Results Pending)              Procedures  Procedures         ED Course                                           MDM  Number of Diagnoses or Management Options  Asthmatic bronchitis:   Risk of Complications, Morbidity, and/or Mortality  Presenting problems: moderate  General comments: CXR nad    Pt  Felt better in ER, pulse ox 100% RA, stable for outpt  Follow up        Disposition  Final diagnoses:   Asthmatic bronchitis     Time reflects when diagnosis was documented in both MDM as applicable and the Disposition within this note     Time User Action Codes Description Comment    5/19/2021  8:37 PM Sonal Tran Add [C57 522] Asthmatic bronchitis       ED Disposition     ED Disposition Condition Date/Time Comment    Discharge Stable Wed May 19, 2021  8:37 PM Derenda Clock discharge to home/self care              Follow-up Information     Follow up With Specialties Details Why Contact Info Additional [address]   [address], [address] [facility] [id number]  [address], [address], [address] [place], [place], [address]          Discharge Medication List as of 5/19/2021  8:39 PM      START taking these medications    Details   !! albuterol (PROVENTIL HFA,VENTOLIN HFA) 90 mcg/act inhaler Inhale 2 puffs every 4 (four) hours as needed for wheezing, Starting Wed 5/19/2021, Normal      azithromycin (ZITHROMAX) 250 mg tablet 1 tablet daily x 4 days, Normal      predniSONE 20 mg tablet Take 2 tablets (40 mg total) by mouth daily for 4 days, Starting Wed 5/19/2021, Until Sun 5/23/2021, Normal       !! - Potential duplicate medications found  Please discuss with provider        CONTINUE these medications which have NOT CHANGED    Details   !! albuterol (PROVENTIL HFA,VENTOLIN HFA) 90 mcg/act inhaler Inhale 2 puffs every 4 (four) hours as needed for wheezing, Starting Mon 8/17/2020, Normal      atoMOXetine (STRATTERA) 40 mg capsule Take 40 mg by mouth daily, Historical Med      Banophen 25 MG capsule Starting Thu 11/19/2020, Historical Med      !! busPIRone (BUSPAR) 15 mg tablet Starting Mon 11/9/2020, Historical Med      !! busPIRone (BUSPAR) 5 mg tablet Take 5 mg by mouth 2 (two) times a day, Starting Fri 9/20/2019, Historical Med      cetirizine (ZyrTEC) 10 mg tablet TAKE ONE TABLET BY MOUTH EVERY DAY, Normal      clonazePAM (KlonoPIN) 0 5 mg tablet Starting Tue 11/26/2019, Historical Med      diphenhydrAMINE (BENADRYL) 25 mg tablet Take 1 tablet (25 mg total) by mouth every 6 (six) hours as needed for itching, Starting Wed 1/6/2021, Normal      DULoxetine (CYMBALTA) 60 mg delayed release capsule Starting Mon 11/16/2020, Historical Med      fluticasone (FLONASE) 50 mcg/act nasal spray 1 spray into each nostril daily, Starting Thu 2/6/2020, Normal      hydrOXYzine pamoate (VISTARIL) 50 mg capsule Take 50 mg by mouth 2 (two) times a day as needed, Starting Fri 5/31/2019, Historical Med      ibuprofen (MOTRIN) 600 mg tablet Take 1 tablet (600 mg total) by mouth every 8 (eight) hours as needed for mild pain or fever, Starting Thu 4/29/2021, Normal      levothyroxine 100 mcg tablet Take 1 tablet (100 mcg total) by mouth every morning, Starting Wed 4/14/2021, Normal      omeprazole (PriLOSEC) 20 mg delayed release capsule Take 1 capsule (20 mg total) by mouth daily, Starting Fri 4/30/2021, Normal      psyllium (METAMUCIL SMOOTH TEXTURE) 28 % packet Take 1 packet by mouth 2 (two) times a day, Starting Wed 4/14/2021, Normal      topiramate (TOPAMAX) 100 mg tablet Take 1 tablet (100 mg total) by mouth 2 (two) times a day, Starting Mon 9/28/2020, Normal      venlafaxine (EFFEXOR-XR) 37 5 mg 24 hr capsule Take 1 capsule (37 5 mg total) by mouth daily with breakfast, Starting Mon 8/17/2020, Normal       !! - Potential duplicate medications found  Please discuss with provider  No discharge procedures on file      PDMP Review     None          ED Provider  Electronically Signed by           Luis E Hendrix MD  05/19/21 0501

## 2021-06-06 ENCOUNTER — APPOINTMENT (EMERGENCY)
Dept: RADIOLOGY | Facility: HOSPITAL | Age: 46
End: 2021-06-06
Payer: COMMERCIAL

## 2021-06-06 ENCOUNTER — HOSPITAL ENCOUNTER (EMERGENCY)
Facility: HOSPITAL | Age: 46
Discharge: HOME | End: 2021-06-06
Attending: EMERGENCY MEDICINE | Admitting: EMERGENCY MEDICINE
Payer: COMMERCIAL

## 2021-06-06 VITALS
BODY MASS INDEX: 43.06 KG/M2 | HEART RATE: 101 BPM | RESPIRATION RATE: 20 BRPM | OXYGEN SATURATION: 98 % | TEMPERATURE: 98.9 F | WEIGHT: 235.45 LBS | SYSTOLIC BLOOD PRESSURE: 170 MMHG | DIASTOLIC BLOOD PRESSURE: 75 MMHG

## 2021-06-06 DIAGNOSIS — J45.901 ASTHMA EXACERBATION: Primary | ICD-10-CM

## 2021-06-06 DIAGNOSIS — J98.01 COUGH DUE TO BRONCHOSPASM: ICD-10-CM

## 2021-06-06 LAB
EXT PREG TEST URINE: NEGATIVE
EXT. CONTROL ED NAV: NORMAL

## 2021-06-06 PROCEDURE — 94640 AIRWAY INHALATION TREATMENT: CPT

## 2021-06-06 PROCEDURE — 99283 EMERGENCY DEPT VISIT LOW MDM: CPT

## 2021-06-06 PROCEDURE — 81025 URINE PREGNANCY TEST: CPT | Performed by: EMERGENCY MEDICINE

## 2021-06-06 PROCEDURE — 99285 EMERGENCY DEPT VISIT HI MDM: CPT | Performed by: EMERGENCY MEDICINE

## 2021-06-06 PROCEDURE — 71046 X-RAY EXAM CHEST 2 VIEWS: CPT

## 2021-06-06 RX ORDER — PREDNISONE 20 MG/1
40 TABLET ORAL DAILY
Qty: 10 TABLET | Refills: 0 | Status: SHIPPED | OUTPATIENT
Start: 2021-06-06 | End: 2021-06-11

## 2021-06-06 RX ORDER — ALBUTEROL SULFATE 2.5 MG/3ML
5 SOLUTION RESPIRATORY (INHALATION) ONCE
Status: COMPLETED | OUTPATIENT
Start: 2021-06-06 | End: 2021-06-06

## 2021-06-06 RX ORDER — PREDNISONE 20 MG/1
60 TABLET ORAL ONCE
Status: COMPLETED | OUTPATIENT
Start: 2021-06-06 | End: 2021-06-06

## 2021-06-06 RX ORDER — ALBUTEROL SULFATE 2.5 MG/3ML
2.5 SOLUTION RESPIRATORY (INHALATION) EVERY 6 HOURS PRN
Qty: 30 ML | Refills: 0 | Status: SHIPPED | OUTPATIENT
Start: 2021-06-06 | End: 2021-06-10 | Stop reason: SDUPTHER

## 2021-06-06 RX ADMIN — ALBUTEROL SULFATE 5 MG: 2.5 SOLUTION RESPIRATORY (INHALATION) at 15:08

## 2021-06-06 RX ADMIN — ALBUTEROL SULFATE 5 MG: 2.5 SOLUTION RESPIRATORY (INHALATION) at 14:19

## 2021-06-06 RX ADMIN — IPRATROPIUM BROMIDE 0.5 MG: 0.5 SOLUTION RESPIRATORY (INHALATION) at 15:08

## 2021-06-06 RX ADMIN — PREDNISONE 60 MG: 20 TABLET ORAL at 14:18

## 2021-06-06 RX ADMIN — IPRATROPIUM BROMIDE 0.5 MG: 0.5 SOLUTION RESPIRATORY (INHALATION) at 14:19

## 2021-06-06 NOTE — ED PROVIDER NOTES
History  Chief Complaint   Patient presents with    Cough     Pt c/o persistant cough x 1 month, seen here in may for same  43 yo F h/o asthma presenting for evaluation of one month of cough  Pt reports having coughing fits and feeling short of breath  At same severity for the past 1 month  She was seen in the ER  for the same and completed 5 days of prednisone & a Z-kristel  She reports using her albuterol inhaler at home with minimal improvement  Worse with ambulation/exertion  Denies fevers, chills, leg pain/swelling, etc    H/o asthma, anxiety/depression  Has never seen a pulmonologist    MDM: 43 yo F with asthma presenting with SOB/cough- will treat with neb, steroids, cxr and reassess                 Prior to Admission Medications   Prescriptions Last Dose Informant Patient Reported? Taking?    Banophen 25 MG capsule   Yes No   DULoxetine (CYMBALTA) 60 mg delayed release capsule   Yes No   albuterol (PROVENTIL HFA,VENTOLIN HFA) 90 mcg/act inhaler   No No   Sig: Inhale 2 puffs every 4 (four) hours as needed for wheezing   albuterol (PROVENTIL HFA,VENTOLIN HFA) 90 mcg/act inhaler   No No   Sig: Inhale 2 puffs every 4 (four) hours as needed for wheezing   atoMOXetine (STRATTERA) 40 mg capsule   Yes No   Sig: Take 40 mg by mouth daily   busPIRone (BUSPAR) 15 mg tablet   Yes No   busPIRone (BUSPAR) 5 mg tablet   Yes No   Sig: Take 5 mg by mouth 2 (two) times a day   cetirizine (ZyrTEC) 10 mg tablet   No No   Sig: TAKE ONE TABLET BY MOUTH EVERY DAY   clonazePAM (KlonoPIN) 0 5 mg tablet   Yes No   diphenhydrAMINE (BENADRYL) 25 mg tablet   No No   Sig: Take 1 tablet (25 mg total) by mouth every 6 (six) hours as needed for itching   fluticasone (FLONASE) 50 mcg/act nasal spray   No No   Si spray into each nostril daily   hydrOXYzine pamoate (VISTARIL) 50 mg capsule   Yes No   Sig: Take 50 mg by mouth 2 (two) times a day as needed   ibuprofen (MOTRIN) 600 mg tablet   No No   Sig: Take 1 tablet (600 mg total) by mouth every 8 (eight) hours as needed for mild pain or fever   levothyroxine 100 mcg tablet   No No   Sig: Take 1 tablet (100 mcg total) by mouth every morning   omeprazole (PriLOSEC) 20 mg delayed release capsule   No No   Sig: Take 1 capsule (20 mg total) by mouth daily   psyllium (METAMUCIL SMOOTH TEXTURE) 28 % packet   No No   Sig: Take 1 packet by mouth 2 (two) times a day   topiramate (TOPAMAX) 100 mg tablet   No No   Sig: Take 1 tablet (100 mg total) by mouth 2 (two) times a day   venlafaxine (EFFEXOR-XR) 37 5 mg 24 hr capsule   No No   Sig: Take 1 capsule (37 5 mg total) by mouth daily with breakfast      Facility-Administered Medications: None       Past Medical History:   Diagnosis Date    Anxiety     Asthma     Bipolar disorder ([facility] )     Depression     Disease of thyroid gland     Endometriosis     Psychiatric illness     Psychosis ([facility] )     PTSD (post-traumatic stress disorder) 6/6/2017    Right carpal tunnel syndrome 12/26/2018    Self-injurious behavior     Suicide attempt [facility])        Past Surgical History:   Procedure Laterality Date    ABDOMINAL SURGERY      HERNIA REPAIR         Family History   Problem Relation Age of Onset    Hypertension Mother     Asthma Mother     Fibromyalgia Mother      I have reviewed and agree with the history as documented  E-Cigarette/Vaping     E-Cigarette/Vaping Substances     Social History     Tobacco Use    Smoking status: Former Smoker     Packs/day: 0 25     Types: Cigarettes    Smokeless tobacco: Never Used   Substance Use Topics    Alcohol use: No    Drug use: No       Review of Systems   Constitutional: Negative for chills, fever and unexpected weight change  HENT: Negative for ear pain, rhinorrhea and sore throat  Eyes: Negative for pain and visual disturbance  Respiratory: Positive for cough, shortness of breath and wheezing  Cardiovascular: Negative for chest pain and leg swelling     Gastrointestinal: Negative for abdominal pain, constipation, diarrhea, nausea and vomiting  Endocrine: Negative for polydipsia, polyphagia and polyuria  Genitourinary: Negative for dysuria, frequency, hematuria and urgency  Musculoskeletal: Negative for back pain, myalgias and neck pain  Skin: Negative for color change and rash  Allergic/Immunologic: Negative for environmental allergies and immunocompromised state  Neurological: Negative for dizziness, weakness, light-headedness, numbness and headaches  Hematological: Negative for adenopathy  Does not bruise/bleed easily  Psychiatric/Behavioral: Negative for agitation and confusion  All other systems reviewed and are negative  Physical Exam  Physical Exam  Vitals signs and nursing note reviewed  Constitutional:       Appearance: Normal appearance  She is well-developed  HENT:      Head: Normocephalic and atraumatic  Nose: Nose normal    Eyes:      Conjunctiva/sclera: Conjunctivae normal    Neck:      Musculoskeletal: Normal range of motion and neck supple  Cardiovascular:      Rate and Rhythm: Normal rate and regular rhythm  Heart sounds: Normal heart sounds  Pulmonary:      Effort: Pulmonary effort is normal  No respiratory distress  Breath sounds: No stridor  Wheezing present  No rales  Comments: Bronchospastic cough, nonproductive, diffuse expiratory wheezing, no increased work of breathing  Chest:      Chest wall: No tenderness  Abdominal:      General: There is no distension  Palpations: Abdomen is soft  Tenderness: There is no abdominal tenderness  There is no guarding or rebound  Musculoskeletal:         General: No tenderness or deformity  Right lower leg: No edema  Left lower leg: No edema  Comments: No calf swelling/ttp, no peripheral edema   Skin:     General: Skin is warm and dry  Findings: No rash  Neurological:      General: No focal deficit present        Mental Status: She is alert and oriented to person, place, and time  Motor: No abnormal muscle tone  Coordination: Coordination normal    Psychiatric:         Thought Content: Thought content normal          Judgment: Judgment normal          Vital Signs  ED Triage Vitals [06/06/21 1322]   Temperature Pulse Respirations Blood Pressure SpO2   98 9 °F (37 2 °C) 101 20 170/75 98 %      Temp Source Heart Rate Source Patient Position - Orthostatic VS BP Location FiO2 (%)   Oral Monitor -- -- --      Pain Score       7           Vitals:    06/06/21 1322   BP: 170/75   Pulse: 101         Visual Acuity      ED Medications  Medications   albuterol inhalation solution 5 mg (5 mg Nebulization Given 6/6/21 1419)   ipratropium (ATROVENT) 0 02 % inhalation solution 0 5 mg (0 5 mg Nebulization Given 6/6/21 1419)   predniSONE tablet 60 mg (60 mg Oral Given 6/6/21 1418)   albuterol inhalation solution 5 mg (5 mg Nebulization Given 6/6/21 1508)   ipratropium (ATROVENT) 0 02 % inhalation solution 0 5 mg (0 5 mg Nebulization Given 6/6/21 1508)       Diagnostic Studies  Results Reviewed     Procedure Component Value Units Date/Time    POCT pregnancy, urine [[id number]]  (Normal) Resulted: 06/06/21 1416    Lab Status: Final result Updated: 06/06/21 1514     EXT PREG TEST UR (Ref: Negative) negative     Control valid                 XR chest 2 views   Final Result by Minna Brannon MD (06/07 2039)   Interval development of small somewhat strand-like opacity lateral right mid lung field possibly representing early infiltrate   Continued follow-up recommended               Workstation performed: [id number]                    Procedures  Procedures         ED Course  ED Course as of Jun 08 0135   Sun Jun 06, 2021   1443 EKG: NSR @ 75 bpm, normal axis, normal intervals, nonspecific st changes, t wave inversions III, v1, v3, v3 is new from prior 3/20/21      1451 Reports feeling a little better after neb, increased breath sounds now, will give 2nd neb and reassess SBIRT 22yo+      Most Recent Value   SBIRT (22 yo +)   In order to provide better care to our patients, we are screening all of our patients for alcohol and drug use  Would it be okay to ask you these screening questions? No Filed at: 06/06/2021 1608                    [facility]  Number of Diagnoses or Management Options  Asthma exacerbation:   Cough due to bronchospasm:   Diagnosis management comments: 41 yo F h/o asthma presenting with cough/wheezing, sx improved with 2 duonebs  Pt has albuterol at home, provided with spacer as well as nebulizer machine  Placed on course of steroids  Instructed to f/u with PCP and pulm    Return precautions reviewed and pt expressed understanding with plan       Amount and/or Complexity of Data Reviewed  Tests in the radiology section of CPT®: ordered and reviewed  Review and summarize past medical records: yes  Independent visualization of images, tracings, or specimens: yes        Disposition  Final diagnoses:   Asthma exacerbation   Cough due to bronchospasm     Time reflects when diagnosis was documented in both MDM as applicable and the Disposition within this note     Time User Action Codes Description Comment    6/6/2021  3:43 PM Americo Terry Add [L16 [id number]] Asthma exacerbation     6/6/2021  3:44 PM Mayra Aschoff A Add [J98 [id number]] Cough due to bronchospasm       ED Disposition     ED Disposition Condition Date/Time Comment    Discharge Stable Sun Jun 6, 2021  3:43 PM Sal Mcmullen discharge to home/self care              Follow-up Information     Follow up With Specialties Details Why Contact Info Additional Information        Follow up with your family doctor     [address] Pulmonary [address] [facility] [address] [facility]   [address] [place]  [facility], [address] [address]          Discharge Medication List as of 6/6/2021  3:45 PM      START taking these medications    Details   predniSONE 20 mg tablet Take 2 tablets (40 mg total) by mouth daily for 5 days, Starting Sun 6/6/2021, Until Fri 6/11/2021, Print         CONTINUE these medications which have NOT CHANGED    Details   !! albuterol (PROVENTIL HFA,VENTOLIN HFA) 90 mcg/act inhaler Inhale 2 puffs every 4 (four) hours as needed for wheezing, Starting Mon 8/17/2020, Normal      !! albuterol (PROVENTIL HFA,VENTOLIN HFA) 90 mcg/act inhaler Inhale 2 puffs every 4 (four) hours as needed for wheezing, Starting Wed 5/19/2021, Normal      atoMOXetine (STRATTERA) 40 mg capsule Take 40 mg by mouth daily, Historical Med      Banophen 25 MG capsule Starting Thu 11/19/2020, Historical Med      !! busPIRone (BUSPAR) 15 mg tablet Starting Mon 11/9/2020, Historical Med      !! busPIRone (BUSPAR) 5 mg tablet Take 5 mg by mouth 2 (two) times a day, Starting Fri 9/20/2019, Historical Med      cetirizine (ZyrTEC) 10 mg tablet TAKE ONE TABLET BY MOUTH EVERY DAY, Normal      clonazePAM (KlonoPIN) 0 5 mg tablet Starting Tue 11/26/2019, Historical Med      diphenhydrAMINE (BENADRYL) 25 mg tablet Take 1 tablet (25 mg total) by mouth every 6 (six) hours as needed for itching, Starting Wed 1/6/2021, Normal      DULoxetine (CYMBALTA) 60 mg delayed release capsule Starting Mon 11/16/2020, Historical Med      fluticasone (FLONASE) 50 mcg/act nasal spray 1 spray into each nostril daily, Starting Thu 2/6/2020, Normal      hydrOXYzine pamoate (VISTARIL) 50 mg capsule Take 50 mg by mouth 2 (two) times a day as needed, Starting Fri 5/31/2019, Historical Med      ibuprofen (MOTRIN) 600 mg tablet Take 1 tablet (600 mg total) by mouth every 8 (eight) hours as needed for mild pain or fever, Starting Thu 4/29/2021, Normal      levothyroxine 100 mcg tablet Take 1 tablet (100 mcg total) by mouth every morning, Starting Wed 4/14/2021, Normal      omeprazole (PriLOSEC) 20 mg delayed release capsule Take 1 capsule (20 mg total) by mouth daily, Starting Fri 4/30/2021, Normal      psyllium (METAMUCIL SMOOTH TEXTURE) 28 % packet Take 1 packet by mouth 2 (two) times a day, Starting Wed 4/14/2021, Normal      topiramate (TOPAMAX) 100 mg tablet Take 1 tablet (100 mg total) by mouth 2 (two) times a day, Starting Mon 9/28/2020, Normal      venlafaxine (EFFEXOR-XR) 37 5 mg 24 hr capsule Take 1 capsule (37 5 mg total) by mouth daily with breakfast, Starting Mon 8/17/2020, Normal       !! - Potential duplicate medications found  Please discuss with provider  No discharge procedures on file      PDMP Review     None          ED Provider  Electronically Signed by           Arelis Butcher DO  06/08/21 0897

## 2021-06-06 NOTE — DISCHARGE INSTRUCTIONS
Take prednisone as prescribed  Use albuterol as needed  Honey or cold medications as needed for cough    Follow up with family doctor and pulmonologist    Return to ER if you develop any new or worsening symptoms

## 2021-06-10 ENCOUNTER — OFFICE VISIT (OUTPATIENT)
Dept: FAMILY MEDICINE CLINIC | Facility: CLINIC | Age: 46
End: 2021-06-10

## 2021-06-10 VITALS
BODY MASS INDEX: 43.43 KG/M2 | SYSTOLIC BLOOD PRESSURE: 130 MMHG | HEART RATE: 92 BPM | TEMPERATURE: 98.1 F | RESPIRATION RATE: 22 BRPM | HEIGHT: 62 IN | DIASTOLIC BLOOD PRESSURE: 88 MMHG | OXYGEN SATURATION: 96 % | WEIGHT: 236 LBS

## 2021-06-10 DIAGNOSIS — J45.901 ASTHMA EXACERBATION: Primary | ICD-10-CM

## 2021-06-10 DIAGNOSIS — J30.2 SEASONAL ALLERGIES: ICD-10-CM

## 2021-06-10 DIAGNOSIS — F17.200 TOBACCO DEPENDENCE: ICD-10-CM

## 2021-06-10 PROCEDURE — 99213 OFFICE O/P EST LOW 20 MIN: CPT | Performed by: FAMILY MEDICINE

## 2021-06-10 RX ORDER — ALBUTEROL SULFATE 2.5 MG/3ML
2.5 SOLUTION RESPIRATORY (INHALATION) EVERY 6 HOURS PRN
Qty: 30 ML | Refills: 0 | Status: SHIPPED | OUTPATIENT
Start: 2021-06-10 | End: 2021-06-30

## 2021-06-10 RX ORDER — ALBUTEROL SULFATE 90 UG/1
2 AEROSOL, METERED RESPIRATORY (INHALATION) EVERY 4 HOURS PRN
Qty: 18 G | Refills: 2 | Status: SHIPPED | OUTPATIENT
Start: 2021-06-10 | End: 2021-11-05 | Stop reason: SDUPTHER

## 2021-06-10 RX ORDER — GUAIFENESIN 600 MG
1200 TABLET, EXTENDED RELEASE 12 HR ORAL EVERY 12 HOURS SCHEDULED
Qty: 30 TABLET | Refills: 1 | Status: SHIPPED | OUTPATIENT
Start: 2021-06-10 | End: 2021-10-25

## 2021-06-10 RX ORDER — BUDESONIDE AND FORMOTEROL FUMARATE DIHYDRATE 80; 4.5 UG/1; UG/1
2 AEROSOL RESPIRATORY (INHALATION) 2 TIMES DAILY
Qty: 10.2 G | Refills: 2 | Status: SHIPPED | OUTPATIENT
Start: 2021-06-10 | End: 2021-06-30

## 2021-06-10 NOTE — PROGRESS NOTES
Assessment/Plan:    Tobacco dependence   Trial of nicotine patches to curb cravings  Asthma exacerbation  Symptoms increasing in frequency  Two recent ED visits for asthma exacerbation  Likely moderate persistent asthma at this point  Breath sounds decreased throughout on exam today  She will complete her course of steroids  Continue albuterol nebulizer treatments every 4-6 hours for wheezing  Will add Symbicort to her management  Will also send Mucinex for cough  Her x-ray in the ED showed a right middle lobe opacity that might represent an early infiltrate  Will continue to monitor symptoms  Provided ED precautions should she develop fever  Also to advised to call or make a sooner appointment if symptoms worsen  Return in about 2 weeks (around 6/24/2021) for Recheck  Asthma exacerbation  Diagnoses and all orders for this visit:    Asthma exacerbation  -     budesonide-formoterol (SYMBICORT) 80-4 5 MCG/ACT inhaler; Inhale 2 puffs 2 (two) times a day Rinse mouth after use  -     Ambulatory referral to social work care management program; Future  -     albuterol (2 5 mg/3 mL) 0 083 % nebulizer solution; Take 3 mL (2 5 mg total) by nebulization every 6 (six) hours as needed for wheezing or shortness of breath for up to 30 doses  -     guaiFENesin (MUCINEX) 600 mg 12 hr tablet; Take 2 tablets (1,200 mg total) by mouth every 12 (twelve) hours    Seasonal allergies  Comments: Will renew meds for seasonal allergies and asthma  Orders:  -     albuterol (PROVENTIL HFA,VENTOLIN HFA) 90 mcg/act inhaler;  Inhale 2 puffs every 4 (four) hours as needed for wheezing    Tobacco dependence  -     nicotine (NICODERM CQ) 7 mg/24hr TD 24 hr patch; Place 1 patch on the skin every 24 hours          Subjective:     Tawny Hernandez is a 42 y o  female who  has a past medical history of Anxiety, Asthma, Bipolar disorder ([address] ), Depression, Disease of thyroid gland, Endometriosis, Psychiatric illness, Psychosis [facility]), PTSD (post-traumatic stress disorder), Right carpal tunnel syndrome, Self-injurious behavior, and Suicide attempt ([facility] )  She also has no past medical history of ADHD (attention deficit hyperactivity disorder), Alcohol abuse, Alcoholism ([facility] ), Anorexia nervosa, Autism spectrum disorder, Borderline personality disorder ([facility] ), Bulimia nervosa, Cancer ([place] [address] ), Chronic pain disorder, Head injury, HIV disease ([facility] ), Liver disease, Obsessive-compulsive disorder, Oppositional defiant disorder, Panic disorder, Schizoaffective disorder ([place] [address] ), Seizures ([place] [address] ), Violence, history of, Withdrawal symptoms, alcohol ([place] [address] ), or Withdrawal symptoms, drug or narcotic ([place] [address] )  who presented to the office today for  Follow-up asthma    HPI    Two recent ED visits for asthma exacerbation  She is currently on a course of prednisone for the most recent episode which started on 06/06/2021  Her asthma is managed only with albuterol as needed  She noticed an increase in symptoms after she tried to quit smoking  She also mentions social stressors in her life that are giving her more anxiety and may be contributing to her chest tightness  Her symptoms are a little bit better than when she visited the ED, but she continues to have cough  She denies any fever or loss of appetite  However, she notes some night sweats  Her temperature today is 98 1  She has not used any Tylenol or NSAIDs  She quit smoking about a month ago, but has significant cravings  Prior to 1 month ago she had stopped for 2 weeks but then started smoking again  Review of Systems   Constitutional: Negative for fatigue and fever  HENT: Positive for congestion  Respiratory: Positive for cough and chest tightness  Negative for shortness of breath  Cardiovascular: Negative for chest pain and palpitations  Gastrointestinal: Negative for abdominal pain  Musculoskeletal: Negative for back pain and myalgias  Skin: Negative for rash     Neurological: Negative for dizziness and weakness  Objective:    /88 (BP Location: Left arm, Patient Position: Sitting, Cuff Size: Adult)   Pulse 92   Temp 98 1 °F (36 7 °C) (Temporal)   Resp 22   Ht 5' 2" (1 575 m)   Wt 107 kg (236 lb)   SpO2 96%   BMI 43 16 kg/m²     Physical Exam  Constitutional:       General: She is not in acute distress  Appearance: She is well-developed  HENT:      Head: Normocephalic and atraumatic  Eyes:      General: No scleral icterus  Conjunctiva/sclera: Conjunctivae normal       Pupils: Pupils are equal, round, and reactive to light  Neck:      Musculoskeletal: Normal range of motion and neck supple  Trachea: No tracheal deviation  Cardiovascular:      Rate and Rhythm: Normal rate and regular rhythm  Heart sounds: Normal heart sounds  No murmur  No friction rub  Pulmonary:      Effort: Pulmonary effort is normal  No respiratory distress  Breath sounds: No wheezing  Comments: Breath sounds are decreased throughout  Musculoskeletal:         General: No deformity  Lymphadenopathy:      Cervical: No cervical adenopathy  Skin:     General: Skin is warm and dry  Findings: No rash  Neurological:      Mental Status: She is alert and oriented to person, place, and time  Cranial Nerves: No cranial nerve deficit        Gait: Gait normal          Woody Dinero MD  06/10/21  10:22 AM

## 2021-06-10 NOTE — ASSESSMENT & PLAN NOTE
Symptoms increasing in frequency  Two recent ED visits for asthma exacerbation  Likely moderate persistent asthma at this point  Breath sounds decreased throughout on exam today  She will complete her course of steroids  Continue albuterol nebulizer treatments every 4-6 hours for wheezing  Will add Symbicort to her management  Will also send Mucinex for cough  Her x-ray in the ED showed a right middle lobe opacity that might represent an early infiltrate  Will continue to monitor symptoms  Provided ED precautions should she develop fever  Also to advised to call or make a sooner appointment if symptoms worsen

## 2021-06-14 ENCOUNTER — HOSPITAL ENCOUNTER (EMERGENCY)
Facility: HOSPITAL | Age: 46
Discharge: HOME | End: 2021-06-14
Attending: EMERGENCY MEDICINE
Payer: COMMERCIAL

## 2021-06-14 ENCOUNTER — APPOINTMENT (EMERGENCY)
Dept: RADIOLOGY | Facility: HOSPITAL | Age: 46
End: 2021-06-14
Payer: COMMERCIAL

## 2021-06-14 VITALS
HEART RATE: 98 BPM | SYSTOLIC BLOOD PRESSURE: 145 MMHG | BODY MASS INDEX: 43.63 KG/M2 | WEIGHT: 238.54 LBS | RESPIRATION RATE: 20 BRPM | OXYGEN SATURATION: 99 % | TEMPERATURE: 98.6 F | DIASTOLIC BLOOD PRESSURE: 86 MMHG

## 2021-06-14 DIAGNOSIS — R05.9 COUGH: Primary | ICD-10-CM

## 2021-06-14 PROCEDURE — 71046 X-RAY EXAM CHEST 2 VIEWS: CPT

## 2021-06-14 PROCEDURE — 99283 EMERGENCY DEPT VISIT LOW MDM: CPT

## 2021-06-14 PROCEDURE — 99282 EMERGENCY DEPT VISIT SF MDM: CPT | Performed by: EMERGENCY MEDICINE

## 2021-06-14 RX ORDER — GUAIFENESIN/DEXTROMETHORPHAN 100-10MG/5
5 SYRUP ORAL 3 TIMES DAILY PRN
Qty: 236 ML | Refills: 0 | Status: SHIPPED | OUTPATIENT
Start: 2021-06-14 | End: 2022-02-01

## 2021-06-14 NOTE — Clinical Note
Lindsay Loomis was seen and treated in our emergency department on 6/14/2021  Diagnosis:     Young Godoy  may return to work on return date  She may return on this date: 06/16/2021         If you have any questions or concerns, please don't hesitate to call        Eddie Chaudhary MD    ______________________________           _______________          _______________  Hospital Representative                              Date                                Time

## 2021-06-15 NOTE — ED ATTENDING ATTESTATION
6/14/2021  I, Brock Blank MD, saw and evaluated the patient  I have discussed the patient with the resident/non-physician practitioner and agree with the resident's/non-physician practitioner's findings, Plan of Care, and MDM as documented in the resident's/non-physician practitioner's note, except where noted  All available labs and Radiology studies were reviewed  I was present for key portions of any procedure(s) performed by the resident/non-physician practitioner and I was immediately available to provide assistance  At this point I agree with the current assessment done in the Emergency Department  I have conducted an independent evaluation of this patient a history and physical is as follows:  Patient is a 53-year-old female, history of asthma, comes with persistent cough for past 1 week, was seen here last week, had chest x-ray done, there was concern of mild right sided opacity, patient did follow-up with her doctor, was started on Symbicort, albuterol; now comes with persistent dry cough, denies fever, chills, phlegm, chest pain, abdominal or back pain  On exam, patient is conscious, alert, well-appearing, nontoxic, vital signs stable, lungs are clear to auscultation  Chest x-ray was repeated, no significant acute abnormality was noted, right-sided opacity not seen any more  Will treat symptomatically for cough, continue meds as prescribed by her PCP and follow up      ED Course         Critical Care Time  Procedures

## 2021-06-15 NOTE — DISCHARGE INSTRUCTIONS
If you are unable to fill the prescription you can use an over the counter medication with dextromethorphan which may help with nighttime coughing

## 2021-06-15 NOTE — ED PROVIDER NOTES
Final Diagnosis:  1  Cough        Chief Complaint   Patient presents with    Cough     Pt states she has been coughing since 6/9/ Was seen here neg for COVID  Pt continues to cough and now states having SOB with headache  Denies N/V/D  HPI  Patient presents for evaluation of cough  Patient has been having a cough for the last 1-2 weeks  She has been seen here twice in the emergency department for this as well as following up with her primary care physician  She states that her cough has not improved which caused her concerned  She states occasionally she will cough so much that she will leak a small amount urine  She denies any fever chills, nausea or vomiting, dysuria, hematuria, constipation or diarrhea  No sick contacts  Patient states that she will occasionally be productive of a small amount of yellowish mucus  Patient does have a history of asthma and recently has had Symbicort added to her treatment regimen  Patient is concerned about being able to control cough symptoms  I discussed this with her  She was unable to get her Mucinex prescription filled she said her insurance did not cover it  She tells me she is allergic to [employer]  She has been using honey with mild improvement of her symptoms  She has me if she could receive a prescription for a nighttime cough medicine to help her sleep at night     - No language barrier    - History obtained from patient  - There are no limitations to the history obtained  - Previous charting was reviewed    PMH:   has a past medical history of Anxiety, Asthma, Bipolar disorder ([address] ), Depression, Disease of thyroid gland, Endometriosis, Psychiatric illness, Psychosis ([address] ), PTSD (post-traumatic stress disorder) (6/6/2017), Right carpal tunnel syndrome (12/26/2018), Self-injurious behavior, and Suicide attempt ([address] )  PSH:   has a past surgical history that includes Abdominal surgery and Hernia repair         ROS:  Review of Systems Constitutional: Negative for chills, diaphoresis, fatigue and fever  Respiratory: Positive for cough  Negative for chest tightness, shortness of breath and wheezing  Cardiovascular: Negative for chest pain and palpitations  Gastrointestinal: Negative for abdominal distention, abdominal pain, constipation, diarrhea, nausea and vomiting  Genitourinary: Negative for dysuria, frequency and hematuria  Musculoskeletal: Negative for arthralgias, myalgias and neck pain  Neurological: Negative for dizziness, syncope, light-headedness and headaches  All other systems reviewed and are negative  PE:   Vitals:    06/14/21 2004 06/14/21 2156   BP: 149/86 145/86   BP Location: Right arm    Pulse: 99 98   Resp: 20 20   Temp: 98 7 °F (37 1 °C) 98 6 °F (37 °C)   TempSrc: Oral    SpO2: 99% 99%   Weight: 108 kg (238 lb 8 6 oz)      Vitals reviewed by me  Physical Exam  Vitals reviewed  Constitutional:       General: She is not in acute distress  Appearance: She is not diaphoretic  HENT:      Head: Normocephalic and atraumatic  Right Ear: External ear normal       Left Ear: External ear normal    Eyes:      General: No scleral icterus  Right eye: No discharge  Left eye: No discharge  Conjunctiva/sclera: Conjunctivae normal       Pupils: Pupils are equal, round, and reactive to light  Neck:      Vascular: No JVD  Cardiovascular:      Rate and Rhythm: Regular rhythm  Tachycardia present  Heart sounds: Normal heart sounds  No murmur heard  No friction rub  No gallop  Pulmonary:      Effort: Pulmonary effort is normal  No respiratory distress or retractions  Breath sounds: Transmitted upper airway sounds present  Examination of the right-upper field reveals decreased breath sounds  Examination of the right-middle field reveals decreased breath sounds  Decreased breath sounds present  No wheezing or rales     Abdominal:      General: Bowel sounds are normal  There is no distension  Palpations: Abdomen is soft  There is no mass  Tenderness: There is no abdominal tenderness  There is no guarding  Musculoskeletal:         General: No tenderness or deformity  Normal range of motion  Cervical back: Normal range of motion and neck supple  Skin:     General: Skin is warm  Neurological:      Mental Status: She is alert and oriented to person, place, and time  Cranial Nerves: No cranial nerve deficit  Sensory: No sensory deficit  Motor: No abnormal muscle tone  Coordination: Coordination normal    Psychiatric:         Behavior: Behavior normal          Thought Content: Thought content normal          Judgment: Judgment normal           A:  - Nursing note reviewed  -                      XR chest 2 views    (Results Pending)     Orders Placed This Encounter   Procedures    XR chest 2 views     Labs Reviewed - No data to display      Final Diagnosis:  1  Cough        P:  - chest x-ray without any consolidations on my interpretation  -I discussed with the patient that she may have a bronchitis which could last for up to a month  Prefers great option for Robitussin provided at patient's request   I discussed with her that she could not find that she should take it an over-the-counter that has dextremorphan it that that could help her sleep at night  Return precautions discussed  Medications - No data to display  Time reflects when diagnosis was documented in both MDM as applicable and the Disposition within this note     Time User Action Codes Description Comment    6/14/2021  9:52 PM Lukasz Reno Add [R05] Cough       ED Disposition     ED Disposition Condition Date/Time Comment    Discharge Stable Mon Jun 14, 2021  9:52 PM Faisal Rogers discharge to home/self care              Follow-up Information     Follow up With Specialties Details Why Contact Info    Your PCP  Schedule an appointment as soon as possible for a visit  If symptoms worsen         Discharge Medication List as of 6/14/2021  9:54 PM      START taking these medications    Details   dextromethorphan-guaiFENesin (ROBITUSSIN DM)  mg/5 mL syrup Take 5 mL by mouth 3 (three) times a day as needed for cough, Starting Mon 6/14/2021, Print         CONTINUE these medications which have NOT CHANGED    Details   albuterol (2 5 mg/3 mL) 0 083 % nebulizer solution Take 3 mL (2 5 mg total) by nebulization every 6 (six) hours as needed for wheezing or shortness of breath for up to 30 doses, Starting Thu 6/10/2021, Normal      !! albuterol (PROVENTIL HFA,VENTOLIN HFA) 90 mcg/act inhaler Inhale 2 puffs every 4 (four) hours as needed for wheezing, Starting Wed 5/19/2021, Normal      !! albuterol (PROVENTIL HFA,VENTOLIN HFA) 90 mcg/act inhaler Inhale 2 puffs every 4 (four) hours as needed for wheezing, Starting Thu 6/10/2021, Normal      atoMOXetine (STRATTERA) 40 mg capsule Take 40 mg by mouth daily, Historical Med      Banophen 25 MG capsule Starting Thu 11/19/2020, Historical Med      budesonide-formoterol (SYMBICORT) 80-4 5 MCG/ACT inhaler Inhale 2 puffs 2 (two) times a day Rinse mouth after use , Starting Thu 6/10/2021, Normal      !! busPIRone (BUSPAR) 15 mg tablet Starting Mon 11/9/2020, Historical Med      !! busPIRone (BUSPAR) 5 mg tablet Take 5 mg by mouth 2 (two) times a day, Starting Fri 9/20/2019, Historical Med      cetirizine (ZyrTEC) 10 mg tablet TAKE ONE TABLET BY MOUTH EVERY DAY, Normal      clonazePAM (KlonoPIN) 0 5 mg tablet Starting Tue 11/26/2019, Historical Med      diphenhydrAMINE (BENADRYL) 25 mg tablet Take 1 tablet (25 mg total) by mouth every 6 (six) hours as needed for itching, Starting Wed 1/6/2021, Normal      DULoxetine (CYMBALTA) 60 mg delayed release capsule Starting Mon 11/16/2020, Historical Med      fluticasone (FLONASE) 50 mcg/act nasal spray 1 spray into each nostril daily, Starting Thu 2/6/2020, Normal      guaiFENesin (MUCINEX) 600 mg 12 hr tablet Take 2 tablets (1,200 mg total) by mouth every 12 (twelve) hours, Starting Thu 6/10/2021, Normal      hydrOXYzine pamoate (VISTARIL) 50 mg capsule Take 50 mg by mouth 2 (two) times a day as needed, Starting Fri 5/31/2019, Historical Med      ibuprofen (MOTRIN) 600 mg tablet Take 1 tablet (600 mg total) by mouth every 8 (eight) hours as needed for mild pain or fever, Starting Thu 4/29/2021, Normal      levothyroxine 100 mcg tablet Take 1 tablet (100 mcg total) by mouth every morning, Starting Wed 4/14/2021, Normal      nicotine (NICODERM CQ) 7 mg/24hr TD 24 hr patch Place 1 patch on the skin every 24 hours, Starting Thu 6/10/2021, Normal      omeprazole (PriLOSEC) 20 mg delayed release capsule Take 1 capsule (20 mg total) by mouth daily, Starting Fri 4/30/2021, Normal      psyllium (METAMUCIL SMOOTH TEXTURE) 28 % packet Take 1 packet by mouth 2 (two) times a day, Starting Wed 4/14/2021, Normal      topiramate (TOPAMAX) 100 mg tablet Take 1 tablet (100 mg total) by mouth 2 (two) times a day, Starting Mon 9/28/2020, Normal      venlafaxine (EFFEXOR-XR) 37 5 mg 24 hr capsule Take 1 capsule (37 5 mg total) by mouth daily with breakfast, Starting Mon 8/17/2020, Normal       !! - Potential duplicate medications found  Please discuss with provider  No discharge procedures on file  Prior to Admission Medications   Prescriptions Last Dose Informant Patient Reported? Taking?    Banophen 25 MG capsule   Yes No   DULoxetine (CYMBALTA) 60 mg delayed release capsule   Yes No   albuterol (2 5 mg/3 mL) 0 083 % nebulizer solution   No No   Sig: Take 3 mL (2 5 mg total) by nebulization every 6 (six) hours as needed for wheezing or shortness of breath for up to 30 doses   albuterol (PROVENTIL HFA,VENTOLIN HFA) 90 mcg/act inhaler   No No   Sig: Inhale 2 puffs every 4 (four) hours as needed for wheezing   albuterol (PROVENTIL HFA,VENTOLIN HFA) 90 mcg/act inhaler   No No   Sig: Inhale 2 puffs every 4 (four) hours as needed for wheezing atoMOXetine (STRATTERA) 40 mg capsule   Yes No   Sig: Take 40 mg by mouth daily   budesonide-formoterol (SYMBICORT) 80-4 5 MCG/ACT inhaler   No No   Sig: Inhale 2 puffs 2 (two) times a day Rinse mouth after use  busPIRone (BUSPAR) 15 mg tablet   Yes No   busPIRone (BUSPAR) 5 mg tablet   Yes No   Sig: Take 5 mg by mouth 2 (two) times a day   cetirizine (ZyrTEC) 10 mg tablet   No No   Sig: TAKE ONE TABLET BY MOUTH EVERY DAY   clonazePAM (KlonoPIN) 0 5 mg tablet   Yes No   diphenhydrAMINE (BENADRYL) 25 mg tablet   No No   Sig: Take 1 tablet (25 mg total) by mouth every 6 (six) hours as needed for itching   fluticasone (FLONASE) 50 mcg/act nasal spray   No No   Si spray into each nostril daily   guaiFENesin (MUCINEX) 600 mg 12 hr tablet   No No   Sig: Take 2 tablets (1,200 mg total) by mouth every 12 (twelve) hours   hydrOXYzine pamoate (VISTARIL) 50 mg capsule   Yes No   Sig: Take 50 mg by mouth 2 (two) times a day as needed   ibuprofen (MOTRIN) 600 mg tablet   No No   Sig: Take 1 tablet (600 mg total) by mouth every 8 (eight) hours as needed for mild pain or fever   levothyroxine 100 mcg tablet   No No   Sig: Take 1 tablet (100 mcg total) by mouth every morning   nicotine (NICODERM CQ) 7 mg/24hr TD 24 hr patch   No No   Sig: Place 1 patch on the skin every 24 hours   omeprazole (PriLOSEC) 20 mg delayed release capsule   No No   Sig: Take 1 capsule (20 mg total) by mouth daily   psyllium (METAMUCIL SMOOTH TEXTURE) 28 % packet   No No   Sig: Take 1 packet by mouth 2 (two) times a day   topiramate (TOPAMAX) 100 mg tablet   No No   Sig: Take 1 tablet (100 mg total) by mouth 2 (two) times a day   venlafaxine (EFFEXOR-XR) 37 5 mg 24 hr capsule   No No   Sig: Take 1 capsule (37 5 mg total) by mouth daily with breakfast      Facility-Administered Medications: None       Portions of the record may have been created with voice recognition software   Occasional wrong word or "sound a like" substitutions may have occurred due to the inherent limitations of voice recognition software  Read the chart carefully and recognize, using context, where substitutions have occurred      Electronically signed by:  Bharat Culp, PGY 3, MD Filomena Jett MD  06/14/21 [id number]

## 2021-06-30 ENCOUNTER — OFFICE VISIT (OUTPATIENT)
Dept: FAMILY MEDICINE CLINIC | Facility: CLINIC | Age: 46
End: 2021-06-30

## 2021-06-30 VITALS
HEART RATE: 74 BPM | BODY MASS INDEX: 43.34 KG/M2 | DIASTOLIC BLOOD PRESSURE: 74 MMHG | OXYGEN SATURATION: 95 % | RESPIRATION RATE: 20 BRPM | TEMPERATURE: 98 F | SYSTOLIC BLOOD PRESSURE: 116 MMHG | WEIGHT: 235.5 LBS | HEIGHT: 62 IN

## 2021-06-30 DIAGNOSIS — J01.10 ACUTE NON-RECURRENT FRONTAL SINUSITIS: ICD-10-CM

## 2021-06-30 DIAGNOSIS — F31.60 BIPOLAR 1 DISORDER, MIXED (HCC): ICD-10-CM

## 2021-06-30 DIAGNOSIS — J45.41 MODERATE PERSISTENT ASTHMA WITH ACUTE EXACERBATION: Primary | ICD-10-CM

## 2021-06-30 PROCEDURE — 3074F SYST BP LT 130 MM HG: CPT | Performed by: NURSE PRACTITIONER

## 2021-06-30 PROCEDURE — 3078F DIAST BP <80 MM HG: CPT | Performed by: NURSE PRACTITIONER

## 2021-06-30 PROCEDURE — T1015 CLINIC SERVICE: HCPCS | Performed by: NURSE PRACTITIONER

## 2021-06-30 PROCEDURE — 99214 OFFICE O/P EST MOD 30 MIN: CPT | Performed by: NURSE PRACTITIONER

## 2021-06-30 RX ORDER — FLUTICASONE PROPIONATE 50 MCG
1 SPRAY, SUSPENSION (ML) NASAL DAILY
Qty: 16 G | Refills: 2 | Status: SHIPPED | OUTPATIENT
Start: 2021-06-30 | End: 2021-11-05 | Stop reason: SDUPTHER

## 2021-06-30 RX ORDER — IPRATROPIUM BROMIDE AND ALBUTEROL SULFATE 2.5; .5 MG/3ML; MG/3ML
3 SOLUTION RESPIRATORY (INHALATION) EVERY 6 HOURS PRN
Qty: 200 ML | Refills: 1 | Status: SHIPPED | OUTPATIENT
Start: 2021-06-30 | End: 2021-10-25 | Stop reason: SDUPTHER

## 2021-06-30 RX ORDER — FEXOFENADINE HCL 180 MG/1
180 TABLET ORAL DAILY
Qty: 90 TABLET | Refills: 1 | Status: SHIPPED | OUTPATIENT
Start: 2021-06-30 | End: 2021-11-05

## 2021-06-30 NOTE — PROGRESS NOTES
Assessment/Plan:    Asthma exacerbation  She has had persistent sx for past several months - she apparently never received maintenance inhaler and so has been using albuterol multiple times per day   Symbicort was not covered- will send for Advair at max dose as she was previously on 250-50 with no improvement in sx   Change cetirizine to Allegra, start Flonase   Duo neb solution for nebulizer   Referral to pulmonology       Benign essential hypertension  Blood pressure is currently at goal, not on any antihypertensives    Nika was seen today for asthma and night sweats  Diagnoses and all orders for this visit:    Moderate persistent asthma with acute exacerbation  -     ipratropium-albuterol (DUO-NEB) 0 5-2 5 mg/3 mL nebulizer solution; Take 3 mL by nebulization every 6 (six) hours as needed for wheezing or shortness of breath  -     fluticasone-salmeterol (Advair) 500-50 mcg/dose inhaler; Inhale 1 puff 2 (two) times a day Rinse mouth after use  -     fexofenadine (ALLEGRA) 180 MG tablet; Take 1 tablet (180 mg total) by mouth daily  -     fluticasone (FLONASE) 50 mcg/act nasal spray; 1 spray into each nostril daily  -     Ambulatory referral to Pulmonology; Future    Acute non-recurrent frontal sinusitis  Comments: Will treat with amoxicillin, flonase and Zyrtec  To increase fluids and take Tylenol for headache and fever  Bipolar 1 disorder, mixed ([facility] )  -     Ambulatory referral to Psychiatry; Future        Return in about 4 weeks (around 7/28/2021) for Recheck asthma  Patient Instructions     Asthma   AMBULATORY CARE:   Asthma  is a lung disease that makes breathing difficult  Chronic inflammation and reactions to triggers narrow the airways in your lungs  Asthma can become life-threatening if it is not managed  Cough-variant asthma  is a type of asthma that causes a dry cough that keeps coming back  A dry cough may be your only symptom, or you may also have chest tightness   These symptoms may be caused by exercise or exposure to odors, allergens, or respiratory tract infections  Cough-variant asthma is treated the same way as typical asthma  Common symptoms include the following:   · Coughing    · Wheezing    · Shortness of breath    · Chest tightness    Call your local emergency number (911 in the [address] [facility] [address] [facility]) if:   · You have severe shortness of breath  · Your lips or nails turn blue or gray  · The skin around your neck and ribs pulls in with each breath  · You have shortness of breath, even after you take your short-term medicine as directed  · Your peak flow numbers are in the red zone of your AAP  Call your doctor if:   · You run out of medicine before your next refill is due  · Your symptoms get worse  · You need to take more medicine than usual to control your symptoms  · You have questions or concerns about your condition or care  Treatment for asthma  will depend on how severe your asthma is  Medicine may decrease inflammation, open airways, and make it easier to breathe  Medicines may be inhaled, taken as a pill, or injected  Short-term medicines relieve your symptoms quickly  Long-term medicines are used to prevent future attacks  You may also need medicine to help control your allergies  Manage and prevent future asthma attacks:   · Follow your asthma action plan  This is a written plan that you and your healthcare provider create  It explains which medicine you need and when to change doses if necessary  It also explains how you can monitor symptoms and use a peak flow meter  The meter measures how well your lungs are working  · Manage other health conditions , such as allergies, acid reflux, and sleep apnea  · Identify and avoid triggers  These may include pets, dust mites, mold, and cockroaches  · Do not smoke or be around others who smoke  Nicotine and other chemicals in cigarettes and cigars can cause lung damage   Ask your healthcare provider for information if you currently smoke and need help to quit  E-cigarettes or smokeless tobacco still contain nicotine  Talk to your healthcare provider before you use these products  · Ask about the flu vaccine  The flu can make your asthma worse  You may need a yearly flu shot  Follow up with your healthcare provider as directed: You will need to return to make sure your medicine is working and your symptoms are controlled  You may be referred to an asthma or allergy specialist  Nolviamaye Rojo may be asked to keep a record of your peak flow values and bring it with you to your appointments  Write down your questions so you remember to ask them during your visits  © Copyright [address] Information is for End User's use only and may not be sold, redistributed or otherwise used for commercial purposes  All illustrations and images included in CareNotes® are the copyrighted property of A D A M , Inc  or [employer]   The above information is an  only  It is not intended as medical advice for individual conditions or treatments  Talk to your doctor, nurse or pharmacist before following any medical regimen to see if it is safe and effective for you  Subjective:     Irish Weems is a 42 y o  female who  has a past medical history of Anxiety, Asthma, Bipolar disorder ([address] ), Depression, Disease of thyroid gland, Endometriosis, Psychiatric illness, Psychosis ([address] ), PTSD (post-traumatic stress disorder), Right carpal tunnel syndrome, Self-injurious behavior, and Suicide attempt ([address] )   She also has no past medical history of ADHD (attention deficit hyperactivity disorder), Alcohol abuse, Alcoholism ([address] ), Anorexia nervosa, Autism spectrum disorder, Borderline personality disorder ([address] ), Bulimia nervosa, Cancer ([address] ), Chronic pain disorder, Head injury, HIV disease ([address] ), Liver disease, Obsessive-compulsive disorder, Oppositional defiant disorder, Panic disorder, Schizoaffective disorder ([facility] ), Seizures ([facility] ), Violence, history of, Withdrawal symptoms, alcohol ([place] [address] ), or Withdrawal symptoms, drug or narcotic ([place] [address] )  who presented to the office today for follow up  Cough  Patient complains of nonproductive cough and wheezing  Symptoms began 2 months ago  Symptoms have been unchanged since that time  The cough is dry and is aggravated by  humidity, seasonal allergies   Associated symptoms include: shortness of breath  She was previously treated with prednisone and z-pack w/ no improvement in sx  Patient does not have new pets  Patient does have a history of asthma  Patient does have a history of environmental allergens  Patient has not traveled recently  Patient does have a history of smoking  Patient has had a previous chest x-ray  Patient has not had a PPD done  The following portions of the patient's history were reviewed and updated as appropriate: allergies, current medications, past family history, past medical history, past social history, past surgical history and problem list     Current Outpatient Medications on File Prior to Visit   Medication Sig Dispense Refill    albuterol (PROVENTIL HFA,VENTOLIN HFA) 90 mcg/act inhaler Inhale 2 puffs every 4 (four) hours as needed for wheezing 8 g 0    atoMOXetine (STRATTERA) 40 mg capsule Take 40 mg by mouth daily      Banophen 25 MG capsule       [DISCONTINUED] albuterol (2 5 mg/3 mL) 0 083 % nebulizer solution Take 3 mL (2 5 mg total) by nebulization every 6 (six) hours as needed for wheezing or shortness of breath for up to 30 doses 30 mL 0    [DISCONTINUED] budesonide-formoterol (SYMBICORT) 80-4 5 MCG/ACT inhaler Inhale 2 puffs 2 (two) times a day Rinse mouth after use   10 2 g 2    albuterol (PROVENTIL HFA,VENTOLIN HFA) 90 mcg/act inhaler Inhale 2 puffs every 4 (four) hours as needed for wheezing 18 g 2    busPIRone (BUSPAR) 15 mg tablet       busPIRone (BUSPAR) 5 mg tablet Take 5 mg by mouth 2 (two) times a day  1    clonazePAM (KlonoPIN) 0 5 mg tablet       dextromethorphan-guaiFENesin (ROBITUSSIN DM)  mg/5 mL syrup Take 5 mL by mouth 3 (three) times a day as needed for cough 236 mL 0    diphenhydrAMINE (BENADRYL) 25 mg tablet Take 1 tablet (25 mg total) by mouth every 6 (six) hours as needed for itching 30 tablet 0    DULoxetine (CYMBALTA) 60 mg delayed release capsule       guaiFENesin (MUCINEX) 600 mg 12 hr tablet Take 2 tablets (1,200 mg total) by mouth every 12 (twelve) hours 30 tablet 1    hydrOXYzine pamoate (VISTARIL) 50 mg capsule Take 50 mg by mouth 2 (two) times a day as needed  1    ibuprofen (MOTRIN) 600 mg tablet Take 1 tablet (600 mg total) by mouth every 8 (eight) hours as needed for mild pain or fever 30 tablet 0    levothyroxine 100 mcg tablet Take 1 tablet (100 mcg total) by mouth every morning 30 tablet 2    nicotine (NICODERM CQ) 7 mg/24hr TD 24 hr patch Place 1 patch on the skin every 24 hours 28 patch 0    omeprazole (PriLOSEC) 20 mg delayed release capsule Take 1 capsule (20 mg total) by mouth daily 30 capsule 2    psyllium (METAMUCIL SMOOTH TEXTURE) 28 % packet Take 1 packet by mouth 2 (two) times a day 75 packet 1    topiramate (TOPAMAX) 100 mg tablet Take 1 tablet (100 mg total) by mouth 2 (two) times a day 60 tablet 5    venlafaxine (EFFEXOR-XR) 37 5 mg 24 hr capsule Take 1 capsule (37 5 mg total) by mouth daily with breakfast 30 capsule 1    [DISCONTINUED] cetirizine (ZyrTEC) 10 mg tablet TAKE ONE TABLET BY MOUTH EVERY DAY 30 tablet 5    [DISCONTINUED] fluticasone (FLONASE) 50 mcg/act nasal spray 1 spray into each nostril daily 1 Bottle 2     No current facility-administered medications on file prior to visit  Review of Systems   Constitutional: Negative for activity change, appetite change, chills, fatigue, fever and unexpected weight change  HENT: Negative for hearing loss, nosebleeds, sinus pain, sneezing, sore throat and trouble swallowing      Eyes: Negative for photophobia and visual disturbance  Respiratory: Positive for chest tightness, shortness of breath and wheezing  Negative for cough  Cardiovascular: Negative for chest pain, palpitations and leg swelling  Gastrointestinal: Negative for abdominal pain, constipation, nausea and vomiting  Genitourinary: Negative for decreased urine volume, difficulty urinating, dysuria, flank pain, genital sores, hematuria and urgency  Musculoskeletal: Negative for back pain and gait problem  Skin: Negative for pallor, rash and wound  Allergic/Immunologic: Positive for environmental allergies  Neurological: Negative for dizziness, seizures, syncope, weakness, numbness and headaches  Hematological: Negative for adenopathy  Does not bruise/bleed easily  Psychiatric/Behavioral: Negative for confusion, hallucinations, self-injury, sleep disturbance and suicidal ideas  The patient is not nervous/anxious  BMI Counseling: Body mass index is 43 07 kg/m²  The BMI is above normal  Nutrition recommendations include decreasing portion sizes, decreasing fast food intake, consuming healthier snacks and limiting drinks that contain sugar  Exercise recommendations include exercising 3-5 times per week  Objective:    /74 (BP Location: Left arm, Patient Position: Sitting, Cuff Size: Adult)   Pulse 74   Temp 98 °F (36 7 °C) (Temporal)   Resp 20   Ht 5' 2" (1 575 m)   Wt 107 kg (235 lb 8 oz)   SpO2 95%   BMI 43 07 kg/m²     Physical Exam  Vitals and nursing note reviewed  Constitutional:       General: She is not in acute distress  Appearance: She is well-developed  She is not diaphoretic  HENT:      Head: Normocephalic and atraumatic  Right Ear: External ear normal       Left Ear: External ear normal    Eyes:      General:         Right eye: No discharge  Left eye: No discharge  Cardiovascular:      Rate and Rhythm: Normal rate and regular rhythm  Heart sounds: Normal heart sounds  Pulmonary:      Effort: Pulmonary effort is normal  No respiratory distress  Breath sounds: Decreased breath sounds present  No wheezing  Abdominal:      General: Bowel sounds are normal  There is no distension  Palpations: Abdomen is soft  Tenderness: There is no abdominal tenderness  Musculoskeletal:         General: No deformity  Normal range of motion  Cervical back: Normal range of motion and neck supple  Lymphadenopathy:      Cervical: No cervical adenopathy  Skin:     General: Skin is warm and dry  Capillary Refill: Capillary refill takes less than 2 seconds  Findings: No rash  Neurological:      Mental Status: She is alert and oriented to person, place, and time     Psychiatric:         Behavior: Behavior normal          JUSTO Oneil  06/30/21  6:34 PM

## 2021-06-30 NOTE — ASSESSMENT & PLAN NOTE
She has had persistent sx for past several months - she apparently never received maintenance inhaler and so has been using albuterol multiple times per day   Symbicort was not covered- will send for Advair at max dose as she was previously on 250-50 with no improvement in sx   Change cetirizine to Allegra, start Flonase   Duo neb solution for nebulizer   Referral to pulmonology

## 2021-06-30 NOTE — PATIENT INSTRUCTIONS
Asthma   AMBULATORY CARE:   Asthma  is a lung disease that makes breathing difficult  Chronic inflammation and reactions to triggers narrow the airways in your lungs  Asthma can become life-threatening if it is not managed  Cough-variant asthma  is a type of asthma that causes a dry cough that keeps coming back  A dry cough may be your only symptom, or you may also have chest tightness  These symptoms may be caused by exercise or exposure to odors, allergens, or respiratory tract infections  Cough-variant asthma is treated the same way as typical asthma  Common symptoms include the following:   · Coughing    · Wheezing    · Shortness of breath    · Chest tightness    Call your local emergency number (911 in the [address] [facility]) if:   · You have severe shortness of breath  · Your lips or nails turn blue or gray  · The skin around your neck and ribs pulls in with each breath  · You have shortness of breath, even after you take your short-term medicine as directed  · Your peak flow numbers are in the red zone of your AAP  Call your doctor if:   · You run out of medicine before your next refill is due  · Your symptoms get worse  · You need to take more medicine than usual to control your symptoms  · You have questions or concerns about your condition or care  Treatment for asthma  will depend on how severe your asthma is  Medicine may decrease inflammation, open airways, and make it easier to breathe  Medicines may be inhaled, taken as a pill, or injected  Short-term medicines relieve your symptoms quickly  Long-term medicines are used to prevent future attacks  You may also need medicine to help control your allergies  Manage and prevent future asthma attacks:   · Follow your asthma action plan  This is a written plan that you and your healthcare provider create  It explains which medicine you need and when to change doses if necessary   It also explains how you can monitor symptoms and use a peak flow meter  The meter measures how well your lungs are working  · Manage other health conditions , such as allergies, acid reflux, and sleep apnea  · Identify and avoid triggers  These may include pets, dust mites, mold, and cockroaches  · Do not smoke or be around others who smoke  Nicotine and other chemicals in cigarettes and cigars can cause lung damage  Ask your healthcare provider for information if you currently smoke and need help to quit  E-cigarettes or smokeless tobacco still contain nicotine  Talk to your healthcare provider before you use these products  · Ask about the flu vaccine  The flu can make your asthma worse  You may need a yearly flu shot  Follow up with your healthcare provider as directed: You will need to return to make sure your medicine is working and your symptoms are controlled  You may be referred to an asthma or allergy specialist  Joelperez Mclean may be asked to keep a record of your peak flow values and bring it with you to your appointments  Write down your questions so you remember to ask them during your visits  © Copyright [address] Information is for End User's use only and may not be sold, redistributed or otherwise used for commercial purposes  All illustrations and images included in CareNotes® are the copyrighted property of A D A M , Inc  or [address] [place], [place] [address]dhaval Lamb   The above information is an  only  It is not intended as medical advice for individual conditions or treatments  Talk to your doctor, nurse or pharmacist before following any medical regimen to see if it is safe and effective for you

## 2021-07-13 ENCOUNTER — PATIENT OUTREACH (OUTPATIENT)
Dept: FAMILY MEDICINE CLINIC | Facility: CLINIC | Age: 46
End: 2021-07-13

## 2021-07-13 RX ORDER — ATOMOXETINE 40 MG/1
40 CAPSULE ORAL DAILY
OUTPATIENT
Start: 2021-07-13

## 2021-07-13 RX ORDER — DULOXETIN HYDROCHLORIDE 60 MG/1
CAPSULE, DELAYED RELEASE ORAL
OUTPATIENT
Start: 2021-07-13

## 2021-07-13 RX ORDER — BUSPIRONE HYDROCHLORIDE 15 MG/1
TABLET ORAL
OUTPATIENT
Start: 2021-07-13

## 2021-07-13 RX ORDER — CLONAZEPAM 0.5 MG/1
TABLET ORAL
OUTPATIENT
Start: 2021-07-13

## 2021-07-13 NOTE — TELEPHONE ENCOUNTER
Patient called stating she needs BH medications refilled  She is currently looking for new [address] [place] [address] provider but needs her medications  Previous [address] [place] [address] provider advised her to contact PCP    atoMOXetine (STRATTERA) 40 mg capsule  busPIRone (BUSPAR) 15 mg tablet  DULoxetine (CYMBALTA) 60 mg delayed release capsule  clonazePAM (KlonoPIN) 0 5 mg tablet    Please send to pharmacy on file

## 2021-07-13 NOTE — PROGRESS NOTES
ENEIDA TELLO contacted patient at this time to see if assistance was needed with scheduling therapy or psychiatry  Patient states she was able to schedule an appointment with [facility]  Patient reports appointment with [facility] is scheduled for Thursday 7/15  Referral will be closed ENEIDA TELLO available if needed

## 2021-07-19 DIAGNOSIS — G43.009 MIGRAINE WITHOUT AURA AND WITHOUT STATUS MIGRAINOSUS, NOT INTRACTABLE: Primary | ICD-10-CM

## 2021-07-22 ENCOUNTER — OFFICE VISIT (OUTPATIENT)
Dept: NEUROLOGY | Facility: CLINIC | Age: 46
End: 2021-07-22
Payer: COMMERCIAL

## 2021-07-22 VITALS
DIASTOLIC BLOOD PRESSURE: 80 MMHG | RESPIRATION RATE: 18 BRPM | SYSTOLIC BLOOD PRESSURE: 126 MMHG | WEIGHT: 234.2 LBS | BODY MASS INDEX: 43.1 KG/M2 | HEART RATE: 58 BPM | HEIGHT: 62 IN

## 2021-07-22 DIAGNOSIS — IMO0002 CHRONIC MIGRAINE: Primary | ICD-10-CM

## 2021-07-22 DIAGNOSIS — G43.009 MIGRAINE WITHOUT AURA AND WITHOUT STATUS MIGRAINOSUS, NOT INTRACTABLE: ICD-10-CM

## 2021-07-22 PROCEDURE — 99213 OFFICE O/P EST LOW 20 MIN: CPT | Performed by: PHYSICIAN ASSISTANT

## 2021-07-22 RX ORDER — ONDANSETRON 4 MG/1
4 TABLET, ORALLY DISINTEGRATING ORAL EVERY 6 HOURS PRN
Qty: 20 TABLET | Refills: 0 | Status: SHIPPED | OUTPATIENT
Start: 2021-07-22 | End: 2021-08-18 | Stop reason: SDUPTHER

## 2021-07-22 RX ORDER — TOPIRAMATE 50 MG/1
TABLET, FILM COATED ORAL
Qty: 90 TABLET | Refills: 1 | Status: SHIPPED | OUTPATIENT
Start: 2021-07-22 | End: 2021-12-07 | Stop reason: DRUGHIGH

## 2021-07-22 RX ORDER — RIZATRIPTAN BENZOATE 10 MG/1
10 TABLET ORAL ONCE AS NEEDED
Qty: 9 TABLET | Refills: 2 | Status: SHIPPED | OUTPATIENT
Start: 2021-07-22 | End: 2022-02-01

## 2021-07-22 NOTE — LETTER
July 22, 2021     Patient: Joanne Mittal   YOB: 1975   Date of Visit: 7/22/2021       To Whom it May Concern:    Joanne Mittal is under my professional care  She was seen in my office on 7/22/2021  She would benefit from tinted windows in her vehicle (sides and front windshield) due to photophobia associated with chronic migraine headaches  If you have any questions or concerns, please don't hesitate to call           Sincerely,          Blanche Jernigan PA-C        CC: No Recipients

## 2021-07-22 NOTE — PATIENT INSTRUCTIONS
Increase topamax to 100 mg am and 150 mg bed, add electrolyte drinks or foods with potassium if you get numbness and tingling  Rescue: Maxalt + benadryl + zofran- cocktail  If needed, will send the toradol

## 2021-07-22 NOTE — PROGRESS NOTES
Patient ID: Soy Acevedo is a 42 y o  female  Assessment/Plan:     Diagnoses and all orders for this visit:    Chronic migraine  -     Ambulatory referral to Neurology  -     Discontinue: ondansetron (ZOFRAN-ODT) 4 mg disintegrating tablet; Take 1 tablet (4 mg total) by mouth every 6 (six) hours as needed for nausea or vomiting  -     topiramate (TOPAMAX) 50 MG tablet; 1 tab qhs (with 100 mg qhs)  -     rizatriptan (MAXALT) 10 MG tablet; Take 1 tablet (10 mg total) by mouth once as needed for migraine for up to 1 dose May repeat in 2 hours if needed    Migraine without aura and without status migrainosus, not intractable  -     Ambulatory referral to Neurology    BMI 40 0-44 9, [facility])  -     Ambulatory referral to Weight Management; Future         She is happy with the medication Topamax for prevention of migraines, however since migraines have worsened in frequency recently she would be agreeable to increasing the dose of Topamax  She can continue 100 mg q 12 hours as instructed by Dr Lida Matthews in the past, and titrate up to 150 mg q h s   Continue 100 mg in the morning  Side effects reviewed  I added Maxalt for rescue, continue Zofran p r n  nausea  Side effects of all new medications above reviewed  Continue to keep a journal or record of headaches and any triggers or patterns which are not yet known  The patient should not hesitate to call me prior to her follow up with any questions or concerns  Subjective:    HPI      Ms Soy Acevedo is a 44-year-old female who is here for neurological follow-up for chronic migraine headaches  She is a former patient of Dr Lida Matthews  She was last seen by him on 9/28/2020 at which time it was mentioned that she was satisfied with the current Topamax med for prevention of migraines  She continues taking the same dose at 100 mg twice daily  She denies side effects      The most recent blood work was performed in February 2020: Normal TSH, in January 2020 Topamax level was within normal limits, CMP normal, CBC with diff normal except for a slightly low MPV  In February triglycerides 238, otherwise cholesterol panel was all within normal limits  Meds tried:  - Tylenol- nose bleeds  - Ibuprofen- sometimes works for headache  - imitrex  - benadryl  - topamax 100 mg BID- has been on this    Onset: late 25s  Head injury(?): none  Current pain: 8/10  Frequency: 1-2 per month  Duration:  1-2 days or longer  Location: R > L hemisphere, or holocranial  Quality: Sharp or shooting pain, pulsatile  Associated with: nausea, sometimes vomiting, photophobia, phonophobia    Triggers: bright sun, red wine, missing meals, chocolate (white chocolate okay), peanuts  Aura/ warning: none except maybe photophobia    She has followed with Ophthalmology who does not feel there is any problem using the topiramate  There is a family hx of glaucoma apparently  Family hx of migraines: not sure/ denies  Family hx of cerebral aneurysms: unsure    Personal history of ablation and has not had her menstrual cycle      The following portions of the patient's history were reviewed and updated as appropriate:   She  has a past medical history of Anxiety, Asthma, Bipolar disorder ([address] ), Depression, Disease of thyroid gland, Endometriosis, Psychiatric illness, Psychosis ([address] ), PTSD (post-traumatic stress disorder) (6/6/2017), Right carpal tunnel syndrome (12/26/2018), Self-injurious behavior, and Suicide attempt ([address] )    She   Patient Active Problem List    Diagnosis Date Noted    BMI 40 0-44 9, adult ([facility] ) 09/18/2021    Asthma 08/05/2021    Chronic cough 08/05/2021    Chronic migraine 07/22/2021    Asthma exacerbation 06/10/2021    Seasonal allergies 06/10/2021    Tobacco dependence 06/10/2021    Acute non intractable tension-type headache 11/20/2020    Poor dentition 11/20/2020    Constipation 11/20/2020    Need for vaccination 11/20/2020    Sciatica of right side 08/18/2020    Perimenopause 08/18/2020    Sleep apnea 05/11/2020    Circadian rhythm sleep disorder, delayed sleep phase type 05/11/2020    COVID-19 virus infection 04/22/2020    Right carpal tunnel syndrome 12/26/2018    Right leg paresthesias 12/26/2018    Migraine without aura and without status migrainosus, not intractable 10/24/2018    Bipolar 1 disorder, mixed ([facility] ) 06/06/2017    Generalized anxiety disorder 06/06/2017    Cocaine abuse ([facility] ) 06/06/2017    PTSD (post-traumatic stress disorder) 06/06/2017    S/P endometrial ablation 10/28/2016    Pelvic pain in female 04/07/2016    Mild intermittent asthma without complication 99/91/2571    Benign essential hypertension 02/24/2012    Depressive disorder 02/24/2012    Hypothyroidism 02/24/2012    Low back pain 02/24/2012     She  has a past surgical history that includes Abdominal surgery and Hernia repair  Her family history includes Asthma in her mother; Fibromyalgia in her mother; Hypertension in her mother  She  reports that she has never smoked  She has never used smokeless tobacco  She reports that she does not drink alcohol and does not use drugs    Current Outpatient Medications   Medication Sig Dispense Refill    albuterol (PROVENTIL HFA,VENTOLIN HFA) 90 mcg/act inhaler Inhale 2 puffs every 4 (four) hours as needed for wheezing 8 g 0    albuterol (PROVENTIL HFA,VENTOLIN HFA) 90 mcg/act inhaler Inhale 2 puffs every 4 (four) hours as needed for wheezing 18 g 2    atoMOXetine (STRATTERA) 40 mg capsule Take 40 mg by mouth daily      Banophen 25 MG capsule       busPIRone (BUSPAR) 15 mg tablet       busPIRone (BUSPAR) 5 mg tablet Take 5 mg by mouth 2 (two) times a day  1    clonazePAM (KlonoPIN) 0 5 mg tablet       dextromethorphan-guaiFENesin (ROBITUSSIN DM)  mg/5 mL syrup Take 5 mL by mouth 3 (three) times a day as needed for cough 236 mL 0    diphenhydrAMINE (BENADRYL) 25 mg tablet Take 1 tablet (25 mg total) by mouth every 6 (six) hours as needed for itching 30 tablet 0    DULoxetine (CYMBALTA) 60 mg delayed release capsule       fexofenadine (ALLEGRA) 180 MG tablet Take 1 tablet (180 mg total) by mouth daily 90 tablet 1    fluticasone (FLONASE) 50 mcg/act nasal spray 1 spray into each nostril daily 16 g 2    fluticasone-salmeterol (Advair) 500-50 mcg/dose inhaler Inhale 1 puff 2 (two) times a day Rinse mouth after use   60 blister 2    guaiFENesin (MUCINEX) 600 mg 12 hr tablet Take 2 tablets (1,200 mg total) by mouth every 12 (twelve) hours 30 tablet 1    hydrOXYzine pamoate (VISTARIL) 50 mg capsule Take 50 mg by mouth 2 (two) times a day as needed  1    ibuprofen (MOTRIN) 600 mg tablet Take 1 tablet (600 mg total) by mouth every 8 (eight) hours as needed for mild pain or fever 30 tablet 0    ipratropium-albuterol (DUO-NEB) 0 5-2 5 mg/3 mL nebulizer solution Take 3 mL by nebulization every 6 (six) hours as needed for wheezing or shortness of breath 200 mL 1    nicotine (NICODERM CQ) 7 mg/24hr TD 24 hr patch Place 1 patch on the skin every 24 hours 28 patch 0    psyllium (METAMUCIL SMOOTH TEXTURE) 28 % packet Take 1 packet by mouth 2 (two) times a day 75 packet 1    topiramate (TOPAMAX) 50 MG tablet 1 tab qhs (with 100 mg qhs) 90 tablet 1    venlafaxine (EFFEXOR-XR) 37 5 mg 24 hr capsule Take 1 capsule (37 5 mg total) by mouth daily with breakfast 30 capsule 1    levothyroxine 100 mcg tablet Take 1 tablet (100 mcg total) by mouth every morning 30 tablet 2    olopatadine (PATANOL) 0 1 % ophthalmic solution Administer 1 drop to both eyes 2 (two) times a day 5 mL 3    omeprazole (PriLOSEC) 40 MG capsule Take 1 capsule (40 mg total) by mouth daily 90 capsule 0    ondansetron (ZOFRAN-ODT) 4 mg disintegrating tablet TAKE ONE TABLET BY MOUTH EVERY 6 HOURS AS NEEDED FOR NAUSEA AND VOMITING 20 tablet 0    rizatriptan (MAXALT) 10 MG tablet Take 1 tablet (10 mg total) by mouth once as needed for migraine for up to 1 dose May repeat in 2 hours if needed 9 tablet 2     No current facility-administered medications for this visit  She is allergic to aleve [naproxen], benzonatate, guaifenesin, latuda [lurasidone], and imitrex [sumatriptan]            Objective:    Blood pressure 126/80, pulse 58, resp  rate 18, height 5' 2" (1 575 m), weight 106 kg (234 lb 3 2 oz)  Body mass index is 42 84 kg/m²  Physical Exam    Neurological Exam  On neurologic exam, the patient is alert and oriented to time and place  Speech is fluent and articulate, and the patient follows commands appropriately  Judgment and affect appear normal  Pupils are equally round and reactive to light and extraocular muscles are intact without nystagmus  Face is symmetric, and tongue, uvula, and palate are midline  Hearing is intact  Motor examination reveals intact strength throughout  Normal gait is steady  ROS:    Review of Systems   Constitutional: Positive for fatigue  Negative for appetite change and fever  HENT: Negative  Negative for hearing loss, tinnitus, trouble swallowing and voice change  Eyes: Negative  Negative for photophobia and pain  Respiratory: Negative  Negative for shortness of breath  Cardiovascular: Negative  Negative for palpitations  Gastrointestinal: Negative  Negative for nausea and vomiting  Endocrine: Negative  Negative for cold intolerance  Genitourinary: Negative  Negative for dysuria, frequency and urgency  Musculoskeletal: Negative  Negative for myalgias and neck pain  Skin: Negative  Negative for rash  Allergic/Immunologic: Negative  Neurological: Positive for headaches  Negative for dizziness, tremors, seizures, syncope, facial asymmetry, speech difficulty, weakness, light-headedness and numbness  Hematological: Negative  Does not bruise/bleed easily  Psychiatric/Behavioral: Positive for agitation, behavioral problems and sleep disturbance  Negative for confusion and hallucinations   The patient is nervous/anxious  Depression, anxiety     The following portions of the patient's history were reviewed and updated as appropriate: allergies, current medications/ medication history, past family history, past medical history, past social history, past surgical history and problem list     Review of systems was reviewed and otherwise unremarkable from a neurological perspective

## 2021-07-30 ENCOUNTER — TELEPHONE (OUTPATIENT)
Dept: FAMILY MEDICINE CLINIC | Facility: CLINIC | Age: 46
End: 2021-07-30

## 2021-08-04 ENCOUNTER — APPOINTMENT (OUTPATIENT)
Dept: LAB | Facility: CLINIC | Age: 46
End: 2021-08-04
Payer: COMMERCIAL

## 2021-08-04 DIAGNOSIS — G43.009 MIGRAINE WITHOUT AURA AND WITHOUT STATUS MIGRAINOSUS, NOT INTRACTABLE: ICD-10-CM

## 2021-08-04 DIAGNOSIS — E03.9 HYPOTHYROIDISM, UNSPECIFIED TYPE: ICD-10-CM

## 2021-08-04 LAB
ALBUMIN SERPL BCP-MCNC: 3.8 G/DL (ref 3.5–5)
ALP SERPL-CCNC: 92 U/L (ref 46–116)
ALT SERPL W P-5'-P-CCNC: 33 U/L (ref 12–78)
ANION GAP SERPL CALCULATED.3IONS-SCNC: 6 MMOL/L (ref 4–13)
AST SERPL W P-5'-P-CCNC: 18 U/L (ref 5–45)
BASOPHILS # BLD AUTO: 0.05 THOUSANDS/ΜL (ref 0–0.1)
BASOPHILS NFR BLD AUTO: 1 % (ref 0–1)
BILIRUB SERPL-MCNC: 0.3 MG/DL (ref 0.2–1)
BUN SERPL-MCNC: 14 MG/DL (ref 5–25)
CALCIUM SERPL-MCNC: 9.5 MG/DL (ref 8.3–10.1)
CHLORIDE SERPL-SCNC: 109 MMOL/L (ref 100–108)
CO2 SERPL-SCNC: 24 MMOL/L (ref 21–32)
CREAT SERPL-MCNC: 1.03 MG/DL (ref 0.6–1.3)
EOSINOPHIL # BLD AUTO: 0.12 THOUSAND/ΜL (ref 0–0.61)
EOSINOPHIL NFR BLD AUTO: 2 % (ref 0–6)
ERYTHROCYTE [DISTWIDTH] IN BLOOD BY AUTOMATED COUNT: 13.1 % (ref 11.6–15.1)
GFR SERPL CREATININE-BSD FRML MDRD: 66 ML/MIN/1.73SQ M
GLUCOSE P FAST SERPL-MCNC: 83 MG/DL (ref 65–99)
HCT VFR BLD AUTO: 43.4 % (ref 34.8–46.1)
HGB BLD-MCNC: 14 G/DL (ref 11.5–15.4)
IMM GRANULOCYTES # BLD AUTO: 0.04 THOUSAND/UL (ref 0–0.2)
IMM GRANULOCYTES NFR BLD AUTO: 1 % (ref 0–2)
LYMPHOCYTES # BLD AUTO: 2.21 THOUSANDS/ΜL (ref 0.6–4.47)
LYMPHOCYTES NFR BLD AUTO: 36 % (ref 14–44)
MCH RBC QN AUTO: 29.5 PG (ref 26.8–34.3)
MCHC RBC AUTO-ENTMCNC: 32.3 G/DL (ref 31.4–37.4)
MCV RBC AUTO: 92 FL (ref 82–98)
MONOCYTES # BLD AUTO: 0.41 THOUSAND/ΜL (ref 0.17–1.22)
MONOCYTES NFR BLD AUTO: 7 % (ref 4–12)
NEUTROPHILS # BLD AUTO: 3.37 THOUSANDS/ΜL (ref 1.85–7.62)
NEUTS SEG NFR BLD AUTO: 53 % (ref 43–75)
NRBC BLD AUTO-RTO: 0 /100 WBCS
PLATELET # BLD AUTO: 252 THOUSANDS/UL (ref 149–390)
PMV BLD AUTO: 10.3 FL (ref 8.9–12.7)
POTASSIUM SERPL-SCNC: 3.9 MMOL/L (ref 3.5–5.3)
PROT SERPL-MCNC: 8.2 G/DL (ref 6.4–8.2)
RBC # BLD AUTO: 4.74 MILLION/UL (ref 3.81–5.12)
SODIUM SERPL-SCNC: 139 MMOL/L (ref 136–145)
T4 FREE SERPL-MCNC: 0.92 NG/DL (ref 0.76–1.46)
TSH SERPL DL<=0.05 MIU/L-ACNC: 0.42 UIU/ML (ref 0.36–3.74)
WBC # BLD AUTO: 6.2 THOUSAND/UL (ref 4.31–10.16)

## 2021-08-04 PROCEDURE — 84439 ASSAY OF FREE THYROXINE: CPT

## 2021-08-04 PROCEDURE — 84443 ASSAY THYROID STIM HORMONE: CPT

## 2021-08-04 PROCEDURE — 80201 ASSAY OF TOPIRAMATE: CPT

## 2021-08-04 PROCEDURE — 85025 COMPLETE CBC W/AUTO DIFF WBC: CPT

## 2021-08-04 PROCEDURE — 36415 COLL VENOUS BLD VENIPUNCTURE: CPT

## 2021-08-04 PROCEDURE — 80053 COMPREHEN METABOLIC PANEL: CPT

## 2021-08-05 ENCOUNTER — TELEPHONE (OUTPATIENT)
Dept: PULMONOLOGY | Facility: CLINIC | Age: 46
End: 2021-08-05

## 2021-08-05 ENCOUNTER — OFFICE VISIT (OUTPATIENT)
Dept: PULMONOLOGY | Facility: CLINIC | Age: 46
End: 2021-08-05
Payer: COMMERCIAL

## 2021-08-05 VITALS
SYSTOLIC BLOOD PRESSURE: 126 MMHG | OXYGEN SATURATION: 97 % | DIASTOLIC BLOOD PRESSURE: 70 MMHG | HEIGHT: 62 IN | BODY MASS INDEX: 42.88 KG/M2 | TEMPERATURE: 98 F | HEART RATE: 76 BPM | WEIGHT: 233 LBS | RESPIRATION RATE: 18 BRPM

## 2021-08-05 DIAGNOSIS — R05.3 CHRONIC COUGH: ICD-10-CM

## 2021-08-05 DIAGNOSIS — K21.9 GASTROESOPHAGEAL REFLUX DISEASE WITHOUT ESOPHAGITIS: ICD-10-CM

## 2021-08-05 DIAGNOSIS — F17.200 TOBACCO DEPENDENCE: ICD-10-CM

## 2021-08-05 DIAGNOSIS — J30.2 SEASONAL ALLERGIES: ICD-10-CM

## 2021-08-05 DIAGNOSIS — G47.30 SLEEP APNEA, UNSPECIFIED TYPE: ICD-10-CM

## 2021-08-05 DIAGNOSIS — J45.41 MODERATE PERSISTENT ASTHMA WITH ACUTE EXACERBATION: ICD-10-CM

## 2021-08-05 DIAGNOSIS — Z91.09 ENVIRONMENTAL ALLERGIES: Primary | ICD-10-CM

## 2021-08-05 PROBLEM — J45.909 ASTHMA: Status: ACTIVE | Noted: 2021-08-05

## 2021-08-05 PROCEDURE — 99245 OFF/OP CONSLTJ NEW/EST HI 55: CPT | Performed by: INTERNAL MEDICINE

## 2021-08-05 PROCEDURE — 3008F BODY MASS INDEX DOCD: CPT | Performed by: INTERNAL MEDICINE

## 2021-08-05 PROCEDURE — 1036F TOBACCO NON-USER: CPT | Performed by: INTERNAL MEDICINE

## 2021-08-05 RX ORDER — OLOPATADINE HYDROCHLORIDE 1 MG/ML
1 SOLUTION/ DROPS OPHTHALMIC 2 TIMES DAILY
Qty: 5 ML | Refills: 3 | Status: SHIPPED | OUTPATIENT
Start: 2021-08-05 | End: 2022-02-01

## 2021-08-05 RX ORDER — OMEPRAZOLE 40 MG/1
40 CAPSULE, DELAYED RELEASE ORAL DAILY
Qty: 90 CAPSULE | Refills: 3 | Status: SHIPPED | OUTPATIENT
Start: 2021-08-05 | End: 2021-08-18 | Stop reason: SDUPTHER

## 2021-08-05 NOTE — PROGRESS NOTES
Pulmonary Consultation   Gopi Zhang 42 y o  female MRN: [id number]  8/5/2021      Assessment:    Tobacco dependence   Patient has successfully quit smoking  Seasonal allergies    Reports environmental allergies  However she is unsure what she may be allergic to  Plan   Checking [place] allergy panel   Continue Flonase  Adding Patanol eyedrops   Continue Allegra    Asthma   29-year-old woman who is referred to the office for evaluation of chronic cough  Patient has been given to previous diagnosis of asthma  However she has never undergone pulmonary function test   She is mostly bothered by the cough  It is possible she has cough variant asthma  Plan  Referred for pulmonary function test   Reminded that she has a Advair prescription at the pharmacy with the highest dose  Continue albuterol   Continue allergy medications   Checking allergy panel and total IgE   Follow-up in approximately 4 weeks    Chronic cough   Possibly has cough variant asthma  Differential diagnosis also includes environmental allergies,  Postnasal drip, GERD, non asthmatic eosinophilic bronchitis, airway lesion, vocal cord abnormality  Plan   Continue asthma meds   Have increased PPI to 40 mg daily  PFTs   Allergy panel    Sleep apnea   Patient underwent sleep study in May 2020  Sleep study showed mild obstructive sleep apnea  Treatment was recommended with PAP therapy  However patient never followed up with the Sleep Clinic  She is now interested in pursuing treatment  Have given her information to contact the clinic to set up delivery of therapeutic equipment  Plan:    Diagnoses and all orders for this visit:    Environmental allergies  -     olopatadine (PATANOL) 0 1 % ophthalmic solution; Administer 1 drop to both eyes 2 (two) times a day    Moderate persistent asthma with acute exacerbation  -     Ambulatory referral to Pulmonology  -     Complete PFT with post bronchodilator;  Future  -     [address] [place] [address] Allergy Panel, Adult; Future    Sleep apnea, unspecified type    Gastroesophageal reflux disease without esophagitis  -     omeprazole (PriLOSEC) 40 MG capsule; Take 1 capsule (40 mg total) by mouth daily    Tobacco dependence    Seasonal allergies    Chronic cough        No follow-ups on file  History of Present Illness   HPI:  Edgar Miller is a 42 y o  female here for evaluation of asthma  Patient reports a chronic cough  States that she was diagnosed asthma several years ago  She never underwent pulmonary function test     Previously prescribed oral steroids  During her last primary care appointment she was prescribed Advair (at the highest dose, but she only has the middle dose)  Also has an albuterol inhaler  She uses DuoNebs in the morning and the evening  She uses her albuterol inhaler with a spacer  Despite the aforementioned medications she has had persistence of symptoms  She is particularly bothered by a nonproductive cough  Patient tested positive for COVID-19 in April 2020  She underwent sleep study in May 2020 that showed mild obstructive sleep apnea  However she did not follow up with the sleep clinic  She is interested in trying PAP therapy  Has chronic reflux despite use of PPI  States she sleeps using 4-5 pillows  She reports chronic fatigue  CBC from August 4th showed no peripheral eosinophilia  Review of Systems   Constitutional: Positive for appetite change  Negative for chills, fatigue and fever  HENT: Positive for postnasal drip, rhinorrhea, sneezing and sore throat  Negative for congestion, ear pain, nosebleeds, sinus pressure and trouble swallowing  Eyes: Negative for discharge, redness and itching  Respiratory: Positive for cough  Negative for choking, chest tightness, shortness of breath, wheezing and stridor  Cardiovascular: Negative for chest pain, palpitations and leg swelling  Gastrointestinal: Negative for blood in stool     Genitourinary: Negative for difficulty urinating and dysuria  Musculoskeletal: Positive for myalgias  Negative for arthralgias and joint swelling  Skin: Negative for color change and rash  Neurological: Positive for headaches  Negative for light-headedness  Hematological: Negative for adenopathy  Historical Information   Past Medical History:   Diagnosis Date    Anxiety     Asthma     Bipolar disorder ([facility] )     Depression     Disease of thyroid gland     Endometriosis     Psychiatric illness     Psychosis ([facility] )     PTSD (post-traumatic stress disorder) 6/6/2017    Right carpal tunnel syndrome 12/26/2018    Self-injurious behavior     Suicide attempt [facility])      Past Surgical History:   Procedure Laterality Date    ABDOMINAL SURGERY      HERNIA REPAIR       Family History   Problem Relation Age of Onset    Hypertension Mother     Asthma Mother     Fibromyalgia Mother        Social History   Places lived: [place]  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   Tobacco: 3-4 pack yeas   Quit in April  Alcohol: none  Illicits:  none  Hobbies: read, walk,   Pets: cat and dog   Travel: none    Meds/Allergies     Current Outpatient Medications:     albuterol (PROVENTIL HFA,VENTOLIN HFA) 90 mcg/act inhaler, Inhale 2 puffs every 4 (four) hours as needed for wheezing, Disp: 8 g, Rfl: 0    albuterol (PROVENTIL HFA,VENTOLIN HFA) 90 mcg/act inhaler, Inhale 2 puffs every 4 (four) hours as needed for wheezing, Disp: 18 g, Rfl: 2    atoMOXetine (STRATTERA) 40 mg capsule, Take 40 mg by mouth daily, Disp: , Rfl:     Banophen 25 MG capsule, , Disp: , Rfl:     busPIRone (BUSPAR) 15 mg tablet, , Disp: , Rfl:     busPIRone (BUSPAR) 5 mg tablet, Take 5 mg by mouth 2 (two) times a day, Disp: , Rfl: 1    clonazePAM (KlonoPIN) 0 5 mg tablet, , Disp: , Rfl:     dextromethorphan-guaiFENesin (ROBITUSSIN DM)  mg/5 mL syrup, Take 5 mL by mouth 3 (three) times a day as needed for cough, Disp: 236 mL, Rfl: 0   diphenhydrAMINE (BENADRYL) 25 mg tablet, Take 1 tablet (25 mg total) by mouth every 6 (six) hours as needed for itching, Disp: 30 tablet, Rfl: 0    DULoxetine (CYMBALTA) 60 mg delayed release capsule, , Disp: , Rfl:     fexofenadine (ALLEGRA) 180 MG tablet, Take 1 tablet (180 mg total) by mouth daily, Disp: 90 tablet, Rfl: 1    fluticasone (FLONASE) 50 mcg/act nasal spray, 1 spray into each nostril daily, Disp: 16 g, Rfl: 2    fluticasone-salmeterol (Advair) 500-50 mcg/dose inhaler, Inhale 1 puff 2 (two) times a day Rinse mouth after use , Disp: 60 blister, Rfl: 2    guaiFENesin (MUCINEX) 600 mg 12 hr tablet, Take 2 tablets (1,200 mg total) by mouth every 12 (twelve) hours, Disp: 30 tablet, Rfl: 1    hydrOXYzine pamoate (VISTARIL) 50 mg capsule, Take 50 mg by mouth 2 (two) times a day as needed, Disp: , Rfl: 1    ibuprofen (MOTRIN) 600 mg tablet, Take 1 tablet (600 mg total) by mouth every 8 (eight) hours as needed for mild pain or fever, Disp: 30 tablet, Rfl: 0    ipratropium-albuterol (DUO-NEB) 0 5-2 5 mg/3 mL nebulizer solution, Take 3 mL by nebulization every 6 (six) hours as needed for wheezing or shortness of breath, Disp: 200 mL, Rfl: 1    levothyroxine 100 mcg tablet, Take 1 tablet (100 mcg total) by mouth every morning, Disp: 30 tablet, Rfl: 2    nicotine (NICODERM CQ) 7 mg/24hr TD 24 hr patch, Place 1 patch on the skin every 24 hours, Disp: 28 patch, Rfl: 0    ondansetron (ZOFRAN-ODT) 4 mg disintegrating tablet, Take 1 tablet (4 mg total) by mouth every 6 (six) hours as needed for nausea or vomiting, Disp: 20 tablet, Rfl: 0    psyllium (METAMUCIL SMOOTH TEXTURE) 28 % packet, Take 1 packet by mouth 2 (two) times a day, Disp: 75 packet, Rfl: 1    rizatriptan (MAXALT) 10 MG tablet, Take 1 tablet (10 mg total) by mouth once as needed for migraine for up to 1 dose May repeat in 2 hours if needed, Disp: 9 tablet, Rfl: 2    topiramate (TOPAMAX) 50 MG tablet, 1 tab qhs (with 100 mg qhs), Disp: 90 tablet, Rfl: 1    venlafaxine (EFFEXOR-XR) 37 5 mg 24 hr capsule, Take 1 capsule (37 5 mg total) by mouth daily with breakfast, Disp: 30 capsule, Rfl: 1    olopatadine (PATANOL) 0 1 % ophthalmic solution, Administer 1 drop to both eyes 2 (two) times a day, Disp: 5 mL, Rfl: 3    omeprazole (PriLOSEC) 40 MG capsule, Take 1 capsule (40 mg total) by mouth daily, Disp: 90 capsule, Rfl: 3  Allergies   Allergen Reactions    Aleve [Naproxen] Shortness Of Breath    Benzonatate Swelling     Throat closing    Guaifenesin Other (See Comments)     Note - patient states she IS able to take robitussin   Latuda [Lurasidone] Swelling      stuttering     Imitrex [Sumatriptan]      Patient reports that this medication "makes me sick"  Vitals: Blood pressure 126/70, pulse 76, temperature 98 °F (36 7 °C), resp  rate 18, height 5' 2" (1 575 m), weight 106 kg (233 lb), SpO2 97 %  Body mass index is 42 62 kg/m²  Oxygen Therapy  SpO2: 97 %      Physical Exam  Physical Exam  Vitals reviewed  Constitutional:       General: She is not in acute distress  Appearance: Normal appearance  She is well-developed  She is obese  She is not ill-appearing, toxic-appearing or diaphoretic  HENT:      Head: Normocephalic and atraumatic  Right Ear: External ear normal       Left Ear: External ear normal       Nose: Nose normal  No congestion or rhinorrhea  Mouth/Throat:      Pharynx: No oropharyngeal exudate  Eyes:      General: No scleral icterus  Right eye: No discharge  Left eye: No discharge  Conjunctiva/sclera: Conjunctivae normal       Pupils: Pupils are equal, round, and reactive to light  Neck:      Trachea: No tracheal deviation  Cardiovascular:      Rate and Rhythm: Normal rate and regular rhythm  Heart sounds: Normal heart sounds  No murmur heard  No friction rub  No gallop  Pulmonary:      Effort: Pulmonary effort is normal       Breath sounds: Normal breath sounds  No stridor  No wheezing or rales  Musculoskeletal:      Cervical back: Normal range of motion  Right lower leg: No edema  Left lower leg: No edema  Lymphadenopathy:      Head:      Right side of head: No submental, submandibular, preauricular or posterior auricular adenopathy  Left side of head: No submental, submandibular, preauricular or posterior auricular adenopathy  Cervical: No cervical adenopathy  Skin:     General: Skin is warm and dry  Findings: No lesion or rash  Neurological:      Mental Status: She is alert and oriented to person, place, and time  Labs: I have personally reviewed pertinent lab results  Lab Results   Component Value Date    WBC 6 20 08/04/2021    HGB 14 0 08/04/2021    HCT 43 4 08/04/2021    MCV 92 08/04/2021     08/04/2021     Lab Results   Component Value Date    CALCIUM 9 5 08/04/2021    K 3 9 08/04/2021    CO2 24 08/04/2021     (H) 08/04/2021    BUN 14 08/04/2021    CREATININE 1 03 08/04/2021     No results found for: IGE  Lab Results   Component Value Date    ALT 33 08/04/2021    AST 18 08/04/2021    ALKPHOS 92 08/04/2021       Imaging and other studies: I have personally reviewed pertinent reports  and I have personally reviewed pertinent films in PACS     chest x-ray June 2021       IMPRESSION:     No acute cardiopulmonary disease      Pulmonary function testing:    none on file      NALLELY Clark    [facility]  Answers for HPI/ROS submitted by the patient on 8/5/2021  Chronicity: new  When did you first notice your symptoms?: more than 1 month ago  How often do your symptoms occur?: constantly  Since you first noticed this problem, how has it changed?: gradually improving  Do you have shortness of breath that occurs with effort or exertion?: Yes  Do you have ear congestion?: No  Do you have heartburn?: Yes  Do you have fatigue?: Yes  Do you have nasal congestion?: Yes  Do you have shortness of breath when lying flat?: Yes  Do you have shortness of breath when you wake up?: Yes  Do you have sweats?: Yes  Have you experienced weight loss?: No  Which of the following makes your symptoms worse?: any activity, emotional stress, exposure to fumes, lying down, pollen  Which of the following makes your symptoms better?: oral steroids

## 2021-08-05 NOTE — ASSESSMENT & PLAN NOTE
Possibly has cough variant asthma  Differential diagnosis also includes environmental allergies,  Postnasal drip, GERD, non asthmatic eosinophilic bronchitis, airway lesion, vocal cord abnormality        Plan   Continue asthma meds   Have increased PPI to 40 mg daily  PFTs   Allergy panel

## 2021-08-05 NOTE — ASSESSMENT & PLAN NOTE
24-year-old woman who is referred to the office for evaluation of chronic cough  Patient has been given to previous diagnosis of asthma  However she has never undergone pulmonary function test   She is mostly bothered by the cough  It is possible she has cough variant asthma      Plan  Referred for pulmonary function test   Reminded that she has a Advair prescription at the pharmacy with the highest dose  Continue albuterol   Continue allergy medications   Checking allergy panel and total IgE   Follow-up in approximately 4 weeks

## 2021-08-05 NOTE — ASSESSMENT & PLAN NOTE
Patient underwent sleep study in May 2020  Sleep study showed mild obstructive sleep apnea  Treatment was recommended with PAP therapy  However patient never followed up with the Sleep Clinic  She is now interested in pursuing treatment  Have given her information to contact the clinic to set up delivery of therapeutic equipment

## 2021-08-05 NOTE — ASSESSMENT & PLAN NOTE
Reports environmental allergies  However she is unsure what she may be allergic to        Plan   Checking [place] allergy panel   Continue Flonase  Adding Patanol eyedrops   Continue Allegra

## 2021-08-06 LAB — TOPIRAMATE SERPL-MCNC: 2.2 UG/ML (ref 2–25)

## 2021-08-10 DIAGNOSIS — E03.9 ACQUIRED HYPOTHYROIDISM: ICD-10-CM

## 2021-08-10 RX ORDER — LEVOTHYROXINE SODIUM 0.1 MG/1
100 TABLET ORAL EVERY MORNING
Qty: 30 TABLET | Refills: 2 | Status: SHIPPED | OUTPATIENT
Start: 2021-08-10 | End: 2021-11-05 | Stop reason: SDUPTHER

## 2021-08-17 DIAGNOSIS — IMO0002 CHRONIC MIGRAINE: ICD-10-CM

## 2021-08-17 RX ORDER — ONDANSETRON 4 MG/1
4 TABLET, ORALLY DISINTEGRATING ORAL EVERY 6 HOURS PRN
Qty: 20 TABLET | Refills: 0 | OUTPATIENT
Start: 2021-08-17

## 2021-08-18 DIAGNOSIS — K21.9 GASTROESOPHAGEAL REFLUX DISEASE WITHOUT ESOPHAGITIS: ICD-10-CM

## 2021-08-18 DIAGNOSIS — IMO0002 CHRONIC MIGRAINE: ICD-10-CM

## 2021-08-18 RX ORDER — OMEPRAZOLE 40 MG/1
40 CAPSULE, DELAYED RELEASE ORAL DAILY
Qty: 90 CAPSULE | Refills: 0 | Status: SHIPPED | OUTPATIENT
Start: 2021-08-18 | End: 2021-11-05 | Stop reason: SDUPTHER

## 2021-08-19 RX ORDER — ONDANSETRON 4 MG/1
4 TABLET, ORALLY DISINTEGRATING ORAL EVERY 6 HOURS PRN
Qty: 20 TABLET | Refills: 0 | Status: SHIPPED | OUTPATIENT
Start: 2021-08-19 | End: 2021-09-17

## 2021-09-07 ENCOUNTER — HOSPITAL ENCOUNTER (OUTPATIENT)
Dept: PULMONOLOGY | Facility: HOSPITAL | Age: 46
Discharge: HOME | End: 2021-09-07
Attending: INTERNAL MEDICINE

## 2021-09-07 RX ORDER — ALBUTEROL SULFATE 2.5 MG/3ML
2.5 SOLUTION RESPIRATORY (INHALATION) ONCE AS NEEDED
Status: DISCONTINUED | OUTPATIENT
Start: 2021-09-07 | End: 2021-09-11 | Stop reason: HOSPADM

## 2021-09-13 ENCOUNTER — TELEPHONE (OUTPATIENT)
Dept: PSYCHIATRY | Facility: CLINIC | Age: 46
End: 2021-09-13

## 2021-09-16 DIAGNOSIS — IMO0002 CHRONIC MIGRAINE: ICD-10-CM

## 2021-09-17 RX ORDER — ONDANSETRON 4 MG/1
TABLET, ORALLY DISINTEGRATING ORAL
Qty: 20 TABLET | Refills: 0 | Status: SHIPPED | OUTPATIENT
Start: 2021-09-17 | End: 2021-11-19

## 2021-10-01 ENCOUNTER — TELEPHONE (OUTPATIENT)
Dept: FAMILY MEDICINE CLINIC | Facility: CLINIC | Age: 46
End: 2021-10-01

## 2021-10-19 DIAGNOSIS — J30.2 SEASONAL ALLERGIES: Primary | ICD-10-CM

## 2021-10-19 RX ORDER — CETIRIZINE HYDROCHLORIDE 10 MG/1
TABLET ORAL
Qty: 30 TABLET | Refills: 0 | Status: SHIPPED | OUTPATIENT
Start: 2021-10-19 | End: 2021-11-05 | Stop reason: SDUPTHER

## 2021-10-25 ENCOUNTER — TELEPHONE (OUTPATIENT)
Dept: SLEEP CENTER | Facility: CLINIC | Age: 46
End: 2021-10-25

## 2021-10-25 ENCOUNTER — TELEMEDICINE (OUTPATIENT)
Dept: FAMILY MEDICINE CLINIC | Facility: CLINIC | Age: 46
End: 2021-10-25

## 2021-10-25 DIAGNOSIS — J45.901 ASTHMA EXACERBATION: ICD-10-CM

## 2021-10-25 DIAGNOSIS — J45.41 MODERATE PERSISTENT ASTHMA WITH ACUTE EXACERBATION: ICD-10-CM

## 2021-10-25 DIAGNOSIS — R05.9 COUGH: Primary | ICD-10-CM

## 2021-10-25 PROCEDURE — 3074F SYST BP LT 130 MM HG: CPT | Performed by: NURSE PRACTITIONER

## 2021-10-25 PROCEDURE — G2012 BRIEF CHECK IN BY MD/QHP: HCPCS | Performed by: NURSE PRACTITIONER

## 2021-10-25 PROCEDURE — 3078F DIAST BP <80 MM HG: CPT | Performed by: NURSE PRACTITIONER

## 2021-10-25 RX ORDER — IPRATROPIUM BROMIDE AND ALBUTEROL SULFATE 2.5; .5 MG/3ML; MG/3ML
3 SOLUTION RESPIRATORY (INHALATION) EVERY 6 HOURS PRN
Qty: 200 ML | Refills: 1 | Status: SHIPPED | OUTPATIENT
Start: 2021-10-25 | End: 2021-11-05 | Stop reason: SDUPTHER

## 2021-10-26 PROCEDURE — U0003 INFECTIOUS AGENT DETECTION BY NUCLEIC ACID (DNA OR RNA); SEVERE ACUTE RESPIRATORY SYNDROME CORONAVIRUS 2 (SARS-COV-2) (CORONAVIRUS DISEASE [COVID-19]), AMPLIFIED PROBE TECHNIQUE, MAKING USE OF HIGH THROUGHPUT TECHNOLOGIES AS DESCRIBED BY CMS-2020-01-R: HCPCS | Performed by: NURSE PRACTITIONER

## 2021-10-26 PROCEDURE — U0005 INFEC AGEN DETEC AMPLI PROBE: HCPCS | Performed by: NURSE PRACTITIONER

## 2021-10-27 LAB — SARS-COV-2 RNA RESP QL NAA+PROBE: NEGATIVE

## 2021-11-05 ENCOUNTER — OFFICE VISIT (OUTPATIENT)
Dept: FAMILY MEDICINE CLINIC | Facility: CLINIC | Age: 46
End: 2021-11-05

## 2021-11-05 VITALS
DIASTOLIC BLOOD PRESSURE: 86 MMHG | SYSTOLIC BLOOD PRESSURE: 136 MMHG | RESPIRATION RATE: 18 BRPM | WEIGHT: 234 LBS | TEMPERATURE: 97 F | OXYGEN SATURATION: 98 % | HEART RATE: 72 BPM | BODY MASS INDEX: 43.06 KG/M2 | HEIGHT: 62 IN

## 2021-11-05 DIAGNOSIS — K21.9 GASTROESOPHAGEAL REFLUX DISEASE WITHOUT ESOPHAGITIS: ICD-10-CM

## 2021-11-05 DIAGNOSIS — J45.909 ASTHMATIC BRONCHITIS: ICD-10-CM

## 2021-11-05 DIAGNOSIS — Z12.31 ENCOUNTER FOR SCREENING MAMMOGRAM FOR MALIGNANT NEOPLASM OF BREAST: ICD-10-CM

## 2021-11-05 DIAGNOSIS — G43.009 MIGRAINE WITHOUT AURA AND WITHOUT STATUS MIGRAINOSUS, NOT INTRACTABLE: ICD-10-CM

## 2021-11-05 DIAGNOSIS — Z11.3 ROUTINE SCREENING FOR STI (SEXUALLY TRANSMITTED INFECTION): Primary | ICD-10-CM

## 2021-11-05 DIAGNOSIS — I10 BENIGN ESSENTIAL HYPERTENSION: ICD-10-CM

## 2021-11-05 DIAGNOSIS — J30.2 SEASONAL ALLERGIES: ICD-10-CM

## 2021-11-05 DIAGNOSIS — Z11.4 SCREENING FOR HIV (HUMAN IMMUNODEFICIENCY VIRUS): ICD-10-CM

## 2021-11-05 DIAGNOSIS — F17.200 TOBACCO DEPENDENCE: ICD-10-CM

## 2021-11-05 DIAGNOSIS — J45.41 MODERATE PERSISTENT ASTHMA WITH ACUTE EXACERBATION: ICD-10-CM

## 2021-11-05 DIAGNOSIS — E03.9 ACQUIRED HYPOTHYROIDISM: ICD-10-CM

## 2021-11-05 PROBLEM — Z23 NEED FOR VACCINATION: Status: RESOLVED | Noted: 2020-11-20 | Resolved: 2021-11-05

## 2021-11-05 PROBLEM — G44.209 ACUTE NON INTRACTABLE TENSION-TYPE HEADACHE: Status: RESOLVED | Noted: 2020-11-20 | Resolved: 2021-11-05

## 2021-11-05 PROCEDURE — 3075F SYST BP GE 130 - 139MM HG: CPT | Performed by: NURSE PRACTITIONER

## 2021-11-05 PROCEDURE — 3079F DIAST BP 80-89 MM HG: CPT | Performed by: NURSE PRACTITIONER

## 2021-11-05 PROCEDURE — 99214 OFFICE O/P EST MOD 30 MIN: CPT | Performed by: NURSE PRACTITIONER

## 2021-11-05 RX ORDER — IPRATROPIUM BROMIDE AND ALBUTEROL SULFATE 2.5; .5 MG/3ML; MG/3ML
3 SOLUTION RESPIRATORY (INHALATION) EVERY 6 HOURS PRN
Qty: 200 ML | Refills: 1 | Status: SHIPPED | OUTPATIENT
Start: 2021-11-05 | End: 2022-04-28 | Stop reason: SDUPTHER

## 2021-11-05 RX ORDER — CETIRIZINE HYDROCHLORIDE 10 MG/1
10 TABLET ORAL DAILY
Qty: 90 TABLET | Refills: 2 | Status: SHIPPED | OUTPATIENT
Start: 2021-11-05 | End: 2021-11-05

## 2021-11-05 RX ORDER — LEVOTHYROXINE SODIUM 0.1 MG/1
100 TABLET ORAL EVERY MORNING
Qty: 90 TABLET | Refills: 0 | Status: SHIPPED | OUTPATIENT
Start: 2021-11-05 | End: 2022-03-03 | Stop reason: SDUPTHER

## 2021-11-05 RX ORDER — OMEPRAZOLE 40 MG/1
40 CAPSULE, DELAYED RELEASE ORAL DAILY
Qty: 90 CAPSULE | Refills: 0 | Status: SHIPPED | OUTPATIENT
Start: 2021-11-05 | End: 2022-05-19 | Stop reason: SDUPTHER

## 2021-11-05 RX ORDER — FEXOFENADINE HCL 180 MG/1
180 TABLET ORAL DAILY
Qty: 90 TABLET | Refills: 1 | Status: SHIPPED | OUTPATIENT
Start: 2021-11-05 | End: 2022-04-28

## 2021-11-05 RX ORDER — FLUTICASONE PROPIONATE 50 MCG
1 SPRAY, SUSPENSION (ML) NASAL DAILY
Qty: 16 G | Refills: 2 | Status: SHIPPED | OUTPATIENT
Start: 2021-11-05

## 2021-11-05 RX ORDER — ALBUTEROL SULFATE 90 UG/1
2 AEROSOL, METERED RESPIRATORY (INHALATION) EVERY 4 HOURS PRN
Qty: 18 G | Refills: 2 | Status: SHIPPED | OUTPATIENT
Start: 2021-11-05 | End: 2022-02-01

## 2021-11-05 RX ORDER — ALBUTEROL SULFATE 90 UG/1
2 AEROSOL, METERED RESPIRATORY (INHALATION) EVERY 4 HOURS PRN
Qty: 18 G | Refills: 1 | Status: SHIPPED | OUTPATIENT
Start: 2021-11-05 | End: 2022-07-11 | Stop reason: SDUPTHER

## 2021-11-09 ENCOUNTER — APPOINTMENT (OUTPATIENT)
Dept: LAB | Facility: CLINIC | Age: 46
End: 2021-11-09
Payer: COMMERCIAL

## 2021-11-09 DIAGNOSIS — E03.9 ACQUIRED HYPOTHYROIDISM: ICD-10-CM

## 2021-11-09 DIAGNOSIS — J45.41 MODERATE PERSISTENT ASTHMA WITH ACUTE EXACERBATION: ICD-10-CM

## 2021-11-09 DIAGNOSIS — Z11.4 SCREENING FOR HIV (HUMAN IMMUNODEFICIENCY VIRUS): ICD-10-CM

## 2021-11-09 DIAGNOSIS — Z11.3 ROUTINE SCREENING FOR STI (SEXUALLY TRANSMITTED INFECTION): ICD-10-CM

## 2021-11-09 LAB
HCV AB SER QL: NORMAL
TSH SERPL DL<=0.05 MIU/L-ACNC: 0.61 UIU/ML (ref 0.36–3.74)

## 2021-11-09 PROCEDURE — 87591 N.GONORRHOEAE DNA AMP PROB: CPT

## 2021-11-09 PROCEDURE — 86696 HERPES SIMPLEX TYPE 2 TEST: CPT

## 2021-11-09 PROCEDURE — 36415 COLL VENOUS BLD VENIPUNCTURE: CPT

## 2021-11-09 PROCEDURE — 86592 SYPHILIS TEST NON-TREP QUAL: CPT

## 2021-11-09 PROCEDURE — 86803 HEPATITIS C AB TEST: CPT

## 2021-11-09 PROCEDURE — 86003 ALLG SPEC IGE CRUDE XTRC EA: CPT

## 2021-11-09 PROCEDURE — 82785 ASSAY OF IGE: CPT

## 2021-11-09 PROCEDURE — 87389 HIV-1 AG W/HIV-1&-2 AB AG IA: CPT

## 2021-11-09 PROCEDURE — 86695 HERPES SIMPLEX TYPE 1 TEST: CPT

## 2021-11-09 PROCEDURE — 87491 CHLMYD TRACH DNA AMP PROBE: CPT

## 2021-11-09 PROCEDURE — 84443 ASSAY THYROID STIM HORMONE: CPT

## 2021-11-10 LAB
A ALTERNATA IGE QN: <0.1 KUA/I
A FUMIGATUS IGE QN: <0.1 KUA/I
ALLERGEN COMMENT: ABNORMAL
BERMUDA GRASS IGE QN: 1.69 KUA/I
BOXELDER IGE QN: <0.1 KUA/I
C HERBARUM IGE QN: <0.1 KUA/I
C TRACH DNA SPEC QL NAA+PROBE: NEGATIVE
CAT DANDER IGE QN: <0.1 KUA/I
CMN PIGWEED IGE QN: <0.1 KUA/I
COMMON RAGWEED IGE QN: <0.1 KUA/I
COTTONWOOD IGE QN: <0.1 KUA/I
D FARINAE IGE QN: <0.1 KUA/I
D PTERONYSS IGE QN: <0.1 KUA/I
DOG DANDER IGE QN: <0.1 KUA/I
HIV 1+2 AB+HIV1 P24 AG SERPL QL IA: NORMAL
HSV1 IGG SER IA-ACNC: <0.91 INDEX (ref 0–0.9)
HSV2 IGG SER IA-ACNC: 5.95 INDEX (ref 0–0.9)
LONDON PLANE IGE QN: <0.1 KUA/I
MOUSE URINE PROT IGE QN: <0.1 KUA/I
MT JUNIPER IGE QN: <0.1 KUA/I
MUGWORT IGE QN: <0.1 KUA/I
N GONORRHOEA DNA SPEC QL NAA+PROBE: NEGATIVE
P NOTATUM IGE QN: <0.1 KUA/I
ROACH IGE QN: <0.1 KUA/I
RPR SER QL: NORMAL
SHEEP SORREL IGE QN: <0.1 KUA/I
SILVER BIRCH IGE QN: <0.1 KUA/I
TIMOTHY IGE QN: 12.1 KUA/I
TOTAL IGE SMQN RAST: 50 KU/L (ref 0–113)
WALNUT IGE QN: <0.1 KUA/I
WHITE ASH IGE QN: <0.1 KUA/I
WHITE ELM IGE QN: <0.1 KUA/I
WHITE MULBERRY IGE QN: <0.1 KUA/I
WHITE OAK IGE QN: <0.1 KUA/I

## 2021-11-19 DIAGNOSIS — G43.009 MIGRAINE WITHOUT AURA AND WITHOUT STATUS MIGRAINOSUS, NOT INTRACTABLE: Primary | ICD-10-CM

## 2021-11-19 RX ORDER — ONDANSETRON 4 MG/1
TABLET, ORALLY DISINTEGRATING ORAL
Qty: 20 TABLET | Refills: 0 | Status: SHIPPED | OUTPATIENT
Start: 2021-11-19 | End: 2022-04-28 | Stop reason: SDUPTHER

## 2021-11-24 ENCOUNTER — OFFICE VISIT (OUTPATIENT)
Dept: FAMILY MEDICINE CLINIC | Facility: CLINIC | Age: 46
End: 2021-11-24

## 2021-11-24 ENCOUNTER — TELEPHONE (OUTPATIENT)
Dept: NEUROLOGY | Facility: CLINIC | Age: 46
End: 2021-11-24

## 2021-11-24 DIAGNOSIS — B34.9 VIRAL INFECTION, UNSPECIFIED: Primary | ICD-10-CM

## 2021-11-24 PROCEDURE — 3079F DIAST BP 80-89 MM HG: CPT | Performed by: PHYSICIAN ASSISTANT

## 2021-11-24 PROCEDURE — U0003 INFECTIOUS AGENT DETECTION BY NUCLEIC ACID (DNA OR RNA); SEVERE ACUTE RESPIRATORY SYNDROME CORONAVIRUS 2 (SARS-COV-2) (CORONAVIRUS DISEASE [COVID-19]), AMPLIFIED PROBE TECHNIQUE, MAKING USE OF HIGH THROUGHPUT TECHNOLOGIES AS DESCRIBED BY CMS-2020-01-R: HCPCS | Performed by: PHYSICIAN ASSISTANT

## 2021-11-24 PROCEDURE — 3075F SYST BP GE 130 - 139MM HG: CPT | Performed by: PHYSICIAN ASSISTANT

## 2021-11-24 PROCEDURE — G2012 BRIEF CHECK IN BY MD/QHP: HCPCS | Performed by: PHYSICIAN ASSISTANT

## 2021-11-27 LAB — SARS-COV-2 RNA RESP QL NAA+PROBE: NEGATIVE

## 2021-11-29 ENCOUNTER — HOSPITAL ENCOUNTER (OUTPATIENT)
Dept: PULMONOLOGY | Facility: HOSPITAL | Age: 46
Discharge: HOME | End: 2021-11-29
Attending: INTERNAL MEDICINE
Payer: COMMERCIAL

## 2021-11-29 DIAGNOSIS — J45.41 MODERATE PERSISTENT ASTHMA WITH ACUTE EXACERBATION: ICD-10-CM

## 2021-11-29 PROCEDURE — 94729 DIFFUSING CAPACITY: CPT

## 2021-11-29 PROCEDURE — 94729 DIFFUSING CAPACITY: CPT | Performed by: INTERNAL MEDICINE

## 2021-11-29 PROCEDURE — 94726 PLETHYSMOGRAPHY LUNG VOLUMES: CPT | Performed by: INTERNAL MEDICINE

## 2021-11-29 PROCEDURE — 94726 PLETHYSMOGRAPHY LUNG VOLUMES: CPT

## 2021-11-29 PROCEDURE — 94760 N-INVAS EAR/PLS OXIMETRY 1: CPT

## 2021-11-29 PROCEDURE — 94060 EVALUATION OF WHEEZING: CPT | Performed by: INTERNAL MEDICINE

## 2021-11-29 PROCEDURE — 94060 EVALUATION OF WHEEZING: CPT

## 2021-11-29 RX ORDER — ALBUTEROL SULFATE 2.5 MG/3ML
2.5 SOLUTION RESPIRATORY (INHALATION) ONCE AS NEEDED
Status: COMPLETED | OUTPATIENT
Start: 2021-11-29 | End: 2021-11-29

## 2021-11-29 RX ADMIN — ALBUTEROL SULFATE 2.5 MG: 2.5 SOLUTION RESPIRATORY (INHALATION) at 14:46

## 2021-11-30 ENCOUNTER — TELEPHONE (OUTPATIENT)
Dept: FAMILY MEDICINE CLINIC | Facility: CLINIC | Age: 46
End: 2021-11-30

## 2021-11-30 ENCOUNTER — CLINICAL SUPPORT (OUTPATIENT)
Dept: FAMILY MEDICINE CLINIC | Facility: CLINIC | Age: 46
End: 2021-11-30

## 2021-11-30 DIAGNOSIS — Z11.1 ENCOUNTER FOR PPD TEST: Primary | ICD-10-CM

## 2021-11-30 DIAGNOSIS — Z23 NEED FOR VACCINATION: ICD-10-CM

## 2021-11-30 PROCEDURE — 86580 TB INTRADERMAL TEST: CPT | Performed by: FAMILY MEDICINE

## 2021-11-30 PROCEDURE — 90471 IMMUNIZATION ADMIN: CPT | Performed by: FAMILY MEDICINE

## 2021-11-30 PROCEDURE — 90682 RIV4 VACC RECOMBINANT DNA IM: CPT | Performed by: FAMILY MEDICINE

## 2021-12-06 ENCOUNTER — TELEPHONE (OUTPATIENT)
Dept: PULMONOLOGY | Facility: CLINIC | Age: 46
End: 2021-12-06

## 2021-12-07 ENCOUNTER — OFFICE VISIT (OUTPATIENT)
Dept: NEUROLOGY | Facility: CLINIC | Age: 46
End: 2021-12-07
Payer: COMMERCIAL

## 2021-12-07 VITALS
HEART RATE: 75 BPM | RESPIRATION RATE: 18 BRPM | HEIGHT: 62 IN | TEMPERATURE: 97.9 F | SYSTOLIC BLOOD PRESSURE: 146 MMHG | WEIGHT: 232.8 LBS | BODY MASS INDEX: 42.84 KG/M2 | DIASTOLIC BLOOD PRESSURE: 82 MMHG

## 2021-12-07 DIAGNOSIS — M54.9 OTHER CHRONIC BACK PAIN: ICD-10-CM

## 2021-12-07 DIAGNOSIS — G89.29 OTHER CHRONIC BACK PAIN: ICD-10-CM

## 2021-12-07 DIAGNOSIS — G47.30 SLEEP APNEA, UNSPECIFIED TYPE: ICD-10-CM

## 2021-12-07 DIAGNOSIS — G56.01 CARPAL TUNNEL SYNDROME ON RIGHT: ICD-10-CM

## 2021-12-07 DIAGNOSIS — G43.009 MIGRAINE WITHOUT AURA AND WITHOUT STATUS MIGRAINOSUS, NOT INTRACTABLE: Primary | ICD-10-CM

## 2021-12-07 PROCEDURE — 99214 OFFICE O/P EST MOD 30 MIN: CPT | Performed by: PHYSICIAN ASSISTANT

## 2021-12-07 RX ORDER — TOPIRAMATE 100 MG/1
TABLET, FILM COATED ORAL
Qty: 75 TABLET | Refills: 5 | Status: SHIPPED | OUTPATIENT
Start: 2021-12-07 | End: 2022-07-11 | Stop reason: SDUPTHER

## 2021-12-07 RX ORDER — METHOCARBAMOL 500 MG/1
TABLET, FILM COATED ORAL
Qty: 30 TABLET | Refills: 0 | Status: SHIPPED | OUTPATIENT
Start: 2021-12-07 | End: 2022-03-31 | Stop reason: SDUPTHER

## 2021-12-07 RX ORDER — TOPIRAMATE 100 MG/1
TABLET, FILM COATED ORAL
COMMUNITY
Start: 2021-09-16 | End: 2021-12-07 | Stop reason: SDUPTHER

## 2021-12-13 ENCOUNTER — CLINICAL SUPPORT (OUTPATIENT)
Dept: FAMILY MEDICINE CLINIC | Facility: CLINIC | Age: 46
End: 2021-12-13

## 2021-12-13 DIAGNOSIS — Z11.1 ENCOUNTER FOR PPD TEST: Primary | ICD-10-CM

## 2021-12-13 PROBLEM — M54.9 NOTALGIA: Status: ACTIVE | Noted: 2021-12-13

## 2021-12-13 PROCEDURE — 86580 TB INTRADERMAL TEST: CPT | Performed by: FAMILY MEDICINE

## 2021-12-15 ENCOUNTER — CLINICAL SUPPORT (OUTPATIENT)
Dept: FAMILY MEDICINE CLINIC | Facility: CLINIC | Age: 46
End: 2021-12-15

## 2021-12-15 DIAGNOSIS — Z11.1 ENCOUNTER FOR PPD SKIN TEST READING: Primary | ICD-10-CM

## 2021-12-15 LAB
INDURATION: 0 MM
TB SKIN TEST: NEGATIVE

## 2021-12-21 ENCOUNTER — TELEPHONE (OUTPATIENT)
Dept: FAMILY MEDICINE CLINIC | Facility: CLINIC | Age: 46
End: 2021-12-21

## 2021-12-27 ENCOUNTER — CLINICAL SUPPORT (OUTPATIENT)
Dept: FAMILY MEDICINE CLINIC | Facility: CLINIC | Age: 46
End: 2021-12-27

## 2021-12-27 DIAGNOSIS — Z11.1 ENCOUNTER FOR PPD TEST: Primary | ICD-10-CM

## 2021-12-27 PROCEDURE — 86580 TB INTRADERMAL TEST: CPT | Performed by: FAMILY MEDICINE

## 2021-12-29 ENCOUNTER — CLINICAL SUPPORT (OUTPATIENT)
Dept: FAMILY MEDICINE CLINIC | Facility: CLINIC | Age: 46
End: 2021-12-29

## 2021-12-29 DIAGNOSIS — Z11.1 ENCOUNTER FOR PPD SKIN TEST READING: Primary | ICD-10-CM

## 2021-12-29 LAB
INDURATION: 0 MM
TB SKIN TEST: NEGATIVE

## 2022-01-06 ENCOUNTER — OFFICE VISIT (OUTPATIENT)
Dept: FAMILY MEDICINE CLINIC | Facility: CLINIC | Age: 47
End: 2022-01-06

## 2022-01-06 DIAGNOSIS — Z20.822 EXPOSURE TO COVID-19 VIRUS: Primary | ICD-10-CM

## 2022-01-06 PROCEDURE — G2012 BRIEF CHECK IN BY MD/QHP: HCPCS | Performed by: FAMILY MEDICINE

## 2022-01-06 PROCEDURE — 3079F DIAST BP 80-89 MM HG: CPT | Performed by: FAMILY MEDICINE

## 2022-01-06 PROCEDURE — 3077F SYST BP >= 140 MM HG: CPT | Performed by: FAMILY MEDICINE

## 2022-01-06 NOTE — PROGRESS NOTES
COVID-19 Outpatient Progress Note    Assessment/Plan:    Problem List Items Addressed This Visit     None      Visit Diagnoses     Exposure to COVID-19 virus    -  Primary    Relevant Orders    COVID Only - Office Collect         Disposition:     Recommended patient to come to the office to test for COVID-19  I recommended self-quarantine for 10 days and to watch for symptoms until 14 days after exposure  If patient were to develop symptoms, they should self isolate and call our office for further guidance  I have spent 9 minutes directly with the patient  Greater than 50% of this time was spent in counseling/coordination of care regarding: instructions for management and patient and family education  Encounter provider [address] [place] [address]    Provider located at [address] [address] [phone]    Recent Visits  No visits were found meeting these conditions  Showing recent visits within past 7 days and meeting all other requirements  Today's Visits  Date Type Provider Dept   01/06/22 Office Visit [facility]   Showing today's visits and meeting all other requirements  Future Appointments  No visits were found meeting these conditions  Showing future appointments within next 150 days and meeting all other requirements     This virtual check-in was done via telephone and she agrees to proceed  Patient agrees to participate in a virtual check in via telephone or video visit instead of presenting to the office to address urgent/immediate medical needs  Patient is aware this is a billable service  After connecting through Telephone, the patient was identified by name and date of birth  Yesy Cuenca was informed that this was a telemedicine visit and that the exam was being conducted confidentially over secure lines  My office door was closed  No one else was in the room   Monse Mueller Darlyn Jade acknowledged consent and understanding of privacy and security of the telemedicine visit  I informed the patient that I have reviewed her record in Epic and presented the opportunity for her to ask any questions regarding the visit today  The patient agreed to participate  It was my intent to perform this visit via video technology but the patient was not able to do a video connection so the visit was completed via audio telephone only  Verification of patient location:  Patient is located in the following state in which I hold an active license: [place]    Subjective:   Larry Dia is a 42 y o  female who is concerned about COVID-19  Patient's symptoms include chills, nasal congestion, cough, chest tightness, abdominal pain, myalgias and headache       Date of symptom onset: 3/3/2022  Date of exposure: 3/3/2022  COVID-19 vaccination status: Not vaccinated    Exposure:   Contact with a person who is under investigation (PUI) for or who is positive for COVID-19 within the last 14 days?: Yes    Hospitalized recently for fever and/or lower respiratory symptoms?: No      Currently a healthcare worker that is involved in direct patient care?: No      Works in a special setting where the risk of COVID-19 transmission may be high? (this may include long-term care, correctional and MCC facilities; homeless shelters; assisted-living facilities and group homes ): No      Resident in a special setting where the risk of COVID-19 transmission may be high? (this may include long-term care, correctional and MCC facilities; homeless shelters; assisted-living facilities and group homes ): No      Lab Results   Component Value Date    SARSCOV2 Negative 11/24/2021    SARSCOV2 Detected (A) 04/22/2020     Past Medical History:   Diagnosis Date    Anxiety     Asthma     [facility]ipolar disorder ([facility] [address]     Depression     Disease of thyroid gland     Endometriosis     Psychiatric illness     Psychosis ([facility] [address]     PTSD (post-traumatic stress disorder) 6/6/2017    Right carpal tunnel syndrome 12/26/2018    Self-injurious behavior     Suicide attempt [facility])      Past Surgical History:   Procedure Laterality Date    ABDOMINAL SURGERY      HERNIA REPAIR       Current Outpatient Medications   Medication Sig Dispense Refill    albuterol (PROVENTIL HFA,VENTOLIN HFA) 90 mcg/act inhaler Inhale 2 puffs every 4 (four) hours as needed for wheezing 18 g 1    albuterol (PROVENTIL HFA,VENTOLIN HFA) 90 mcg/act inhaler Inhale 2 puffs every 4 (four) hours as needed for wheezing 18 g 2    atoMOXetine (STRATTERA) 40 mg capsule Take 40 mg by mouth daily      Banophen 25 MG capsule       busPIRone (BUSPAR) 15 mg tablet       busPIRone (BUSPAR) 5 mg tablet Take 5 mg by mouth 2 (two) times a day  1    clonazePAM (KlonoPIN) 0 5 mg tablet       dextromethorphan-guaiFENesin (ROBITUSSIN DM)  mg/5 mL syrup Take 5 mL by mouth 3 (three) times a day as needed for cough (Patient not taking: Reported on 12/7/2021 ) 236 mL 0    diphenhydrAMINE (BENADRYL) 25 mg tablet Take 1 tablet (25 mg total) by mouth every 6 (six) hours as needed for itching 30 tablet 0    DULoxetine (CYMBALTA) 60 mg delayed release capsule       fexofenadine (ALLEGRA) 180 MG tablet Take 1 tablet (180 mg total) by mouth daily 90 tablet 1    fluticasone (FLONASE) 50 mcg/act nasal spray 1 spray into each nostril daily 16 g 2    fluticasone-salmeterol (Advair) 500-50 mcg/dose inhaler Inhale 1 puff 2 (two) times a day Rinse mouth after use   60 blister 2    hydrOXYzine pamoate (VISTARIL) 50 mg capsule Take 50 mg by mouth 2 (two) times a day as needed  1    ibuprofen (MOTRIN) 600 mg tablet Take 1 tablet (600 mg total) by mouth every 8 (eight) hours as needed for mild pain or fever 30 tablet 0    ipratropium-albuterol (DUO-NEB) 0 5-2 5 mg/3 mL nebulizer solution Take 3 mL by nebulization every 6 (six) hours as needed for wheezing or shortness of breath 200 mL 1    levothyroxine 100 mcg tablet Take 1 tablet (100 mcg total) by mouth every morning 90 tablet 0    methocarbamol (ROBAXIN) 500 mg tablet 1 tab qhs prn back pain 30 tablet 0    nicotine (NICODERM CQ) 7 mg/24hr TD 24 hr patch Place 1 patch on the skin every 24 hours (Patient not taking: Reported on 12/7/2021 ) 28 patch 0    olopatadine (PATANOL) 0 1 % ophthalmic solution Administer 1 drop to both eyes 2 (two) times a day (Patient not taking: Reported on 12/7/2021 ) 5 mL 3    omeprazole (PriLOSEC) 40 MG capsule Take 1 capsule (40 mg total) by mouth daily 90 capsule 0    ondansetron (ZOFRAN-ODT) 4 mg disintegrating tablet TAKE ONE TABLET BY MOUTH EVERY 6 HOURS AS NEEDED FOR NAUSEA AND VOMITING 20 tablet 0    psyllium (METAMUCIL SMOOTH TEXTURE) 28 % packet Take 1 packet by mouth 2 (two) times a day (Patient not taking: Reported on 12/7/2021 ) 75 packet 1    rizatriptan (MAXALT) 10 MG tablet Take 1 tablet (10 mg total) by mouth once as needed for migraine for up to 1 dose May repeat in 2 hours if needed 9 tablet 2    topiramate (TOPAMAX) 100 mg tablet 100 mg qam and 150 mg qhs 75 tablet 5    venlafaxine (EFFEXOR-XR) 37 5 mg 24 hr capsule Take 1 capsule (37 5 mg total) by mouth daily with breakfast 30 capsule 1     No current facility-administered medications for this visit  Allergies   Allergen Reactions    Aleve [Naproxen] Shortness Of Breath    Benzonatate Swelling     Throat closing    Guaifenesin Other (See Comments)     Note - patient states she IS able to take robitussin   Latuda [Lurasidone] Swelling      stuttering     Imitrex [Sumatriptan]      Patient reports that this medication "makes me sick"  Review of Systems   Constitutional: Positive for chills  HENT: Positive for congestion  Respiratory: Positive for cough and chest tightness  Gastrointestinal: Positive for abdominal pain  Musculoskeletal: Positive for myalgias  Neurological: Positive for headaches     All other systems reviewed and are negative  Objective: There were no vitals filed for this visit  Physical Exam    VIRTUAL VISIT DISCLAIMER    Sugar Ramirez verbally agrees to participate in [employer]  Pt is aware that [employer] could be limited without vital signs or the ability to perform a full hands-on physical exam  Nika Sandoval Keep understands she or the provider may request at any time to terminate the video visit and request the patient to seek care or treatment in person

## 2022-01-07 ENCOUNTER — TELEPHONE (OUTPATIENT)
Dept: FAMILY MEDICINE CLINIC | Facility: CLINIC | Age: 47
End: 2022-01-07

## 2022-01-07 PROCEDURE — U0003 INFECTIOUS AGENT DETECTION BY NUCLEIC ACID (DNA OR RNA); SEVERE ACUTE RESPIRATORY SYNDROME CORONAVIRUS 2 (SARS-COV-2) (CORONAVIRUS DISEASE [COVID-19]), AMPLIFIED PROBE TECHNIQUE, MAKING USE OF HIGH THROUGHPUT TECHNOLOGIES AS DESCRIBED BY CMS-2020-01-R: HCPCS | Performed by: FAMILY MEDICINE

## 2022-01-11 LAB — SARS-COV-2 RNA RESP QL NAA+PROBE: POSITIVE

## 2022-01-28 ENCOUNTER — OFFICE VISIT (OUTPATIENT)
Dept: OBGYN CLINIC | Facility: CLINIC | Age: 47
End: 2022-01-28
Payer: COMMERCIAL

## 2022-01-28 VITALS
HEIGHT: 63 IN | SYSTOLIC BLOOD PRESSURE: 118 MMHG | WEIGHT: 229 LBS | DIASTOLIC BLOOD PRESSURE: 78 MMHG | BODY MASS INDEX: 40.57 KG/M2

## 2022-01-28 DIAGNOSIS — Z12.4 CERVICAL CANCER SCREENING: ICD-10-CM

## 2022-01-28 DIAGNOSIS — Z11.3 SCREEN FOR STD (SEXUALLY TRANSMITTED DISEASE): ICD-10-CM

## 2022-01-28 DIAGNOSIS — Z11.51 SCREENING FOR HPV (HUMAN PAPILLOMAVIRUS): ICD-10-CM

## 2022-01-28 DIAGNOSIS — Z01.419 WOMEN'S ANNUAL ROUTINE GYNECOLOGICAL EXAMINATION: Primary | ICD-10-CM

## 2022-01-28 PROCEDURE — 87591 N.GONORRHOEAE DNA AMP PROB: CPT | Performed by: OBSTETRICS & GYNECOLOGY

## 2022-01-28 PROCEDURE — 99386 PREV VISIT NEW AGE 40-64: CPT | Performed by: OBSTETRICS & GYNECOLOGY

## 2022-01-28 PROCEDURE — 0503F POSTPARTUM CARE VISIT: CPT | Performed by: OBSTETRICS & GYNECOLOGY

## 2022-01-28 PROCEDURE — G0143 SCR C/V CYTO,THINLAYER,RESCR: HCPCS | Performed by: OBSTETRICS & GYNECOLOGY

## 2022-01-28 PROCEDURE — G0476 HPV COMBO ASSAY CA SCREEN: HCPCS | Performed by: OBSTETRICS & GYNECOLOGY

## 2022-01-28 PROCEDURE — 87491 CHLMYD TRACH DNA AMP PROBE: CPT | Performed by: OBSTETRICS & GYNECOLOGY

## 2022-01-28 NOTE — PROGRESS NOTES
Assessment/Plan:      Pap and HPV done today     she has a baseline mammogram prescription, I encouraged her to schedule this, she is agreeable  Discussed self breast exams    colon cancer screening  -Reviewed guidelines to squat start colon cancer screening at age 41  She does not have a family history of colon cancer but states that her older brother did have polyps  discussed preventive care, regular exercise and a healthy diet      No problem-specific Assessment & Plan notes found for this encounter  Diagnoses and all orders for this visit:    Women's annual routine gynecological examination  -     Liquid-based pap, screening    Screen for STD (sexually transmitted disease)  -     Chlamydia/GC amplified DNA by PCR          Subjective:      Patient ID: Cesar Lau is a 42 y o  female  New patient- 82-year-old female presents for yearly  It has been 3 or 4 years since her last exam   She has no Gyn complaints  She had an endometrial ablation about 8 years ago  Initially, she had some spotting but now she very rarely has any spotting  She is status post tubal       History of vaginal delivery x3  She has not yet had a baseline mammogram       She has had abnormal Paps in the past   She feels that her most recent Pap was normal      Normal Pap with negative HPV noted from 2016 [facility] her [facility]      The following portions of the patient's history were reviewed and updated as appropriate: allergies, current medications, past family history, past medical history, past social history, past surgical history and problem list     Review of Systems   Constitutional: Negative  Gastrointestinal: Negative  Genitourinary: Negative  Objective:      /78 (BP Location: Left arm, Patient Position: Sitting)   Ht 5' 2 5" (1 588 m)   Wt 104 kg (229 lb)   BMI 41 22 kg/m²          Physical Exam  Vitals reviewed  Constitutional:       Appearance: She is well-developed  Neck:      Thyroid: No thyromegaly  Trachea: No tracheal deviation  Cardiovascular:      Rate and Rhythm: Normal rate and regular rhythm  Pulmonary:      Effort: Pulmonary effort is normal       Breath sounds: Normal breath sounds  Chest:   Breasts: Breasts are symmetrical       Right: No inverted nipple, mass, nipple discharge, skin change or tenderness  Left: No inverted nipple, mass, nipple discharge, skin change or tenderness  Abdominal:      General: There is no distension  Palpations: Abdomen is soft  There is no mass  Tenderness: There is no abdominal tenderness  Genitourinary:     Labia:         Right: No rash, tenderness, lesion or injury  Left: No rash, tenderness, lesion or injury  Vagina: Normal       Cervix: No cervical motion tenderness, discharge or friability  Adnexa:         Right: No mass, tenderness or fullness  Left: No mass, tenderness or fullness          Rectum: Normal

## 2022-01-31 LAB
C TRACH DNA SPEC QL NAA+PROBE: NEGATIVE
HPV HR 12 DNA CVX QL NAA+PROBE: NEGATIVE
HPV16 DNA CVX QL NAA+PROBE: NEGATIVE
HPV18 DNA CVX QL NAA+PROBE: NEGATIVE
N GONORRHOEA DNA SPEC QL NAA+PROBE: NEGATIVE

## 2022-02-01 ENCOUNTER — OFFICE VISIT (OUTPATIENT)
Dept: FAMILY MEDICINE CLINIC | Facility: CLINIC | Age: 47
End: 2022-02-01

## 2022-02-01 VITALS
BODY MASS INDEX: 40.86 KG/M2 | SYSTOLIC BLOOD PRESSURE: 132 MMHG | HEART RATE: 86 BPM | HEIGHT: 63 IN | DIASTOLIC BLOOD PRESSURE: 94 MMHG | RESPIRATION RATE: 18 BRPM | TEMPERATURE: 98 F | WEIGHT: 230.6 LBS | OXYGEN SATURATION: 97 %

## 2022-02-01 DIAGNOSIS — I10 PRIMARY HYPERTENSION: Primary | ICD-10-CM

## 2022-02-01 DIAGNOSIS — R07.9 CHEST PAIN, UNSPECIFIED TYPE: ICD-10-CM

## 2022-02-01 DIAGNOSIS — R00.2 PALPITATIONS: ICD-10-CM

## 2022-02-01 DIAGNOSIS — F31.60 BIPOLAR 1 DISORDER, MIXED (HCC): ICD-10-CM

## 2022-02-01 DIAGNOSIS — I10 BENIGN ESSENTIAL HYPERTENSION: ICD-10-CM

## 2022-02-01 DIAGNOSIS — E03.9 HYPOTHYROIDISM, UNSPECIFIED TYPE: ICD-10-CM

## 2022-02-01 PROBLEM — J45.901 ASTHMA EXACERBATION: Status: RESOLVED | Noted: 2021-06-10 | Resolved: 2022-02-01

## 2022-02-01 PROCEDURE — 3075F SYST BP GE 130 - 139MM HG: CPT | Performed by: NURSE PRACTITIONER

## 2022-02-01 PROCEDURE — 3080F DIAST BP >= 90 MM HG: CPT | Performed by: NURSE PRACTITIONER

## 2022-02-01 PROCEDURE — 99214 OFFICE O/P EST MOD 30 MIN: CPT | Performed by: NURSE PRACTITIONER

## 2022-02-01 RX ORDER — ARIPIPRAZOLE 10 MG/1
TABLET ORAL
COMMUNITY
Start: 2022-01-11

## 2022-02-01 RX ORDER — BUPROPION HYDROCHLORIDE 150 MG/1
TABLET, EXTENDED RELEASE ORAL
COMMUNITY
Start: 2022-01-11

## 2022-02-01 RX ORDER — LISINOPRIL 10 MG/1
10 TABLET ORAL DAILY
Qty: 30 TABLET | Refills: 0 | Status: SHIPPED | OUTPATIENT
Start: 2022-02-01

## 2022-02-01 NOTE — PROGRESS NOTES
Assessment/Plan:    Asthma  She follows with pulmonology   Stable   Continue current regimen:   Albuterol nebs/ inhaler PRN  Advair 500-50 bid   Antihistamine, Flonase daily     Benign essential hypertension  Blood pressure not at goal- restart lisinopril 10 mg daily     Hypothyroidism  Lab Results   Component Value Date    [id number] 0 614 11/09/2021     Continue with current dose of levothyroxine   Will check TSH again given sx of increased anxiety and agitation     Bipolar 1 disorder, mixed ([address] )  Follows with Psych at [facility] - reports that her psychiatrist recently retired and she is transitioning to a new provider  Due to this she has been rationing her clonazepam, advised her to contact [facility] office and discuss that she needs sooner appointment     Chest pain  Reports new onset left chest pain/ pressure with radiation to left arm- associated with palpitations   EKG in office NSR no ST/T wave abnormalities   Low likelihood cardiac etiology   Discussed will check labs, likely related to her increased anxiety     Nika was seen today for asthma and hypertension  Diagnoses and all orders for this visit:    Primary hypertension  -     lisinopril (ZESTRIL) 10 mg tablet; Take 1 tablet (10 mg total) by mouth daily  -     CBC and differential; Future  -     Comprehensive metabolic panel; Future  -     Hemoglobin A1C; Future  -     Lipid panel; Future  -     TSH, 3rd generation with Free T4 reflex; Future    Chest pain, unspecified type  -     POCT ECG  -     CBC and differential; Future  -     Comprehensive metabolic panel; Future  -     Hemoglobin A1C; Future  -     Lipid panel; Future  -     TSH, 3rd generation with Free T4 reflex; Future    Palpitations  -     CBC and differential; Future  -     Comprehensive metabolic panel; Future  -     Hemoglobin A1C; Future  -     Lipid panel; Future  -     TSH, 3rd generation with Free T4 reflex;  Future    Benign essential hypertension    Hypothyroidism, unspecified type    Bipolar 1 disorder, mixed ([facility] )        Follow up in 6 weeks     Patient Instructions     Benefits of an Active Lifestyle   AMBULATORY CARE:   An active lifestyle  means you do physical activity throughout the day  Any activity that gets you up and moving is part of an active lifestyle  Physical activity includes exercise such as walking or lifting weights  It also includes playing sports  Physical activity is different from other kinds of activity, such as reading a book  This kind of activity is called sedentary  A sedentary lifestyle means you sit or do not move much during the day  An active lifestyle has many benefits, such as helping you prevent or manage health conditions  Call your doctor if:   · You notice changes in your health, such as new or worsening shortness of breath  · You have questions or concerns about your condition or care  Benefits of an active lifestyle:   · You may be able to do daily activities more easily  Activity helps condition your heart, lungs, and muscles  This can help you get through your daily activities without feeling tired  · You can help control your weight  Activity helps your body use the calories you eat instead of storing them as fat  Your body continues to burn calories at a higher rate after you are active  · Activity can increase your health  Activity helps lower your risk for cancer, heart disease, diabetes, and stroke  Activity can help you control your blood pressure and blood sugar levels, and lower your cholesterol  If you have arthritis, activity can help your joints move more easily and with less pain  · Your bones and muscles will get stronger  This will help prevent osteoporosis and reduce your risk for falls  · Activity can help improve your mood  Activity can reduce or prevent depression and stress  Activity can also help improve your sleep      Risks of a sedentary lifestyle:  A sedentary lifestyle increases your risk for diseases such as diabetes, high blood pressure, and heart disease  Your immune system also becomes weaker  This means it cannot fight infections well  How much activity you need:  Any activity is better than no activity at all  When you go from being mostly inactive to adding some activity, you will see health benefits  The following are general guidelines:  · Do aerobic activity several days each week  Aerobic activity includes walking, bicycling, dancing, swimming, and raking leaves  Aim for 150 to 300 minutes of moderate activity, or 75 to 150 of vigorous activity each week  You can also do a combination of moderate and vigorous activity  · Do strength training at least 2 times each week  Strength training helps you keep the muscles you have and build new muscles  Strength training includes pushups, yoga, izzy chi, and weightlifting  If you do not have access to weights, you can lift items around your house  Try to work all the major muscle groups, such as your legs, arms, abdomen, and chest  Do 2 or 3 sets on each area  Use a weight that is slightly heavier than you can lift easily  You can work up to [employer]  You can also use resistance bands instead of weights  Steps you can take to become more active:   · Set goals  Set some long-term goals and some short-term goals  For example, you may want to be able to walk for 30 minutes without becoming short of breath  Try not to put time requirements on your goals  For example, do not think you should reach your goal in a month  Set smaller goals, such as walking a little longer each week, or feeling less shortness of breath  · Be active all day  Activity does not have to mean structured exercise each day  You can be more active by making small changes all day  For example, try parking as far from the entrance of buildings as you can when you run errands  If possible, walk or ride a bike instead of driving   Take the stairs instead of the elevator  · Keep a record of your activity and your progress  You can do this by writing down your daily activity  Include the kind of activity and how long you did it  You can also use a program on your phone or other device that will track activity for you  Also record your progress  You may be doing daily activities more easily, sleeping better, or building muscles  · Step counting can help you monitor activity  A general guide is to take 10,000 steps each day  A pedometer is a device you can wear to track your steps  Some phones have programs that will count and record steps  You may need to work up to 10,000 steps  Start by finding out how many steps you usually take in a day  Then try to take more steps each day than you took the day before  Tips to help you stay on track:   · Start slowly and work up  You do not have to do 30 minutes of activity at one time  You can break the activity up and do a few minutes at a time  Remember that some physical activity is better than none  Stand up during the day, even if you cannot walk around  Your body uses more energy when you stand  You may be able to get a desk that allows you to stand while you type or make phone calls for work  Aim for a speed or intensity that is challenging but not too difficult  You should be able to speak a few words at a time but not be able to sing  · Plan activities you enjoy  Do a variety of activities so you do not become bored and you stay challenged  Include activities that strengthen your bones  These activities are called weight-bearing exercises  Examples include tennis, jumping rope, and running  Swimming, riding a bike, and similar exercises keep weight off your bones  They will not help strengthen bones, but they will help your heart and lungs work better  · Ask for support from the people in your life  Go for a walk after dinner with your family  Meet friends at the park   Take a break with a coworker and walk around  Find someone who likes to go to the gym at the same time you do  You may be more likely to go if you know another person is counting on you  Get involved in community events, such as cleaning a community park  Ask someone to help you stay on track  For example, you can tell the person about your daily or weekly activity  · Treat yourself to a reward when you reach a goal   The rewards can be for activity done for a certain amount of time each day or days each week  Rewards can also be for progress you make  Have rewards that are not food, such as a new clothing item or book  What you need to know about nutrition and activity:  Healthy foods will give you the energy you need to be active  Activity and good nutrition work together to help you reach or maintain a healthy weight  Healthy foods include fruits, vegetables, lean meats, fish, cooked beans, whole-grain breads, and low-fat dairy products  Your healthcare provider can help you create a healthy meal plan  He or she can tell you how many calories you need to stay active and still lose weight if needed  Follow up with your doctor as directed:  Write down your questions so you remember to ask them during your visits  © Copyright [employer] 2021 Information is for End User's use only and may not be sold, redistributed or otherwise used for commercial purposes  All illustrations and images included in CareNotes® are the copyrighted property of [employer]  or [facility][note 3] Abdullahi Lamb   The above information is an  only  It is not intended as medical advice for individual conditions or treatments  Talk to your doctor, nurse or pharmacist before following any medical regimen to see if it is safe and effective for you              Subjective:     Christophe White is a 42 y o  female who  has a past medical history of Anxiety, Asthma, Bipolar disorder ([facility] ), Depression, Disease of thyroid gland, Endometriosis, Psychiatric illness, Psychosis ([facility] ), PTSD (post-traumatic stress disorder), Right carpal tunnel syndrome, Self-injurious behavior, and Suicide attempt ([facility] )  She has no past medical history of ADHD (attention deficit hyperactivity disorder), Alcohol abuse, Alcoholism ([facility] ), Anorexia nervosa, Autism spectrum disorder, Borderline personality disorder ([facility] ), Bulimia nervosa, Cancer ([facility] ), Chronic pain disorder, Head injury, HIV disease ([facility] ), Liver disease, Obsessive-compulsive disorder, Oppositional defiant disorder, Panic disorder, Schizoaffective disorder ([facility] ), Seizures ([facility] ), Violence, history of, Withdrawal symptoms, alcohol ([place] [address] ), or Withdrawal symptoms, drug or narcotic ([facility] )  who presented to the office today for follow up  Chest Pain  Jodie Feng complains of chest pain  Onset was 2 weeks ago  Symptoms have been unchanged since that time  The patient's pain occurs at rest, with ADLs and activities  The patient describes the pain as dull and radiates to the left arm  Patient rates pain as a 4/10 in intensity  Associated symptoms are: chest pain and chest pressure/discomfort  Aggravating factors are: none  Alleviating factors are: none  Patient's cardiac risk factors are: dyslipidemia, hypertension, obesity (BMI >= 30 kg/m2) and sedentary lifestyle  Patient's risk factors for DVT/PE: none  Previous cardiac testing: electrocardiogram (ECG) 2019- normal     Patient does also note that her psychiatrist has retired and she has been rationing her benzodiazepine to hopefully last until she is able to establish with another  Admits that her anxiety is significantly elevated due to this       The following portions of the patient's history were reviewed and updated as appropriate: allergies, current medications, past family history, past medical history, past social history, past surgical history and problem list     Current Outpatient Medications on File Prior to Visit   Medication Sig Dispense Refill    albuterol (PROVENTIL HFA,VENTOLIN HFA) 90 mcg/act inhaler Inhale 2 puffs every 4 (four) hours as needed for wheezing 18 g 1    ARIPiprazole (ABILIFY) 10 mg tablet       buPROPion (WELLBUTRIN SR) 150 mg 12 hr tablet       busPIRone (BUSPAR) 15 mg tablet       busPIRone (BUSPAR) 5 mg tablet Take 5 mg by mouth 2 (two) times a day  1    clonazePAM (KlonoPIN) 0 5 mg tablet       DULoxetine (CYMBALTA) 60 mg delayed release capsule       fexofenadine (ALLEGRA) 180 MG tablet Take 1 tablet (180 mg total) by mouth daily 90 tablet 1    fluticasone (FLONASE) 50 mcg/act nasal spray 1 spray into each nostril daily 16 g 2    fluticasone-salmeterol (Advair) 500-50 mcg/dose inhaler Inhale 1 puff 2 (two) times a day Rinse mouth after use  60 blister 2    ipratropium-albuterol (DUO-NEB) 0 5-2 5 mg/3 mL nebulizer solution Take 3 mL by nebulization every 6 (six) hours as needed for wheezing or shortness of breath 200 mL 1    levothyroxine 100 mcg tablet Take 1 tablet (100 mcg total) by mouth every morning 90 tablet 0    methocarbamol (ROBAXIN) 500 mg tablet 1 tab qhs prn back pain 30 tablet 0    omeprazole (PriLOSEC) 40 MG capsule Take 1 capsule (40 mg total) by mouth daily 90 capsule 0    ondansetron (ZOFRAN-ODT) 4 mg disintegrating tablet TAKE ONE TABLET BY MOUTH EVERY 6 HOURS AS NEEDED FOR NAUSEA AND VOMITING 20 tablet 0    topiramate (TOPAMAX) 100 mg tablet 100 mg qam and 150 mg qhs 75 tablet 5     No current facility-administered medications on file prior to visit  Review of Systems   Constitutional: Negative for chills and fever  HENT: Negative for ear pain and sore throat  Eyes: Negative for pain and visual disturbance  Respiratory: Negative for cough and shortness of breath  Cardiovascular: Positive for chest pain  Negative for palpitations  Gastrointestinal: Negative for abdominal pain and vomiting  Genitourinary: Negative for dysuria and hematuria     Musculoskeletal: Negative for arthralgias and back pain    Skin: Negative for color change and rash  Neurological: Negative for seizures and syncope  Psychiatric/Behavioral: Positive for agitation, dysphoric mood and sleep disturbance  The patient is nervous/anxious  All other systems reviewed and are negative  BMI Counseling: Body mass index is 41 51 kg/m²  The BMI is above normal  Nutrition recommendations include decreasing portion sizes, encouraging healthy choices of fruits and vegetables, decreasing fast food intake, consuming healthier snacks and limiting drinks that contain sugar  Rationale for BMI follow-up plan is due to patient being overweight or obese  Objective:    /94 (BP Location: Left arm, Patient Position: Sitting, Cuff Size: Standard)   Pulse 86   Temp 98 °F (36 7 °C) (Temporal)   Resp 18   Ht 5' 2 5" (1 588 m)   Wt 105 kg (230 lb 9 6 oz)   SpO2 97%   BMI 41 51 kg/m²     Physical Exam  Vitals and nursing note reviewed  Constitutional:       General: She is not in acute distress  Appearance: She is well-developed  She is not diaphoretic  HENT:      Head: Normocephalic and atraumatic  Right Ear: External ear normal       Left Ear: External ear normal    Eyes:      Pupils: Pupils are equal, round, and reactive to light  Cardiovascular:      Rate and Rhythm: Normal rate and regular rhythm  Pulmonary:      Effort: Pulmonary effort is normal  No respiratory distress  Breath sounds: Normal breath sounds  No wheezing  Abdominal:      General: Bowel sounds are normal  There is no distension  Palpations: Abdomen is soft  Tenderness: There is no abdominal tenderness  There is no rebound  Musculoskeletal:         General: No deformity  Normal range of motion  Cervical back: Normal range of motion and neck supple  Lymphadenopathy:      Cervical: No cervical adenopathy  Skin:     General: Skin is warm and dry  Capillary Refill: Capillary refill takes less than 2 seconds  Findings: No rash  Neurological:      General: No focal deficit present  Mental Status: She is alert and oriented to person, place, and time  Psychiatric:         Mood and Affect: Mood is anxious           JUSTO Rasheed  02/02/22  7:32 AM

## 2022-02-01 NOTE — PATIENT INSTRUCTIONS
Benefits of an Active Lifestyle   AMBULATORY CARE:   An active lifestyle  means you do physical activity throughout the day  Any activity that gets you up and moving is part of an active lifestyle  Physical activity includes exercise such as walking or lifting weights  It also includes playing sports  Physical activity is different from other kinds of activity, such as reading a book  This kind of activity is called sedentary  A sedentary lifestyle means you sit or do not move much during the day  An active lifestyle has many benefits, such as helping you prevent or manage health conditions  Call your doctor if:   · You notice changes in your health, such as new or worsening shortness of breath  · You have questions or concerns about your condition or care  Benefits of an active lifestyle:   · You may be able to do daily activities more easily  Activity helps condition your heart, lungs, and muscles  This can help you get through your daily activities without feeling tired  · You can help control your weight  Activity helps your body use the calories you eat instead of storing them as fat  Your body continues to burn calories at a higher rate after you are active  · Activity can increase your health  Activity helps lower your risk for cancer, heart disease, diabetes, and stroke  Activity can help you control your blood pressure and blood sugar levels, and lower your cholesterol  If you have arthritis, activity can help your joints move more easily and with less pain  · Your bones and muscles will get stronger  This will help prevent osteoporosis and reduce your risk for falls  · Activity can help improve your mood  Activity can reduce or prevent depression and stress  Activity can also help improve your sleep  Risks of a sedentary lifestyle:  A sedentary lifestyle increases your risk for diseases such as diabetes, high blood pressure, and heart disease   Your immune system also becomes weaker  This means it cannot fight infections well  How much activity you need:  Any activity is better than no activity at all  When you go from being mostly inactive to adding some activity, you will see health benefits  The following are general guidelines:  · Do aerobic activity several days each week  Aerobic activity includes walking, bicycling, dancing, swimming, and raking leaves  Aim for 150 to 300 minutes of moderate activity, or 75 to 150 of vigorous activity each week  You can also do a combination of moderate and vigorous activity  · Do strength training at least 2 times each week  Strength training helps you keep the muscles you have and build new muscles  Strength training includes pushups, yoga, izzy chi, and weightlifting  If you do not have access to weights, you can lift items around your house  Try to work all the major muscle groups, such as your legs, arms, abdomen, and chest  Do 2 or 3 sets on each area  Use a weight that is slightly heavier than you can lift easily  You can work up to [employer]  You can also use resistance bands instead of weights  Steps you can take to become more active:   · Set goals  Set some long-term goals and some short-term goals  For example, you may want to be able to walk for 30 minutes without becoming short of breath  Try not to put time requirements on your goals  For example, do not think you should reach your goal in a month  Set smaller goals, such as walking a little longer each week, or feeling less shortness of breath  · Be active all day  Activity does not have to mean structured exercise each day  You can be more active by making small changes all day  For example, try parking as far from the entrance of buildings as you can when you run errands  If possible, walk or ride a bike instead of driving  Take the stairs instead of the elevator  · Keep a record of your activity and your progress    You can do this by writing down your daily activity  Include the kind of activity and how long you did it  You can also use a program on your phone or other device that will track activity for you  Also record your progress  You may be doing daily activities more easily, sleeping better, or building muscles  · Step counting can help you monitor activity  A general guide is to take 10,000 steps each day  A pedometer is a device you can wear to track your steps  Some phones have programs that will count and record steps  You may need to work up to 10,000 steps  Start by finding out how many steps you usually take in a day  Then try to take more steps each day than you took the day before  Tips to help you stay on track:   · Start slowly and work up  You do not have to do 30 minutes of activity at one time  You can break the activity up and do a few minutes at a time  Remember that some physical activity is better than none  Stand up during the day, even if you cannot walk around  Your body uses more energy when you stand  You may be able to get a desk that allows you to stand while you type or make phone calls for work  Aim for a speed or intensity that is challenging but not too difficult  You should be able to speak a few words at a time but not be able to sing  · Plan activities you enjoy  Do a variety of activities so you do not become bored and you stay challenged  Include activities that strengthen your bones  These activities are called weight-bearing exercises  Examples include tennis, jumping rope, and running  Swimming, riding a bike, and similar exercises keep weight off your bones  They will not help strengthen bones, but they will help your heart and lungs work better  · Ask for support from the people in your life  Go for a walk after dinner with your family  Meet friends at the park  Take a break with a coworker and walk around  Find someone who likes to go to the gym at the same time you do   You may be more likely to go if you know another person is counting on you  Get involved in community events, such as cleaning a community park  Ask someone to help you stay on track  For example, you can tell the person about your daily or weekly activity  · Treat yourself to a reward when you reach a goal   The rewards can be for activity done for a certain amount of time each day or days each week  Rewards can also be for progress you make  Have rewards that are not food, such as a new clothing item or book  What you need to know about nutrition and activity:  Healthy foods will give you the energy you need to be active  Activity and good nutrition work together to help you reach or maintain a healthy weight  Healthy foods include fruits, vegetables, lean meats, fish, cooked beans, whole-grain breads, and low-fat dairy products  Your healthcare provider can help you create a healthy meal plan  He or she can tell you how many calories you need to stay active and still lose weight if needed  Follow up with your doctor as directed:  Write down your questions so you remember to ask them during your visits  © Copyright [employer] 2021 Information is for End User's use only and may not be sold, redistributed or otherwise used for commercial purposes  All illustrations and images included in CareNotes® are the copyrighted property of [employer] Inc  or [facility] Abdullahi Lamb   The above information is an  only  It is not intended as medical advice for individual conditions or treatments  Talk to your doctor, nurse or pharmacist before following any medical regimen to see if it is safe and effective for you

## 2022-02-01 NOTE — ASSESSMENT & PLAN NOTE
She follows with pulmonology   Stable   Continue current regimen:   Albuterol nebs/ inhaler PRN  Advair 500-50 bid   Antihistamine, Flonase daily

## 2022-02-02 PROBLEM — R07.9 CHEST PAIN: Status: ACTIVE | Noted: 2022-02-02

## 2022-02-02 LAB
LAB AP GYN PRIMARY INTERPRETATION: NORMAL
Lab: NORMAL
PATH INTERP SPEC-IMP: NORMAL

## 2022-02-02 PROCEDURE — 93000 ELECTROCARDIOGRAM COMPLETE: CPT | Performed by: NURSE PRACTITIONER

## 2022-02-02 NOTE — ASSESSMENT & PLAN NOTE
Reports new onset left chest pain/ pressure with radiation to left arm- associated with palpitations   EKG in office NSR no ST/T wave abnormalities   Low likelihood cardiac etiology   Discussed will check labs, likely related to her increased anxiety

## 2022-02-02 NOTE — ASSESSMENT & PLAN NOTE
Follows with Psych at [facility] - reports that her psychiatrist recently retired and she is transitioning to a new provider  Due to this she has been rationing her clonazepam, advised her to contact [facility] office and discuss that she needs sooner appointment

## 2022-02-02 NOTE — ASSESSMENT & PLAN NOTE
Lab Results   Component Value Date    [id number] 0 614 11/09/2021     Continue with current dose of levothyroxine   Will check TSH again given sx of increased anxiety and agitation

## 2022-02-03 ENCOUNTER — OFFICE VISIT (OUTPATIENT)
Dept: FAMILY MEDICINE CLINIC | Facility: CLINIC | Age: 47
End: 2022-02-03

## 2022-02-03 VITALS
RESPIRATION RATE: 16 BRPM | TEMPERATURE: 98 F | WEIGHT: 229 LBS | BODY MASS INDEX: 40.57 KG/M2 | DIASTOLIC BLOOD PRESSURE: 72 MMHG | OXYGEN SATURATION: 98 % | SYSTOLIC BLOOD PRESSURE: 112 MMHG | HEART RATE: 86 BPM | HEIGHT: 63 IN

## 2022-02-03 DIAGNOSIS — K13.70 MOUTH LESION: Primary | ICD-10-CM

## 2022-02-03 PROCEDURE — 3074F SYST BP LT 130 MM HG: CPT | Performed by: FAMILY MEDICINE

## 2022-02-03 PROCEDURE — 3078F DIAST BP <80 MM HG: CPT | Performed by: FAMILY MEDICINE

## 2022-02-03 PROCEDURE — 99213 OFFICE O/P EST LOW 20 MIN: CPT | Performed by: FAMILY MEDICINE

## 2022-02-03 NOTE — ASSESSMENT & PLAN NOTE
At this time patients tongue appears normal  -patient will continue to monitor symptoms if it does not resolve or gets worse she will contact the clinic and we will send her to ENT for further evaluation

## 2022-02-03 NOTE — PROGRESS NOTES
Assessment/Plan:    Mouth lesion  At this time patients tongue appears normal  -patient will continue to monitor symptoms if it does not resolve or gets worse she will contact the clinic and we will send her to ENT for further evaluation  Diagnoses and all orders for this visit:    Mouth lesion          Subjective:      Patient ID: Deepak Brady is a 42 y o  female  This is a very pleasant 65-year-old female presents to clinic for sick visit  This morning she noticed that she had a lesion on her tongue which was a dark black and texture appears on the central aspect of her tongue, patient showed me a picture of what looked like this morning on her phone  This is also accompanied by pain, patient describes pain almost like a paper cut  At this time there is no black discoloration texture does appear normal however tongue looks like it has mild irritation  The following portions of the patient's history were reviewed and updated as appropriate: allergies, current medications, past family history, past medical history, past social history, past surgical history and problem list     Review of Systems   Constitutional: Negative for chills and fever  HENT: Positive for mouth sores  Negative for congestion and sore throat  Eyes: Negative for visual disturbance  Respiratory: Negative for wheezing  Cardiovascular: Negative for chest pain  Gastrointestinal: Negative for abdominal pain, blood in stool and nausea  Endocrine: Negative for cold intolerance and heat intolerance  Genitourinary: Negative for dysuria and hematuria  Musculoskeletal: Negative for gait problem  Skin: Negative for rash  Allergic/Immunologic: Negative for environmental allergies  Neurological: Negative for syncope  Hematological: Does not bruise/bleed easily  Psychiatric/Behavioral: Negative for behavioral problems           Objective:      /72 (BP Location: Right arm, Patient Position: Sitting, Cuff Size: Large)   Pulse 86   Temp 98 °F (36 7 °C) (Temporal)   Resp 16   Ht 5' 2 5" (1 588 m)   Wt 104 kg (229 lb)   SpO2 98%   BMI 41 22 kg/m²          Physical Exam  Vitals reviewed  Constitutional:       General: She is not in acute distress  Appearance: She is well-developed  She is not diaphoretic  HENT:      Head: Normocephalic and atraumatic  Right Ear: External ear normal       Left Ear: External ear normal       Nose: Nose normal    Eyes:      General:         Right eye: No discharge  Left eye: No discharge  Conjunctiva/sclera: Conjunctivae normal       Pupils: Pupils are equal, round, and reactive to light  Neck:      Thyroid: No thyromegaly  Vascular: No JVD  Trachea: No tracheal deviation  Cardiovascular:      Rate and Rhythm: Normal rate and regular rhythm  Heart sounds: Normal heart sounds  No murmur heard  No friction rub  No gallop  Pulmonary:      Effort: Pulmonary effort is normal  No respiratory distress  Breath sounds: Normal breath sounds  No wheezing  Chest:      Chest wall: No tenderness  Abdominal:      General: Bowel sounds are normal  There is no distension  Palpations: Abdomen is soft  Tenderness: There is no abdominal tenderness  There is no rebound  Musculoskeletal:         General: No tenderness  Normal range of motion  Cervical back: Normal range of motion and neck supple  Skin:     General: Skin is warm and dry  Findings: No erythema or rash  Neurological:      Mental Status: She is alert and oriented to person, place, and time  Coordination: Coordination normal       Deep Tendon Reflexes: Reflexes are normal and symmetric  Psychiatric:         Behavior: Behavior normal          Thought Content: Thought content normal               Media Information             Document Information    Clinical Image - Mobile Device      02/03/2022 3:43 PM   Attached To:    Office Visit on 2/3/22 with ENEIDA WU [id number] MD Serjio Leon

## 2022-02-04 DIAGNOSIS — N76.0 BACTERIAL VAGINOSIS: Primary | ICD-10-CM

## 2022-02-04 DIAGNOSIS — B96.89 BACTERIAL VAGINOSIS: Primary | ICD-10-CM

## 2022-02-04 RX ORDER — METRONIDAZOLE 7.5 MG/G
1 GEL VAGINAL
Qty: 70 G | Refills: 0 | Status: SHIPPED | OUTPATIENT
Start: 2022-02-04 | End: 2022-02-09

## 2022-02-07 ENCOUNTER — TELEPHONE (OUTPATIENT)
Dept: FAMILY MEDICINE CLINIC | Facility: CLINIC | Age: 47
End: 2022-02-07

## 2022-02-07 DIAGNOSIS — G47.30 SLEEP APNEA, UNSPECIFIED TYPE: Primary | ICD-10-CM

## 2022-02-07 NOTE — TELEPHONE ENCOUNTER
----- Message from Mebla [employer] sent at 2/7/2022  9:40 AM EST -----  Regarding: PHYSICIAIN REFERRA/ORDER REQUIRED  Patient is scheduled w/ sleep  medicine physician 2/14 (not a study) and requires physician referral/order from PCP office  Please enter in epic      Dx:  [id number] [phone]

## 2022-02-09 ENCOUNTER — TELEPHONE (OUTPATIENT)
Dept: DIABETES SERVICES | Facility: OTHER | Age: 47
End: 2022-02-09

## 2022-02-14 ENCOUNTER — OFFICE VISIT (OUTPATIENT)
Dept: SLEEP CENTER | Facility: CLINIC | Age: 47
End: 2022-02-14
Payer: COMMERCIAL

## 2022-02-14 VITALS
DIASTOLIC BLOOD PRESSURE: 76 MMHG | HEIGHT: 63 IN | HEART RATE: 72 BPM | WEIGHT: 230 LBS | SYSTOLIC BLOOD PRESSURE: 136 MMHG | BODY MASS INDEX: 40.75 KG/M2

## 2022-02-14 DIAGNOSIS — R06.83 SNORING: ICD-10-CM

## 2022-02-14 DIAGNOSIS — G47.30 SLEEP APNEA, UNSPECIFIED TYPE: Primary | ICD-10-CM

## 2022-02-14 DIAGNOSIS — J45.909 UNCOMPLICATED ASTHMA, UNSPECIFIED ASTHMA SEVERITY, UNSPECIFIED WHETHER PERSISTENT: ICD-10-CM

## 2022-02-14 DIAGNOSIS — G47.19 EXCESSIVE DAYTIME SLEEPINESS: ICD-10-CM

## 2022-02-14 PROCEDURE — 99215 OFFICE O/P EST HI 40 MIN: CPT | Performed by: INTERNAL MEDICINE

## 2022-02-14 NOTE — PROGRESS NOTES
Pulmonary/Sleep Follow Up Note   Andie Brewster 42 y o  female MRN: [id number]  2/14/2022      Assessment and Plan:    1  Sleep apnea, unspecified type  Assessment & Plan:  · Mild obstructive apnea diagnosed in June 2020 with AHI 5 1 events per hour, she has not started CPAP, I prescribed an auto titrating CPAP with a pressure range of 5-20 cm H2O she is very symptomatic and her REM AHI is 14 5 events per hour  · Counseled extensively for the importance of initiating CPAP    Orders:  -     Ambulatory Referral to Sleep Medicine  -     CPAP Auto New DME    2  Uncomplicated asthma, unspecified asthma severity, unspecified whether persistent  Assessment & Plan:  Follows up with pulmonology, Dr Liliana Pina, her asthma is controlled continues to use Advair and albuterol as needed      3  BMI 40 0-44 9, adult [facility])  Assessment & Plan:  Her BMI is 41 40, she would benefit from weight loss      4  Excessive daytime sleepiness  Assessment & Plan:  Treatment of HARIS, detailed above      5  Snoring  Assessment & Plan: Will need to treat HARIS, detailed above          Return in about 3 months (around 5/14/2022)  History of Present Illness   HPI:  Andie Brewster is a 42 y o  female who is here for follow-up the patient was last seen a 2 years ago with classic symptoms of obstructive sleep apnea snoring, and excessive daytime sleepiness she underwent an in-lab diagnostic sleep study found to have an AHI 5 1 events per hour which seems to be significantly worse during REM sleep with REM AHI 14 5 events per hour  The patient has classic symptoms she has sleep interruptions un refreshed upon awakening, she would take a daytime nap diffuse she is given an opportunity overall dissatisfied with her sleep and she would snore loudly when she is asleep her [place] scale is 18/24        Review of Systems      Genitourinary need to urinate more than twice a night and hot flashes at night   Cardiology palpitations/fluttering feeling in the chest   Gastrointestinal frequent heartburn/acid reflux   Neurology frequent headaches, awaken with headache and numbness/tingling of an extremity   Constitutional claustrophobia, fatigue, excessive sweating at night and weight change   Integumentary none   Psychiatry anxiety, depression, aggressiveness or irritability and mood change   Musculoskeletal sciatica and leg cramps   Pulmonary frequent cough and snoring   ENT none   Endocrine frequent urination   Hematological none           Historical Information   Past Medical History:   Diagnosis Date    Anxiety     Asthma     Bipolar disorder (HCC)     Depression     Disease of thyroid gland     Endometriosis     Psychiatric illness     Psychosis ([facility] )     PTSD (post-traumatic stress disorder) 6/6/2017    Right carpal tunnel syndrome 12/26/2018    Self-injurious behavior     Suicide attempt ([facility] )      Past Surgical History:   Procedure Laterality Date    ABDOMINAL SURGERY      HERNIA REPAIR      LAPAROSCOPIC TUBAL LIGATION       Family History   Problem Relation Age of Onset    Hypertension Mother     Asthma Mother     Fibromyalgia Mother     Osteoporosis Mother     Thyroid disease Brother          Meds/Allergies     Current Outpatient Medications:     albuterol (PROVENTIL HFA,VENTOLIN HFA) 90 mcg/act inhaler, Inhale 2 puffs every 4 (four) hours as needed for wheezing, Disp: 18 g, Rfl: 1    ARIPiprazole (ABILIFY) 10 mg tablet, , Disp: , Rfl:     buPROPion (WELLBUTRIN SR) 150 mg 12 hr tablet, , Disp: , Rfl:     busPIRone (BUSPAR) 15 mg tablet, , Disp: , Rfl:     busPIRone (BUSPAR) 5 mg tablet, Take 5 mg by mouth 2 (two) times a day, Disp: , Rfl: 1    clonazePAM (KlonoPIN) 0 5 mg tablet, , Disp: , Rfl:     DULoxetine (CYMBALTA) 60 mg delayed release capsule, , Disp: , Rfl:     fexofenadine (ALLEGRA) 180 MG tablet, Take 1 tablet (180 mg total) by mouth daily, Disp: 90 tablet, Rfl: 1    fluticasone (FLONASE) 50 mcg/act nasal spray, 1 spray into each nostril daily, Disp: 16 g, Rfl: 2    fluticasone-salmeterol (Advair) 500-50 mcg/dose inhaler, Inhale 1 puff 2 (two) times a day Rinse mouth after use , Disp: 60 blister, Rfl: 2    ipratropium-albuterol (DUO-NEB) 0 5-2 5 mg/3 mL nebulizer solution, Take 3 mL by nebulization every 6 (six) hours as needed for wheezing or shortness of breath, Disp: 200 mL, Rfl: 1    levothyroxine 100 mcg tablet, Take 1 tablet (100 mcg total) by mouth every morning, Disp: 90 tablet, Rfl: 0    lisinopril (ZESTRIL) 10 mg tablet, Take 1 tablet (10 mg total) by mouth daily, Disp: 30 tablet, Rfl: 0    methocarbamol (ROBAXIN) 500 mg tablet, 1 tab qhs prn back pain, Disp: 30 tablet, Rfl: 0    omeprazole (PriLOSEC) 40 MG capsule, Take 1 capsule (40 mg total) by mouth daily, Disp: 90 capsule, Rfl: 0    ondansetron (ZOFRAN-ODT) 4 mg disintegrating tablet, TAKE ONE TABLET BY MOUTH EVERY 6 HOURS AS NEEDED FOR NAUSEA AND VOMITING, Disp: 20 tablet, Rfl: 0    topiramate (TOPAMAX) 100 mg tablet, 100 mg qam and 150 mg qhs, Disp: 75 tablet, Rfl: 5  Allergies   Allergen Reactions    Aleve [Naproxen] Shortness Of Breath    Benzonatate Swelling     Throat closing    Guaifenesin Other (See Comments)     Note - patient states she IS able to take robitussin   Latuda [Lurasidone] Swelling      stuttering     Imitrex [Sumatriptan]      Patient reports that this medication "makes me sick"  Vitals: Blood pressure 136/76, pulse 72, height 5' 2 5" (1 588 m), weight 104 kg (230 lb)  Body mass index is 41 4 kg/m²          Physical Exam  General:  Awake alert and oriented x 3, conversant without conversational dyspnea, NAD, normal affect  HEENT:   Sclera noninjected, nonicteric OU, Nares patent,  no craniofacial abnormalities, Mucous membranes, moist, no oral lesions, normal dentition, Mallampati class 4  NECK:  Trachea midline, no accessory muscle use, no stridor,  JVP not elevated  CARDIAC: Reg, single s1/S2, no m/r/g  PULM: CTA bilaterally no wheezing, rhonchi or rales  ABD: Soft nontender, nondistended, no rebound, no rigidity, no guarding  EXT: No cyanosis, no clubbing, no edema, normal capillary refill  NEURO: no focal neurologic deficits, AAOx3, moving all extremities appropriately    Labs: I have personally reviewed pertinent lab results  , ABG: No results found for: PHART, VEO7ETT, PO2ART, VOP6DLS, I2LYNKIA, BEART, SOURCE, BNP: No results found for: BNP, CBC: No results found for: WBC, HGB, HCT, MCV, PLT, ADJUSTEDWBC, MCH, MCHC, RDW, MPV, NRBC, CMP: No results found for: SODIUM, K, CL, CO2, ANIONGAP, BUN, CREATININE, GLUCOSE, CALCIUM, AST, ALT, ALKPHOS, PROT, BILITOT, EGFR, PT/INR: No results found for: PT, INR, Troponin: No results found for: TROPONINI  Lab Results   Component Value Date    WBC 6 20 08/04/2021    HGB 14 0 08/04/2021    HCT 43 4 08/04/2021    MCV 92 08/04/2021     08/04/2021     Lab Results   Component Value Date    CALCIUM 9 5 08/04/2021    K 3 9 08/04/2021    CO2 24 08/04/2021     (H) 08/04/2021    BUN 14 08/04/2021    CREATININE 1 03 08/04/2021     Lab Results   Component Value Date    IGE 50 0 11/09/2021     Lab Results   Component Value Date    ALT 33 08/04/2021    AST 18 08/04/2021    ALKPHOS 92 08/04/2021       Sleep studies: I have personally reviewed pertinent reports  PSG 6/2020  AHI 5 1 events/hr         Andree Mason MD  [facility]       Portions of the record may have been created with voice recognition software  Occasional wrong word or "sound a like" substitutions may have occurred due to the inherent limitations of voice recognition software  Read the chart carefully and recognize, using context, where substitutions have occurred

## 2022-02-14 NOTE — PATIENT INSTRUCTIONS
Sleep Apnea   AMBULATORY CARE:   Sleep apnea  is a condition that causes you to stop breathing often during sleep  Types of sleep apnea:   · Obstructive sleep apnea (HARIS)  is the most common kind  The muscles and tissues around your throat relax and block air from passing through  Obesity, use of alcohol or cigarettes, or a family history are common causes  HARIS may increase your risk for complications after surgery  · Central sleep apnea (CSA)  means your brain does not send signals to the muscles that control breathing  You do not take a breath even though your airway is open  Common causes include medical conditions such as heart failure, being older than 40, or use of opioids  · Complex (or mixed) sleep apnea  means you have both obstructive and central sleep apnea  Common signs and symptoms:   · Loud snoring or long pauses in breathing    · Feeling sleepy, slow, and tired during the day    · Snorting, gasping, or choking while you sleep, and waking up suddenly because of these    · Feeling irritable during the day    · Dry mouth or a headache in the mornings    · Heavy night sweating    · A hard time thinking, remembering things, or focusing on your tasks the following day    Call your local emergency number (911 in the [address] [facility]) if:   · You have chest pain or trouble breathing  Call your doctor if:   · You have new or worsening signs or symptoms  · You have questions or concerns about your condition or care  Treatment  depends on the kind of apnea you have  · A mouth device  may be needed if you have mild sleep apnea  These are designed to keep your throat open  Ask your dentist or healthcare provider about the best mouth device for you  · A machine  may be used to help you get more air during sleep  A mask may be placed over your nose and mouth, or just your nose  The mask is hooked to the machine  You will get air through the mask      ? A continuous positive airway pressure (CPAP) machine is used to keep your airway open during sleep  The machine blows a gentle stream of air into the mask when you breathe  This helps keep your airway open so you can breathe more regularly  Extra oxygen may be given through the machine  ? A bilevel positive airway pressure (BiPAP) machine  gives air but lowers the pressure when you breathe out  ? An adaptive servo-ventilator (ASV)  is a machine that learns your usual breathing pattern  Then, it uses pressure to give you air and prevent stops in your breathing  · Surgery  to expand your airway or remove extra tissues may be needed  Surgery is usually only considered if other treatments do not work  Manage or prevent sleep apnea:   · Reach and maintain a healthy weight  Ask your healthcare provider what a healthy weight is for you  Ask him or her to help you create a safe weight loss plan if you are overweight  Even a small goal of a 10% weight loss can improve your symptoms  · Do not smoke  Nicotine and other chemicals in cigarettes and cigars can cause lung damage  Ask your healthcare provider for information if you currently smoke and need help to quit  E-cigarettes or smokeless tobacco still contain nicotine  Talk to your healthcare provider before you use these products  · Do not drink alcohol or take sedative medicine before you go to sleep  Alcohol and sedatives can relax the muscles and tissues around your throat  This can block the airflow to your lungs  · Sleep on your side or use pillows designed to prevent sleep apnea  This prevents your tongue or other tissues from blocking your throat  You can also raise the head of your bed  Follow up with your doctor or specialist as directed: You may need to have blood tests during your follow-up visits  Work with your provider to find the right breathing support equipment and settings for you  Write down your questions so you remember to ask them during your visits    © Copyright [employer] 2021 Information is for [employer] use only and may not be sold, redistributed or otherwise used for commercial purposes  All illustrations and images included in CareNotes® are the copyrighted property of A D A M , Inc  or [employer]  The above information is an  only  It is not intended as medical advice for individual conditions or treatments  Talk to your doctor, nurse or pharmacist before following any medical regimen to see if it is safe and effective for you

## 2022-02-14 NOTE — PROGRESS NOTES
Review of Systems      Genitourinary need to urinate more than twice a night and hot flashes at night   Cardiology palpitations/fluttering feeling in the chest   Gastrointestinal frequent heartburn/acid reflux   Neurology frequent headaches, awaken with headache and numbness/tingling of an extremity   Constitutional claustrophobia, fatigue, excessive sweating at night and weight change   Integumentary none   Psychiatry anxiety, depression, aggressiveness or irritability and mood change   Musculoskeletal sciatica and leg cramps   Pulmonary frequent cough and snoring   ENT none   Endocrine frequent urination   Hematological none

## 2022-02-14 NOTE — ASSESSMENT & PLAN NOTE
· Mild obstructive apnea diagnosed in June 2020 with AHI 5 1 events per hour, she has not started CPAP, I prescribed an auto titrating CPAP with a pressure range of 5-20 cm H2O she is very symptomatic and her REM AHI is 14 5 events per hour  · Counseled extensively for the importance of initiating CPAP

## 2022-02-14 NOTE — ASSESSMENT & PLAN NOTE
Follows up with pulmonology, Dr Chrissie Pompa, her asthma is controlled continues to use Advair and albuterol as needed

## 2022-02-28 ENCOUNTER — OFFICE VISIT (OUTPATIENT)
Dept: FAMILY MEDICINE CLINIC | Facility: CLINIC | Age: 47
End: 2022-02-28

## 2022-02-28 VITALS
BODY MASS INDEX: 41.2 KG/M2 | TEMPERATURE: 98 F | OXYGEN SATURATION: 98 % | WEIGHT: 232.5 LBS | HEART RATE: 82 BPM | RESPIRATION RATE: 16 BRPM | DIASTOLIC BLOOD PRESSURE: 78 MMHG | SYSTOLIC BLOOD PRESSURE: 134 MMHG | HEIGHT: 63 IN

## 2022-02-28 DIAGNOSIS — G47.30 SLEEP APNEA, UNSPECIFIED TYPE: Primary | ICD-10-CM

## 2022-02-28 DIAGNOSIS — F41.9 ANXIETY: ICD-10-CM

## 2022-02-28 DIAGNOSIS — R07.9 CHEST PAIN, UNSPECIFIED TYPE: ICD-10-CM

## 2022-02-28 DIAGNOSIS — E03.9 HYPOTHYROIDISM, UNSPECIFIED TYPE: ICD-10-CM

## 2022-02-28 DIAGNOSIS — I10 BENIGN ESSENTIAL HYPERTENSION: ICD-10-CM

## 2022-02-28 DIAGNOSIS — J45.909 UNCOMPLICATED ASTHMA, UNSPECIFIED ASTHMA SEVERITY, UNSPECIFIED WHETHER PERSISTENT: ICD-10-CM

## 2022-02-28 PROCEDURE — 3078F DIAST BP <80 MM HG: CPT | Performed by: NURSE PRACTITIONER

## 2022-02-28 PROCEDURE — 99214 OFFICE O/P EST MOD 30 MIN: CPT | Performed by: NURSE PRACTITIONER

## 2022-02-28 PROCEDURE — 3075F SYST BP GE 130 - 139MM HG: CPT | Performed by: NURSE PRACTITIONER

## 2022-02-28 NOTE — PROGRESS NOTES
Assessment/Plan:    Hypothyroidism  Lab Results   Component Value Date    [id number] 0 614 11/09/2021     Encouraged to complete previously ordered TSH level     Benign essential hypertension  Within goal today in office   Continue with lisinopril 10 mg daily     Chest pain  Reports continues with episodes left chest pain/ pressure with radiation to left arm- associated with palpitations   Previous EKG in office NSR no ST/T wave abnormalities   Stress test and echo ordered       Anxiety  Follows with psych   Anxiety exacerbated 2/2 25 YO son's medical and MH problems     Lance Burton was seen today for follow-up  Diagnoses and all orders for this visit:    Sleep apnea, unspecified type    Benign essential hypertension    Chest pain, unspecified type  -     Stress test only, exercise; Future  -     Echo complete w/ contrast if indicated; Future    Uncomplicated asthma, unspecified asthma severity, unspecified whether persistent    Hypothyroidism, unspecified type    Anxiety        Return for Next scheduled follow up  Subjective:     Brittaney Oscar is a 42 y o  female who  has a past medical history of Anxiety, Asthma, Bipolar disorder ([address] ), Depression, Disease of thyroid gland, Endometriosis, Psychiatric illness, Psychosis ([address] ), PTSD (post-traumatic stress disorder), Right carpal tunnel syndrome, Self-injurious behavior, and Suicide attempt ([address] )  She has no past medical history of ADHD (attention deficit hyperactivity disorder), Alcohol abuse, Alcoholism ([address] ), Anorexia nervosa, Autism spectrum disorder, Borderline personality disorder ([address] ), Bulimia nervosa, Cancer ([address] ), Chronic pain disorder, Head injury, HIV disease ([address] ), Liver disease, Obsessive-compulsive disorder, Oppositional defiant disorder, Panic disorder, Schizoaffective disorder ([address] ), Seizures ([address] ), Violence, history of, Withdrawal symptoms, alcohol ([address] ), or Withdrawal symptoms, drug or narcotic ([address] )   who presented to the office today for follow up  Reports that she is distressed due to her 23 YO son having unexplainable health problems  Several months ago he sustained 2 episodes of cardiac arrest with no etiology and she reports that since then he has been different  She is afraid that he will never be able to take care of himself  The following portions of the patient's history were reviewed and updated as appropriate: allergies, current medications, past family history, past medical history, past social history, past surgical history and problem list     Current Outpatient Medications on File Prior to Visit   Medication Sig Dispense Refill    albuterol (PROVENTIL HFA,VENTOLIN HFA) 90 mcg/act inhaler Inhale 2 puffs every 4 (four) hours as needed for wheezing 18 g 1    ARIPiprazole (ABILIFY) 10 mg tablet       buPROPion (WELLBUTRIN SR) 150 mg 12 hr tablet       busPIRone (BUSPAR) 15 mg tablet       busPIRone (BUSPAR) 5 mg tablet Take 5 mg by mouth 2 (two) times a day  1    clonazePAM (KlonoPIN) 0 5 mg tablet       DULoxetine (CYMBALTA) 60 mg delayed release capsule       fexofenadine (ALLEGRA) 180 MG tablet Take 1 tablet (180 mg total) by mouth daily 90 tablet 1    fluticasone (FLONASE) 50 mcg/act nasal spray 1 spray into each nostril daily 16 g 2    fluticasone-salmeterol (Advair) 500-50 mcg/dose inhaler Inhale 1 puff 2 (two) times a day Rinse mouth after use   60 blister 2    ipratropium-albuterol (DUO-NEB) 0 5-2 5 mg/3 mL nebulizer solution Take 3 mL by nebulization every 6 (six) hours as needed for wheezing or shortness of breath 200 mL 1    levothyroxine 100 mcg tablet Take 1 tablet (100 mcg total) by mouth every morning 90 tablet 0    lisinopril (ZESTRIL) 10 mg tablet Take 1 tablet (10 mg total) by mouth daily 30 tablet 0    methocarbamol (ROBAXIN) 500 mg tablet 1 tab qhs prn back pain 30 tablet 0    omeprazole (PriLOSEC) 40 MG capsule Take 1 capsule (40 mg total) by mouth daily 90 capsule 0    ondansetron (ZOFRAN-ODT) 4 mg disintegrating tablet TAKE ONE TABLET BY MOUTH EVERY 6 HOURS AS NEEDED FOR NAUSEA AND VOMITING 20 tablet 0    topiramate (TOPAMAX) 100 mg tablet 100 mg qam and 150 mg qhs 75 tablet 5     No current facility-administered medications on file prior to visit  Review of Systems   Constitutional: Negative for chills and fever  HENT: Negative for ear pain and sore throat  Eyes: Negative for pain and visual disturbance  Respiratory: Negative for cough and shortness of breath  Cardiovascular: Positive for chest pain  Negative for palpitations  Gastrointestinal: Negative for abdominal pain and vomiting  Genitourinary: Negative for dysuria and hematuria  Musculoskeletal: Negative for arthralgias and back pain  Skin: Negative for color change and rash  Neurological: Negative for seizures and syncope  Psychiatric/Behavioral: Positive for dysphoric mood  Negative for self-injury and suicidal ideas  The patient is nervous/anxious  All other systems reviewed and are negative  Objective:    /78 (BP Location: Left arm, Patient Position: Sitting, Cuff Size: Standard) Comment: Pt states she has not taken bp medication today  Pulse 82   Temp 98 °F (36 7 °C) (Temporal)   Resp 16   Ht 5' 2 5" (1 588 m)   Wt 105 kg (232 lb 8 oz)   SpO2 98%   BMI 41 85 kg/m²     Physical Exam  Vitals and nursing note reviewed  Constitutional:       General: She is not in acute distress  Appearance: She is well-developed  She is not diaphoretic  HENT:      Head: Normocephalic and atraumatic  Right Ear: External ear normal       Left Ear: External ear normal    Cardiovascular:      Rate and Rhythm: Normal rate and regular rhythm  Pulmonary:      Effort: Pulmonary effort is normal  No respiratory distress  Breath sounds: Normal breath sounds  No wheezing  Abdominal:      General: Bowel sounds are normal  There is no distension  Palpations: Abdomen is soft        Tenderness: There is no abdominal tenderness  Musculoskeletal:         General: No deformity  Normal range of motion  Cervical back: Normal range of motion and neck supple  Lymphadenopathy:      Cervical: No cervical adenopathy  Skin:     General: Skin is warm and dry  Capillary Refill: Capillary refill takes less than 2 seconds  Findings: No rash  Neurological:      Mental Status: She is alert and oriented to person, place, and time  Psychiatric:         Mood and Affect: Mood is anxious  Affect is tearful           Behavior: Behavior normal          JUSTO Sultana  03/01/22  12:45 PM

## 2022-03-01 PROBLEM — F41.9 ANXIETY: Status: ACTIVE | Noted: 2022-03-01

## 2022-03-01 NOTE — ASSESSMENT & PLAN NOTE
Reports continues with episodes left chest pain/ pressure with radiation to left arm- associated with palpitations   Previous EKG in office NSR no ST/T wave abnormalities   Stress test and echo ordered

## 2022-03-01 NOTE — ASSESSMENT & PLAN NOTE
Lab Results   Component Value Date    [id number] 0 614 11/09/2021     Encouraged to complete previously ordered TSH level

## 2022-03-03 DIAGNOSIS — E03.9 ACQUIRED HYPOTHYROIDISM: ICD-10-CM

## 2022-03-04 RX ORDER — LEVOTHYROXINE SODIUM 0.1 MG/1
100 TABLET ORAL EVERY MORNING
Qty: 90 TABLET | Refills: 0 | Status: SHIPPED | OUTPATIENT
Start: 2022-03-04 | End: 2022-04-01

## 2022-03-08 ENCOUNTER — TELEPHONE (OUTPATIENT)
Dept: NEUROLOGY | Facility: CLINIC | Age: 47
End: 2022-03-08

## 2022-03-08 NOTE — TELEPHONE ENCOUNTER
DARRIN for patient regarding her 6/9/22 appointment  Advised patient that visit was no longer in the [facility] office and would be in the [address] [place] [address] office same date and time and provided patient with the [address] [place] [address] address

## 2022-03-17 ENCOUNTER — OFFICE VISIT (OUTPATIENT)
Dept: FAMILY MEDICINE CLINIC | Facility: CLINIC | Age: 47
End: 2022-03-17

## 2022-03-17 DIAGNOSIS — R05.9 COUGH: Primary | ICD-10-CM

## 2022-03-17 PROCEDURE — 3075F SYST BP GE 130 - 139MM HG: CPT | Performed by: NURSE PRACTITIONER

## 2022-03-17 PROCEDURE — G2012 BRIEF CHECK IN BY MD/QHP: HCPCS | Performed by: NURSE PRACTITIONER

## 2022-03-17 PROCEDURE — 3078F DIAST BP <80 MM HG: CPT | Performed by: NURSE PRACTITIONER

## 2022-03-17 RX ORDER — GUAIFENESIN 200 MG/1
400 TABLET ORAL EVERY 4 HOURS PRN
Qty: 30 TABLET | Refills: 0 | Status: SHIPPED | OUTPATIENT
Start: 2022-03-17

## 2022-03-17 RX ORDER — AZITHROMYCIN 250 MG/1
TABLET, FILM COATED ORAL
Qty: 6 TABLET | Refills: 0 | Status: SHIPPED | OUTPATIENT
Start: 2022-03-17 | End: 2022-03-21

## 2022-03-18 NOTE — PROGRESS NOTES
Virtual Brief Visit    Patient is located in the following state in which I hold an active license [place]      Assessment/Plan:    Problem List Items Addressed This Visit     None      Visit Diagnoses     Cough    -  Primary    Relevant Medications    azithromycin (ZITHROMAX) 250 mg tablet    guaiFENesin 200 MG tablet          Recent Visits  No visits were found meeting these conditions  Showing recent visits within past 7 days and meeting all other requirements  Future Appointments  No visits were found meeting these conditions  Showing future appointments within next 150 days and meeting all other requirements     Patient presents for virtual visit 2/2 cough x 1 5 weeks  She has yellow sputum production  She denies fever, sore throat, body aches, n/v/d  She is a smoker  No one else in the home is ill  Past Medical History:   Diagnosis Date    Anxiety     Asthma     Bipolar disorder ([facility] )     Depression     Disease of thyroid gland     Endometriosis     Psychiatric illness     Psychosis ([facility] )     PTSD (post-traumatic stress disorder) 6/6/2017    Right carpal tunnel syndrome 12/26/2018    Self-injurious behavior     Suicide attempt ([facility] )        Review of Systems   Constitutional: Negative for chills and fever  HENT: Negative for ear pain and sore throat  Eyes: Negative for pain and visual disturbance  Respiratory: Positive for cough and chest tightness  Negative for shortness of breath  Cardiovascular: Negative for chest pain and palpitations  Gastrointestinal: Negative for abdominal pain and vomiting  Genitourinary: Negative for dysuria and hematuria  Musculoskeletal: Negative for arthralgias and back pain  Skin: Negative for color change and rash  Neurological: Negative for seizures and syncope  All other systems reviewed and are negative          I spent 10 minutes directly with the patient during this visit

## 2022-03-21 ENCOUNTER — APPOINTMENT (OUTPATIENT)
Dept: LAB | Facility: CLINIC | Age: 47
End: 2022-03-21
Payer: COMMERCIAL

## 2022-03-21 DIAGNOSIS — R07.9 CHEST PAIN, UNSPECIFIED TYPE: ICD-10-CM

## 2022-03-21 DIAGNOSIS — Z79.899 ENCOUNTER FOR LONG-TERM (CURRENT) USE OF OTHER MEDICATIONS: Primary | ICD-10-CM

## 2022-03-21 DIAGNOSIS — R00.2 PALPITATIONS: ICD-10-CM

## 2022-03-21 DIAGNOSIS — I10 PRIMARY HYPERTENSION: ICD-10-CM

## 2022-03-21 LAB
25(OH)D3 SERPL-MCNC: 24.2 NG/ML (ref 30–100)
ALBUMIN SERPL BCP-MCNC: 4.1 G/DL (ref 3.5–5)
ALP SERPL-CCNC: 86 U/L (ref 46–116)
ALT SERPL W P-5'-P-CCNC: 31 U/L (ref 12–78)
ANION GAP SERPL CALCULATED.3IONS-SCNC: 5 MMOL/L (ref 4–13)
AST SERPL W P-5'-P-CCNC: 17 U/L (ref 5–45)
BASOPHILS # BLD AUTO: 0.05 THOUSANDS/ΜL (ref 0–0.1)
BASOPHILS NFR BLD AUTO: 1 % (ref 0–1)
BILIRUB SERPL-MCNC: 0.22 MG/DL (ref 0.2–1)
BUN SERPL-MCNC: 14 MG/DL (ref 5–25)
CALCIUM SERPL-MCNC: 9.4 MG/DL (ref 8.3–10.1)
CHLORIDE SERPL-SCNC: 108 MMOL/L (ref 100–108)
CHOLEST SERPL-MCNC: 183 MG/DL
CO2 SERPL-SCNC: 25 MMOL/L (ref 21–32)
CREAT SERPL-MCNC: 1.03 MG/DL (ref 0.6–1.3)
EOSINOPHIL # BLD AUTO: 0.13 THOUSAND/ΜL (ref 0–0.61)
EOSINOPHIL NFR BLD AUTO: 2 % (ref 0–6)
ERYTHROCYTE [DISTWIDTH] IN BLOOD BY AUTOMATED COUNT: 13.6 % (ref 11.6–15.1)
GFR SERPL CREATININE-BSD FRML MDRD: 66 ML/MIN/1.73SQ M
GLUCOSE P FAST SERPL-MCNC: 94 MG/DL (ref 65–99)
HCT VFR BLD AUTO: 40.7 % (ref 34.8–46.1)
HDLC SERPL-MCNC: 44 MG/DL
HGB BLD-MCNC: 13.2 G/DL (ref 11.5–15.4)
IMM GRANULOCYTES # BLD AUTO: 0.05 THOUSAND/UL (ref 0–0.2)
IMM GRANULOCYTES NFR BLD AUTO: 1 % (ref 0–2)
LDLC SERPL CALC-MCNC: 106 MG/DL (ref 0–100)
LYMPHOCYTES # BLD AUTO: 2.18 THOUSANDS/ΜL (ref 0.6–4.47)
LYMPHOCYTES NFR BLD AUTO: 31 % (ref 14–44)
MCH RBC QN AUTO: 28.6 PG (ref 26.8–34.3)
MCHC RBC AUTO-ENTMCNC: 32.4 G/DL (ref 31.4–37.4)
MCV RBC AUTO: 88 FL (ref 82–98)
MONOCYTES # BLD AUTO: 0.34 THOUSAND/ΜL (ref 0.17–1.22)
MONOCYTES NFR BLD AUTO: 5 % (ref 4–12)
NEUTROPHILS # BLD AUTO: 4.23 THOUSANDS/ΜL (ref 1.85–7.62)
NEUTS SEG NFR BLD AUTO: 60 % (ref 43–75)
NONHDLC SERPL-MCNC: 139 MG/DL
NRBC BLD AUTO-RTO: 0 /100 WBCS
PLATELET # BLD AUTO: 267 THOUSANDS/UL (ref 149–390)
PMV BLD AUTO: 10.5 FL (ref 8.9–12.7)
POTASSIUM SERPL-SCNC: 3.7 MMOL/L (ref 3.5–5.3)
PROT SERPL-MCNC: 8.4 G/DL (ref 6.4–8.2)
RBC # BLD AUTO: 4.62 MILLION/UL (ref 3.81–5.12)
SODIUM SERPL-SCNC: 138 MMOL/L (ref 136–145)
T4 FREE SERPL-MCNC: 1.05 NG/DL (ref 0.76–1.46)
TRIGL SERPL-MCNC: 166 MG/DL
TSH SERPL DL<=0.05 MIU/L-ACNC: 0.34 UIU/ML (ref 0.36–3.74)
WBC # BLD AUTO: 6.98 THOUSAND/UL (ref 4.31–10.16)

## 2022-03-21 PROCEDURE — 36415 COLL VENOUS BLD VENIPUNCTURE: CPT

## 2022-03-21 PROCEDURE — 80307 DRUG TEST PRSMV CHEM ANLYZR: CPT

## 2022-03-21 PROCEDURE — 82306 VITAMIN D 25 HYDROXY: CPT

## 2022-03-21 PROCEDURE — 83036 HEMOGLOBIN GLYCOSYLATED A1C: CPT

## 2022-03-21 PROCEDURE — 80053 COMPREHEN METABOLIC PANEL: CPT

## 2022-03-21 PROCEDURE — 85025 COMPLETE CBC W/AUTO DIFF WBC: CPT

## 2022-03-21 PROCEDURE — 80061 LIPID PANEL: CPT

## 2022-03-21 PROCEDURE — 84439 ASSAY OF FREE THYROXINE: CPT

## 2022-03-21 PROCEDURE — 84443 ASSAY THYROID STIM HORMONE: CPT

## 2022-03-22 LAB
AMPHETAMINES UR QL SCN: NEGATIVE NG/ML
BARBITURATES UR QL SCN: NEGATIVE NG/ML
BENZODIAZ UR QL: NEGATIVE NG/ML
BZE UR QL: NEGATIVE NG/ML
CANNABINOIDS UR QL SCN: NEGATIVE NG/ML
EST. AVERAGE GLUCOSE BLD GHB EST-MCNC: 105 MG/DL
HBA1C MFR BLD: 5.3 %
METHADONE UR QL SCN: NEGATIVE NG/ML
OPIATES UR QL: NEGATIVE NG/ML
PCP UR QL: NEGATIVE NG/ML
PROPOXYPH UR QL SCN: NEGATIVE NG/ML

## 2022-03-31 ENCOUNTER — OFFICE VISIT (OUTPATIENT)
Dept: FAMILY MEDICINE CLINIC | Facility: CLINIC | Age: 47
End: 2022-03-31

## 2022-03-31 VITALS
DIASTOLIC BLOOD PRESSURE: 70 MMHG | BODY MASS INDEX: 41.29 KG/M2 | RESPIRATION RATE: 16 BRPM | HEIGHT: 63 IN | HEART RATE: 83 BPM | OXYGEN SATURATION: 97 % | WEIGHT: 233 LBS | SYSTOLIC BLOOD PRESSURE: 104 MMHG | TEMPERATURE: 98 F

## 2022-03-31 DIAGNOSIS — R07.89 CHEST TIGHTNESS: ICD-10-CM

## 2022-03-31 DIAGNOSIS — I10 BENIGN ESSENTIAL HYPERTENSION: ICD-10-CM

## 2022-03-31 DIAGNOSIS — J45.20 MILD INTERMITTENT ASTHMA WITHOUT COMPLICATION: ICD-10-CM

## 2022-03-31 DIAGNOSIS — E03.9 ACQUIRED HYPOTHYROIDISM: ICD-10-CM

## 2022-03-31 DIAGNOSIS — F17.200 TOBACCO DEPENDENCE: ICD-10-CM

## 2022-03-31 DIAGNOSIS — M54.2 NECK PAIN: ICD-10-CM

## 2022-03-31 DIAGNOSIS — M54.9 OTHER CHRONIC BACK PAIN: ICD-10-CM

## 2022-03-31 DIAGNOSIS — R10.11 RUQ PAIN: Primary | ICD-10-CM

## 2022-03-31 DIAGNOSIS — G89.29 OTHER CHRONIC BACK PAIN: ICD-10-CM

## 2022-03-31 PROCEDURE — 3074F SYST BP LT 130 MM HG: CPT | Performed by: NURSE PRACTITIONER

## 2022-03-31 PROCEDURE — 99214 OFFICE O/P EST MOD 30 MIN: CPT | Performed by: NURSE PRACTITIONER

## 2022-03-31 PROCEDURE — 3078F DIAST BP <80 MM HG: CPT | Performed by: NURSE PRACTITIONER

## 2022-03-31 RX ORDER — SIMETHICONE 125 MG
125 TABLET,CHEWABLE ORAL EVERY 6 HOURS PRN
Qty: 30 TABLET | Refills: 0 | Status: SHIPPED | OUTPATIENT
Start: 2022-03-31

## 2022-03-31 RX ORDER — METHOCARBAMOL 500 MG/1
TABLET, FILM COATED ORAL
Qty: 30 TABLET | Refills: 0 | Status: SHIPPED | OUTPATIENT
Start: 2022-03-31 | End: 2022-04-28 | Stop reason: SDUPTHER

## 2022-03-31 NOTE — PATIENT INSTRUCTIONS
Benefits of an Active Lifestyle   AMBULATORY CARE:   An active lifestyle  means you do physical activity throughout the day  Any activity that gets you up and moving is part of an active lifestyle  Physical activity includes exercise such as walking or lifting weights  It also includes playing sports  Physical activity is different from other kinds of activity, such as reading a book  This kind of activity is called sedentary  A sedentary lifestyle means you sit or do not move much during the day  An active lifestyle has many benefits, such as helping you prevent or manage health conditions  Call your doctor if:   · You notice changes in your health, such as new or worsening shortness of breath  · You have questions or concerns about your condition or care  Benefits of an active lifestyle:   · You may be able to do daily activities more easily  Activity helps condition your heart, lungs, and muscles  This can help you get through your daily activities without feeling tired  · You can help control your weight  Activity helps your body use the calories you eat instead of storing them as fat  Your body continues to burn calories at a higher rate after you are active  · Activity can increase your health  Activity helps lower your risk for cancer, heart disease, diabetes, and stroke  Activity can help you control your blood pressure and blood sugar levels, and lower your cholesterol  If you have arthritis, activity can help your joints move more easily and with less pain  · Your bones and muscles will get stronger  This will help prevent osteoporosis and reduce your risk for falls  · Activity can help improve your mood  Activity can reduce or prevent depression and stress  Activity can also help improve your sleep  Risks of a sedentary lifestyle:  A sedentary lifestyle increases your risk for diseases such as diabetes, high blood pressure, and heart disease   Your immune system also becomes weaker  This means it cannot fight infections well  How much activity you need:  Any activity is better than no activity at all  When you go from being mostly inactive to adding some activity, you will see health benefits  The following are general guidelines:  · Do aerobic activity several days each week  Aerobic activity includes walking, bicycling, dancing, swimming, and raking leaves  Aim for 150 to 300 minutes of moderate activity, or 75 to 150 of vigorous activity each week  You can also do a combination of moderate and vigorous activity  · Do strength training at least 2 times each week  Strength training helps you keep the muscles you have and build new muscles  Strength training includes pushups, yoga, izzy chi, and weightlifting  If you do not have access to weights, you can lift items around your house  Try to work all the major muscle groups, such as your legs, arms, abdomen, and chest  Do 2 or 3 sets on each area  Use a weight that is slightly heavier than you can lift easily  You can work up to [employer]  You can also use resistance bands instead of weights  Steps you can take to become more active:   · Set goals  Set some long-term goals and some short-term goals  For example, you may want to be able to walk for 30 minutes without becoming short of breath  Try not to put time requirements on your goals  For example, do not think you should reach your goal in a month  Set smaller goals, such as walking a little longer each week, or feeling less shortness of breath  · Be active all day  Activity does not have to mean structured exercise each day  You can be more active by making small changes all day  For example, try parking as far from the entrance of buildings as you can when you run errands  If possible, walk or ride a bike instead of driving  Take the stairs instead of the elevator  · Keep a record of your activity and your progress    You can do this by writing down your daily activity  Include the kind of activity and how long you did it  You can also use a program on your phone or other device that will track activity for you  Also record your progress  You may be doing daily activities more easily, sleeping better, or building muscles  · Step counting can help you monitor activity  A general guide is to take 10,000 steps each day  A pedometer is a device you can wear to track your steps  Some phones have programs that will count and record steps  You may need to work up to 10,000 steps  Start by finding out how many steps you usually take in a day  Then try to take more steps each day than you took the day before  Tips to help you stay on track:   · Start slowly and work up  You do not have to do 30 minutes of activity at one time  You can break the activity up and do a few minutes at a time  Remember that some physical activity is better than none  Stand up during the day, even if you cannot walk around  Your body uses more energy when you stand  You may be able to get a desk that allows you to stand while you type or make phone calls for work  Aim for a speed or intensity that is challenging but not too difficult  You should be able to speak a few words at a time but not be able to sing  · Plan activities you enjoy  Do a variety of activities so you do not become bored and you stay challenged  Include activities that strengthen your bones  These activities are called weight-bearing exercises  Examples include tennis, jumping rope, and running  Swimming, riding a bike, and similar exercises keep weight off your bones  They will not help strengthen bones, but they will help your heart and lungs work better  · Ask for support from the people in your life  Go for a walk after dinner with your family  Meet friends at the park  Take a break with a coworker and walk around  Find someone who likes to go to the gym at the same time you do   You may be more likely to go if you know another person is counting on you  Get involved in community events, such as cleaning a community park  Ask someone to help you stay on track  For example, you can tell the person about your daily or weekly activity  · Treat yourself to a reward when you reach a goal   The rewards can be for activity done for a certain amount of time each day or days each week  Rewards can also be for progress you make  Have rewards that are not food, such as a new clothing item or book  What you need to know about nutrition and activity:  Healthy foods will give you the energy you need to be active  Activity and good nutrition work together to help you reach or maintain a healthy weight  Healthy foods include fruits, vegetables, lean meats, fish, cooked beans, whole-grain breads, and low-fat dairy products  Your healthcare provider can help you create a healthy meal plan  He or she can tell you how many calories you need to stay active and still lose weight if needed  Follow up with your doctor as directed:  Write down your questions so you remember to ask them during your visits  © Copyright [employer] 2022 Information is for End User's use only and may not be sold, redistributed or otherwise used for commercial purposes  All illustrations and images included in CareNotes® are the copyrighted property of [employer]  or [facility] Abdullahi Lamb   The above information is an  only  It is not intended as medical advice for individual conditions or treatments  Talk to your doctor, nurse or pharmacist before following any medical regimen to see if it is safe and effective for you

## 2022-03-31 NOTE — PROGRESS NOTES
Assessment/Plan:    Hypothyroidism  Lab Results   Component Value Date    [id number] 0 343 (L) 03/21/2022     Decrease levothyroxine to 88 mcg daily   Recheck TSH in 6 - 8 weeks     Benign essential hypertension  Within goal today in office   Continue with lisinopril 10 mg daily     Asthma  Reports increased chest tightness, continue Advair and albuterol   Check CXR    Nika was seen today for follow-up  Diagnoses and all orders for this visit:    RUQ pain  -     US right upper quadrant; Future  -     simethicone (MYLICON) 349 MG chewable tablet; Chew 1 tablet (125 mg total) every 6 (six) hours as needed for flatulence    Chest tightness  -     XR chest pa & lateral; Future    Neck pain  -     XR spine cervical complete 4 or 5 vw non injury; Future    Other chronic back pain  -     methocarbamol (ROBAXIN) 500 mg tablet; 1 tab qhs prn back pain    Acquired hypothyroidism  -     levothyroxine 88 mcg tablet; Take 1 tablet (88 mcg total) by mouth every morning    Acquired hypothyroidism  Comments:  TSH is 8 7  Will adjust meds to 100mcg of levothyroxine  Will repeat TSH in 4 weeks  Orders:  -     levothyroxine 88 mcg tablet; Take 1 tablet (88 mcg total) by mouth every morning    Benign essential hypertension    Tobacco dependence    Mild intermittent asthma without complication      Return in about 4 weeks (around 4/28/2022) for chest tightness  Patient Instructions     Benefits of an Active Lifestyle   AMBULATORY CARE:   An active lifestyle  means you do physical activity throughout the day  Any activity that gets you up and moving is part of an active lifestyle  Physical activity includes exercise such as walking or lifting weights  It also includes playing sports  Physical activity is different from other kinds of activity, such as reading a book  This kind of activity is called sedentary  A sedentary lifestyle means you sit or do not move much during the day   An active lifestyle has many benefits, such as helping you prevent or manage health conditions  Call your doctor if:   · You notice changes in your health, such as new or worsening shortness of breath  · You have questions or concerns about your condition or care  Benefits of an active lifestyle:   · You may be able to do daily activities more easily  Activity helps condition your heart, lungs, and muscles  This can help you get through your daily activities without feeling tired  · You can help control your weight  Activity helps your body use the calories you eat instead of storing them as fat  Your body continues to burn calories at a higher rate after you are active  · Activity can increase your health  Activity helps lower your risk for cancer, heart disease, diabetes, and stroke  Activity can help you control your blood pressure and blood sugar levels, and lower your cholesterol  If you have arthritis, activity can help your joints move more easily and with less pain  · Your bones and muscles will get stronger  This will help prevent osteoporosis and reduce your risk for falls  · Activity can help improve your mood  Activity can reduce or prevent depression and stress  Activity can also help improve your sleep  Risks of a sedentary lifestyle:  A sedentary lifestyle increases your risk for diseases such as diabetes, high blood pressure, and heart disease  Your immune system also becomes weaker  This means it cannot fight infections well  How much activity you need:  Any activity is better than no activity at all  When you go from being mostly inactive to adding some activity, you will see health benefits  The following are general guidelines:  · Do aerobic activity several days each week  Aerobic activity includes walking, bicycling, dancing, swimming, and raking leaves  Aim for 150 to 300 minutes of moderate activity, or 75 to 150 of vigorous activity each week   You can also do a combination of moderate and vigorous activity  · Do strength training at least 2 times each week  Strength training helps you keep the muscles you have and build new muscles  Strength training includes pushups, yoga, izzy chi, and weightlifting  If you do not have access to weights, you can lift items around your house  Try to work all the major muscle groups, such as your legs, arms, abdomen, and chest  Do 2 or 3 sets on each area  Use a weight that is slightly heavier than you can lift easily  You can work up to [employer]  You can also use resistance bands instead of weights  Steps you can take to become more active:   · Set goals  Set some long-term goals and some short-term goals  For example, you may want to be able to walk for 30 minutes without becoming short of breath  Try not to put time requirements on your goals  For example, do not think you should reach your goal in a month  Set smaller goals, such as walking a little longer each week, or feeling less shortness of breath  · Be active all day  Activity does not have to mean structured exercise each day  You can be more active by making small changes all day  For example, try parking as far from the entrance of buildings as you can when you run errands  If possible, walk or ride a bike instead of driving  Take the stairs instead of the elevator  · Keep a record of your activity and your progress  You can do this by writing down your daily activity  Include the kind of activity and how long you did it  You can also use a program on your phone or other device that will track activity for you  Also record your progress  You may be doing daily activities more easily, sleeping better, or building muscles  · Step counting can help you monitor activity  A general guide is to take 10,000 steps each day  A pedometer is a device you can wear to track your steps  Some phones have programs that will count and record steps  You may need to work up to 10,000 steps   Start by finding out how many steps you usually take in a day  Then try to take more steps each day than you took the day before  Tips to help you stay on track:   · Start slowly and work up  You do not have to do 30 minutes of activity at one time  You can break the activity up and do a few minutes at a time  Remember that some physical activity is better than none  Stand up during the day, even if you cannot walk around  Your body uses more energy when you stand  You may be able to get a desk that allows you to stand while you type or make phone calls for work  Aim for a speed or intensity that is challenging but not too difficult  You should be able to speak a few words at a time but not be able to sing  · Plan activities you enjoy  Do a variety of activities so you do not become bored and you stay challenged  Include activities that strengthen your bones  These activities are called weight-bearing exercises  Examples include tennis, jumping rope, and running  Swimming, riding a bike, and similar exercises keep weight off your bones  They will not help strengthen bones, but they will help your heart and lungs work better  · Ask for support from the people in your life  Go for a walk after dinner with your family  Meet friends at the park  Take a break with a coworker and walk around  Find someone who likes to go to the gym at the same time you do  You may be more likely to go if you know another person is counting on you  Get involved in community events, such as cleaning a community park  Ask someone to help you stay on track  For example, you can tell the person about your daily or weekly activity  · Treat yourself to a reward when you reach a goal   The rewards can be for activity done for a certain amount of time each day or days each week  Rewards can also be for progress you make  Have rewards that are not food, such as a new clothing item or book      What you need to know about nutrition and activity:  Healthy foods will give you the energy you need to be active  Activity and good nutrition work together to help you reach or maintain a healthy weight  Healthy foods include fruits, vegetables, lean meats, fish, cooked beans, whole-grain breads, and low-fat dairy products  Your healthcare provider can help you create a healthy meal plan  He or she can tell you how many calories you need to stay active and still lose weight if needed  Follow up with your doctor as directed:  Write down your questions so you remember to ask them during your visits  © Copyright [employer] 2022 Information is for End User's use only and may not be sold, redistributed or otherwise used for commercial purposes  All illustrations and images included in CareNotes® are the copyrighted property of A D A M [employer] Inc  or [facility] Abdullahi Lamb   The above information is an  only  It is not intended as medical advice for individual conditions or treatments  Talk to your doctor, nurse or pharmacist before following any medical regimen to see if it is safe and effective for you  Subjective:     Sammi Sumner is a 42 y o  female who  has a past medical history of Anxiety, Asthma, Bipolar disorder ([address] ), Depression, Disease of thyroid gland, Endometriosis, Psychiatric illness, Psychosis ([address] ), PTSD (post-traumatic stress disorder), Right carpal tunnel syndrome, Self-injurious behavior, and Suicide attempt ([address] )      She has no past medical history of ADHD (attention deficit hyperactivity disorder), Alcohol abuse, Alcoholism ([address] ), Anorexia nervosa, Autism spectrum disorder, Borderline personality disorder ([address] ), Bulimia nervosa, Cancer ([address] ), Chronic pain disorder, Head injury, HIV disease ([address] ), Liver disease, Obsessive-compulsive disorder, Oppositional defiant disorder, Panic disorder, Schizoaffective disorder ([address] ), Seizures ([address] ), Violence, history of, Withdrawal symptoms, alcohol ([address] ), or Withdrawal symptoms, drug or narcotic ([facility] [address] )  who presented to the office today for follow up  Reports past week has had increased chest tightness and albuterol use  Also having RUQ pain and bloating  Worse after eating  No fever, chest pain, n/v/d  No blood in stool, weight loss, anorexia  The following portions of the patient's history were reviewed and updated as appropriate: allergies, current medications, past family history, past medical history, past social history, past surgical history and problem list     Current Outpatient Medications on File Prior to Visit   Medication Sig Dispense Refill    albuterol (PROVENTIL HFA,VENTOLIN HFA) 90 mcg/act inhaler Inhale 2 puffs every 4 (four) hours as needed for wheezing 18 g 1    ARIPiprazole (ABILIFY) 10 mg tablet       buPROPion (WELLBUTRIN SR) 150 mg 12 hr tablet       busPIRone (BUSPAR) 15 mg tablet       busPIRone (BUSPAR) 5 mg tablet Take 5 mg by mouth 2 (two) times a day  1    clonazePAM (KlonoPIN) 0 5 mg tablet       DULoxetine (CYMBALTA) 60 mg delayed release capsule       fexofenadine (ALLEGRA) 180 MG tablet Take 1 tablet (180 mg total) by mouth daily 90 tablet 1    fluticasone (FLONASE) 50 mcg/act nasal spray 1 spray into each nostril daily 16 g 2    fluticasone-salmeterol (Advair) 500-50 mcg/dose inhaler Inhale 1 puff 2 (two) times a day Rinse mouth after use   60 blister 2    guaiFENesin 200 MG tablet Take 2 tablets (400 mg total) by mouth every 4 (four) hours as needed for cough 30 tablet 0    ipratropium-albuterol (DUO-NEB) 0 5-2 5 mg/3 mL nebulizer solution Take 3 mL by nebulization every 6 (six) hours as needed for wheezing or shortness of breath 200 mL 1    lisinopril (ZESTRIL) 10 mg tablet Take 1 tablet (10 mg total) by mouth daily 30 tablet 0    omeprazole (PriLOSEC) 40 MG capsule Take 1 capsule (40 mg total) by mouth daily 90 capsule 0    ondansetron (ZOFRAN-ODT) 4 mg disintegrating tablet TAKE ONE TABLET BY MOUTH EVERY 6 HOURS AS NEEDED FOR NAUSEA AND VOMITING 20 tablet 0    topiramate (TOPAMAX) 100 mg tablet 100 mg qam and 150 mg qhs 75 tablet 5    [DISCONTINUED] levothyroxine 100 mcg tablet Take 1 tablet (100 mcg total) by mouth every morning 90 tablet 0     No current facility-administered medications on file prior to visit  Review of Systems   Constitutional: Negative for chills and fever  HENT: Negative for ear pain and sore throat  Eyes: Negative for pain and visual disturbance  Respiratory: Positive for chest tightness  Negative for cough and shortness of breath  Cardiovascular: Negative for chest pain and palpitations  Gastrointestinal: Positive for abdominal distention and abdominal pain  Negative for blood in stool, constipation, diarrhea and vomiting  Genitourinary: Negative for dysuria and hematuria  Musculoskeletal: Negative for arthralgias and back pain  Skin: Negative for color change and rash  Neurological: Negative for seizures and syncope  All other systems reviewed and are negative  Objective:    /70 (BP Location: Left arm, Patient Position: Sitting, Cuff Size: Large)   Pulse 83   Temp 98 °F (36 7 °C) (Temporal)   Resp 16   Ht 5' 2 5" (1 588 m)   Wt 106 kg (233 lb)   SpO2 97%   BMI 41 94 kg/m²     Physical Exam  Vitals and nursing note reviewed  Constitutional:       General: She is not in acute distress  Appearance: She is well-developed  She is not diaphoretic  HENT:      Head: Normocephalic and atraumatic  Right Ear: External ear normal       Left Ear: External ear normal    Cardiovascular:      Rate and Rhythm: Normal rate and regular rhythm  Pulmonary:      Effort: Pulmonary effort is normal  No respiratory distress  Breath sounds: Normal breath sounds  No wheezing  Abdominal:      General: Bowel sounds are normal  There is no distension  Palpations: Abdomen is soft  Tenderness: There is generalized abdominal tenderness     Musculoskeletal: General: No deformity  Normal range of motion  Cervical back: Normal range of motion and neck supple  Lymphadenopathy:      Cervical: No cervical adenopathy  Skin:     General: Skin is warm and dry  Capillary Refill: Capillary refill takes less than 2 seconds  Findings: No rash  Neurological:      Mental Status: She is alert and oriented to person, place, and time     Psychiatric:         Behavior: Behavior normal          JUSTO Elizalde  04/01/22  11:05 AM

## 2022-04-01 PROBLEM — K13.70 MOUTH LESION: Status: RESOLVED | Noted: 2022-02-03 | Resolved: 2022-04-01

## 2022-04-01 RX ORDER — LEVOTHYROXINE SODIUM 88 UG/1
88 TABLET ORAL EVERY MORNING
Qty: 90 TABLET | Refills: 0 | Status: SHIPPED | OUTPATIENT
Start: 2022-04-01 | End: 2022-07-12

## 2022-04-01 NOTE — ASSESSMENT & PLAN NOTE
Lab Results   Component Value Date    [id number] 0 343 (L) 03/21/2022     Decrease levothyroxine to 88 mcg daily   Recheck TSH in 6 - 8 weeks

## 2022-04-28 ENCOUNTER — OFFICE VISIT (OUTPATIENT)
Dept: FAMILY MEDICINE CLINIC | Facility: CLINIC | Age: 47
End: 2022-04-28

## 2022-04-28 VITALS
SYSTOLIC BLOOD PRESSURE: 128 MMHG | DIASTOLIC BLOOD PRESSURE: 86 MMHG | OXYGEN SATURATION: 98 % | RESPIRATION RATE: 18 BRPM | HEART RATE: 76 BPM | WEIGHT: 230 LBS | TEMPERATURE: 97.6 F | BODY MASS INDEX: 41.4 KG/M2

## 2022-04-28 DIAGNOSIS — J45.41 MODERATE PERSISTENT ASTHMA WITH ACUTE EXACERBATION: ICD-10-CM

## 2022-04-28 DIAGNOSIS — G43.009 MIGRAINE WITHOUT AURA AND WITHOUT STATUS MIGRAINOSUS, NOT INTRACTABLE: ICD-10-CM

## 2022-04-28 DIAGNOSIS — M54.9 OTHER CHRONIC BACK PAIN: ICD-10-CM

## 2022-04-28 DIAGNOSIS — R05.3 CHRONIC COUGH: Primary | ICD-10-CM

## 2022-04-28 DIAGNOSIS — G89.29 CHRONIC BILATERAL LOW BACK PAIN WITH SCIATICA, SCIATICA LATERALITY UNSPECIFIED: ICD-10-CM

## 2022-04-28 DIAGNOSIS — I10 BENIGN ESSENTIAL HYPERTENSION: ICD-10-CM

## 2022-04-28 DIAGNOSIS — M54.40 CHRONIC BILATERAL LOW BACK PAIN WITH SCIATICA, SCIATICA LATERALITY UNSPECIFIED: ICD-10-CM

## 2022-04-28 DIAGNOSIS — G89.29 OTHER CHRONIC BACK PAIN: ICD-10-CM

## 2022-04-28 PROCEDURE — 3074F SYST BP LT 130 MM HG: CPT | Performed by: NURSE PRACTITIONER

## 2022-04-28 PROCEDURE — 99214 OFFICE O/P EST MOD 30 MIN: CPT | Performed by: NURSE PRACTITIONER

## 2022-04-28 PROCEDURE — 3079F DIAST BP 80-89 MM HG: CPT | Performed by: NURSE PRACTITIONER

## 2022-04-28 RX ORDER — IPRATROPIUM BROMIDE AND ALBUTEROL SULFATE 2.5; .5 MG/3ML; MG/3ML
3 SOLUTION RESPIRATORY (INHALATION) EVERY 6 HOURS PRN
Qty: 200 ML | Refills: 1 | Status: SHIPPED | OUTPATIENT
Start: 2022-04-28

## 2022-04-28 RX ORDER — CETIRIZINE HYDROCHLORIDE 10 MG/1
10 TABLET ORAL DAILY
Qty: 90 TABLET | Refills: 1 | Status: SHIPPED | OUTPATIENT
Start: 2022-04-28 | End: 2022-07-11 | Stop reason: SDUPTHER

## 2022-04-28 RX ORDER — ONDANSETRON 4 MG/1
4 TABLET, ORALLY DISINTEGRATING ORAL EVERY 8 HOURS SCHEDULED
Qty: 20 TABLET | Refills: 0 | Status: SHIPPED | OUTPATIENT
Start: 2022-04-28 | End: 2022-08-05 | Stop reason: SDUPTHER

## 2022-04-28 RX ORDER — PREDNISONE 20 MG/1
40 TABLET ORAL DAILY
Qty: 10 TABLET | Refills: 0 | Status: SHIPPED | OUTPATIENT
Start: 2022-04-28 | End: 2022-05-03

## 2022-04-28 RX ORDER — METHOCARBAMOL 500 MG/1
TABLET, FILM COATED ORAL
Qty: 30 TABLET | Refills: 0 | Status: SHIPPED | OUTPATIENT
Start: 2022-04-28

## 2022-04-28 NOTE — ASSESSMENT & PLAN NOTE
Severe cough variant asthma with exacerbation    recommend complete previously ordered chest x-ray with days prednisone recommend she DuoNeb nebulizer before bed   continue with the Advair 500-50, albuterol p r n    discussed if no improvement referral to pulmonology

## 2022-04-28 NOTE — PROGRESS NOTES
Assessment/Plan:    Asthma  Severe cough variant asthma with exacerbation    recommend complete previously ordered chest x-ray with days prednisone recommend she DuoNeb nebulizer before bed   continue with the Advair 500-50, albuterol p r n    discussed if no improvement referral to pulmonology    Benign essential hypertension  Within goal today in office   Continue with lisinopril 10 mg daily     Low back pain  Check xray as pain is >6 weeks   Trial Robaxin and recommend PT     Nika was seen today for follow-up  Diagnoses and all orders for this visit:    Chronic cough    Moderate persistent asthma with acute exacerbation  -     ipratropium-albuterol (DUO-NEB) 0 5-2 5 mg/3 mL nebulizer solution; Take 3 mL by nebulization every 6 (six) hours as needed for wheezing or shortness of breath  -     predniSONE 20 mg tablet; Take 2 tablets (40 mg total) by mouth daily for 5 days  -     cetirizine (ZyrTEC) 10 mg tablet; Take 1 tablet (10 mg total) by mouth daily    Other chronic back pain  -     XR spine lumbar minimum 4 views non injury; Future  -     Ambulatory Referral to Physical Therapy; Future  -     methocarbamol (ROBAXIN) 500 mg tablet; 1 tab qhs prn back pain    Migraine without aura and without status migrainosus, not intractable  -     ondansetron (ZOFRAN-ODT) 4 mg disintegrating tablet; Take 1 tablet (4 mg total) by mouth every 8 (eight) hours    Benign essential hypertension    Chronic bilateral low back pain with sciatica, sciatica laterality unspecified        No follow-ups on file  Patient Instructions     Asthma   AMBULATORY CARE:   Asthma  is a lung disease that makes breathing difficult  Chronic inflammation and reactions to triggers narrow the airways in your lungs  Asthma can become life-threatening if it is not managed  Cough-variant asthma  is a type of asthma that causes a dry cough that keeps coming back  A dry cough may be your only symptom, or you may also have chest tightness  These symptoms may be caused by exercise or exposure to odors, allergens, or respiratory tract infections  Cough-variant asthma is treated the same way as typical asthma  Common symptoms include the following:   · Coughing    · Wheezing    · Shortness of breath    · Chest tightness    Call your local emergency number (911 in the [address]) if:   · You have severe shortness of breath  · The skin around your neck and ribs pulls in with each breath  · Your peak flow numbers are in the red zone of your AAP  Seek care immediately if:   · You have shortness of breath, even after you take your short-term medicine as directed  · Your lips or nails turn blue or gray  Call your doctor or asthma specialist if:   · You run out of medicine before your next refill is due  · Your symptoms get worse  · You need to take more medicine than usual to control your symptoms  · You have questions or concerns about your condition or care  Treatment for asthma  will depend on how severe your asthma is  Medicine may be used to decrease inflammation, open airways, and make it easier to breathe  Medicines may be inhaled, taken as a pill, or injected  Short-term medicines relieve your symptoms quickly  Long-term medicines are used to prevent future attacks  Other medicines may be needed if your regular medicines are not able to prevent attacks  You may also need medicine to help control your allergies  Manage and prevent future asthma attacks:   · Follow your asthma action plan  This is a written plan that you and your healthcare provider create  It explains which medicine you need and when to change doses if necessary  It also explains how you can monitor symptoms and use a peak flow meter  The meter measures how well your lungs are working  · Manage other health conditions , such as allergies, acid reflux, and sleep apnea  · Identify and avoid triggers    These may include pets, dust mites, mold, and cockroaches  · Do not smoke or be around others who smoke  Nicotine and other chemicals in cigarettes and cigars can cause lung damage  Ask your healthcare provider for information if you currently smoke and need help to quit  E-cigarettes or smokeless tobacco still contain nicotine  Talk to your healthcare provider before you use these products  · Ask about the flu vaccine  The flu can make your asthma worse  You may need a yearly flu shot  Follow up with your healthcare provider as directed: You will need to return to make sure your medicine is working and your symptoms are controlled  You may be referred to an asthma or allergy specialist  Xiang Stewart may be asked to keep a record of your peak flow values and bring it with you to your appointments  Write down your questions so you remember to ask them during your visits  © Copyright [employer] 2022 Information is for End User's use only and may not be sold, redistributed or otherwise used for commercial purposes  All illustrations and images included in CareNotes® are the copyrighted property of A D A M [employer] Inc  or [employer]   The above information is an  only  It is not intended as medical advice for individual conditions or treatments  Talk to your doctor, nurse or pharmacist before following any medical regimen to see if it is safe and effective for you  Subjective:     Sonia Mcelroy is a 41 y o  female who  has a past medical history of Anxiety, Asthma, Bipolar disorder ([address] ), Depression, Disease of thyroid gland, Endometriosis, Psychiatric illness, Psychosis ([address] ), PTSD (post-traumatic stress disorder), Right carpal tunnel syndrome, Self-injurious behavior, and Suicide attempt ([address] )      She has no past medical history of ADHD (attention deficit hyperactivity disorder), Alcohol abuse, Alcoholism ([address] ), Anorexia nervosa, Autism spectrum disorder, Borderline personality disorder ([address] ), Bulimia nervosa, Cancer ([address] ), Chronic pain disorder, Head injury, HIV disease ([facility] ), Liver disease, Obsessive-compulsive disorder, Oppositional defiant disorder, Panic disorder, Schizoaffective disorder ([facility] ), Seizures ([facility] ), Violence, history of, Withdrawal symptoms, alcohol ([place] [address] ), or Withdrawal symptoms, drug or narcotic ([place] [address] )  who presented to the office today for follow-up  Reports that she is having chest tightness every night  Has some improvement when she uses her albuterol inhaler  Also reports left low back pain with radiation to the left leg  Denies any trauma or injury  Denies numbness/ tingling, incontinence, falls, weakness, fever, saddle anesthesia  The following portions of the patient's history were reviewed and updated as appropriate: allergies, current medications, past family history, past medical history, past social history, past surgical history and problem list     Current Outpatient Medications on File Prior to Visit   Medication Sig Dispense Refill    albuterol (PROVENTIL HFA,VENTOLIN HFA) 90 mcg/act inhaler Inhale 2 puffs every 4 (four) hours as needed for wheezing 18 g 1    ARIPiprazole (ABILIFY) 10 mg tablet       buPROPion (WELLBUTRIN SR) 150 mg 12 hr tablet       busPIRone (BUSPAR) 15 mg tablet       busPIRone (BUSPAR) 5 mg tablet Take 5 mg by mouth 2 (two) times a day  1    clonazePAM (KlonoPIN) 0 5 mg tablet       DULoxetine (CYMBALTA) 60 mg delayed release capsule       fluticasone (FLONASE) 50 mcg/act nasal spray 1 spray into each nostril daily 16 g 2    fluticasone-salmeterol (Advair) 500-50 mcg/dose inhaler Inhale 1 puff 2 (two) times a day Rinse mouth after use   60 blister 2    guaiFENesin 200 MG tablet Take 2 tablets (400 mg total) by mouth every 4 (four) hours as needed for cough 30 tablet 0    levothyroxine 88 mcg tablet Take 1 tablet (88 mcg total) by mouth every morning 90 tablet 0    lisinopril (ZESTRIL) 10 mg tablet Take 1 tablet (10 mg total) by mouth daily 30 tablet 0    omeprazole (PriLOSEC) 40 MG capsule Take 1 capsule (40 mg total) by mouth daily 90 capsule 0    simethicone (MYLICON) 634 MG chewable tablet Chew 1 tablet (125 mg total) every 6 (six) hours as needed for flatulence 30 tablet 0    topiramate (TOPAMAX) 100 mg tablet 100 mg qam and 150 mg qhs 75 tablet 5    [DISCONTINUED] fexofenadine (ALLEGRA) 180 MG tablet Take 1 tablet (180 mg total) by mouth daily 90 tablet 1    [DISCONTINUED] ipratropium-albuterol (DUO-NEB) 0 5-2 5 mg/3 mL nebulizer solution Take 3 mL by nebulization every 6 (six) hours as needed for wheezing or shortness of breath 200 mL 1    [DISCONTINUED] methocarbamol (ROBAXIN) 500 mg tablet 1 tab qhs prn back pain 30 tablet 0    [DISCONTINUED] ondansetron (ZOFRAN-ODT) 4 mg disintegrating tablet TAKE ONE TABLET BY MOUTH EVERY 6 HOURS AS NEEDED FOR NAUSEA AND VOMITING 20 tablet 0     No current facility-administered medications on file prior to visit  Review of Systems   Constitutional: Negative for chills and fever  HENT: Negative for ear pain and sore throat  Eyes: Negative for pain and visual disturbance  Respiratory: Positive for chest tightness  Negative for cough and shortness of breath  Cardiovascular: Negative for chest pain and palpitations  Gastrointestinal: Negative for abdominal pain and vomiting  Genitourinary: Negative for dysuria and hematuria  Musculoskeletal: Positive for back pain  Negative for arthralgias  Skin: Negative for color change and rash  Neurological: Negative for seizures and syncope  All other systems reviewed and are negative  Objective:    /86 (BP Location: Left arm, Patient Position: Sitting, Cuff Size: Adult)   Pulse 76   Temp 97 6 °F (36 4 °C) (Temporal)   Resp 18   Wt 104 kg (230 lb)   SpO2 98%   BMI 41 40 kg/m²     Physical Exam  Vitals and nursing note reviewed  Constitutional:       General: She is not in acute distress  Appearance: She is well-developed   She is not diaphoretic  HENT:      Head: Normocephalic and atraumatic  Right Ear: External ear normal       Left Ear: External ear normal    Cardiovascular:      Rate and Rhythm: Normal rate and regular rhythm  Pulmonary:      Effort: Pulmonary effort is normal  No respiratory distress  Breath sounds: No wheezing  Abdominal:      General: Bowel sounds are normal  There is no distension  Palpations: Abdomen is soft  Tenderness: There is no abdominal tenderness  Musculoskeletal:         General: No deformity  Cervical back: Normal range of motion and neck supple  Lumbar back: Tenderness present  Positive left straight leg raise test  Negative right straight leg raise test       Comments:   Tenderness to palpation lumbar paraspinal muscles   Intact sensation, strength reflexes bilateral extremities   Lymphadenopathy:      Cervical: No cervical adenopathy  Skin:     General: Skin is warm and dry  Capillary Refill: Capillary refill takes less than 2 seconds  Findings: No rash  Neurological:      Mental Status: She is alert and oriented to person, place, and time     Psychiatric:         Behavior: Behavior normal          JUSTO Carvajal  04/28/22  5:10 PM

## 2022-05-09 ENCOUNTER — EVALUATION (OUTPATIENT)
Dept: PHYSICAL THERAPY | Facility: CLINIC | Age: 47
End: 2022-05-09
Payer: COMMERCIAL

## 2022-05-09 DIAGNOSIS — G89.29 OTHER CHRONIC BACK PAIN: Primary | ICD-10-CM

## 2022-05-09 DIAGNOSIS — M54.9 OTHER CHRONIC BACK PAIN: Primary | ICD-10-CM

## 2022-05-09 PROCEDURE — 97162 PT EVAL MOD COMPLEX 30 MIN: CPT

## 2022-05-09 PROCEDURE — 97110 THERAPEUTIC EXERCISES: CPT

## 2022-05-09 NOTE — PROGRESS NOTES
PT Evaluation     Today's date: 2022  Patient name: Renae Barber  : 1975  MRN: [id number]  Referring provider: JUSTO Aguilar  Dx:   Encounter Diagnosis     ICD-10-CM    1  Other chronic back pain  M54 9 Ambulatory Referral to Physical Therapy    G89 29        Start Time: 56  Stop Time: 112  Total time in clinic (min): 41 minutes    Assessment  Assessment details: Pt presents to therapy with L sided lumbar and thoracic pain, signs and symptoms consistent with referring diagnosis  Pt responded well to repeated extensions in prone with decreased pain  Pt displays L lateral hip weakness in comparison to the R lateral hip  Pt's pain and weakness is limiting her from standing for long periods of time and bending/lifting items, as well as carrying her granddaughter  Pt will benefit from continued physical therapy twice a week for 6-8 weeks to improve pain and increase strength  Impairments: abnormal or restricted ROM, activity intolerance, impaired physical strength, lacks appropriate home exercise program and pain with function    Symptom irritability: moderateUnderstanding of Dx/Px/POC: good   Prognosis: good    Goals  Short term goals (3-4 weeks)  1  Pt will display independence with understanding and performance of HEP to allow for carryover of plan of care at home  2  Pt will improve FOTO score from initial evaluation to show improvement in pain and function  3  Pt will increase L hip ER strength to 4/5 to improve lateral hip stability and decrease pain  4  Pt will increase L hip abduction strength to 4/5 to improve lateral hip stability and decrease pain  Long term goals (6-8 weeks)  1  Pt will score equal or better than projected score on FOTO to show improvement in pain and function  2  Pt will increase L hip ER strength to 4+/5 to improve lateral hip stability and decrease pain     3  Pt will increase L hip abduction strength to 4+/5 to improve lateral hip stability and decrease pain    4  Pt will decrease maximal pain level to <6/10 to improve quality of life  5  Pt will be able to lift granddaughter with minimal to no pain  Plan  Patient would benefit from: skilled physical therapy  Planned modality interventions: TENS, thermotherapy: hydrocollator packs, traction, ultrasound, cryotherapy and low level laser therapy  Planned therapy interventions: body mechanics training, therapeutic training, therapeutic exercise, therapeutic activities, stretching, strengthening, neuromuscular re-education, patient education, home exercise program, functional ROM exercises, flexibility, manual therapy, Watt taping, joint mobilization and balance  Frequency: 2x week  Duration in weeks: 8  Treatment plan discussed with: patient        Subjective Evaluation    History of Present Illness  Mechanism of injury: Pt presents to therapy with chronic back pain starting a couple of months ago  Pt reports the pain is constant but has been getting worse recently, staying in the midline lower thoracic and left lower lumbar area  Pt reports her pain changes depending on what she's doing  Standing for long periods of time makes the pain worse  Lifting and holding grandaughter (~8 pounds) is also difficult    Pain  Current pain ratin  At best pain ratin  At worst pain ratin  Quality: burning and sharp  Relieving factors: rest and change in position  Aggravating factors: standing, walking and lifting  Progression: worsening    Patient Goals  Patient goals for therapy: decreased pain and increased strength (cook a full meal without pain, lift granddaughter)          Objective     General Comments:      Lumbar Comments  Lumbar ROM  -Flexion: WNL no pain  -Extension: WNL no pain  -R SB: WNL no pain  -L SB: minimal limitation, pain L mid back   Thoracic ROM  -R rot: WNL no pain  -L rot: minimally limited, pain L mid back    LE strength  hip flexion: 4+/5 bilat with pain on the R in the back  knee ext: 4+/5 R, 5/5 L  knee flex: 4+/5 R, 5/5 L  DF: 5/5 bilat  Great toe: 5/5 bilat  PF: 5/5 bilat  Hip abduct: 3+/5 R, 3/5 L  Hip ER: 3+/5 L, 4+/5 R     Hip ROM  -Flexion:  WNL  -ER: WNL  -IR: WNL    Special tests  -Sciatic tension test: (-) bilat  -SANDY: (-) R, (+) L  FADDIR: (-) bilat  -SLR: (-) bilat    Palpation  -Lumbar PA and unilateral PA: pain with PA mobilization to lower thoracic and lower lumbar vertebrae, L side>R side and PA to SIJ and L and R SIJ UPA    Repeated motions  -extension: improvement in pain with L SB and L rot following prone press ups      Flowsheet Rows      Most Recent Value   PT/OT G-Codes    Current Score 52   Projected Score 63             Precautions: hypothyroidism, sleep apnea, asthma, chronic migraine, HTN, right carpal tunnel, bipolar 1 disorder, anxiety, cocaine abuse, low back pain, pelvic pain, depressive disorder, PTSD, chest pain, obesity      Manuals 5/9                                                                Neuro Re-Ed 5/9            TA iso nv            paloff press nv            SLR nv            Fire hydrants nv            Prone t/s ext nv                                      Ther Ex 5/9            Prone press ups 2x10; HEP            Open books 1x10; HEP            bike nv            Leg press  nv                                                                Ther Activity 5/9            Suitcase marches nv            deadlifts nv            Gait Training 5/9                                      Modalities 5/9

## 2022-05-16 ENCOUNTER — OFFICE VISIT (OUTPATIENT)
Dept: PHYSICAL THERAPY | Facility: CLINIC | Age: 47
End: 2022-05-16
Payer: COMMERCIAL

## 2022-05-16 DIAGNOSIS — G89.29 OTHER CHRONIC BACK PAIN: Primary | ICD-10-CM

## 2022-05-16 DIAGNOSIS — M54.9 OTHER CHRONIC BACK PAIN: Primary | ICD-10-CM

## 2022-05-16 PROCEDURE — 97110 THERAPEUTIC EXERCISES: CPT

## 2022-05-16 PROCEDURE — 97112 NEUROMUSCULAR REEDUCATION: CPT

## 2022-05-16 PROCEDURE — 97530 THERAPEUTIC ACTIVITIES: CPT

## 2022-05-16 NOTE — PROGRESS NOTES
Daily Note     Today's date: 2022  Patient name: Sheila Duenas  : 1975  MRN: [id number]  Referring provider: JUSTO Contreras  Dx:   Encounter Diagnosis     ICD-10-CM    1  Other chronic back pain  M54 9     G89 29        Start Time: 3871  Stop Time: 1615  Total time in clinic (min): 1421 minutes    Subjective: Pt reports she's been compliant with her exercises at home  Objective: See treatment diary below      Assessment: Tolerated treatment well  Patient required initial cuing for bridges and SLR  Tactile cuing was provided during SLR to engage core and avoid lumbar lordosis  Fatigue was reported with "muscle burn" in thoracic extensors with prone thoracic extension  Cuing was provided with lateral walks and suitcase marches to control the eccentric portion of the motion  Continuous R foot tingling was noted throughout the session without tingling in the rest of the RLE  Pt will continue to benefit from therapy to improve pain and increase strength  Plan: Continue per plan of care        Precautions: hypothyroidism, sleep apnea, asthma, chronic migraine, HTN, right carpal tunnel, bipolar 1 disorder, anxiety, cocaine abuse, low back pain, pelvic pain, depressive disorder, PTSD, chest pain, obesity      Manuals                                                                Neuro Re-Ed            TA iso nv            paloff press nv 10# 1x15 each side           SLR nv 1x10 each           Fire hydrants nv            Prone t/s ext nv 2x10           sidelying abduction             bridges  2x10 5" hold           clamshells             Ther Ex            Prone press ups 2x10; HEP            Open books 1x10; HEP            bike nv 5'           Leg press  nv            Lateral walks  orange tb 5x at mirror           Thoracic foam roll ext  2x10                                     Ther Activity            Suitcase marches nv 8# 2x28'           deadlifts nv 8# 2x10 Gait Training 5/9 5/16                                     Modalities 5/9 5/16

## 2022-05-17 ENCOUNTER — APPOINTMENT (OUTPATIENT)
Dept: PHYSICAL THERAPY | Facility: CLINIC | Age: 47
End: 2022-05-17
Payer: COMMERCIAL

## 2022-05-19 ENCOUNTER — APPOINTMENT (OUTPATIENT)
Dept: PHYSICAL THERAPY | Facility: CLINIC | Age: 47
End: 2022-05-19
Payer: COMMERCIAL

## 2022-05-19 DIAGNOSIS — K21.9 GASTROESOPHAGEAL REFLUX DISEASE WITHOUT ESOPHAGITIS: ICD-10-CM

## 2022-05-20 ENCOUNTER — OFFICE VISIT (OUTPATIENT)
Dept: PHYSICAL THERAPY | Facility: CLINIC | Age: 47
End: 2022-05-20
Payer: COMMERCIAL

## 2022-05-20 DIAGNOSIS — G89.29 OTHER CHRONIC BACK PAIN: Primary | ICD-10-CM

## 2022-05-20 DIAGNOSIS — M54.9 OTHER CHRONIC BACK PAIN: Primary | ICD-10-CM

## 2022-05-20 PROCEDURE — 97110 THERAPEUTIC EXERCISES: CPT

## 2022-05-20 PROCEDURE — 97112 NEUROMUSCULAR REEDUCATION: CPT

## 2022-05-20 PROCEDURE — 97530 THERAPEUTIC ACTIVITIES: CPT

## 2022-05-20 RX ORDER — OMEPRAZOLE 40 MG/1
40 CAPSULE, DELAYED RELEASE ORAL DAILY
Qty: 90 CAPSULE | Refills: 0 | Status: SHIPPED | OUTPATIENT
Start: 2022-05-20

## 2022-05-20 NOTE — PROGRESS NOTES
Daily Note     Today's date: 2022  Patient name: Loan Schreiber  : 1975  MRN: [id number]  Referring provider: JUSTO Wright  Dx:   Encounter Diagnosis     ICD-10-CM    1  Other chronic back pain  M54 9     G89 29        Start Time: 935  Stop Time: 59  Total time in clinic (min): 40 minutes    Subjective: Pt reported calf cramping in her LLE last night that limited her from sleeping  She also continues to feel RLE foot numbness  Objective: See treatment diary below      Assessment: Tolerated treatment well  Patient required initial cuing for prone I, T, Y; pt reported R hand numbness throughout the exercise  Despite pt's symptoms on the L side of her back, pt's numbness is strictly in her R foot and R hand with no numbness experienced in between; these experiences are unrelated to the condition the pt is being treated for  Pt was educated on talking to her PCP about potential vascular follow ups to look into the cause of the numbness  Pt experiences the numbness more often in positions where she is laying down than when she is standing up  Verbal cuing was provided throughout the session to engage core  Pt trialed [employer] press but experienced stress in neck and low back; when cued to engage core, pt was unable and did not understand  TA isometrics with bolster were performed to educate pt on engaging core before returning to [facility] press  Deadlifts were performed again this session with verbal cuing to stand up straight all of the way in between each repetition  Continue to progress pt as tolerated next visit  Plan: Continue per plan of care        Precautions: hypothyroidism, sleep apnea, asthma, chronic migraine, HTN, right carpal tunnel, bipolar 1 disorder, anxiety, cocaine abuse, low back pain, pelvic pain, depressive disorder, PTSD, chest pain, obesity      Manuals                                                               Neuro Re-Ed           Prone I, T, Y   10x each          TA iso nv  10x5" hold bolster          paloff press nv 10# 1x15 each side 8# 1x10; TA iso emphasized each side          SLR nv 1x10 each           Fire hydrants nv            Prone t/s ext nv 2x10 2x10          sidelying abduction             bridges  2x10 5" hold           clamshells             Ther Ex 5/9 5/16 5/20          Prone press ups 2x10; HEP  2x10 l/s, t/s bias          Open books 1x10; HEP            bike nv 5' 6'          Leg press  nv            Lateral walks  orange tb 5x at mirror           Thoracic foam roll ext  2x10                                     Ther Activity 5/9 5/16 5/20          Suitcase marches nv 8# 2x28' 8# 2x28'          deadlifts nv 8# 2x10 8# 2x15          Gait Training 5/9 5/16 5/20                                    Modalities 5/9 5/16 5/20          MHP   nv

## 2022-05-23 ENCOUNTER — OFFICE VISIT (OUTPATIENT)
Dept: PHYSICAL THERAPY | Facility: CLINIC | Age: 47
End: 2022-05-23
Payer: COMMERCIAL

## 2022-05-23 DIAGNOSIS — M54.9 OTHER CHRONIC BACK PAIN: Primary | ICD-10-CM

## 2022-05-23 DIAGNOSIS — G89.29 OTHER CHRONIC BACK PAIN: Primary | ICD-10-CM

## 2022-05-23 PROCEDURE — 97112 NEUROMUSCULAR REEDUCATION: CPT

## 2022-05-23 PROCEDURE — 97110 THERAPEUTIC EXERCISES: CPT

## 2022-05-23 PROCEDURE — 97530 THERAPEUTIC ACTIVITIES: CPT

## 2022-05-23 NOTE — PROGRESS NOTES
Daily Note     Today's date: 2022  Patient name: Davide Maria  : 1975  MRN: [id number]  Referring provider: JUSTO Ryder  Dx:   Encounter Diagnosis     ICD-10-CM    1  Other chronic back pain  M54 9     G89 29        Start Time: 173  Stop Time:   Total time in clinic (min): 43 minutes    Subjective: Patient reports increased l/s prior to start of session  Over the weekend patient donned sheets on 14 isaias sized beds  She states she was at a forward flexed position and did not lift with her leg but rather with her back  Patient states that she will F/U with PCP about potential vascular F/Us  Objective: See treatment diary below      Assessment: Patient responded well to treatment  Pain centralized to (L) l/s at end of session  VCs utilized to encourage core stabilization t/o session  Patient continue with cues to fully stand post DL to finish rep, but improved by last rep  MHP added today with positive response and reduction of pain post  Patient would benefit from continued PT to increase core stability  Plan: Continue per plan of care        Precautions: hypothyroidism, sleep apnea, asthma, chronic migraine, HTN, right carpal tunnel, bipolar 1 disorder, anxiety, cocaine abuse, low back pain, pelvic pain, depressive disorder, PTSD, chest pain, obesity      Manuals                                                              Neuro Re-Ed          Prone I, T, Y   10x each 10x each         TA iso nv  10x5" hold bolster 10x5" hold bolster         paloff press nv 10# 1x15 each side 8# 1x10; TA iso emphasized each side 8# 1x10; TA iso emphasized each side         SLR nv 1x10 each  1x10 each         Fire hydrants nv            Prone t/s ext nv 2x10 2x10 2x10         sidelying abduction             bridges  2x10 5" hold  2x10         clamshells             Ther Ex          Prone press ups 2x10; HEP  2x10 l/s, t/s bias 2x10 l/s, t/s bias Open books 1x10; HEP            bike nv 5' 6' 6'         Leg press  nv            Lateral walks  orange tb 5x at mirror           Thoracic foam roll ext  2x10                                     Ther Activity 5/9 5/16 5/20          Suitcase [place] nv 8# 2x28' 8# 2x28' 8# 2x28'         deadlifts nv 8# 2x10 8# 2x15 8# 2x15         Gait Training 5/9 5/16 5/20                                    Modalities 5/9 5/16 5/20 5/23         MHP   nv 10' [FreeTextEntry6] : 16 year old male here for "jammed middle finger" on his right hand\par \par HPI:  Was in basketball try outs this morning and the basketball jammed\par his right middle finger. Pain initially 4/10 \par PMH:  fractured right tibia in 5/5/21. Had 2 surgeries. Once to put \par a metal plate in and once to take it out\par \par Home: lives with Mom and two older brothers\par Ed: in 8th grade in [employer]; he is a good student\par No drug , alcohol or tobacco use\par Never sexually active. \par Has friends; likes school

## 2022-05-24 ENCOUNTER — APPOINTMENT (OUTPATIENT)
Dept: PHYSICAL THERAPY | Facility: CLINIC | Age: 47
End: 2022-05-24
Payer: COMMERCIAL

## 2022-05-25 ENCOUNTER — APPOINTMENT (OUTPATIENT)
Dept: PHYSICAL THERAPY | Facility: CLINIC | Age: 47
End: 2022-05-25
Payer: COMMERCIAL

## 2022-05-26 ENCOUNTER — APPOINTMENT (OUTPATIENT)
Dept: PHYSICAL THERAPY | Facility: CLINIC | Age: 47
End: 2022-05-26
Payer: COMMERCIAL

## 2022-05-31 ENCOUNTER — APPOINTMENT (OUTPATIENT)
Dept: PHYSICAL THERAPY | Facility: CLINIC | Age: 47
End: 2022-05-31
Payer: COMMERCIAL

## 2022-07-11 DIAGNOSIS — G43.009 MIGRAINE WITHOUT AURA AND WITHOUT STATUS MIGRAINOSUS, NOT INTRACTABLE: ICD-10-CM

## 2022-07-11 DIAGNOSIS — J45.909 ASTHMATIC BRONCHITIS: ICD-10-CM

## 2022-07-11 DIAGNOSIS — J45.41 MODERATE PERSISTENT ASTHMA WITH ACUTE EXACERBATION: ICD-10-CM

## 2022-07-11 DIAGNOSIS — E03.9 ACQUIRED HYPOTHYROIDISM: ICD-10-CM

## 2022-07-12 RX ORDER — ALBUTEROL SULFATE 90 UG/1
2 AEROSOL, METERED RESPIRATORY (INHALATION) EVERY 4 HOURS PRN
Qty: 18 G | Refills: 0 | Status: SHIPPED | OUTPATIENT
Start: 2022-07-12 | End: 2022-09-30 | Stop reason: SDUPTHER

## 2022-07-12 RX ORDER — LEVOTHYROXINE SODIUM 88 UG/1
88 TABLET ORAL EVERY MORNING
Qty: 90 TABLET | Refills: 0 | Status: SHIPPED | OUTPATIENT
Start: 2022-07-12 | End: 2022-09-30 | Stop reason: SDUPTHER

## 2022-07-12 RX ORDER — LEVOTHYROXINE SODIUM 88 UG/1
TABLET ORAL
Qty: 90 TABLET | Refills: 0 | Status: SHIPPED | OUTPATIENT
Start: 2022-07-12 | End: 2022-09-02 | Stop reason: SDUPTHER

## 2022-07-12 RX ORDER — CETIRIZINE HYDROCHLORIDE 10 MG/1
10 TABLET ORAL DAILY
Qty: 90 TABLET | Refills: 0 | Status: SHIPPED | OUTPATIENT
Start: 2022-07-12 | End: 2022-09-30 | Stop reason: SDUPTHER

## 2022-07-27 RX ORDER — TOPIRAMATE 100 MG/1
TABLET, FILM COATED ORAL
Qty: 75 TABLET | Refills: 0 | Status: SHIPPED | OUTPATIENT
Start: 2022-07-27 | End: 2022-09-13 | Stop reason: SDUPTHER

## 2022-08-05 DIAGNOSIS — G43.009 MIGRAINE WITHOUT AURA AND WITHOUT STATUS MIGRAINOSUS, NOT INTRACTABLE: ICD-10-CM

## 2022-08-08 ENCOUNTER — HOSPITAL ENCOUNTER (OUTPATIENT)
Dept: MAMMOGRAPHY | Facility: CLINIC | Age: 47
Discharge: HOME | End: 2022-08-08
Payer: COMMERCIAL

## 2022-08-08 VITALS — HEIGHT: 63 IN | WEIGHT: 230 LBS | BODY MASS INDEX: 40.75 KG/M2

## 2022-08-08 DIAGNOSIS — Z12.31 ENCOUNTER FOR SCREENING MAMMOGRAM FOR MALIGNANT NEOPLASM OF BREAST: ICD-10-CM

## 2022-08-08 PROCEDURE — 77063 BREAST TOMOSYNTHESIS BI: CPT

## 2022-08-08 PROCEDURE — 77067 SCR MAMMO BI INCL CAD: CPT

## 2022-08-09 RX ORDER — ONDANSETRON 4 MG/1
4 TABLET, ORALLY DISINTEGRATING ORAL EVERY 8 HOURS SCHEDULED
Qty: 20 TABLET | Refills: 0 | Status: SHIPPED | OUTPATIENT
Start: 2022-08-09

## 2022-08-30 ENCOUNTER — HOSPITAL ENCOUNTER (OUTPATIENT)
Dept: MAMMOGRAPHY | Facility: CLINIC | Age: 47
Discharge: HOME | End: 2022-08-30
Payer: COMMERCIAL

## 2022-08-30 ENCOUNTER — HOSPITAL ENCOUNTER (OUTPATIENT)
Dept: NON INVASIVE DIAGNOSTICS | Facility: HOSPITAL | Age: 47
Discharge: HOME | End: 2022-08-30
Payer: COMMERCIAL

## 2022-08-30 VITALS
HEIGHT: 63 IN | DIASTOLIC BLOOD PRESSURE: 86 MMHG | WEIGHT: 230 LBS | SYSTOLIC BLOOD PRESSURE: 128 MMHG | BODY MASS INDEX: 40.75 KG/M2 | HEART RATE: 74 BPM

## 2022-08-30 VITALS — WEIGHT: 230 LBS | BODY MASS INDEX: 40.75 KG/M2 | HEIGHT: 63 IN

## 2022-08-30 DIAGNOSIS — R92.8 ABNORMAL SCREENING MAMMOGRAM: ICD-10-CM

## 2022-08-30 DIAGNOSIS — R07.9 CHEST PAIN, UNSPECIFIED TYPE: ICD-10-CM

## 2022-08-30 LAB
AORTIC ROOT: 2.6 CM
APICAL FOUR CHAMBER EJECTION FRACTION: 69 %
ASCENDING AORTA: 3.1 CM
E WAVE DECELERATION TIME: 211 MS
FRACTIONAL SHORTENING: 35 % (ref 28–44)
INTERVENTRICULAR SEPTUM IN DIASTOLE (PARASTERNAL SHORT AXIS VIEW): 1.1 CM
INTERVENTRICULAR SEPTUM: 1.1 CM (ref 0.6–1.1)
LAAS-AP2: 16.9 CM2
LAAS-AP4: 18.4 CM2
LEFT ATRIUM SIZE: 4.2 CM
LEFT INTERNAL DIMENSION IN SYSTOLE: 3.1 CM (ref 2.1–4)
LEFT VENTRICULAR INTERNAL DIMENSION IN DIASTOLE: 4.8 CM (ref 3.5–6)
LEFT VENTRICULAR POSTERIOR WALL IN END DIASTOLE: 1.1 CM
LEFT VENTRICULAR STROKE VOLUME: 68 ML
LVSV (TEICH): 68 ML
MAX HR PERCENT: 88 %
MAX HR: 155 BPM
MV E'TISSUE VEL-SEP: 10 CM/S
MV PEAK A VEL: 0.76 M/S
MV PEAK E VEL: 69 CM/S
MV STENOSIS PRESSURE HALF TIME: 61 MS
MV VALVE AREA P 1/2 METHOD: 3.61 CM2
RATE PRESSURE PRODUCT: NORMAL
RIGHT ATRIUM AREA SYSTOLE A4C: 13.1 CM2
RIGHT VENTRICLE ID DIMENSION: 3 CM
SL CV LEFT ATRIUM LENGTH A2C: 5 CM
SL CV LV EF: 60
SL CV PED ECHO LEFT VENTRICLE DIASTOLIC VOLUME (MOD BIPLANE) 2D: 106 ML
SL CV PED ECHO LEFT VENTRICLE SYSTOLIC VOLUME (MOD BIPLANE) 2D: 38 ML
SL CV STRESS RECOVERY BP: NORMAL MMHG
SL CV STRESS RECOVERY HR: 82 BPM
SL CV STRESS RECOVERY O2 SAT: 98 %
SL CV STRESS STAGE REACHED: 2
STRESS ANGINA INDEX: 1
STRESS BASELINE BP: NORMAL MMHG
STRESS BASELINE HR: 70 BPM
STRESS DUKE TREADMILL SCORE: -4
STRESS O2 SAT REST: 98 %
STRESS PEAK HR: 155 BPM
STRESS POST ESTIMATED WORKLOAD: 7 METS
STRESS POST EXERCISE DUR MIN: 6 MIN
STRESS POST O2 SAT PEAK: 98 %
STRESS POST PEAK BP: 162 MMHG
STRESS ST DEPRESSION: 1.2 MM

## 2022-08-30 PROCEDURE — 93306 TTE W/DOPPLER COMPLETE: CPT

## 2022-08-30 PROCEDURE — G0279 TOMOSYNTHESIS, MAMMO: HCPCS

## 2022-08-30 PROCEDURE — 93016 CV STRESS TEST SUPVJ ONLY: CPT | Performed by: INTERNAL MEDICINE

## 2022-08-30 PROCEDURE — 77065 DX MAMMO INCL CAD UNI: CPT

## 2022-08-30 PROCEDURE — 93017 CV STRESS TEST TRACING ONLY: CPT

## 2022-08-30 PROCEDURE — 93306 TTE W/DOPPLER COMPLETE: CPT | Performed by: INTERNAL MEDICINE

## 2022-08-30 PROCEDURE — 93018 CV STRESS TEST I&R ONLY: CPT | Performed by: INTERNAL MEDICINE

## 2022-08-31 ENCOUNTER — OFFICE VISIT (OUTPATIENT)
Dept: FAMILY MEDICINE CLINIC | Facility: CLINIC | Age: 47
End: 2022-08-31

## 2022-08-31 VITALS
HEIGHT: 63 IN | DIASTOLIC BLOOD PRESSURE: 72 MMHG | HEART RATE: 66 BPM | OXYGEN SATURATION: 98 % | BODY MASS INDEX: 40.18 KG/M2 | TEMPERATURE: 97.9 F | SYSTOLIC BLOOD PRESSURE: 118 MMHG | RESPIRATION RATE: 14 BRPM | WEIGHT: 226.8 LBS

## 2022-08-31 DIAGNOSIS — R07.9 CHEST PAIN, UNSPECIFIED TYPE: Primary | ICD-10-CM

## 2022-08-31 DIAGNOSIS — R94.39 EQUIVOCAL STRESS TEST: ICD-10-CM

## 2022-08-31 DIAGNOSIS — E55.9 VITAMIN D DEFICIENCY: ICD-10-CM

## 2022-08-31 LAB
CHEST PAIN STATEMENT: NORMAL
MAX DIASTOLIC BP: 68 MMHG
MAX HEART RATE: 155 BPM
MAX PREDICTED HEART RATE: 175 BPM
MAX. SYSTOLIC BP: 162 MMHG
PROTOCOL NAME: NORMAL
TARGET HR FORMULA: NORMAL
TEST INDICATION: NORMAL
TIME IN EXERCISE PHASE: NORMAL

## 2022-08-31 PROCEDURE — 3074F SYST BP LT 130 MM HG: CPT | Performed by: NURSE PRACTITIONER

## 2022-08-31 PROCEDURE — 99214 OFFICE O/P EST MOD 30 MIN: CPT | Performed by: NURSE PRACTITIONER

## 2022-08-31 PROCEDURE — 3078F DIAST BP <80 MM HG: CPT | Performed by: NURSE PRACTITIONER

## 2022-08-31 RX ORDER — NITROGLYCERIN 0.4 MG/1
0.4 TABLET SUBLINGUAL
Qty: 30 TABLET | Refills: 0 | Status: SHIPPED | OUTPATIENT
Start: 2022-08-31

## 2022-09-01 NOTE — PROGRESS NOTES
Assessment/Plan:    Chest pain  Stress test equivocal   Referral to cardiology for possible coronary CT angiogram  Message to referral team to assist with prompt scheduling  NTG SL tabs prescribed, patient educated on use   ED parameters reviewed     Tam Clark was seen today for chest thightness  Diagnoses and all orders for this visit:    Chest pain, unspecified type  -     Ambulatory Referral to Cardiology; Future  -     nitroglycerin (NITROSTAT) 0 4 mg SL tablet; Place 1 tablet (0 4 mg total) under the tongue every 5 (five) minutes as needed for chest pain    Equivocal stress test  -     Ambulatory Referral to Cardiology; Future  -     nitroglycerin (NITROSTAT) 0 4 mg SL tablet; Place 1 tablet (0 4 mg total) under the tongue every 5 (five) minutes as needed for chest pain    Vitamin D deficiency  -     Vitamin D 25 hydroxy; Future    BMI 40 0-44 9, adult (HCC)  -     Lipid panel; Future        Return in about 4 weeks (around 9/28/2022) for chest pain   Subjective:     Juan Diego Napoles is a 41 y o  female who  has a past medical history of Anxiety, Asthma, Bipolar disorder ([facility] [address] ), Depression, Disease of thyroid gland, Endometriosis, Psychiatric illness, Psychosis ([facility] [address] ), PTSD (post-traumatic stress disorder), Right carpal tunnel syndrome, Self-injurious behavior, and Suicide attempt ([facility] [address] )  who presented to the office today for follow up       The following portions of the patient's history were reviewed and updated as appropriate: allergies, current medications, past family history, past medical history, past social history, past surgical history and problem list     Current Outpatient Medications on File Prior to Visit   Medication Sig Dispense Refill    albuterol (PROVENTIL HFA,VENTOLIN HFA) 90 mcg/act inhaler Inhale 2 puffs every 4 (four) hours as needed for wheezing 18 g 0    ARIPiprazole (ABILIFY) 10 mg tablet       buPROPion (WELLBUTRIN SR) 150 mg 12 hr tablet       busPIRone (BUSPAR) 15 mg tablet  busPIRone (BUSPAR) 5 mg tablet Take 5 mg by mouth 2 (two) times a day  1    cetirizine (ZyrTEC) 10 mg tablet Take 1 tablet (10 mg total) by mouth daily 90 tablet 0    clonazePAM (KlonoPIN) 0 5 mg tablet       DULoxetine (CYMBALTA) 60 mg delayed release capsule       fluticasone (FLONASE) 50 mcg/act nasal spray 1 spray into each nostril daily 16 g 2    fluticasone-salmeterol (Advair) 500-50 mcg/dose inhaler Inhale 1 puff 2 (two) times a day Rinse mouth after use  60 blister 2    guaiFENesin 200 MG tablet Take 2 tablets (400 mg total) by mouth every 4 (four) hours as needed for cough 30 tablet 0    ipratropium-albuterol (DUO-NEB) 0 5-2 5 mg/3 mL nebulizer solution Take 3 mL by nebulization every 6 (six) hours as needed for wheezing or shortness of breath 200 mL 1    levothyroxine 88 mcg tablet TAKE ONE TABLET BY MOUTH EVERY MORNING  90 tablet 0    levothyroxine 88 mcg tablet Take 1 tablet (88 mcg total) by mouth every morning 90 tablet 0    lisinopril (ZESTRIL) 10 mg tablet Take 1 tablet (10 mg total) by mouth daily 30 tablet 0    methocarbamol (ROBAXIN) 500 mg tablet 1 tab qhs prn back pain 30 tablet 0    omeprazole (PriLOSEC) 40 MG capsule Take 1 capsule (40 mg total) by mouth in the morning  90 capsule 0    ondansetron (ZOFRAN-ODT) 4 mg disintegrating tablet Take 1 tablet (4 mg total) by mouth every 8 (eight) hours 20 tablet 0    simethicone (MYLICON) 646 MG chewable tablet Chew 1 tablet (125 mg total) every 6 (six) hours as needed for flatulence 30 tablet 0    topiramate (TOPAMAX) 100 mg tablet 100 mg qam and 150 mg qhs 75 tablet 0     No current facility-administered medications on file prior to visit  Review of Systems   Constitutional: Negative for chills and fever  HENT: Negative for ear pain and sore throat  Eyes: Negative for pain and visual disturbance  Respiratory: Positive for chest tightness  Negative for cough and shortness of breath      Cardiovascular: Positive for chest pain  Negative for palpitations  Gastrointestinal: Negative for abdominal pain and vomiting  Genitourinary: Negative for dysuria and hematuria  Musculoskeletal: Negative for arthralgias and back pain  Skin: Negative for color change and rash  Neurological: Negative for seizures and syncope  All other systems reviewed and are negative  Objective:    /72 (BP Location: Left arm, Patient Position: Sitting, Cuff Size: Adult)   Pulse 66   Temp 97 9 °F (36 6 °C) (Temporal)   Resp 14   Ht 5' 2 5" (1 588 m)   Wt 103 kg (226 lb 12 8 oz)   SpO2 98%   BMI 40 82 kg/m²     Physical Exam  Vitals and nursing note reviewed  Constitutional:       General: She is not in acute distress  HENT:      Head: Normocephalic and atraumatic  Right Ear: External ear normal       Left Ear: External ear normal    Eyes:      General:         Right eye: No discharge  Left eye: No discharge  Cardiovascular:      Rate and Rhythm: Normal rate and regular rhythm  Pulmonary:      Effort: Pulmonary effort is normal  No respiratory distress  Breath sounds: Normal breath sounds  Skin:     General: Skin is warm and dry  Neurological:      Mental Status: She is alert and oriented to person, place, and time     Psychiatric:         Behavior: Behavior normal          JUSTO Spencer  09/01/22  10:37 AM

## 2022-09-01 NOTE — ASSESSMENT & PLAN NOTE
Stress test equivocal   Referral to cardiology for possible coronary CT angiogram  Message to referral team to assist with prompt scheduling  NTG SL tabs prescribed, patient educated on use   ED parameters reviewed

## 2022-09-02 ENCOUNTER — TELEPHONE (OUTPATIENT)
Dept: CARDIOLOGY CLINIC | Facility: CLINIC | Age: 47
End: 2022-09-02

## 2022-09-02 ENCOUNTER — OFFICE VISIT (OUTPATIENT)
Dept: CARDIOLOGY CLINIC | Facility: CLINIC | Age: 47
End: 2022-09-02
Payer: COMMERCIAL

## 2022-09-02 VITALS
BODY MASS INDEX: 40.4 KG/M2 | HEIGHT: 63 IN | WEIGHT: 228 LBS | DIASTOLIC BLOOD PRESSURE: 82 MMHG | SYSTOLIC BLOOD PRESSURE: 138 MMHG | HEART RATE: 65 BPM

## 2022-09-02 DIAGNOSIS — E78.2 MIXED HYPERLIPIDEMIA: ICD-10-CM

## 2022-09-02 DIAGNOSIS — I20.8 STABLE ANGINA (HCC): Primary | ICD-10-CM

## 2022-09-02 DIAGNOSIS — R94.39 EQUIVOCAL STRESS TEST: ICD-10-CM

## 2022-09-02 DIAGNOSIS — I10 BENIGN ESSENTIAL HYPERTENSION: ICD-10-CM

## 2022-09-02 PROCEDURE — 99244 OFF/OP CNSLTJ NEW/EST MOD 40: CPT | Performed by: INTERNAL MEDICINE

## 2022-09-02 RX ORDER — AMLODIPINE BESYLATE 2.5 MG/1
2.5 TABLET ORAL DAILY
Qty: 30 TABLET | Refills: 3 | Status: SHIPPED | OUTPATIENT
Start: 2022-09-02

## 2022-09-02 NOTE — PATIENT INSTRUCTIONS
You were seen today in the Cardiology office for chest pain  Please continue your current cardiac medications as prescribed  Please make the following changes to your cardiac medications:  Start taking Metoprolol tartrate 25mg twice daily  2    Start taking Amlodipine 2 5mg once daily  Check your blood pressure twice daily  We discussed the benefits of (weight loss, healthy low-fat diet, salt reduction, fluid restriction)    Thank you for choosing [address]  Please call our office or use [phone] with any questions

## 2022-09-02 NOTE — PROGRESS NOTES
[facility]    Anabell [place]a   DOS: 9/2/2022     Chief Complaint:   Chief Complaint   Patient presents with    Abnormal ECG     Consult - PCP for abnormal testing     Chest Pain     Random chest pressure and L shoulder discomfort       HISTORY OF PRESENT ILLNESS:      HPI:  Sandy De Los Santos is a 41 y o  female  She  has a past medical history of Anxiety, Asthma, Bipolar disorder ([facility] [address] ), Depression, Disease of thyroid gland, Endometriosis, Psychiatric illness, Psychosis ([facility] [address] ), PTSD (post-traumatic stress disorder) (6/6/2017), Right carpal tunnel syndrome (12/26/2018), Self-injurious behavior, and Suicide attempt ([facility] [address] )  She presents to establish care with a cardiologist at the recommendation of her PCP for further evaluation of an equivocal treadmill exercise stress test  She underwent exercise stress testing for evaluation of episodic chest pressure and left shoulder discomfort  She achieved 93% of her MPHR, and was noted to have horizontal to downsloping ST segment depressions along with non limiting chest tightness during the study  Ortiz treadmill score of -4 correlates with moderate risk  Her exercise capacity was below average for her age at 6 minutes  On my review of the study, there is significant baseline wander with exercise which reduces the sensitivity of her study  She does however have clear tracings in recovery, and there is definite ST segment depression in the inferior leads lasting until approximately 5 minutes into recovery  Today, she reports continued intermittent chest discomfort that occurs both at rest and with exertion  The discomfort is similar to what she experienced on the treadmill during her stress test but of much decreased severity, and is generally self limiting within 10-20 minutes  She has not used sublingual nitroglycerin yet for the pain   She denies associated denies shortness of breath, diaphoresis, dizziness, palpitations, orthopnea, PND, edema, syncope  The pain is sometimes brought on by exertion such as walking at a brisk pace for extended periods of time, and is always relieved by resting in those instances  It also occurs randomly at rest, for example when she's sitting watching TV or in the car driving  The rest pain is not associated with any identified stress or anxiety  She has no known family history of heart disease, CAD, stroke, arrhythmias, or sudden cardiac death  She is a prior 1/2 PPD smoker for approximately 6 years, not actively smoking  She drinks 1-2 alcoholic drinks weekly  Denies recreational drug use  The 10-year ASCVD risk score (Javier Hunter et al , 2013) is: 1 6%    Values used to calculate the score:      Age: 41 years      Sex: Female      Is Non- : No      Diabetic: No      Tobacco smoker: No      Systolic Blood Pressure: 005 mmHg      Is BP treated: Yes      HDL Cholesterol: 44 mg/dL      Total Cholesterol: 183 mg/dL           ROS    ROS: Pertinent positives and negatives as described in History of Present Illness  Remainder of a 14 point review of systems was negative  Allergies   Allergen Reactions    Aleve [Naproxen] Shortness Of Breath    Benzonatate Swelling     Throat closing    Guaifenesin Other (See Comments)     Note - patient states she IS able to take robitussin   Latuda [Lurasidone] Swelling      stuttering     Imitrex [Sumatriptan]      Patient reports that this medication "makes me sick"          Current Outpatient Medications on File Prior to Visit   Medication Sig Dispense Refill    albuterol (PROVENTIL HFA,VENTOLIN HFA) 90 mcg/act inhaler Inhale 2 puffs every 4 (four) hours as needed for wheezing 18 g 0    ARIPiprazole (ABILIFY) 10 mg tablet       buPROPion (WELLBUTRIN SR) 150 mg 12 hr tablet       busPIRone (BUSPAR) 15 mg tablet       busPIRone (BUSPAR) 5 mg tablet Take 5 mg by mouth 2 (two) times a day  1    cetirizine (ZyrTEC) 10 mg tablet Take 1 tablet (10 mg total) by mouth daily 90 tablet 0    clonazePAM (KlonoPIN) 0 5 mg tablet       DULoxetine (CYMBALTA) 60 mg delayed release capsule       fluticasone (FLONASE) 50 mcg/act nasal spray 1 spray into each nostril daily 16 g 2    fluticasone-salmeterol (Advair) 500-50 mcg/dose inhaler Inhale 1 puff 2 (two) times a day Rinse mouth after use  60 blister 2    guaiFENesin 200 MG tablet Take 2 tablets (400 mg total) by mouth every 4 (four) hours as needed for cough 30 tablet 0    ipratropium-albuterol (DUO-NEB) 0 5-2 5 mg/3 mL nebulizer solution Take 3 mL by nebulization every 6 (six) hours as needed for wheezing or shortness of breath 200 mL 1    levothyroxine 88 mcg tablet Take 1 tablet (88 mcg total) by mouth every morning 90 tablet 0    lisinopril (ZESTRIL) 10 mg tablet Take 1 tablet (10 mg total) by mouth daily 30 tablet 0    methocarbamol (ROBAXIN) 500 mg tablet 1 tab qhs prn back pain 30 tablet 0    nitroglycerin (NITROSTAT) 0 4 mg SL tablet Place 1 tablet (0 4 mg total) under the tongue every 5 (five) minutes as needed for chest pain 30 tablet 0    omeprazole (PriLOSEC) 40 MG capsule Take 1 capsule (40 mg total) by mouth in the morning  90 capsule 0    ondansetron (ZOFRAN-ODT) 4 mg disintegrating tablet Take 1 tablet (4 mg total) by mouth every 8 (eight) hours 20 tablet 0    simethicone (MYLICON) 041 MG chewable tablet Chew 1 tablet (125 mg total) every 6 (six) hours as needed for flatulence 30 tablet 0    topiramate (TOPAMAX) 100 mg tablet 100 mg qam and 150 mg qhs 75 tablet 0    [DISCONTINUED] levothyroxine 88 mcg tablet TAKE ONE TABLET BY MOUTH EVERY MORNING  90 tablet 0     No current facility-administered medications on file prior to visit         Past Medical History:   Diagnosis Date    Anxiety     Asthma     [facility]     Depression     Disease of thyroid gland     Endometriosis     Psychiatric illness     Psychosis ([facility] )     PTSD (post-traumatic stress disorder) 6/6/2017  Right carpal tunnel syndrome 2018    Self-injurious behavior     Suicide attempt [facility])        Past Surgical History:   Procedure Laterality Date    ABDOMINAL SURGERY      HERNIA REPAIR      LAPAROSCOPIC TUBAL LIGATION         Family History   Problem Relation Age of Onset    Hypertension Mother     Asthma Mother     Fibromyalgia Mother     Osteoporosis Mother     No Known Problems Father     No Known Problems Sister     No Known Problems Daughter     No Known Problems Maternal Grandmother     No Known Problems Maternal Grandfather     No Known Problems Paternal Grandmother     No Known Problems Paternal Grandfather     Thyroid disease Brother     Breast cancer Maternal Aunt     Breast cancer Maternal Aunt        Social History     Socioeconomic History    Marital status: Single     Spouse name: Not on file    Number of children: Not on file    Years of education: Not on file    Highest education level: Not on file   Occupational History    Not on file   Tobacco Use    Smoking status: Former Smoker     Quit date: 2021     Years since quittin 4    Smokeless tobacco: Never Used   Vaping Use    Vaping Use: Never used   Substance and Sexual Activity    Alcohol use: No    Drug use: No    Sexual activity: Not on file   Other Topics Concern    Not on file   Social History Narrative    Flu shot:no     Social Determinants of Health     Financial Resource Strain: Low Risk     Difficulty of Paying Living Expenses: Not hard at all   Food Insecurity: No Food Insecurity    Worried About Running Out of Food in the Last Year: Never true    Jesse of Food in the Last Year: Never true   Transportation Needs: No Transportation Needs    Lack of Transportation (Medical): No    Lack of Transportation (Non-Medical):  No   Physical Activity: Not on file   Stress: Not on file   Social Connections: Not on file   Intimate Partner Violence: Not on file   Housing Stability: Not on file OBJECTIVE:    Ht 5' 2 5" (1 588 m)   Wt 103 kg (228 lb)   BMI 41 04 kg/m²      BP Readings from Last 3 Encounters:   08/31/22 118/72   08/30/22 128/86   04/28/22 128/86       Wt Readings from Last 3 Encounters:   09/02/22 103 kg (228 lb)   08/31/22 103 kg (226 lb 12 8 oz)   08/30/22 104 kg (230 lb)         Physical Exam  Vitals reviewed  Constitutional:       General: She is not in acute distress  Appearance: Normal appearance  She is obese  She is not diaphoretic  HENT:      Head: Normocephalic and atraumatic  Eyes:      Conjunctiva/sclera: Conjunctivae normal    Neck:      Vascular: No carotid bruit or JVD  Cardiovascular:      Rate and Rhythm: Normal rate and regular rhythm  Pulses: Normal pulses  Heart sounds: Normal heart sounds  No murmur heard  No friction rub  No gallop  Pulmonary:      Effort: Pulmonary effort is normal       Breath sounds: Normal breath sounds  No wheezing, rhonchi or rales  Abdominal:      General: Abdomen is flat  Bowel sounds are normal  There is no distension  Palpations: Abdomen is soft  Musculoskeletal:      Right lower leg: No edema  Left lower leg: No edema  Skin:     General: Skin is warm and dry  Neurological:      General: No focal deficit present  Mental Status: She is alert and oriented to person, place, and time  Psychiatric:         Mood and Affect: Mood normal          Behavior: Behavior normal                                                    TTE 8/30/22:  Left Ventricle Left ventricular cavity size is normal  Wall thickness is normal  The left ventricular ejection fraction is 60%  Systolic function is normal   Wall motion is normal  Diastolic function is mildly abnormal, consistent with grade I (abnormal) relaxation  Right Ventricle Right ventricular cavity size is normal  Systolic function is normal  Wall thickness is normal    Left Atrium The atrium is normal in size     Right Atrium The atrium is normal in size    Aortic Valve The aortic valve is trileaflet  The leaflets are not thickened  The leaflets are not calcified  The leaflets exhibit normal mobility  There is no evidence of regurgitation  The aortic valve has no significant stenosis  Mitral Valve There is no evidence of regurgitation  There is no evidence of stenosis  The mitral valve has normal structure and normal function  Tricuspid Valve Tricuspid valve structure is normal  There is no evidence of regurgitation  There is no evidence of stenosis  Pulmonic Valve Pulmonic valve structure is normal  There is no evidence of regurgitation  There is no evidence of stenosis  Ascending Aorta The aortic root is normal in size  IVC/SVC The inferior vena cava is normal in size  Pericardium There is no pericardial effusion  The pericardium is normal in appearance  LABS:  Lab Results   Component Value Date    BUN 14 03/21/2022    CREATININE 1 03 03/21/2022    CALCIUM 9 4 03/21/2022    K 3 7 03/21/2022    CO2 25 03/21/2022     03/21/2022    ALKPHOS 86 03/21/2022    AST 17 03/21/2022    ALT 31 03/21/2022        Lab Results   Component Value Date    WBC 6 98 03/21/2022    HGB 13 2 03/21/2022    HCT 40 7 03/21/2022    MCV 88 03/21/2022     03/21/2022       Lab Results   Component Value Date    HDL 44 (L) 03/21/2022    LDLCALC 106 (H) 03/21/2022    TRIG 166 (H) 03/21/2022       Lab Results   Component Value Date    HGBA1C 5 3 03/21/2022       No results found for: TSH        ASSESSMENT/PLAN:  Diagnoses and all orders for this visit:    Stable angina (HCC)  Equivocal stress test  - Equivocal stress testing as noted above, however her ST changes persist late into recovery which is often suggestive of true underlying coronary disease  In the absence of the stress test results, she is already an intermediate pre-test risk patient given her risk factors including obesity, hyperlipidemia, hypertension, hypothyroidism, prior smoking history, HARIS  - I initially had advised the patient to undergo a CT coronary angiography study to evaluate her coronary anatomy better  This decision was made after discussion with the patient and careful consideration to avoid high radiation procedures such as a nuclear stress test  Unfortunately, the next available CT-angiography appointment is nearly 2 months out  That timeline would be unacceptable given her current symptoms and possibility of underlying coronary disease  - In place of the CTA, I have ordered an exercise stress ECHO to exclude regional wall motion abnormalities with exercise stress  - Start anti-anginal therapy  Lopressor and Norvasc as noted below  CCB therapy was chosen specifically due to concerns for possible underlying coronary vasospasm, as her symptoms occur at rest and with exertion    -     Ambulatory Referral to Cardiology  -     metoprolol tartrate (LOPRESSOR) 25 mg tablet; Take 1 tablet (25 mg total) by mouth every 12 (twelve) hours  -     amLODIPine (NORVASC) 2 5 mg tablet; Take 1 tablet (2 5 mg total) by mouth daily  -     Echo stress test, exercise; Future      Benign essential hypertension  -     amLODIPine (NORVASC) 2 5 mg tablet; Take 1 tablet (2 5 mg total) by mouth daily    Mixed hyperlipidemia  - Counseled extensively on diet and exercise combined with weight loss to lower cardiac risk  - Heart health diet printed information provided  Close follow up in 4 weeks or sooner to review test results  Advised on when to present to the ED, and also that she can call our office with any concerns or reach me via [phone]                 Ken Briones MD

## 2022-09-06 ENCOUNTER — TELEPHONE (OUTPATIENT)
Dept: CARDIOLOGY CLINIC | Facility: CLINIC | Age: 47
End: 2022-09-06

## 2022-09-06 ENCOUNTER — APPOINTMENT (OUTPATIENT)
Dept: LAB | Facility: CLINIC | Age: 47
End: 2022-09-06
Payer: COMMERCIAL

## 2022-09-06 DIAGNOSIS — E05.90 HYPERTHYROIDISM: ICD-10-CM

## 2022-09-06 DIAGNOSIS — E55.9 VITAMIN D DEFICIENCY: ICD-10-CM

## 2022-09-06 LAB
25(OH)D3 SERPL-MCNC: 26.9 NG/ML (ref 30–100)
CHOLEST SERPL-MCNC: 199 MG/DL
HDLC SERPL-MCNC: 47 MG/DL
LDLC SERPL CALC-MCNC: 134 MG/DL (ref 0–100)
NONHDLC SERPL-MCNC: 152 MG/DL
TRIGL SERPL-MCNC: 88 MG/DL
TSH SERPL DL<=0.05 MIU/L-ACNC: 0.91 UIU/ML (ref 0.45–4.5)

## 2022-09-06 PROCEDURE — 36415 COLL VENOUS BLD VENIPUNCTURE: CPT

## 2022-09-06 PROCEDURE — 80061 LIPID PANEL: CPT

## 2022-09-06 PROCEDURE — 84443 ASSAY THYROID STIM HORMONE: CPT

## 2022-09-06 PROCEDURE — 82306 VITAMIN D 25 HYDROXY: CPT

## 2022-09-06 NOTE — TELEPHONE ENCOUNTER
Byron Ny,     Can you please call Irene Garcia and let her know that diarrhea is certainly an infrequent side effect of Metoprolol  If she's taking the 25mg dose, I first recommend that she try taking 12 5mg instead  If still having diarrhea, she can stop taking Metoprolol and we can discuss an alternative at her follow up office visit         Dina Montalvo text

## 2022-09-06 NOTE — TELEPHONE ENCOUNTER
Pt is c/o of diarrhea and believes its due to her new medication, Lopressor 25mg daily that was started on 9/2    Please advise

## 2022-09-08 DIAGNOSIS — E55.9 VITAMIN D DEFICIENCY: Primary | ICD-10-CM

## 2022-09-08 RX ORDER — MELATONIN
1000 DAILY
Qty: 90 TABLET | Refills: 3 | Status: SHIPPED | OUTPATIENT
Start: 2022-09-08 | End: 2022-09-30 | Stop reason: SDUPTHER

## 2022-09-12 ENCOUNTER — HOSPITAL ENCOUNTER (OUTPATIENT)
Dept: ULTRASOUND IMAGING | Facility: HOSPITAL | Age: 47
Discharge: HOME | End: 2022-09-12
Payer: COMMERCIAL

## 2022-09-12 DIAGNOSIS — R10.11 RUQ PAIN: ICD-10-CM

## 2022-09-12 PROCEDURE — 76705 ECHO EXAM OF ABDOMEN: CPT

## 2022-09-13 ENCOUNTER — HOSPITAL ENCOUNTER (OUTPATIENT)
Dept: NON INVASIVE DIAGNOSTICS | Facility: HOSPITAL | Age: 47
Discharge: HOME | End: 2022-09-13
Attending: INTERNAL MEDICINE
Payer: COMMERCIAL

## 2022-09-13 VITALS — WEIGHT: 228 LBS | BODY MASS INDEX: 41.96 KG/M2 | HEIGHT: 62 IN

## 2022-09-13 DIAGNOSIS — G43.009 MIGRAINE WITHOUT AURA AND WITHOUT STATUS MIGRAINOSUS, NOT INTRACTABLE: ICD-10-CM

## 2022-09-13 DIAGNOSIS — R94.39 EQUIVOCAL STRESS TEST: ICD-10-CM

## 2022-09-13 DIAGNOSIS — I20.8 STABLE ANGINA (HCC): ICD-10-CM

## 2022-09-13 LAB
CHEST PAIN STATEMENT: NORMAL
E WAVE DECELERATION TIME: 184 MS
MAX DIASTOLIC BP: 70 MMHG
MAX HEART RATE: 142 BPM
MAX HR PERCENT: 81 %
MAX HR: 142 BPM
MAX PREDICTED HEART RATE: 175 BPM
MAX. SYSTOLIC BP: 200 MMHG
MV E'TISSUE VEL-SEP: 8 CM/S
MV PEAK A VEL: 0.8 M/S
MV PEAK E VEL: 65 CM/S
MV STENOSIS PRESSURE HALF TIME: 53 MS
MV VALVE AREA P 1/2 METHOD: 4.15 CM2
PROTOCOL NAME: NORMAL
RATE PRESSURE PRODUCT: NORMAL
REASON FOR TERMINATION: NORMAL
SL CV LV EF: 60
SL CV STRESS RECOVERY BP: NORMAL MMHG
SL CV STRESS RECOVERY HR: 75 BPM
SL CV STRESS RECOVERY O2 SAT: 98 %
SL CV STRESS STAGE REACHED: 2
STRESS ANGINA INDEX: 1
STRESS BASELINE BP: NORMAL MMHG
STRESS BASELINE HR: 74 BPM
STRESS O2 SAT REST: 99 %
STRESS PEAK HR: 142 BPM
STRESS POST ESTIMATED WORKLOAD: 7 METS
STRESS POST EXERCISE DUR MIN: 6 MIN
STRESS POST EXERCISE DUR SEC: 0 SEC
STRESS POST O2 SAT PEAK: 98 %
STRESS POST PEAK BP: 200 MMHG
TARGET HR FORMULA: NORMAL
TEST INDICATION: NORMAL
TIME IN EXERCISE PHASE: NORMAL
TR MAX PG: 24 MMHG
TR PEAK VELOCITY: 2.5 M/S
TRICUSPID VALVE PEAK REGURGITATION VELOCITY: 2.46 M/S

## 2022-09-13 PROCEDURE — 93350 STRESS TTE ONLY: CPT

## 2022-09-13 PROCEDURE — 93350 STRESS TTE ONLY: CPT | Performed by: INTERNAL MEDICINE

## 2022-09-13 RX ORDER — TOPIRAMATE 100 MG/1
TABLET, FILM COATED ORAL
Qty: 75 TABLET | Refills: 1 | Status: SHIPPED | OUTPATIENT
Start: 2022-09-13

## 2022-09-13 NOTE — TELEPHONE ENCOUNTER
----- Message from Paola Epstein sent at 9/12/2022 12:49 PM EDT -----  Regarding: Refill   Please review      ----- Message -----  From: Eugene Mistry  Sent: 9/12/2022  11:51 AM EDT  To: [facility]  Subject: Refill                                           Good morning wanted to know if you can refill my topiramate 100 mg I dont have any thank u have a nice day

## 2022-09-13 NOTE — TELEPHONE ENCOUNTER
patient of Tangela Rizvi, last visit 12/7/2021    f/u scheduled 11/10/2022    Due for refill; please sign script if in agreement, thank you

## 2022-09-24 DIAGNOSIS — I10 PRIMARY HYPERTENSION: ICD-10-CM

## 2022-09-24 RX ORDER — LISINOPRIL 10 MG/1
TABLET ORAL
Qty: 30 TABLET | Refills: 0 | Status: SHIPPED | OUTPATIENT
Start: 2022-09-24 | End: 2022-09-30 | Stop reason: SDUPTHER

## 2022-09-29 ENCOUNTER — OFFICE VISIT (OUTPATIENT)
Dept: FAMILY MEDICINE CLINIC | Facility: CLINIC | Age: 47
End: 2022-09-29

## 2022-09-29 VITALS
RESPIRATION RATE: 19 BRPM | BODY MASS INDEX: 41.77 KG/M2 | WEIGHT: 227 LBS | DIASTOLIC BLOOD PRESSURE: 76 MMHG | TEMPERATURE: 97.7 F | OXYGEN SATURATION: 97 % | HEART RATE: 75 BPM | SYSTOLIC BLOOD PRESSURE: 124 MMHG | HEIGHT: 62 IN

## 2022-09-29 DIAGNOSIS — I10 BENIGN ESSENTIAL HYPERTENSION: ICD-10-CM

## 2022-09-29 DIAGNOSIS — J45.909 ASTHMATIC BRONCHITIS: ICD-10-CM

## 2022-09-29 DIAGNOSIS — K21.9 GASTROESOPHAGEAL REFLUX DISEASE WITHOUT ESOPHAGITIS: ICD-10-CM

## 2022-09-29 DIAGNOSIS — I10 PRIMARY HYPERTENSION: ICD-10-CM

## 2022-09-29 DIAGNOSIS — E03.9 ACQUIRED HYPOTHYROIDISM: Primary | ICD-10-CM

## 2022-09-29 DIAGNOSIS — J45.20 MILD INTERMITTENT ASTHMA WITHOUT COMPLICATION: ICD-10-CM

## 2022-09-29 DIAGNOSIS — E55.9 VITAMIN D DEFICIENCY: ICD-10-CM

## 2022-09-29 DIAGNOSIS — E03.9 ACQUIRED HYPOTHYROIDISM: ICD-10-CM

## 2022-09-29 DIAGNOSIS — J45.41 MODERATE PERSISTENT ASTHMA WITH ACUTE EXACERBATION: ICD-10-CM

## 2022-09-29 PROCEDURE — 3078F DIAST BP <80 MM HG: CPT | Performed by: NURSE PRACTITIONER

## 2022-09-29 PROCEDURE — 99214 OFFICE O/P EST MOD 30 MIN: CPT | Performed by: NURSE PRACTITIONER

## 2022-09-29 PROCEDURE — 3074F SYST BP LT 130 MM HG: CPT | Performed by: NURSE PRACTITIONER

## 2022-09-29 NOTE — PROGRESS NOTES
Assessment/Plan:    Hypothyroidism  Lab Results   Component Value Date    [id number] 0 914 09/06/2022     Continue levothyroxine 88 mcg daily  Check TSH in 6 months     Asthma  Stable, continue current regimen   Changed maintenance to Advair HFA due to coverage, albuterol PRN   Recommend PFT's     Benign essential hypertension  Within goal  Continue lisinopril 10 mg daily and was started on amlodipine 2 5 mg daily, metoprolol 25 mg bid by cardiology     Anxiety  Follows with psych   Anxiety exacerbated 2/2 23 YO son's medical and MH problems     Bridgette Núñez was seen today for follow-up  Diagnoses and all orders for this visit:    Acquired hypothyroidism  -     levothyroxine 88 mcg tablet; Take 1 tablet (88 mcg total) by mouth every morning    Mild intermittent asthma without complication    Asthmatic bronchitis  -     albuterol (PROVENTIL HFA,VENTOLIN HFA) 90 mcg/act inhaler; Inhale 2 puffs every 4 (four) hours as needed for wheezing    Moderate persistent asthma with acute exacerbation  -     cetirizine (ZyrTEC) 10 mg tablet; Take 1 tablet (10 mg total) by mouth daily  -     fluticasone (FLONASE) 50 mcg/act nasal spray; 1 spray into each nostril daily  -     fluticasone-salmeterol (Advair HFA) 115-21 MCG/ACT inhaler; Inhale 2 puffs 2 (two) times a day Rinse mouth after use  -     ipratropium-albuterol (DUO-NEB) 0 5-2 5 mg/3 mL nebulizer solution; Take 3 mL by nebulization every 6 (six) hours as needed for wheezing or shortness of breath    Vitamin D deficiency  -     cholecalciferol (VITAMIN D3) 1,000 units tablet; Take 1 tablet (1,000 Units total) by mouth daily    Acquired hypothyroidism  Comments:  TSH is 8 7  Will adjust meds to 100mcg of levothyroxine  Will repeat TSH in 4 weeks  Orders:  -     levothyroxine 88 mcg tablet; Take 1 tablet (88 mcg total) by mouth every morning    Primary hypertension  -     lisinopril (ZESTRIL) 10 mg tablet;  Take 1 tablet (10 mg total) by mouth daily    Gastroesophageal reflux disease without esophagitis  -     omeprazole (PriLOSEC) 40 MG capsule; Take 1 capsule (40 mg total) by mouth daily    Benign essential hypertension        No follow-ups on file  Subjective:     Michael Ward is a 41 y o  female who  has a past medical history of Anxiety, Asthma, Bipolar disorder ([facility] [address] ), Depression, Disease of thyroid gland, Endometriosis, Psychiatric illness, Psychosis ([facility] ), PTSD (post-traumatic stress disorder), Right carpal tunnel syndrome, Self-injurious behavior, and Suicide attempt ([facility] )  who presented to the office today for follow up       The following portions of the patient's history were reviewed and updated as appropriate: allergies, current medications, past family history, past medical history, past social history, past surgical history and problem list     Current Outpatient Medications on File Prior to Visit   Medication Sig Dispense Refill    amLODIPine (NORVASC) 2 5 mg tablet Take 1 tablet (2 5 mg total) by mouth daily 30 tablet 3    ARIPiprazole (ABILIFY) 10 mg tablet Take 10 mg by mouth daily      buPROPion (WELLBUTRIN SR) 150 mg 12 hr tablet Take 150 mg by mouth in the morning      busPIRone (BUSPAR) 15 mg tablet Take 15 mg by mouth daily as needed      busPIRone (BUSPAR) 5 mg tablet Take 5 mg by mouth 2 (two) times a day  1    clonazePAM (KlonoPIN) 0 5 mg tablet Take 0 5 mg by mouth daily at bedtime      DULoxetine (CYMBALTA) 60 mg delayed release capsule Take 60 mg by mouth daily      metoprolol tartrate (LOPRESSOR) 25 mg tablet Take 1 tablet (25 mg total) by mouth every 12 (twelve) hours 60 tablet 2    nitroglycerin (NITROSTAT) 0 4 mg SL tablet Place 1 tablet (0 4 mg total) under the tongue every 5 (five) minutes as needed for chest pain 30 tablet 0    ondansetron (ZOFRAN-ODT) 4 mg disintegrating tablet Take 1 tablet (4 mg total) by mouth every 8 (eight) hours 20 tablet 0    simethicone (MYLICON) 241 MG chewable tablet Chew 1 tablet (125 mg total) every 6 (six) hours as needed for flatulence 30 tablet 0    topiramate (TOPAMAX) 100 mg tablet 100 mg qam and 150 mg qhs 75 tablet 1    [DISCONTINUED] albuterol (PROVENTIL HFA,VENTOLIN HFA) 90 mcg/act inhaler Inhale 2 puffs every 4 (four) hours as needed for wheezing 18 g 0    [DISCONTINUED] cetirizine (ZyrTEC) 10 mg tablet Take 1 tablet (10 mg total) by mouth daily 90 tablet 0    [DISCONTINUED] cholecalciferol (VITAMIN D3) 1,000 units tablet Take 1 tablet (1,000 Units total) by mouth daily 90 tablet 3    [DISCONTINUED] fluticasone (FLONASE) 50 mcg/act nasal spray 1 spray into each nostril daily 16 g 2    [DISCONTINUED] fluticasone-salmeterol (Advair) 500-50 mcg/dose inhaler Inhale 1 puff 2 (two) times a day Rinse mouth after use  60 blister 2    [DISCONTINUED] guaiFENesin 200 MG tablet Take 2 tablets (400 mg total) by mouth every 4 (four) hours as needed for cough (Patient not taking: Reported on 9/2/2022) 30 tablet 0    [DISCONTINUED] ipratropium-albuterol (DUO-NEB) 0 5-2 5 mg/3 mL nebulizer solution Take 3 mL by nebulization every 6 (six) hours as needed for wheezing or shortness of breath 200 mL 1    [DISCONTINUED] levothyroxine 88 mcg tablet Take 1 tablet (88 mcg total) by mouth every morning 90 tablet 0    [DISCONTINUED] lisinopril (ZESTRIL) 10 mg tablet TAKE 1 TABLET BY MOUTH DAILY 30 tablet 0    [DISCONTINUED] methocarbamol (ROBAXIN) 500 mg tablet 1 tab qhs prn back pain 30 tablet 0    [DISCONTINUED] omeprazole (PriLOSEC) 40 MG capsule Take 1 capsule (40 mg total) by mouth in the morning  90 capsule 0     No current facility-administered medications on file prior to visit  Review of Systems   Constitutional: Negative for chills and fever  HENT: Negative for ear pain and sore throat  Eyes: Negative for pain and visual disturbance  Respiratory: Negative for cough and shortness of breath  Cardiovascular: Negative for chest pain and palpitations     Gastrointestinal: Negative for abdominal pain and vomiting  Genitourinary: Negative for dysuria and hematuria  Musculoskeletal: Negative for arthralgias and back pain  Skin: Negative for color change and rash  Neurological: Negative for seizures and syncope  Psychiatric/Behavioral: Negative for self-injury and sleep disturbance  The patient is nervous/anxious  All other systems reviewed and are negative  Objective:    /76 (BP Location: Left arm, Patient Position: Sitting, Cuff Size: Adult)   Pulse 75   Temp 97 7 °F (36 5 °C) (Temporal)   Resp 19   Ht 5' 2" (1 575 m)   Wt 103 kg (227 lb)   SpO2 97%   BMI 41 52 kg/m²     Physical Exam  Vitals and nursing note reviewed  Constitutional:       General: She is not in acute distress  Appearance: She is well-developed  She is not diaphoretic  HENT:      Head: Normocephalic and atraumatic  Eyes:      Conjunctiva/sclera: Conjunctivae normal       Pupils: Pupils are equal, round, and reactive to light  Cardiovascular:      Rate and Rhythm: Normal rate and regular rhythm  Heart sounds: Normal heart sounds  Pulmonary:      Effort: Pulmonary effort is normal  No respiratory distress  Breath sounds: Normal breath sounds  No wheezing  Abdominal:      General: Bowel sounds are normal  There is no distension  Palpations: Abdomen is soft  Tenderness: There is no abdominal tenderness  Musculoskeletal:         General: No deformity  Normal range of motion  Cervical back: Normal range of motion and neck supple  Lymphadenopathy:      Cervical: No cervical adenopathy  Skin:     General: Skin is warm and dry  Capillary Refill: Capillary refill takes less than 2 seconds  Neurological:      Mental Status: She is alert and oriented to person, place, and time     Psychiatric:         Behavior: Behavior normal          Mylene Sheffield  09/30/22  10:08 AM

## 2022-09-30 RX ORDER — CETIRIZINE HYDROCHLORIDE 10 MG/1
10 TABLET ORAL DAILY
Qty: 90 TABLET | Refills: 0 | Status: SHIPPED | OUTPATIENT
Start: 2022-09-30

## 2022-09-30 RX ORDER — ALBUTEROL SULFATE 90 UG/1
2 AEROSOL, METERED RESPIRATORY (INHALATION) EVERY 4 HOURS PRN
Qty: 18 G | Refills: 0 | Status: SHIPPED | OUTPATIENT
Start: 2022-09-30

## 2022-09-30 RX ORDER — OMEPRAZOLE 40 MG/1
40 CAPSULE, DELAYED RELEASE ORAL DAILY
Qty: 90 CAPSULE | Refills: 0 | Status: SHIPPED | OUTPATIENT
Start: 2022-09-30

## 2022-09-30 RX ORDER — MELATONIN
1000 DAILY
Qty: 90 TABLET | Refills: 3 | Status: SHIPPED | OUTPATIENT
Start: 2022-09-30

## 2022-09-30 RX ORDER — LISINOPRIL 10 MG/1
10 TABLET ORAL DAILY
Qty: 90 TABLET | Refills: 0 | Status: SHIPPED | OUTPATIENT
Start: 2022-09-30

## 2022-09-30 RX ORDER — IPRATROPIUM BROMIDE AND ALBUTEROL SULFATE 2.5; .5 MG/3ML; MG/3ML
3 SOLUTION RESPIRATORY (INHALATION) EVERY 6 HOURS PRN
Qty: 200 ML | Refills: 1 | Status: SHIPPED | OUTPATIENT
Start: 2022-09-30

## 2022-09-30 RX ORDER — FLUTICASONE PROPIONATE 50 MCG
1 SPRAY, SUSPENSION (ML) NASAL DAILY
Qty: 16 G | Refills: 2 | Status: SHIPPED | OUTPATIENT
Start: 2022-09-30

## 2022-09-30 RX ORDER — LEVOTHYROXINE SODIUM 88 UG/1
88 TABLET ORAL EVERY MORNING
Qty: 90 TABLET | Refills: 0 | Status: SHIPPED | OUTPATIENT
Start: 2022-09-30

## 2022-09-30 RX ORDER — FLUTICASONE PROPIONATE AND SALMETEROL XINAFOATE 115; 21 UG/1; UG/1
2 AEROSOL, METERED RESPIRATORY (INHALATION) 2 TIMES DAILY
Qty: 12 G | Refills: 2 | Status: SHIPPED | OUTPATIENT
Start: 2022-09-30

## 2022-09-30 NOTE — ASSESSMENT & PLAN NOTE
Lab Results   Component Value Date    [id number] 0 914 09/06/2022     Continue levothyroxine 88 mcg daily  Check TSH in 6 months

## 2022-09-30 NOTE — ASSESSMENT & PLAN NOTE
Within goal  Continue lisinopril 10 mg daily and was started on amlodipine 2 5 mg daily, metoprolol 25 mg bid by cardiology

## 2022-09-30 NOTE — ASSESSMENT & PLAN NOTE
Stable, continue current regimen   Changed maintenance to Advair HFA due to coverage, albuterol PRN   Recommend PFT's

## 2022-11-06 DIAGNOSIS — G43.009 MIGRAINE WITHOUT AURA AND WITHOUT STATUS MIGRAINOSUS, NOT INTRACTABLE: Primary | ICD-10-CM

## 2022-11-08 DIAGNOSIS — J45.41 MODERATE PERSISTENT ASTHMA WITH ACUTE EXACERBATION: ICD-10-CM

## 2022-11-08 DIAGNOSIS — I10 PRIMARY HYPERTENSION: ICD-10-CM

## 2022-11-09 RX ORDER — LISINOPRIL 10 MG/1
10 TABLET ORAL DAILY
Qty: 90 TABLET | Refills: 0 | Status: SHIPPED | OUTPATIENT
Start: 2022-11-09

## 2022-11-09 RX ORDER — CETIRIZINE HYDROCHLORIDE 10 MG/1
10 TABLET ORAL DAILY
Qty: 90 TABLET | Refills: 0 | Status: SHIPPED | OUTPATIENT
Start: 2022-11-09

## 2022-11-10 ENCOUNTER — OFFICE VISIT (OUTPATIENT)
Dept: NEUROLOGY | Facility: CLINIC | Age: 47
End: 2022-11-10

## 2022-11-10 VITALS
SYSTOLIC BLOOD PRESSURE: 131 MMHG | HEIGHT: 62 IN | DIASTOLIC BLOOD PRESSURE: 75 MMHG | BODY MASS INDEX: 41.59 KG/M2 | WEIGHT: 226 LBS | HEART RATE: 75 BPM

## 2022-11-10 DIAGNOSIS — M54.9 OTHER CHRONIC BACK PAIN: ICD-10-CM

## 2022-11-10 DIAGNOSIS — G89.29 OTHER CHRONIC BACK PAIN: ICD-10-CM

## 2022-11-10 DIAGNOSIS — G43.009 MIGRAINE WITHOUT AURA AND WITHOUT STATUS MIGRAINOSUS, NOT INTRACTABLE: Primary | ICD-10-CM

## 2022-11-10 RX ORDER — KETOROLAC TROMETHAMINE 30 MG/ML
60 INJECTION, SOLUTION INTRAMUSCULAR; INTRAVENOUS ONCE
Status: COMPLETED | OUTPATIENT
Start: 2022-11-10 | End: 2022-11-10

## 2022-11-10 RX ORDER — PROCHLORPERAZINE EDISYLATE 5 MG/ML
10 INJECTION INTRAMUSCULAR; INTRAVENOUS ONCE
Status: COMPLETED | OUTPATIENT
Start: 2022-11-10 | End: 2022-11-10

## 2022-11-10 RX ADMIN — KETOROLAC TROMETHAMINE 60 MG: 30 INJECTION, SOLUTION INTRAMUSCULAR; INTRAVENOUS at 16:41

## 2022-11-10 RX ADMIN — PROCHLORPERAZINE EDISYLATE 10 MG: 5 INJECTION INTRAMUSCULAR; INTRAVENOUS at 16:40

## 2022-11-10 NOTE — PATIENT INSTRUCTIONS
XRs printed, previously ordered in April by PCP  Please let me know if you are interested in muscle relaxant or PT for R sided low back pain

## 2022-11-10 NOTE — LETTER
November 10, 2022     Patient: Kristie Osborne   YOB: 1975   Date of Visit: 11/10/2022       To Whom it May Concern:    Kristie Osborne is under my professional care  She was seen in my office on 11/10/2022  She would benefit from tinted windows in her vehicle (sides and front windshield) due to photophobia associated with chronic migraine headaches  If you have any questions or concerns, please don't hesitate to call           Sincerely,          Randolph Aparicio PA-C        CC: No Recipients
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
## 2022-11-29 ENCOUNTER — OFFICE VISIT (OUTPATIENT)
Dept: FAMILY MEDICINE CLINIC | Facility: CLINIC | Age: 47
End: 2022-11-29

## 2022-11-29 VITALS
OXYGEN SATURATION: 97 % | TEMPERATURE: 97.8 F | SYSTOLIC BLOOD PRESSURE: 118 MMHG | RESPIRATION RATE: 18 BRPM | HEART RATE: 91 BPM | WEIGHT: 228.8 LBS | BODY MASS INDEX: 42.1 KG/M2 | DIASTOLIC BLOOD PRESSURE: 86 MMHG | HEIGHT: 62 IN

## 2022-11-29 DIAGNOSIS — J45.41 MODERATE PERSISTENT ASTHMA WITH ACUTE EXACERBATION: ICD-10-CM

## 2022-11-29 DIAGNOSIS — J06.9 UPPER RESPIRATORY TRACT INFECTION, UNSPECIFIED TYPE: ICD-10-CM

## 2022-11-29 RX ORDER — FEXOFENADINE HCL 180 MG/1
180 TABLET ORAL DAILY
Qty: 90 TABLET | Refills: 0 | Status: SHIPPED | OUTPATIENT
Start: 2022-11-29

## 2022-11-29 RX ORDER — PREDNISONE 20 MG/1
60 TABLET ORAL DAILY
Qty: 15 TABLET | Refills: 0 | Status: SHIPPED | OUTPATIENT
Start: 2022-11-29 | End: 2022-12-04

## 2022-11-29 RX ORDER — FLUTICASONE PROPIONATE 50 MCG
1 SPRAY, SUSPENSION (ML) NASAL DAILY
Qty: 16 G | Refills: 2 | Status: SHIPPED | OUTPATIENT
Start: 2022-11-29

## 2022-11-29 RX ORDER — ALBUTEROL SULFATE 90 UG/1
2 AEROSOL, METERED RESPIRATORY (INHALATION) EVERY 4 HOURS PRN
Qty: 18 G | Refills: 0 | Status: SHIPPED | OUTPATIENT
Start: 2022-11-29

## 2022-11-29 NOTE — PATIENT INSTRUCTIONS
Use salt water gargles 4 times per day, take Tylenol and ibuprofen, use humidifier, keep using [employer], drink LOTS of water and hot tea with honey   Cold Symptoms   AMBULATORY CARE:   Cold symptoms  include sneezing, dry throat, a stuffy nose, headache, watery eyes, and a cough  Your cough may be dry, or you may cough up mucus  You may also have muscle aches, joint pain, and tiredness  Rarely, you may have a fever  Cold symptoms occur from inflammation in your upper respiratory system caused by a virus  Most colds go away without treatment  Seek care immediately if:   You have increased tiredness and weakness  You are unable to eat  Your heart is beating much faster than usual for you  You see white spots in the back of your throat and your neck is swollen and sore to the touch  You see pinpoint or larger reddish-purple dots on your skin  Contact your healthcare provider if:   You have a fever higher than 102°F (38 9°C)  You have new or worsening shortness of breath  You have thick nasal drainage for more than 2 days  Your symptoms do not improve or get worse within 5 days  You have questions or concerns about your condition or care  Treatment for cold symptoms  may include NSAIDS to decrease muscle aches and fever  Cold medicines may also be given to decrease coughing, nasal stuffiness, sneezing, and a runny nose  Manage your cold symptoms: The following may help relieve cold symptoms, such as a dry throat and congestion:  Gargle with mouthwash or warm salt water as directed  Suck on throat lozenges or hard candy  Use a cold or warm vaporizer or humidifier to ease your breathing  Rest for at least 2 days and then as needed to decrease tiredness and weakness  Use petroleum based jelly around your nostrils to decrease irritation from blowing your nose  Drink plenty of liquids  Liquids will help thin and loosen thick mucus so you can cough it up   Liquids will also keep you hydrated  Ask your healthcare provider which liquids are best for you and how much to drink each day  Prevent the spread of germs  by washing your hands often  You can spread your cold germs to others for at least 3 days after your symptoms start  Do not share items, such as eating utensils  Cover your nose and mouth when you cough or sneeze using the crook of your elbow instead of your hands  Throw used tissues in the garbage  Do not smoke:  Smoking may worsen your symptoms and increase the length of time you feel sick  Talk with your healthcare provider if you need help to stop smoking  Follow up with your doctor as directed:  Write down your questions so you remember to ask them during your visits  © Copyright [employer] 2022 Information is for End User's use only and may not be sold, redistributed or otherwise used for commercial purposes  All illustrations and images included in CareNotes® are the copyrighted property of [employer]  or [employer]   The above information is an  only  It is not intended as medical advice for individual conditions or treatments  Talk to your doctor, nurse or pharmacist before following any medical regimen to see if it is safe and effective for you

## 2022-11-29 NOTE — PROGRESS NOTES
COVID-19 Outpatient Progress Note    Assessment/Plan:    Problem List Items Addressed This Visit        Respiratory    Asthma    Relevant Medications    predniSONE 20 mg tablet    albuterol (PROVENTIL HFA,VENTOLIN HFA) 90 mcg/act inhaler    fluticasone (FLONASE) 50 mcg/act nasal spray    fexofenadine (ALLEGRA) 180 MG tablet   Other Visit Diagnoses     Upper respiratory tract infection, unspecified type        Relevant Medications    fluticasone (FLONASE) 50 mcg/act nasal spray    fexofenadine (ALLEGRA) 180 MG tablet         Disposition:     After clarifying the patient's history, my suspicion for COVID-19 infection is very low  Likely viral URI, quite possibly RSV given grand daughter is +  Discussed supportive treatment and will treat for asthma exacerbation, ED parameters reviewed     I have spent 20 minutes directly with the patient  Greater than 50% of this time was spent in counseling/coordination of care regarding: instructions for management, patient and family education and impressions  Encounter provider: JUSTO Zamudio     Provider located at: [address] [address] [phone]     Recent Visits  Date Type Provider Dept   11/29/22 Office Visit Ja Zamudio 48 recent visits within past 7 days and meeting all other requirements  Future Appointments  No visits were found meeting these conditions  Showing future appointments within next 150 days and meeting all other requirements     Subjective:   Stephenie Reyes is a 41 y o  female who is concerned about COVID-19  Patient's symptoms include chills, nasal congestion, cough, shortness of breath and chest tightness  Patient denies fever, fatigue, malaise, rhinorrhea, sore throat, anosmia, loss of taste, abdominal pain, nausea, vomiting, diarrhea, myalgias and headaches           COVID-19 vaccination status: Not vaccinated    Lab Results Component Value Date    SARSCOV2 Positive (A) 01/07/2022    SARSCOV2 Detected (A) 04/22/2020       Review of Systems   Constitutional: Positive for chills  Negative for fatigue and fever  HENT: Positive for congestion  Negative for rhinorrhea and sore throat  Respiratory: Positive for cough, chest tightness and shortness of breath  Gastrointestinal: Negative for abdominal pain, diarrhea, nausea and vomiting  Musculoskeletal: Negative for myalgias  Neurological: Negative for headaches  Current Outpatient Medications on File Prior to Visit   Medication Sig   • amLODIPine (NORVASC) 2 5 mg tablet Take 1 tablet (2 5 mg total) by mouth daily   • ARIPiprazole (ABILIFY) 10 mg tablet Take 10 mg by mouth daily   • buPROPion (WELLBUTRIN SR) 150 mg 12 hr tablet Take 150 mg by mouth in the morning   • busPIRone (BUSPAR) 15 mg tablet Take 15 mg by mouth daily as needed   • busPIRone (BUSPAR) 5 mg tablet Take 5 mg by mouth 2 (two) times a day   • cholecalciferol (VITAMIN D3) 1,000 units tablet Take 1 tablet (1,000 Units total) by mouth daily   • clonazePAM (KlonoPIN) 0 5 mg tablet Take 0 5 mg by mouth daily at bedtime   • DULoxetine (CYMBALTA) 60 mg delayed release capsule Take 60 mg by mouth daily   • fluticasone-salmeterol (Advair HFA) 115-21 MCG/ACT inhaler Inhale 2 puffs 2 (two) times a day Rinse mouth after use     • ipratropium-albuterol (DUO-NEB) 0 5-2 5 mg/3 mL nebulizer solution Take 3 mL by nebulization every 6 (six) hours as needed for wheezing or shortness of breath   • levothyroxine 88 mcg tablet Take 1 tablet (88 mcg total) by mouth every morning   • lisinopril (ZESTRIL) 10 mg tablet Take 1 tablet (10 mg total) by mouth daily   • metoprolol tartrate (LOPRESSOR) 25 mg tablet Take 1 tablet (25 mg total) by mouth every 12 (twelve) hours   • nitroglycerin (NITROSTAT) 0 4 mg SL tablet Place 1 tablet (0 4 mg total) under the tongue every 5 (five) minutes as needed for chest pain   • omeprazole (PriLOSEC) 40 MG capsule Take 1 capsule (40 mg total) by mouth daily   • ondansetron (ZOFRAN-ODT) 4 mg disintegrating tablet Take 1 tablet (4 mg total) by mouth every 8 (eight) hours   • simethicone (MYLICON) 910 MG chewable tablet Chew 1 tablet (125 mg total) every 6 (six) hours as needed for flatulence   • topiramate (TOPAMAX) 100 mg tablet 100 mg qam and 150 mg qhs       Objective:    /86 (BP Location: Left arm, Patient Position: Sitting, Cuff Size: Large)   Pulse 91   Temp 97 8 °F (36 6 °C) (Temporal)   Resp 18   Ht 5' 2" (1 575 m)   Wt 104 kg (228 lb 12 8 oz)   SpO2 97%   BMI 41 85 kg/m²      Physical Exam  Vitals and nursing note reviewed  Constitutional:       General: She is not in acute distress  Appearance: She is well-developed  HENT:      Head: Normocephalic and atraumatic  Right Ear: Tympanic membrane and external ear normal       Left Ear: Tympanic membrane and external ear normal       Nose: Congestion present  Eyes:      Conjunctiva/sclera: Conjunctivae normal       Pupils: Pupils are equal, round, and reactive to light  Cardiovascular:      Rate and Rhythm: Normal rate and regular rhythm  Heart sounds: No murmur heard  Pulmonary:      Effort: Pulmonary effort is normal  No respiratory distress  Breath sounds: Wheezing present  Abdominal:      Palpations: Abdomen is soft  Tenderness: There is no abdominal tenderness  Musculoskeletal:      Cervical back: Neck supple  Right lower leg: No edema  Left lower leg: No edema  Lymphadenopathy:      Cervical: No cervical adenopathy  Skin:     General: Skin is warm and dry  Capillary Refill: Capillary refill takes less than 2 seconds  Neurological:      Mental Status: She is alert and oriented to person, place, and time     Psychiatric:         Behavior: Behavior normal        JUSTO Ta

## 2022-12-12 ENCOUNTER — OFFICE VISIT (OUTPATIENT)
Dept: CARDIOLOGY CLINIC | Facility: CLINIC | Age: 47
End: 2022-12-12

## 2022-12-12 VITALS
SYSTOLIC BLOOD PRESSURE: 132 MMHG | HEART RATE: 68 BPM | HEIGHT: 62 IN | BODY MASS INDEX: 41.99 KG/M2 | DIASTOLIC BLOOD PRESSURE: 72 MMHG | WEIGHT: 228.2 LBS

## 2022-12-12 DIAGNOSIS — R07.2 PRECORDIAL PAIN: ICD-10-CM

## 2022-12-12 DIAGNOSIS — I10 BENIGN ESSENTIAL HYPERTENSION: Primary | ICD-10-CM

## 2022-12-12 NOTE — PATIENT INSTRUCTIONS
You were seen today in the Cardiology office for follow up evaluation of chest pain  Please continue your current cardiac medications as prescribed  Thank you for choosing [address]  Please call our office or use [employer] with any questions

## 2022-12-12 NOTE — PROGRESS NOTES
[facility]    Shanel Cervantes   DOS: 12/12/2022     Chief Complaint:   Chief Complaint   Patient presents with   • Follow-up     No complaints  HISTORY OF PRESENT ILLNESS:    HPI:  Andie Brewster is a 41 y o  female  She  has a past medical history of Anxiety, Asthma, Bipolar disorder ([facility] [address] ), Depression, Disease of thyroid gland, Endometriosis, Psychiatric illness, Psychosis ([facility] [address] ), PTSD (post-traumatic stress disorder) (6/6/2017), Right carpal tunnel syndrome (12/26/2018), Self-injurious behavior, and Suicide attempt ([facility] [address] )  She presents for follow-up evaluation  Last saw me in the office on 9/2/2022 for evaluation of left-sided chest pressure and left-sided shoulder pain in the setting of equivocal exercise stress ECG test   At that time, I had referred the patient for an exercise stress echo to increase the sensitivity and specificity of her test   Her exercise stress echo was within normal limits, however the patient did have reproduction of symptoms  Today, she continues to experience similar complaints which she describes as intermittent chest discomfort that occurs both at rest and with exertion  The discomfort is similar to what she experienced on the treadmill during her stress ECHO test, but of lower severity  The pain is generally self-limiting after about 15-20 minutes, however the patient does report an episode recently that persisted  During that episode, she took 1 sublingual nitroglycerin which completely resolved the pain  She has had no further recurrence of pain since that time  She denies exertional dyspnea, diaphoresis, dizziness, palpitations, edema, orthopnea, syncope        She reports a fair amount of stress recently, stating that her 54-year-old son recently "flatlined" in the [facility] in [place] requiring resuscitation and a significant cardiac work-up with an outpatient cardiologist   She states that she is unsure about the exact cause of what happened, but was told that her son had a heart attack  Currently denies any fever, chills, fatigue, new visual changes, lightheadedness, syncope, chest pain, palpitations, shortness of breath at rest or with exertion, orthopnea, PND, nausea, vomiting, diarrhea, dark or bright red blood in stools, lower extremity swelling, leg claudication  ROS    ROS: Pertinent positives and negatives as described in History of Present Illness  Remainder of a 14 point review of systems was negative  Allergies   Allergen Reactions   • Aleve [Naproxen] Shortness Of Breath   • Benzonatate Swelling     Throat closing   • Guaifenesin Other (See Comments)     Note - patient states she IS able to take robitussin  • Latuda [Lurasidone] Swelling      stuttering    • Imitrex [Sumatriptan]      Patient reports that this medication "makes me sick"          Current Outpatient Medications on File Prior to Visit   Medication Sig Dispense Refill   • albuterol (PROVENTIL HFA,VENTOLIN HFA) 90 mcg/act inhaler Inhale 2 puffs every 4 (four) hours as needed for wheezing 18 g 0   • amLODIPine (NORVASC) 2 5 mg tablet Take 1 tablet (2 5 mg total) by mouth daily 30 tablet 3   • ARIPiprazole (ABILIFY) 10 mg tablet Take 10 mg by mouth daily     • buPROPion (WELLBUTRIN SR) 150 mg 12 hr tablet Take 150 mg by mouth in the morning     • busPIRone (BUSPAR) 15 mg tablet Take 15 mg by mouth daily as needed     • busPIRone (BUSPAR) 5 mg tablet Take 5 mg by mouth 2 (two) times a day  1   • cholecalciferol (VITAMIN D3) 1,000 units tablet Take 1 tablet (1,000 Units total) by mouth daily 90 tablet 3   • clonazePAM (KlonoPIN) 0 5 mg tablet Take 0 5 mg by mouth daily at bedtime     • DULoxetine (CYMBALTA) 60 mg delayed release capsule Take 60 mg by mouth daily     • fexofenadine (ALLEGRA) 180 MG tablet Take 1 tablet (180 mg total) by mouth daily 90 tablet 0   • fluticasone (FLONASE) 50 mcg/act nasal spray 1 spray into each nostril daily 16 g 2   • fluticasone-salmeterol (Advair HFA) 115-21 MCG/ACT inhaler Inhale 2 puffs 2 (two) times a day Rinse mouth after use  12 g 2   • ipratropium-albuterol (DUO-NEB) 0 5-2 5 mg/3 mL nebulizer solution Take 3 mL by nebulization every 6 (six) hours as needed for wheezing or shortness of breath 200 mL 1   • levothyroxine 88 mcg tablet Take 1 tablet (88 mcg total) by mouth every morning 90 tablet 0   • lisinopril (ZESTRIL) 10 mg tablet Take 1 tablet (10 mg total) by mouth daily 90 tablet 0   • metoprolol tartrate (LOPRESSOR) 25 mg tablet Take 1 tablet (25 mg total) by mouth every 12 (twelve) hours 60 tablet 2   • nitroglycerin (NITROSTAT) 0 4 mg SL tablet Place 1 tablet (0 4 mg total) under the tongue every 5 (five) minutes as needed for chest pain 30 tablet 0   • omeprazole (PriLOSEC) 40 MG capsule Take 1 capsule (40 mg total) by mouth daily 90 capsule 0   • ondansetron (ZOFRAN-ODT) 4 mg disintegrating tablet Take 1 tablet (4 mg total) by mouth every 8 (eight) hours 20 tablet 0   • simethicone (MYLICON) 313 MG chewable tablet Chew 1 tablet (125 mg total) every 6 (six) hours as needed for flatulence 30 tablet 0   • topiramate (TOPAMAX) 100 mg tablet 100 mg qam and 150 mg qhs 75 tablet 1     No current facility-administered medications on file prior to visit         Past Medical History:   Diagnosis Date   • Anxiety    • Asthma    • Bipolar disorder ([facility] [address] )    • Depression    • Disease of thyroid gland    • Endometriosis    • Psychiatric illness    • Psychosis ([facility] [address] )    • PTSD (post-traumatic stress disorder) 6/6/2017   • Right carpal tunnel syndrome 12/26/2018   • Self-injurious behavior    • Suicide attempt [facility])        Past Surgical History:   Procedure Laterality Date   • ABDOMINAL SURGERY     • HERNIA REPAIR     • LAPAROSCOPIC TUBAL LIGATION         Family History   Problem Relation Age of Onset   • Hypertension Mother    • Asthma Mother    • Fibromyalgia Mother    • Osteoporosis Mother    • No Known Problems Father    • No Known Problems Sister    • No Known Problems Daughter    • No Known Problems Maternal Grandmother    • No Known Problems Maternal Grandfather    • No Known Problems Paternal Grandmother    • No Known Problems Paternal Grandfather    • Thyroid disease Brother    • Breast cancer Maternal Aunt    • Breast cancer Maternal Aunt        Social History     Socioeconomic History   • Marital status: Single     Spouse name: Not on file   • Number of children: Not on file   • Years of education: Not on file   • Highest education level: Not on file   Occupational History   • Not on file   Tobacco Use   • Smoking status: Former     Types: Cigarettes     Quit date: 2021     Years since quittin 6   • Smokeless tobacco: Never   Vaping Use   • Vaping Use: Never used   Substance and Sexual Activity   • Alcohol use: No   • Drug use: No   • Sexual activity: Not on file   Other Topics Concern   • Not on file   Social History Narrative    Flu shot:no     Social Determinants of Health     Financial Resource Strain: Low Risk    • Difficulty of Paying Living Expenses: Not hard at all   Food Insecurity: No Food Insecurity   • Worried About Running Out of Food in the Last Year: Never true   • Ran Out of Food in the Last Year: Never true   Transportation Needs: No Transportation Needs   • Lack of Transportation (Medical): No   • Lack of Transportation (Non-Medical):  No   Physical Activity: Not on file   Stress: Not on file   Social Connections: Not on file   Intimate Partner Violence: Not on file   Housing Stability: Not on file       OBJECTIVE:    /72 (BP Location: Right arm, Patient Position: Sitting, Cuff Size: Large)   Pulse 68   Ht 5' 2" (1 575 m)   Wt 104 kg (228 lb 3 2 oz)   BMI 41 74 kg/m²      BP Readings from Last 3 Encounters:   22 132/72   22 118/86   11/10/22 131/75       Wt Readings from Last 3 Encounters:   22 104 kg (228 lb 3 2 oz)   22 104 kg (228 lb 12 8 oz)   11/10/22 103 kg (226 lb) Physical Exam  Vitals reviewed  Constitutional:       General: She is not in acute distress  Appearance: Normal appearance  She is obese  She is not diaphoretic  HENT:      Head: Normocephalic and atraumatic  Eyes:      Conjunctiva/sclera: Conjunctivae normal    Neck:      Vascular: No carotid bruit or JVD  Cardiovascular:      Rate and Rhythm: Normal rate and regular rhythm  Pulses: Normal pulses  Heart sounds: Normal heart sounds  No murmur heard  No friction rub  No gallop  Pulmonary:      Effort: Pulmonary effort is normal       Breath sounds: Normal breath sounds  No wheezing, rhonchi or rales  Abdominal:      General: Abdomen is flat  Bowel sounds are normal  There is no distension  Palpations: Abdomen is soft  Musculoskeletal:      Right lower leg: No edema  Left lower leg: No edema  Skin:     General: Skin is warm and dry  Neurological:      General: No focal deficit present  Mental Status: She is alert and oriented to person, place, and time  Psychiatric:         Mood and Affect: Mood normal          Behavior: Behavior normal                                                            LABS:  Lab Results   Component Value Date    BUN 14 03/21/2022    CREATININE 1 03 03/21/2022    CALCIUM 9 4 03/21/2022    K 3 7 03/21/2022    CO2 25 03/21/2022     03/21/2022    ALKPHOS 86 03/21/2022    AST 17 03/21/2022    ALT 31 03/21/2022        Lab Results   Component Value Date    WBC 6 98 03/21/2022    HGB 13 2 03/21/2022    HCT 40 7 03/21/2022    MCV 88 03/21/2022     03/21/2022       Lab Results   Component Value Date    HDL 47 (L) 09/06/2022    LDLCALC 134 (H) 09/06/2022    TRIG 88 09/06/2022       Lab Results   Component Value Date    HGBA1C 5 3 03/21/2022       No results found for: TSH        ASSESSMENT/PLAN:  Diagnoses and all orders for this visit:    Benign essential hypertension  Advised patient to continue checking blood pressure at home  Continue amlodipine 2 5 mg once daily, continue metoprolol tartrate 25 mg twice daily  Continue lisinopril 10 mg once daily   -     CTA cardiac; Future  -     Basic metabolic panel; Future    Precordial pain  Most recent exercise stress echo was negative for ischemia by both ECG and echo criteria  I personally reviewed the echo images with the patient in office today, noting preserved wall motion in all segments with good endocardial border definition and the post-rest images  The patient expressed significant emotional concern regarding her symptoms, particularly given the recent events that took place with her son having a cardiac arrest   She is looking for a definitive diagnosis, and we discussed the role for invasive coronary angiography versus CT guided coronary angiography  I have recommended a CTA coronary arteries for further anatomical evaluation  If her CTA is normal, it is unlikely that she has flow-limiting coronary disease  Consult patient strictly on ER evaluation for any episode of chest pain that requires greater than 1 nitroglycerin to improve and/or any change in severity/intensity/associated features of her pain  -     CTA cardiac; Future  -     Basic metabolic panel;  Future                Fatimah Penn MD

## 2023-01-17 DIAGNOSIS — E03.9 ACQUIRED HYPOTHYROIDISM: ICD-10-CM

## 2023-01-17 DIAGNOSIS — K21.9 GASTROESOPHAGEAL REFLUX DISEASE WITHOUT ESOPHAGITIS: ICD-10-CM

## 2023-01-17 RX ORDER — OMEPRAZOLE 40 MG/1
CAPSULE, DELAYED RELEASE ORAL
Qty: 30 CAPSULE | Refills: 0 | Status: SHIPPED | OUTPATIENT
Start: 2023-01-17

## 2023-01-17 RX ORDER — LEVOTHYROXINE SODIUM 88 UG/1
TABLET ORAL
Qty: 90 TABLET | Refills: 0 | Status: SHIPPED | OUTPATIENT
Start: 2023-01-17

## 2023-01-25 NOTE — NURSING NOTE
Cardiac CTA Questionairre      Cardiac history     Reason for exam: Stable angina, equivocal stress test      Have you been diagnosed with heart disease? YES - per pt " I was told I have a clogged artery"      Do you have a family history of heart disease? YES - per pt Mother suffers from palpitations     Have you ever experienced chest pain? YES     Have you had a CABG, angioplasty, cardiac cath, stent placement? NO     Do you have a pacemaker or defibrillator? NO     Have you ever been diagnosed with an irregular heart beat? NO     Do you have a history of having COPD or asthma? YES Asthma     Is your asthma controlled? YES hasn't used inhalers in a "long while"    Do you have high blood pressure? YES     Do you have diabetes? NO     Do you have high cholesterol? NO     Do you smoke? NO    Did you ever smoke? YES, Quit 1 5 yrs ago smoked for 7 yrs     General Information     Do you take any male enhancement medications such as Viagra, Levitra, or Cialis (PDE5 inhibitors) NO       Due to nitrate given during test, this needs to be held for 3 days prior to testing and may be            resumed 24 hrs  Do you have allergies? Naproxen, Benzonatate, Guafensien  Allergy to intravenous contrast or dye? NO     Do you have kidney disease? NO                               Blood work done:  Pt aware that it is needed, order in Epic              BUN:      CREATINE:         GFR:      Height: 5'2"                         Weight: 228     Call placed to patient to discuss upcoming appointment at [place] [address] radiology department and complete consultation and Questionnaire with patient via phone  Patient is having a Cardiac CTA  Reviewed patient's allergies, also reviewed medications  No Beta blocker ordered by Cardiologist   Test instructions given, patient aware to hold all caffeine products for 12 hours prior (0130)to the exam this includes coffee, decaf, tea, soda and chocolate    Also made aware to avoid solid food for 4 hours prior (0930) to exam and to increase fluids one day prior to exam unless contraindicated  Patient was instructed that they may take all medications as usual unless otherwise directed by physician  Patient instructed to not use inhalers prior to exam, if uses may need to reschedule the study to another day  Discussed the pre procedure expectations, post procedure expectations, time entailed to perform the study and also the medications that may be used in exam ( beta blocker if needed to  heart rate to under 65 beats per minute for optimal exam results and NTG given during scan for vasodilation)  Reminded patient of location and time expected for procedure, instructed to bring photo ID, insurance card and script  Informed the patient that the study will last approximately 30-45 minutes  Pt verbalizes understanding of education and instructions  My number was also supplied to patient to call if any further questions or concerns should arise pre or post procedure  Instructions also sent via e-mail ([email or web address]) to verified address see message below  Good Afternoon Ms Rita Mark are scheduled on 23 @ 1:30 pm  for a CTA Cardiac in the Radiology department of the [facility] is located at [address] [place] [address], building [address] 1st floor and present to Radiology 15 minutes prior to your appointment time  Blood work is needed prior to your scheduled appointment  Please do this days prior to the visit, it can take hours to get the results and might delay or cancel your scan  The order for the needed blood work is in our system, your MD has requested that you fast at least 8 hours prior to obtaining your blood work  Please go to any [facility]’s lab and they will happily provide this service  Please avoid all caffeine products for 12 hours prior to the exam this includes coffee, decaf, tea, soda, or chocolate   This will be in effect from 23  at 1:30 am till your appointment time  You are also to stop solid food 4 hours prior to the exam at 9:30 am but you can continue to drink fluids (excluding coffee, decaf, tea, soda, or chocolate) prior to the exam, you can increase fluids starting the day prior  You can take all your regular prescribed medication as usual unless otherwise directed by your physician  Please do not use your inhaler medication prior to the exam, if used you may need to reschedule your test      During the study a certain medication may be given such as a beta blocker if needed to  heart rate to under 65 beats per minute for optimal exam results  A sublingual tablet of nitroglycerin will be given during scan for vasodilation, prior to contrast administration and final scan  If the heart rate is not able to achieve target (55-60 beats per minute) study may be cancelled after discussing with Radiologist  At a higher heart rate study may not be useful or viable  The study will last approximately 30-45 minutes  If you have any question or concerns please feel free to contact me at the number below,   Juma Duncan RN  [facility] RN  [address] [place] [address]  [phone] (Office)  [phone] (Fax)  [email or web address]

## 2023-01-31 ENCOUNTER — ANNUAL EXAM (OUTPATIENT)
Dept: GYNECOLOGY | Facility: CLINIC | Age: 48
End: 2023-01-31

## 2023-01-31 ENCOUNTER — APPOINTMENT (OUTPATIENT)
Dept: LAB | Facility: CLINIC | Age: 48
End: 2023-01-31

## 2023-01-31 VITALS
HEIGHT: 62 IN | DIASTOLIC BLOOD PRESSURE: 78 MMHG | BODY MASS INDEX: 41.37 KG/M2 | WEIGHT: 224.8 LBS | SYSTOLIC BLOOD PRESSURE: 124 MMHG

## 2023-01-31 DIAGNOSIS — R23.2 HOT FLASHES: ICD-10-CM

## 2023-01-31 DIAGNOSIS — Z01.419 ENCOUNTER FOR ANNUAL ROUTINE GYNECOLOGICAL EXAMINATION: Primary | ICD-10-CM

## 2023-01-31 DIAGNOSIS — Z12.11 COLON CANCER SCREENING: ICD-10-CM

## 2023-01-31 DIAGNOSIS — Z12.31 ENCOUNTER FOR SCREENING MAMMOGRAM FOR BREAST CANCER: ICD-10-CM

## 2023-01-31 DIAGNOSIS — I10 BENIGN ESSENTIAL HYPERTENSION: ICD-10-CM

## 2023-01-31 DIAGNOSIS — R07.2 PRECORDIAL PAIN: ICD-10-CM

## 2023-01-31 LAB
ANION GAP SERPL CALCULATED.3IONS-SCNC: 3 MMOL/L (ref 4–13)
BUN SERPL-MCNC: 15 MG/DL (ref 5–25)
CALCIUM SERPL-MCNC: 9.1 MG/DL (ref 8.3–10.1)
CHLORIDE SERPL-SCNC: 111 MMOL/L (ref 96–108)
CO2 SERPL-SCNC: 27 MMOL/L (ref 21–32)
CREAT SERPL-MCNC: 1.09 MG/DL (ref 0.6–1.3)
GFR SERPL CREATININE-BSD FRML MDRD: 61 ML/MIN/1.73SQ M
GLUCOSE P FAST SERPL-MCNC: 80 MG/DL (ref 65–99)
POTASSIUM SERPL-SCNC: 4 MMOL/L (ref 3.5–5.3)
SODIUM SERPL-SCNC: 141 MMOL/L (ref 135–147)

## 2023-01-31 NOTE — PROGRESS NOTES
Assessment/Plan:    pap is up to date    mammogram reviewed with her including breast density  RX given for August  Discussed self breast exams    colon cancer screening -discussed the new guidelines for colon cancer screening  Her brother has had polyps  I recommended that colonoscopy would be preferred screening for her  Referral placed    Hot flashes -we discussed avoiding triggers  We also discussed conservative measures for hot flashes and sleep  She is most likely perimenopausal   It is difficult to tell because she does not have any bleeding  We discussed exercise as well    discussed preventive care, regular exercise and a healthy diet      No problem-specific Assessment & Plan notes found for this encounter  Diagnoses and all orders for this visit:    Encounter for annual routine gynecological examination    Encounter for screening mammogram for breast cancer  -     Mammo screening bilateral w 3d & cad; Future    Hot flashes    Colon cancer screening  -     Ambulatory Referral to Gastroenterology; Future          Subjective:      Patient ID: Justin Bledsoe is a 41 y o  female  Patient here for yearly  She gets some hot flashes and mood swings, she does not have any bleeding since her ablation  She is unsure if the symptoms are cyclical   She is undergoing a cardiovascular workup  Normal Pap, negative HPV, negative chlamydia and gonorrhea 01/28/2022  3D mammogram in August showed scattered fibroglandular densities and average risk, she was recalled for an mass on the left however this appeared to be a small benign cyst and she can return to routine screening    S/p ablation and tubal 10 years ago      The following portions of the patient's history were reviewed and updated as appropriate: allergies, current medications, past family history, past medical history, past social history, past surgical history and problem list     Review of Systems   Constitutional: Positive for fatigue  Gastrointestinal: Negative  Endocrine: Positive for heat intolerance  Genitourinary: Negative  Psychiatric/Behavioral: Positive for sleep disturbance  Objective: There were no vitals taken for this visit  Physical Exam  Vitals reviewed  Constitutional:       Appearance: She is well-developed  Neck:      Thyroid: No thyromegaly  Trachea: No tracheal deviation  Cardiovascular:      Rate and Rhythm: Normal rate and regular rhythm  Pulmonary:      Effort: Pulmonary effort is normal       Breath sounds: Normal breath sounds  Chest:   Breasts:     Breasts are symmetrical       Right: No inverted nipple, mass, nipple discharge, skin change or tenderness  Left: No inverted nipple, mass, nipple discharge, skin change or tenderness  Abdominal:      General: There is no distension  Palpations: Abdomen is soft  There is no mass  Tenderness: There is no abdominal tenderness  Genitourinary:     Labia:         Right: No rash, tenderness, lesion or injury  Left: No rash, tenderness, lesion or injury  Vagina: Normal       Cervix: No cervical motion tenderness, discharge or friability  Adnexa:         Right: No mass, tenderness or fullness  Left: No mass, tenderness or fullness          Rectum: Normal

## 2023-02-02 ENCOUNTER — HOSPITAL ENCOUNTER (OUTPATIENT)
Dept: RADIOLOGY | Facility: HOSPITAL | Age: 48
Discharge: HOME | End: 2023-02-02
Attending: INTERNAL MEDICINE

## 2023-02-02 VITALS
DIASTOLIC BLOOD PRESSURE: 73 MMHG | RESPIRATION RATE: 20 BRPM | HEART RATE: 68 BPM | SYSTOLIC BLOOD PRESSURE: 128 MMHG | OXYGEN SATURATION: 98 %

## 2023-02-02 DIAGNOSIS — R07.2 PRECORDIAL PAIN: ICD-10-CM

## 2023-02-02 DIAGNOSIS — I10 BENIGN ESSENTIAL HYPERTENSION: ICD-10-CM

## 2023-02-02 RX ORDER — NITROGLYCERIN 0.4 MG/1
0.4 TABLET SUBLINGUAL
Status: COMPLETED | OUTPATIENT
Start: 2023-02-02 | End: 2023-02-02

## 2023-02-02 RX ORDER — IODIXANOL 320 MG/ML
100 INJECTION, SOLUTION INTRAVASCULAR
Status: COMPLETED | OUTPATIENT
Start: 2023-02-02 | End: 2023-02-02

## 2023-02-02 RX ORDER — METOPROLOL TARTRATE 5 MG/5ML
5 INJECTION INTRAVENOUS
Status: DISCONTINUED | OUTPATIENT
Start: 2023-02-02 | End: 2023-02-03 | Stop reason: HOSPADM

## 2023-02-02 RX ADMIN — METOROPROLOL TARTRATE 5 MG: 5 INJECTION, SOLUTION INTRAVENOUS at 13:48

## 2023-02-02 RX ADMIN — IODIXANOL 100 ML: 320 INJECTION, SOLUTION INTRAVASCULAR at 14:10

## 2023-02-02 RX ADMIN — NITROGLYCERIN 0.4 MG: 0.4 TABLET SUBLINGUAL at 13:55

## 2023-02-23 DIAGNOSIS — R94.39 EQUIVOCAL STRESS TEST: ICD-10-CM

## 2023-02-23 DIAGNOSIS — R07.9 CHEST PAIN, UNSPECIFIED TYPE: ICD-10-CM

## 2023-02-23 DIAGNOSIS — K21.9 GASTROESOPHAGEAL REFLUX DISEASE WITHOUT ESOPHAGITIS: ICD-10-CM

## 2023-02-23 DIAGNOSIS — G43.009 MIGRAINE WITHOUT AURA AND WITHOUT STATUS MIGRAINOSUS, NOT INTRACTABLE: ICD-10-CM

## 2023-02-23 RX ORDER — OMEPRAZOLE 40 MG/1
40 CAPSULE, DELAYED RELEASE ORAL DAILY
Qty: 30 CAPSULE | Refills: 0 | Status: SHIPPED | OUTPATIENT
Start: 2023-02-23

## 2023-02-23 RX ORDER — ONDANSETRON 4 MG/1
4 TABLET, ORALLY DISINTEGRATING ORAL EVERY 8 HOURS SCHEDULED
Qty: 20 TABLET | Refills: 0 | Status: SHIPPED | OUTPATIENT
Start: 2023-02-23

## 2023-02-23 RX ORDER — NITROGLYCERIN 0.4 MG/1
0.4 TABLET SUBLINGUAL
Qty: 30 TABLET | Refills: 0 | Status: SHIPPED | OUTPATIENT
Start: 2023-02-23

## 2023-03-10 DIAGNOSIS — I10 PRIMARY HYPERTENSION: ICD-10-CM

## 2023-03-13 RX ORDER — LISINOPRIL 10 MG/1
TABLET ORAL
Qty: 90 TABLET | Refills: 0 | Status: SHIPPED | OUTPATIENT
Start: 2023-03-13 | End: 2023-03-22 | Stop reason: SDUPTHER

## 2023-03-22 ENCOUNTER — OFFICE VISIT (OUTPATIENT)
Dept: CARDIOLOGY CLINIC | Facility: CLINIC | Age: 48
End: 2023-03-22

## 2023-03-22 VITALS
BODY MASS INDEX: 40.41 KG/M2 | WEIGHT: 219.6 LBS | HEIGHT: 62 IN | DIASTOLIC BLOOD PRESSURE: 90 MMHG | HEART RATE: 64 BPM | SYSTOLIC BLOOD PRESSURE: 132 MMHG

## 2023-03-22 DIAGNOSIS — R07.2 PRECORDIAL PAIN: ICD-10-CM

## 2023-03-22 DIAGNOSIS — G43.009 MIGRAINE WITHOUT AURA AND WITHOUT STATUS MIGRAINOSUS, NOT INTRACTABLE: ICD-10-CM

## 2023-03-22 DIAGNOSIS — K21.9 GASTROESOPHAGEAL REFLUX DISEASE, UNSPECIFIED WHETHER ESOPHAGITIS PRESENT: ICD-10-CM

## 2023-03-22 DIAGNOSIS — I10 PRIMARY HYPERTENSION: ICD-10-CM

## 2023-03-22 DIAGNOSIS — I10 BENIGN ESSENTIAL HYPERTENSION: Primary | ICD-10-CM

## 2023-03-22 RX ORDER — LISINOPRIL 10 MG/1
10 TABLET ORAL DAILY
Qty: 90 TABLET | Refills: 3 | Status: SHIPPED | OUTPATIENT
Start: 2023-03-22

## 2023-03-22 NOTE — PATIENT INSTRUCTIONS
You were seen today in the Cardiology office for follow up evaluation  Please continue your current cardiac medications as prescribed  Thank you for choosing [facility]  Please call our office or use [employer] with any questions

## 2023-03-22 NOTE — PROGRESS NOTES
[facility]    Gerhardt Caper   DOS: 3/22/2023     Chief Complaint:   Chief Complaint   Patient presents with   • Follow-up   • Chest Pain   • Shortness of Breath     some       HISTORY OF PRESENT ILLNESS:    HPI:  Sb Lopez is a 41 y o  female  She  has a past medical history of Anxiety, Asthma, Bipolar disorder ([facility] [address] ), Depression, Disease of thyroid gland, Endometriosis, Psychiatric illness, Psychosis ([facility] [address] ), PTSD (post-traumatic stress disorder) (6/6/2017), Right carpal tunnel syndrome (12/26/2018), Self-injurious behavior, and Suicide attempt ([facility] [address] )  She presents for follow up evaluation  Last saw me on the office on 12/12/22  My per OV note at that time:  Last saw me in the office on 9/2/2022 for evaluation of left-sided chest pressure and left-sided shoulder pain in the setting of equivocal exercise stress ECG test   At that time, I had referred the patient for an exercise stress echo to increase the sensitivity and specificity of her test   Her exercise stress echo was within normal limits, however the patient did have reproduction of symptoms  Today, she continues to experience similar complaints which she describes as intermittent chest discomfort that occurs both at rest and with exertion  The discomfort is similar to what she experienced on the treadmill during her stress ECHO test, but of lower severity  The pain is generally self-limiting after about 15-20 minutes, however the patient does report an episode recently that persisted  During that episode, she took 1 sublingual nitroglycerin which completely resolved the pain  She has had no further recurrence of pain since that time  She denies exertional dyspnea, diaphoresis, dizziness, palpitations, edema, orthopnea, syncope  Based upon her persistent complaints, I had referred her for a CTA to exclude underlying obstructive coronary disease   Her CTA demonstrated a calcium score of 0, with no occlusive coronary disease noted  The patient presently has no active acute cardiopulmonary complaints, but does continue to report exertional dyspnea and episodic right sided chest discomfort that occurs predominantly now at rest  These episodes are sometimes related to meals, sometimes related to stress/anxiety  There is presently no clear exertional component as there has been in the past  She otherwise denies diaphoresis, dizziness, palpitations, orthopnea, PND, edema, syncope  ROS    ROS: Pertinent positives and negatives as described in History of Present Illness  Remainder of a 14 point review of systems was negative  Allergies   Allergen Reactions   • Aleve [Naproxen] Shortness Of Breath   • Benzonatate Swelling     Throat closing   • Guaifenesin Other (See Comments)     Note - patient states she IS able to take robitussin  • Latuda [Lurasidone] Swelling      stuttering    • Imitrex [Sumatriptan]      Patient reports that this medication "makes me sick"          Current Outpatient Medications on File Prior to Visit   Medication Sig Dispense Refill   • albuterol (PROVENTIL HFA,VENTOLIN HFA) 90 mcg/act inhaler Inhale 2 puffs every 4 (four) hours as needed for wheezing 18 g 0   • amLODIPine (NORVASC) 2 5 mg tablet Take 1 tablet (2 5 mg total) by mouth daily 30 tablet 3   • ARIPiprazole (ABILIFY) 10 mg tablet Take 10 mg by mouth daily     • buPROPion (WELLBUTRIN SR) 150 mg 12 hr tablet Take 150 mg by mouth in the morning     • busPIRone (BUSPAR) 15 mg tablet Take 15 mg by mouth daily as needed     • busPIRone (BUSPAR) 5 mg tablet Take 5 mg by mouth 2 (two) times a day  1   • cholecalciferol (VITAMIN D3) 1,000 units tablet Take 1 tablet (1,000 Units total) by mouth daily 90 tablet 3   • clonazePAM (KlonoPIN) 0 5 mg tablet Take 0 5 mg by mouth daily at bedtime     • DULoxetine (CYMBALTA) 60 mg delayed release capsule Take 60 mg by mouth daily     • fluticasone (FLONASE) 50 mcg/act nasal spray 1 spray into each nostril daily 16 g 2   • fluticasone-salmeterol (Advair HFA) 115-21 MCG/ACT inhaler Inhale 2 puffs 2 (two) times a day Rinse mouth after use  12 g 2   • ipratropium-albuterol (DUO-NEB) 0 5-2 5 mg/3 mL nebulizer solution Take 3 mL by nebulization every 6 (six) hours as needed for wheezing or shortness of breath 200 mL 1   • levothyroxine 88 mcg tablet TAKE ONE TABLET BY MOUTH EVERY MORNING 90 tablet 0   • lisinopril (ZESTRIL) 10 mg tablet TAKE ONE TABLET BY MOUTH EVERY DAY  90 tablet 0   • metoprolol tartrate (LOPRESSOR) 25 mg tablet Take 1 tablet (25 mg total) by mouth every 12 (twelve) hours 60 tablet 2   • nitroglycerin (NITROSTAT) 0 4 mg SL tablet Place 1 tablet (0 4 mg total) under the tongue every 5 (five) minutes as needed for chest pain 30 tablet 0   • omeprazole (PriLOSEC) 40 MG capsule Take 1 capsule (40 mg total) by mouth daily 30 capsule 0   • ondansetron (ZOFRAN-ODT) 4 mg disintegrating tablet Take 1 tablet (4 mg total) by mouth every 8 (eight) hours 20 tablet 0   • simethicone (MYLICON) 716 MG chewable tablet Chew 1 tablet (125 mg total) every 6 (six) hours as needed for flatulence 30 tablet 0   • topiramate (TOPAMAX) 100 mg tablet 100 mg qam and 150 mg qhs 75 tablet 1   • fexofenadine (ALLEGRA) 180 MG tablet Take 1 tablet (180 mg total) by mouth daily (Patient not taking: Reported on 3/22/2023) 90 tablet 0     No current facility-administered medications on file prior to visit         Past Medical History:   Diagnosis Date   • Anxiety    • Asthma    • Bipolar disorder ([facility] [address] )    • Depression    • Disease of thyroid gland    • Endometriosis    • Psychiatric illness    • Psychosis ([facility] [address] )    • PTSD (post-traumatic stress disorder) 6/6/2017   • Right carpal tunnel syndrome 12/26/2018   • Self-injurious behavior    • Suicide attempt [facility])        Past Surgical History:   Procedure Laterality Date   • ABDOMINAL SURGERY     • HERNIA REPAIR     • LAPAROSCOPIC TUBAL LIGATION         Family History   Problem Relation Age of Onset   • Hypertension Mother    • Asthma Mother    • Fibromyalgia Mother    • Osteoporosis Mother    • No Known Problems Father    • No Known Problems Sister    • No Known Problems Daughter    • No Known Problems Maternal Grandmother    • No Known Problems Maternal Grandfather    • No Known Problems Paternal Grandmother    • No Known Problems Paternal Grandfather    • Thyroid disease Brother    • Breast cancer Maternal Aunt    • Breast cancer Maternal Aunt        Social History     Socioeconomic History   • Marital status: Single     Spouse name: Not on file   • Number of children: Not on file   • Years of education: Not on file   • Highest education level: Not on file   Occupational History   • Not on file   Tobacco Use   • Smoking status: Former     Types: Cigarettes     Quit date: 2021     Years since quittin 9   • Smokeless tobacco: Never   Vaping Use   • Vaping Use: Never used   Substance and Sexual Activity   • Alcohol use: No   • Drug use: No   • Sexual activity: Not on file   Other Topics Concern   • Not on file   Social History Narrative    Flu shot:no     Social Determinants of Health     Financial Resource Strain: Not on file   Food Insecurity: Not on file   Transportation Needs: Not on file   Physical Activity: Not on file   Stress: Not on file   Social Connections: Not on file   Intimate Partner Violence: Not on file   Housing Stability: Not on file       OBJECTIVE:    /90 (BP Location: Left arm, Patient Position: Sitting, Cuff Size: Large)   Pulse 64   Ht 5' 2" (1 575 m)   Wt 99 6 kg (219 lb 9 6 oz)   BMI 40 17 kg/m²      BP Readings from Last 3 Encounters:   23 132/90   23 128/73   23 124/78       Wt Readings from Last 3 Encounters:   23 99 6 kg (219 lb 9 6 oz)   23 102 kg (224 lb 12 8 oz)   22 104 kg (228 lb 3 2 oz)         Physical Exam  Vitals reviewed  Constitutional:       General: She is not in acute distress       Appearance: Normal appearance  She is obese  She is not diaphoretic  HENT:      Head: Normocephalic and atraumatic  Eyes:      Conjunctiva/sclera: Conjunctivae normal    Neck:      Vascular: No carotid bruit or JVD  Cardiovascular:      Rate and Rhythm: Normal rate and regular rhythm  Pulses: Normal pulses  Heart sounds: Normal heart sounds  No murmur heard  No friction rub  No gallop  Pulmonary:      Effort: Pulmonary effort is normal       Breath sounds: Normal breath sounds  No wheezing, rhonchi or rales  Abdominal:      General: Abdomen is flat  Bowel sounds are normal  There is no distension  Palpations: Abdomen is soft  Musculoskeletal:      Right lower leg: No edema  Left lower leg: No edema  Skin:     General: Skin is warm and dry  Neurological:      General: No focal deficit present  Mental Status: She is alert and oriented to person, place, and time     Psychiatric:         Mood and Affect: Mood normal          Behavior: Behavior normal                                                        Cardiac testing:   EKG reviewed personally as performed in office today: Normal sinus rhythm        LABS:  Lab Results   Component Value Date    BUN 15 01/31/2023    CREATININE 1 09 01/31/2023    CALCIUM 9 1 01/31/2023    K 4 0 01/31/2023    CO2 27 01/31/2023     (H) 01/31/2023    ALKPHOS 86 03/21/2022    AST 17 03/21/2022    ALT 31 03/21/2022        Lab Results   Component Value Date    WBC 6 98 03/21/2022    HGB 13 2 03/21/2022    HCT 40 7 03/21/2022    MCV 88 03/21/2022     03/21/2022       Lab Results   Component Value Date    HDL 47 (L) 09/06/2022    LDLCALC 134 (H) 09/06/2022    TRIG 88 09/06/2022       Lab Results   Component Value Date    HGBA1C 5 3 03/21/2022       No results found for: TSH        ASSESSMENT/PLAN:  Diagnoses and all orders for this visit:    Benign essential hypertension  Primary hypertension  BP Readings from Last 3 Encounters:   03/22/23 132/90   02/02/23 128/73   01/31/23 124/78   - Continue Lisinipril 10mg QD  Stable  -     POCT ECG  -     lisinopril (ZESTRIL) 10 mg tablet; Take 1 tablet (10 mg total) by mouth daily    Gastroesophageal reflux disease, unspecified whether esophagitis present  Maintained on Omeprazole 40mg QD with full compliance  Unclear if she would benefit from switching to another PPI  -     Ambulatory referral to Gastroenterology; Future    Precordial pain  CTA reviewed in office with the patient  Reassuring, with no coronary calcium and no diagnostic evidence of flow limiting coronary disease  I have recommended to the patient that she see a gastroenterologist to discuss the clinical utility of an EGD due to persistent symptoms, known history of GERD, relation of symptoms to food, and lack of an identified cardiac etiology  If a complete GI workup is unrevealing for a cause of her symptoms, the next step would be to trial medical therapy for microvascular angina although this diagnosis is fairly unlikely in a young female patient at this age                 Radha Ramires MD

## 2023-03-24 NOTE — TELEPHONE ENCOUNTER
Patient of Ayleen Alarcon, lat visit 11/20/2022  Due for refill; please sign script if in agreement, thank you

## 2023-03-27 RX ORDER — TOPIRAMATE 100 MG/1
TABLET, FILM COATED ORAL
Qty: 75 TABLET | Refills: 0 | Status: SHIPPED | OUTPATIENT
Start: 2023-03-27

## 2023-03-28 ENCOUNTER — CONSULT (OUTPATIENT)
Dept: GASTROENTEROLOGY | Facility: MEDICAL CENTER | Age: 48
End: 2023-03-28

## 2023-03-28 VITALS
OXYGEN SATURATION: 98 % | WEIGHT: 218 LBS | BODY MASS INDEX: 40.12 KG/M2 | DIASTOLIC BLOOD PRESSURE: 74 MMHG | HEIGHT: 62 IN | SYSTOLIC BLOOD PRESSURE: 116 MMHG | HEART RATE: 84 BPM

## 2023-03-28 DIAGNOSIS — Z12.11 COLON CANCER SCREENING: ICD-10-CM

## 2023-03-28 DIAGNOSIS — R10.11 RUQ PAIN: Primary | ICD-10-CM

## 2023-03-28 DIAGNOSIS — K52.9 CHRONIC DIARRHEA: ICD-10-CM

## 2023-03-28 DIAGNOSIS — K21.9 GASTROESOPHAGEAL REFLUX DISEASE, UNSPECIFIED WHETHER ESOPHAGITIS PRESENT: ICD-10-CM

## 2023-03-28 RX ORDER — DICYCLOMINE HCL 20 MG
20 TABLET ORAL EVERY 6 HOURS
Qty: 120 TABLET | Refills: 1 | Status: SHIPPED | OUTPATIENT
Start: 2023-03-28

## 2023-03-28 NOTE — PATIENT INSTRUCTIONS
Scheduled date of colon/egd (as of today) 7/20/23  Physician performing: Dr Deloris Campos  Location of procedure:  [facility]  Instructions given to patient:  miralax/dulcolax  Clearances: na

## 2023-03-28 NOTE — PROGRESS NOTES
[facility] - Outpatient Consultation  Mirta Whalen 41 y o  female MRN: [id number]  Encounter: [id number]          ASSESSMENT AND PLAN:  81-year-old female with history of hypothyroidism, hypertension, migraine headaches, bipolar disorder who presents for evaluation  1  RUQ pain  2  Gastroesophageal reflux disease, unspecified whether esophagitis present  The reports history of right upper quadrant epigastric abdominal pain for some time  Prior abdominal ultrasound was normal   She has been trialed on daily PPI, omeprazole 40 mg for some time with no improvement of her symptoms  We discussed etiology of her symptoms may be related to intestinal spasm, peptic ulcer disease, biliary dyskinesia  Recommend EGD be performed for evaluation of mucosal abnormality that could be contributing to her symptoms and a biopsy for H  pylori  HIDA scan is ordered for evaluation of biliary dyskinesia  She will also trial Bentyl up to 4 times a day as needed for abdominal pain    - dicyclomine (BENTYL) 20 mg tablet; Take 1 tablet (20 mg total) by mouth every 6 (six) hours As needed for abdominal pain  Dispense: 120 tablet; Refill: 1  - NM hepatobiliary w rx; Future  - EGD; Future        3  Colon cancer screening  He has no prior colonoscopy and is due at this time given her age 41  She is average risk  I discussed indication, risk and benefit of colonoscopy for colon cancer screening with her and she is agreeable to proceed  - Colonoscopy; Future    4  Chronic diarrhea  She has intermittent loose stools  Etiology includes infectious, inflammatory, functional or malabsorptive causes  I have ordered stool and serologic testing for further work-up  Biopsies will be obtained at the time of her colonoscopy for evaluation of microscopic colitis as well  - Calprotectin,Fecal; Future  - Giardia antigen; Future  - Tissue transglutaminase, IgA; Future  - IgA;  Future    Follow-up after the above work-up and procedures are complete    ______________________________________________________________________    HPI: 72-year-old female with history of hypothyroidism, hypertension, migraine headaches, bipolar disorder who presents for evaluation  She reports months of right upper quadrant and epigastric discomfort  She also has chest pressure during the episodes  The chest pressure is more constant to the right upper quadrant and epigastric abdominal pain occurs intermittently  There are certain foods that can make it worse like caffeine and greasy food  She reports nausea and vomiting at times  She denies frequent NSAID use  Appetite is good  She reports bowel movements that can be intermittently loose however nonbloody  She does report regular bowel movements in between the episodes  She has not tried any antidiarrhea medications    She reports family history of colon cancer in a maternal grandfather  She takes no antiplatelet or anticoagulant medications    9/2022 right upper quadrant ultrasound is normal  3/2022 hemoglobin 13, liver enzymes within normal limits        REVIEW OF SYSTEMS:    CONSTITUTIONAL: Denies any fever, chills, rigors, and weight loss  HEENT: No earache or tinnitus  Denies hearing loss or visual disturbances  CARDIOVASCULAR: No chest pain or palpitations  RESPIRATORY: Denies any cough, hemoptysis, shortness of breath or dyspnea on exertion  GASTROINTESTINAL: As noted in the History of Present Illness  GENITOURINARY: No problems with urination  Denies any hematuria or dysuria  NEUROLOGIC: No dizziness or vertigo, denies headaches  MUSCULOSKELETAL: Denies any muscle or joint pain  SKIN: Denies skin rashes or itching  ENDOCRINE: Denies excessive thirst  Denies intolerance to heat or cold  PSYCHOSOCIAL: Denies depression or anxiety  Denies any recent memory loss         Historical Information   Past Medical History:   Diagnosis Date   • Anxiety    • Asthma    • Bipolar disorder ([facility] )    • Depression    • Disease of thyroid gland    • Endometriosis    • Psychiatric illness    • Psychosis ([place] [address] )    • PTSD (post-traumatic stress disorder) 2017   • Right carpal tunnel syndrome 2018   • Self-injurious behavior    • Suicide attempt [facility])      Past Surgical History:   Procedure Laterality Date   • ABDOMINAL SURGERY     • HERNIA REPAIR     • LAPAROSCOPIC TUBAL LIGATION       Social History   Social History     Substance and Sexual Activity   Alcohol Use No     Social History     Substance and Sexual Activity   Drug Use No     Social History     Tobacco Use   Smoking Status Former   • Types: Cigarettes   • Quit date: 2021   • Years since quittin 9   Smokeless Tobacco Never     Family History   Problem Relation Age of Onset   • Hypertension Mother    • Asthma Mother    • Fibromyalgia Mother    • Osteoporosis Mother    • No Known Problems Father    • No Known Problems Sister    • No Known Problems Daughter    • No Known Problems Maternal Grandmother    • No Known Problems Maternal Grandfather    • No Known Problems Paternal Grandmother    • No Known Problems Paternal Grandfather    • Thyroid disease Brother    • Breast cancer Maternal Aunt    • Breast cancer Maternal Aunt        Meds/Allergies       Current Outpatient Medications:   •  albuterol (PROVENTIL HFA,VENTOLIN HFA) 90 mcg/act inhaler  •  amLODIPine (NORVASC) 2 5 mg tablet  •  ARIPiprazole (ABILIFY) 10 mg tablet  •  buPROPion (WELLBUTRIN SR) 150 mg 12 hr tablet  •  busPIRone (BUSPAR) 15 mg tablet  •  busPIRone (BUSPAR) 5 mg tablet  •  cholecalciferol (VITAMIN D3) 1,000 units tablet  •  clonazePAM (KlonoPIN) 0 5 mg tablet  •  DULoxetine (CYMBALTA) 60 mg delayed release capsule  •  fluticasone (FLONASE) 50 mcg/act nasal spray  •  fluticasone-salmeterol (Advair HFA) 115-21 MCG/ACT inhaler  •  ipratropium-albuterol (DUO-NEB) 0 5-2 5 mg/3 mL nebulizer solution  •  levothyroxine 88 mcg tablet  •  lisinopril (ZESTRIL) 10 mg "tablet  •  nitroglycerin (NITROSTAT) 0 4 mg SL tablet  •  omeprazole (PriLOSEC) 40 MG capsule  •  ondansetron (ZOFRAN-ODT) 4 mg disintegrating tablet  •  simethicone (MYLICON) 873 MG chewable tablet  •  topiramate (TOPAMAX) 100 mg tablet  •  fexofenadine (ALLEGRA) 180 MG tablet    Allergies   Allergen Reactions   • Aleve [Naproxen] Shortness Of Breath   • Benzonatate Swelling     Throat closing   • Guaifenesin Other (See Comments)     Note - patient states she IS able to take robitussin  • Latuda [Lurasidone] Swelling      stuttering    • Imitrex [Sumatriptan]      Patient reports that this medication \"makes me sick\"  Objective     Blood pressure 116/74, pulse 84, height 5' 2\" (1 575 m), weight 98 9 kg (218 lb), SpO2 98 %  There is no height or weight on file to calculate BMI  PHYSICAL EXAM:      General Appearance:   Alert, cooperative, no distress   HEENT:   Normocephalic, atraumatic, anicteric  Neck:  Supple, symmetrical, trachea midline   Lungs:   Clear to auscultation bilaterally; no rales, rhonchi or wheezing; respirations unlabored    Heart[de-identified]   Regular rate and rhythm; no murmur, rub, or gallop  Abdomen:   Soft, right upper quadrant abdominal tenderness to deep palpation without rebound or guarding non-distended; normal bowel sounds; no masses, no organomegaly    Genitalia:   Deferred    Rectal:   Deferred    Extremities:  No cyanosis, clubbing or edema    Pulses:  2+ and symmetric    Skin:  No jaundice, rashes, or lesions    Lymph nodes:  No palpable cervical lymphadenopathy        Lab Results:   No visits with results within 1 Day(s) from this visit     Latest known visit with results is:   Appointment on 01/31/2023   Component Date Value   • Sodium 01/31/2023 141    • Potassium 01/31/2023 4 0    • Chloride 01/31/2023 111 (H)    • CO2 01/31/2023 27    • ANION GAP 01/31/2023 3 (L)    • BUN 01/31/2023 15    • Creatinine 01/31/2023 1 09    • Glucose, Fasting 01/31/2023 80    • Calcium " 01/31/2023 9 1    • eGFR 01/31/2023 61          Radiology Results:   No results found

## 2023-03-29 DIAGNOSIS — J45.41 MODERATE PERSISTENT ASTHMA WITH ACUTE EXACERBATION: ICD-10-CM

## 2023-03-30 RX ORDER — ALBUTEROL SULFATE 90 UG/1
AEROSOL, METERED RESPIRATORY (INHALATION)
Qty: 18 G | Refills: 0 | Status: SHIPPED | OUTPATIENT
Start: 2023-03-30

## 2023-04-04 DIAGNOSIS — G43.009 MIGRAINE WITHOUT AURA AND WITHOUT STATUS MIGRAINOSUS, NOT INTRACTABLE: ICD-10-CM

## 2023-04-05 RX ORDER — ONDANSETRON 4 MG/1
TABLET, ORALLY DISINTEGRATING ORAL
Qty: 20 TABLET | Refills: 0 | OUTPATIENT
Start: 2023-04-05

## 2023-04-13 ENCOUNTER — TELEPHONE (OUTPATIENT)
Dept: FAMILY MEDICINE CLINIC | Facility: CLINIC | Age: 48
End: 2023-04-13

## 2023-04-13 NOTE — TELEPHONE ENCOUNTER
Pt again requesting zofran refill  Denied by PCP back on 3/26 encounter so when she requested it again on 4/4, I declined      Please advise if pt needs an appt for her request

## 2023-04-18 NOTE — ASSESSMENT & PLAN NOTE
Currently managed with albuterol prn  Short acting beta agonist use for symptom control 1-2 times per week  Symptoms are currently controlled at this time  Avoid exposure to tobacco smoke, polluted air and other known asthma triggers  Monitor albuterol use to report back at follow up  Patient aware that increased albuterol use indicates poor control and may need further medication adjustment  Patient/Caregiver provided printed discharge information.

## 2023-04-28 DIAGNOSIS — K21.9 GASTROESOPHAGEAL REFLUX DISEASE WITHOUT ESOPHAGITIS: ICD-10-CM

## 2023-04-28 DIAGNOSIS — E03.9 ACQUIRED HYPOTHYROIDISM: ICD-10-CM

## 2023-04-28 RX ORDER — LEVOTHYROXINE SODIUM 88 UG/1
88 TABLET ORAL DAILY
Qty: 90 TABLET | Refills: 0 | Status: SHIPPED | OUTPATIENT
Start: 2023-04-28

## 2023-04-28 RX ORDER — OMEPRAZOLE 40 MG/1
40 CAPSULE, DELAYED RELEASE ORAL DAILY
Qty: 30 CAPSULE | Refills: 0 | Status: SHIPPED | OUTPATIENT
Start: 2023-04-28

## 2023-05-31 DIAGNOSIS — G43.009 MIGRAINE WITHOUT AURA AND WITHOUT STATUS MIGRAINOSUS, NOT INTRACTABLE: ICD-10-CM

## 2023-06-08 RX ORDER — TOPIRAMATE 100 MG/1
TABLET, FILM COATED ORAL
Qty: 75 TABLET | Refills: 2 | Status: SHIPPED | OUTPATIENT
Start: 2023-06-08

## 2023-06-13 DIAGNOSIS — G43.009 MIGRAINE WITHOUT AURA AND WITHOUT STATUS MIGRAINOSUS, NOT INTRACTABLE: ICD-10-CM

## 2023-06-15 RX ORDER — ONDANSETRON 4 MG/1
TABLET, ORALLY DISINTEGRATING ORAL
Qty: 20 TABLET | Refills: 0 | Status: SHIPPED | OUTPATIENT
Start: 2023-06-15

## 2023-06-16 DIAGNOSIS — K21.9 GASTROESOPHAGEAL REFLUX DISEASE WITHOUT ESOPHAGITIS: ICD-10-CM

## 2023-06-19 RX ORDER — OMEPRAZOLE 40 MG/1
CAPSULE, DELAYED RELEASE ORAL
Qty: 30 CAPSULE | Refills: 0 | Status: SHIPPED | OUTPATIENT
Start: 2023-06-19

## 2023-06-30 ENCOUNTER — TELEPHONE (OUTPATIENT)
Dept: GASTROENTEROLOGY | Facility: MEDICAL CENTER | Age: 48
End: 2023-06-30

## 2023-07-06 NOTE — TELEPHONE ENCOUNTER
Spoke with patient to confirm 7/20 procedure, informed that bowel prep instructions sent via [employer] and will need a .

## 2023-07-14 DIAGNOSIS — E03.9 ACQUIRED HYPOTHYROIDISM: ICD-10-CM

## 2023-07-16 DIAGNOSIS — G43.009 MIGRAINE WITHOUT AURA AND WITHOUT STATUS MIGRAINOSUS, NOT INTRACTABLE: ICD-10-CM

## 2023-07-16 RX ORDER — TOPIRAMATE 100 MG/1
TABLET, FILM COATED ORAL
Qty: 75 TABLET | Refills: 0 | Status: CANCELLED | OUTPATIENT
Start: 2023-07-16

## 2023-07-17 RX ORDER — LEVOTHYROXINE SODIUM 88 UG/1
TABLET ORAL
Qty: 90 TABLET | Refills: 0 | Status: SHIPPED | OUTPATIENT
Start: 2023-07-17

## 2023-08-01 ENCOUNTER — TELEPHONE (OUTPATIENT)
Dept: GASTROENTEROLOGY | Facility: MEDICAL CENTER | Age: 48
End: 2023-08-01

## 2023-08-01 NOTE — TELEPHONE ENCOUNTER
I called and scheduled patient off of cancellation list. Agreeable to 8/31/23 at [address] [facility]. Instructions sent in mail per patient request and confirmed address with her.

## 2023-08-08 DIAGNOSIS — G43.009 MIGRAINE WITHOUT AURA AND WITHOUT STATUS MIGRAINOSUS, NOT INTRACTABLE: ICD-10-CM

## 2023-08-11 RX ORDER — ONDANSETRON 4 MG/1
TABLET, ORALLY DISINTEGRATING ORAL
Qty: 20 TABLET | Refills: 0 | Status: SHIPPED | OUTPATIENT
Start: 2023-08-11

## 2023-08-21 ENCOUNTER — TELEPHONE (OUTPATIENT)
Dept: GASTROENTEROLOGY | Facility: CLINIC | Age: 48
End: 2023-08-21

## 2023-08-21 NOTE — TELEPHONE ENCOUNTER
Confirming Upcoming Procedure: colon/egd on 8/31  Physician performing: Dr. García Presley   Location of procedure:    Prep: Miralax

## 2023-08-24 DIAGNOSIS — Z12.11 COLON CANCER SCREENING: Primary | ICD-10-CM

## 2023-08-25 RX ORDER — BISACODYL 5 MG/1
TABLET, DELAYED RELEASE ORAL
Qty: 2 TABLET | Refills: 0 | Status: SHIPPED | OUTPATIENT
Start: 2023-08-25

## 2023-08-25 RX ORDER — POLYETHYLENE GLYCOL 3350 17 G/17G
POWDER, FOR SOLUTION ORAL
Qty: 238 G | Refills: 0 | Status: SHIPPED | OUTPATIENT
Start: 2023-08-25

## 2023-08-31 ENCOUNTER — ANESTHESIA EVENT (OUTPATIENT)
Dept: ANESTHESIOLOGY | Facility: HOSPITAL | Age: 48
End: 2023-08-31

## 2023-08-31 ENCOUNTER — ANESTHESIA (OUTPATIENT)
Dept: ANESTHESIOLOGY | Facility: HOSPITAL | Age: 48
End: 2023-08-31

## 2023-08-31 RX ORDER — SODIUM CHLORIDE, SODIUM LACTATE, POTASSIUM CHLORIDE, CALCIUM CHLORIDE 600; 310; 30; 20 MG/100ML; MG/100ML; MG/100ML; MG/100ML
125 INJECTION, SOLUTION INTRAVENOUS CONTINUOUS
OUTPATIENT
Start: 2023-08-31

## 2023-09-11 ENCOUNTER — PATIENT MESSAGE (OUTPATIENT)
Dept: NEUROLOGY | Facility: CLINIC | Age: 48
End: 2023-09-11

## 2023-09-11 DIAGNOSIS — G43.009 MIGRAINE WITHOUT AURA AND WITHOUT STATUS MIGRAINOSUS, NOT INTRACTABLE: ICD-10-CM

## 2023-09-12 NOTE — PATIENT COMMUNICATION
Pt. confused and restless, then in rapid atrial fibrillation Refill entered for 100mg in am and 150mg at hs  Please sign off

## 2023-09-13 RX ORDER — TOPIRAMATE 100 MG/1
TABLET, FILM COATED ORAL
Qty: 75 TABLET | Refills: 2 | Status: SHIPPED | OUTPATIENT
Start: 2023-09-13

## 2023-09-18 ENCOUNTER — VBI (OUTPATIENT)
Dept: ADMINISTRATIVE | Facility: OTHER | Age: 48
End: 2023-09-18

## 2023-10-02 ENCOUNTER — HOSPITAL ENCOUNTER (EMERGENCY)
Facility: HOSPITAL | Age: 48
Discharge: HOME | End: 2023-10-03
Attending: EMERGENCY MEDICINE
Payer: COMMERCIAL

## 2023-10-02 VITALS
OXYGEN SATURATION: 99 % | HEART RATE: 66 BPM | TEMPERATURE: 98.3 F | RESPIRATION RATE: 18 BRPM | SYSTOLIC BLOOD PRESSURE: 130 MMHG | DIASTOLIC BLOOD PRESSURE: 75 MMHG

## 2023-10-02 DIAGNOSIS — G43.909 MIGRAINE HEADACHE: Primary | ICD-10-CM

## 2023-10-02 PROCEDURE — 96375 TX/PRO/DX INJ NEW DRUG ADDON: CPT

## 2023-10-02 PROCEDURE — 96365 THER/PROPH/DIAG IV INF INIT: CPT

## 2023-10-02 PROCEDURE — 96366 THER/PROPH/DIAG IV INF ADDON: CPT

## 2023-10-02 PROCEDURE — 99282 EMERGENCY DEPT VISIT SF MDM: CPT

## 2023-10-02 RX ORDER — METOCLOPRAMIDE HYDROCHLORIDE 5 MG/ML
10 INJECTION INTRAMUSCULAR; INTRAVENOUS EVERY 8 HOURS SCHEDULED
Status: DISCONTINUED | OUTPATIENT
Start: 2023-10-02 | End: 2023-10-03 | Stop reason: HOSPADM

## 2023-10-02 RX ORDER — DIPHENHYDRAMINE HYDROCHLORIDE 50 MG/ML
25 INJECTION INTRAMUSCULAR; INTRAVENOUS EVERY 8 HOURS SCHEDULED
Status: DISCONTINUED | OUTPATIENT
Start: 2023-10-02 | End: 2023-10-02

## 2023-10-02 RX ORDER — SODIUM CHLORIDE 9 MG/ML
100 INJECTION, SOLUTION INTRAVENOUS ONCE
Status: COMPLETED | OUTPATIENT
Start: 2023-10-02 | End: 2023-10-03

## 2023-10-02 RX ORDER — METOCLOPRAMIDE HYDROCHLORIDE 5 MG/ML
10 INJECTION INTRAMUSCULAR; INTRAVENOUS EVERY 8 HOURS SCHEDULED
Status: DISCONTINUED | OUTPATIENT
Start: 2023-10-02 | End: 2023-10-02

## 2023-10-02 RX ORDER — BUTALBITAL, ACETAMINOPHEN AND CAFFEINE 50; 325; 40 MG/1; MG/1; MG/1
1 TABLET ORAL ONCE
Status: COMPLETED | OUTPATIENT
Start: 2023-10-02 | End: 2023-10-02

## 2023-10-02 RX ORDER — MAGNESIUM SULFATE HEPTAHYDRATE 40 MG/ML
2 INJECTION, SOLUTION INTRAVENOUS
Status: DISCONTINUED | OUTPATIENT
Start: 2023-10-03 | End: 2023-10-02

## 2023-10-02 RX ORDER — MAGNESIUM SULFATE HEPTAHYDRATE 40 MG/ML
2 INJECTION, SOLUTION INTRAVENOUS
Status: DISCONTINUED | OUTPATIENT
Start: 2023-10-02 | End: 2023-10-03 | Stop reason: HOSPADM

## 2023-10-02 RX ORDER — DIPHENHYDRAMINE HYDROCHLORIDE 50 MG/ML
25 INJECTION INTRAMUSCULAR; INTRAVENOUS EVERY 8 HOURS SCHEDULED
Status: DISCONTINUED | OUTPATIENT
Start: 2023-10-02 | End: 2023-10-03 | Stop reason: HOSPADM

## 2023-10-02 RX ORDER — CIPROFLOXACIN HYDROCHLORIDE 3.5 MG/ML
1 SOLUTION/ DROPS TOPICAL
Status: DISCONTINUED | OUTPATIENT
Start: 2023-10-02 | End: 2023-10-02

## 2023-10-02 RX ADMIN — SODIUM CHLORIDE 100 ML/HR: 0.9 INJECTION, SOLUTION INTRAVENOUS at 21:17

## 2023-10-02 RX ADMIN — MAGNESIUM SULFATE HEPTAHYDRATE 2 G: 2 INJECTION, SOLUTION INTRAVENOUS at 21:29

## 2023-10-02 RX ADMIN — METOCLOPRAMIDE 10 MG: 5 INJECTION, SOLUTION INTRAMUSCULAR; INTRAVENOUS at 21:23

## 2023-10-02 RX ADMIN — BUTALBITAL, ACETAMINOPHEN, AND CAFFEINE 1 TABLET: 50; 325; 40 TABLET ORAL at 21:18

## 2023-10-02 RX ADMIN — DIPHENHYDRAMINE HYDROCHLORIDE 25 MG: 50 INJECTION, SOLUTION INTRAMUSCULAR; INTRAVENOUS at 21:20

## 2023-10-02 NOTE — Clinical Note
Toño Childress was seen and treated in our emergency department on 10/2/2023. No restrictions            Diagnosis:     Nika  . She may return on this date: 10/03/2023         If you have any questions or concerns, please don't hesitate to call.       Ewa Aranda PA-C    ______________________________           _______________          _______________  Hospital Representative                              Date                                Time

## 2023-10-03 PROCEDURE — 96366 THER/PROPH/DIAG IV INF ADDON: CPT

## 2023-10-03 PROCEDURE — 99284 EMERGENCY DEPT VISIT MOD MDM: CPT

## 2023-10-03 NOTE — ED PROVIDER NOTES
History  Chief Complaint   Patient presents with   • Headache     Pt states they get migraines and it start about an hour ago. Pt states "they usually just give me medications and I go home." C/o nausea and photosensitivity. Patient is a 57year old female coming in for evaluation of a migraine that started earlier today. Is on Topamax for prophylaxis. Feels like her normal migraines, is complaining of slight nausea, and photosensitivity. No other distress at this time, or other symptoms      Headache  Duration:  1 day  Associated symptoms: nausea and photophobia    Associated symptoms: no abdominal pain, no blurred vision, no fatigue, no fever, no numbness, no swollen glands and no vomiting        Prior to Admission Medications   Prescriptions Last Dose Informant Patient Reported? Taking?    ARIPiprazole (ABILIFY) 10 mg tablet  Self Yes No   Sig: Take 10 mg by mouth daily   DULoxetine (CYMBALTA) 60 mg delayed release capsule  Self Yes No   Sig: Take 60 mg by mouth daily   albuterol (PROVENTIL HFA,VENTOLIN HFA) 90 mcg/act inhaler   No No   Sig: INHALE 2 PUFFS EVERY 4 HOURS AS NEEDED FOR WHEEZING   amLODIPine (NORVASC) 2.5 mg tablet  Self No No   Sig: Take 1 tablet (2.5 mg total) by mouth daily   bisacodyl (DULCOLAX) 5 mg EC tablet   No No   Sig: Use as directed by GI office for colonoscopy   buPROPion (WELLBUTRIN SR) 150 mg 12 hr tablet  Self Yes No   Sig: Take 150 mg by mouth in the morning   busPIRone (BUSPAR) 15 mg tablet  Self Yes No   Sig: Take 15 mg by mouth daily as needed   busPIRone (BUSPAR) 5 mg tablet  Self Yes No   Sig: Take 5 mg by mouth 2 (two) times a day   cholecalciferol (VITAMIN D3) 1,000 units tablet  Self No No   Sig: Take 1 tablet (1,000 Units total) by mouth daily   clonazePAM (KlonoPIN) 0.5 mg tablet  Self Yes No   Sig: Take 0.5 mg by mouth daily at bedtime   dicyclomine (BENTYL) 20 mg tablet   No No   Sig: Take 1 tablet (20 mg total) by mouth every 6 (six) hours As needed for abdominal pain   fexofenadine (ALLEGRA) 180 MG tablet  Self No No   Sig: Take 1 tablet (180 mg total) by mouth daily   Patient not taking: Reported on 3/22/2023   fluticasone (FLONASE) 50 mcg/act nasal spray  Self No No   Si spray into each nostril daily   fluticasone-salmeterol (Advair HFA) 115-21 MCG/ACT inhaler  Self No No   Sig: Inhale 2 puffs 2 (two) times a day Rinse mouth after use.    ipratropium-albuterol (DUO-NEB) 0.5-2.5 mg/3 mL nebulizer solution  Self No No   Sig: Take 3 mL by nebulization every 6 (six) hours as needed for wheezing or shortness of breath   levothyroxine 88 mcg tablet   No No   Sig: TAKE ONE TABLET BY MOUTH EVERY MORNING   lisinopril (ZESTRIL) 10 mg tablet   No No   Sig: Take 1 tablet (10 mg total) by mouth daily   nitroglycerin (NITROSTAT) 0.4 mg SL tablet  Self No No   Sig: Place 1 tablet (0.4 mg total) under the tongue every 5 (five) minutes as needed for chest pain   omeprazole (PriLOSEC) 40 MG capsule   No No   Sig: TAKE 1 CAPSULE BY MOUTH DAILY   ondansetron (ZOFRAN-ODT) 4 mg disintegrating tablet   No No   Sig: DISSOLVE ONE TABLET BY MOUTH ONCE EVERY 8 HOURS   polyethylene glycol (GLYCOLAX) 17 GM/SCOOP powder   No No   Sig: Take as directed by GI office for colonoscopy   simethicone (MYLICON) 258 MG chewable tablet  Self No No   Sig: Chew 1 tablet (125 mg total) every 6 (six) hours as needed for flatulence   topiramate (TOPAMAX) 100 mg tablet   No No   Si mg qam and 150 mg qhs      Facility-Administered Medications: None       Past Medical History:   Diagnosis Date   • Anxiety    • Asthma    • Bipolar disorder (HCC)    • Depression    • Disease of thyroid gland    • Endometriosis    • Psychiatric illness    • Psychosis ([address])    • PTSD (post-traumatic stress disorder) 2017   • Right carpal tunnel syndrome 2018   • Self-injurious behavior    • Suicide attempt [facility])        Past Surgical History:   Procedure Laterality Date   • ABDOMINAL SURGERY     • HERNIA REPAIR     • LAPAROSCOPIC TUBAL LIGATION         Family History   Problem Relation Age of Onset   • Hypertension Mother    • Asthma Mother    • Fibromyalgia Mother    • Osteoporosis Mother    • No Known Problems Father    • No Known Problems Sister    • No Known Problems Daughter    • No Known Problems Maternal Grandmother    • No Known Problems Maternal Grandfather    • No Known Problems Paternal Grandmother    • No Known Problems Paternal Grandfather    • Thyroid disease Brother    • Breast cancer Maternal Aunt    • Breast cancer Maternal Aunt      I have reviewed and agree with the history as documented. E-Cigarette/Vaping   • E-Cigarette Use Never User      E-Cigarette/Vaping Substances   • Nicotine No    • THC No    • CBD No    • Flavoring No    • Other No    • Unknown No      Social History     Tobacco Use   • Smoking status: Former     Types: Cigarettes     Quit date: 2021     Years since quittin.5   • Smokeless tobacco: Never   Vaping Use   • Vaping Use: Never used   Substance Use Topics   • Alcohol use: No   • Drug use: No       Review of Systems   Constitutional: Negative for fatigue and fever. Eyes: Positive for photophobia. Negative for blurred vision, redness and visual disturbance. Gastrointestinal: Positive for nausea. Negative for abdominal pain and vomiting. Genitourinary: Negative for dysuria. Neurological: Positive for headaches. Negative for numbness. Physical Exam  Physical Exam  Vitals reviewed. Constitutional:       Appearance: Normal appearance. She is normal weight. HENT:      Head: Normocephalic and atraumatic. Right Ear: External ear normal.      Left Ear: External ear normal.      Nose: Nose normal.   Eyes:      Conjunctiva/sclera: Conjunctivae normal.   Cardiovascular:      Rate and Rhythm: Normal rate. Pulmonary:      Effort: Pulmonary effort is normal.   Musculoskeletal:         General: Normal range of motion. Cervical back: Normal range of motion. Skin:     General: Skin is warm and dry. Neurological:      Mental Status: She is alert. Vital Signs  ED Triage Vitals   Temperature Pulse Respirations Blood Pressure SpO2   10/02/23 2025 10/02/23 2025 10/02/23 2025 10/02/23 2025 10/02/23 2025   98.3 °F (36.8 °C) 66 18 130/75 99 %      Temp Source Heart Rate Source Patient Position - Orthostatic VS BP Location FiO2 (%)   10/02/23 2025 10/02/23 2025 10/02/23 2025 10/02/23 2025 --   Oral Monitor Sitting Left arm       Pain Score       10/02/23 2032       9           Vitals:    10/02/23 2025   BP: 130/75   Pulse: 66   Patient Position - Orthostatic VS: Sitting         Visual Acuity  Visual Acuity    Flowsheet Row Most Recent Value   L Pupil Size (mm) 3   R Pupil Size (mm) 3          ED Medications  Medications   metoclopramide (REGLAN) injection 10 mg (10 mg Intravenous Given 10/2/23 2123)   magnesium sulfate 2 g/50 mL IVPB (premix) 2 g (2 g Intravenous New Bag 10/2/23 2129)   diphenhydrAMINE (BENADRYL) injection 25 mg (25 mg Intravenous Given 10/2/23 2120)   sodium chloride 0.9 % infusion (100 mL/hr Intravenous New Bag 10/2/23 2117)   butalbital-acetaminophen-caffeine (FIORICET,ESGIC) -40 mg per tablet 1 tablet (1 tablet Oral Given 10/2/23 2118)       Diagnostic Studies  Results Reviewed     None                 No orders to display              Procedures  Procedures         ED Course                               SBIRT 20yo+    Flowsheet Row Most Recent Value   Initial Alcohol Screen: US AUDIT-C     1. How often do you have a drink containing alcohol? 0 Filed at: 10/02/2023 2025   2. How many drinks containing alcohol do you have on a typical day you are drinking? 0 Filed at: 10/02/2023 2025   3b. FEMALE Any Age, or MALE 65+: How often do you have 4 or more drinks on one occassion? 0 Filed at: 10/02/2023 2025   Audit-C Score 0 Filed at: 10/02/2023 2025   [place]: How many times in the past year have you. ..     Used an illegal drug or used a prescription medication for non-medical reasons? Never Filed at: 10/02/2023 2025                    Medical Decision Making  Patient is a 62-year-old female coming in for evaluation of migraine. Is in no acute distress at this time. Did feel better after migraine cocktail and does discharged home    Risk  Prescription drug management. Disposition  Final diagnoses:   Migraine headache     Time reflects when diagnosis was documented in both MDM as applicable and the Disposition within this note     Time User Action Codes Description Comment    10/2/2023 11:55 PM Dina Kent Add [G43.909] Migraine headache       ED Disposition     ED Disposition   Discharge    Condition   Stable    Date/Time   Mon Oct 2, 2023 11:55 PM    Comment   Trula Libman discharge to home/self care. Follow-up Information     Follow up With Specialties Details Why Contact Info Additional Johnathan, Neela Wall, Nurse Practitioner   [address]  [address]  [address] [facility] [phone]       [address] [facility] Department Emergency Medicine  As needed, If symptoms worsen [address] [id number]  [address] [facility] Emergency Department          Patient's Medications   Discharge Prescriptions    No medications on file       No discharge procedures on file.     PDMP Review       Value Time User    PDMP Reviewed  Yes 2/28/2022  3:58 PM [address], [address] [facility]          ED Provider  Electronically Signed by           Baudilio Castañeda PA-C  10/03/23 0017

## 2023-10-12 ENCOUNTER — TELEPHONE (OUTPATIENT)
Dept: NEUROLOGY | Facility: CLINIC | Age: 48
End: 2023-10-12

## 2023-10-12 NOTE — LETTER
October 13, 2023     Patient: Uri Samaniego  YOB: 1975  Date of Visit: 11/10/2022       To Whom it May Concern:    Uri Samaniego is under my professional care. Tylor Kaur was seen in my office on 11/10/2022. She would benefit from tinted windows in her vehicle (sides and front windshield) due to photophobia associated with chronic migraine headaches. If you have any questions or concerns, please don't hesitate to call. Sincerely,          Marta Story.  INDERJIT Pedro      CC: No Recipients

## 2023-10-12 NOTE — TELEPHONE ENCOUNTER
10/11/23 at [address] [place] [address], my name is Homa Mina. I wanted to know if someone can call me back. I have a letter from the neurologist and I wanted to know. The letter I have, she didn't find the letter. And I wanted to know if she can either rewrite me another one so it can be updated with her signature. And I also need to make an appointment. My date of birth is [birth year]. And you can call me back at [phone]. Thank you. And you have a good day bymike.

## 2023-10-12 NOTE — TELEPHONE ENCOUNTER
Called pt and states that [address], [address] [facility] generated a letter last year, 11/10/22. She needs same letter be generated and need MK's actual signature. Once signed, requesting letter be sent to her mychart. Ok to generate? To Whom it May Concern:     Dina Montejo is under my professional care. She was seen in my office on 11/10/2022. She would benefit from tinted windows in her vehicle (sides and front windshield) due to photophobia associated with chronic migraine headaches. If you have any questions or concerns, please don't hesitate to call.

## 2023-10-13 DIAGNOSIS — E03.9 ACQUIRED HYPOTHYROIDISM: ICD-10-CM

## 2023-10-17 RX ORDER — LEVOTHYROXINE SODIUM 88 UG/1
88 TABLET ORAL DAILY
Qty: 90 TABLET | Refills: 0 | Status: SHIPPED | OUTPATIENT
Start: 2023-10-17

## 2023-10-24 ENCOUNTER — OFFICE VISIT (OUTPATIENT)
Dept: NEUROLOGY | Facility: CLINIC | Age: 48
End: 2023-10-24
Payer: COMMERCIAL

## 2023-10-24 VITALS
HEART RATE: 69 BPM | BODY MASS INDEX: 40.3 KG/M2 | HEIGHT: 62 IN | DIASTOLIC BLOOD PRESSURE: 74 MMHG | WEIGHT: 219 LBS | SYSTOLIC BLOOD PRESSURE: 118 MMHG

## 2023-10-24 DIAGNOSIS — G43.009 MIGRAINE WITHOUT AURA AND WITHOUT STATUS MIGRAINOSUS, NOT INTRACTABLE: Primary | ICD-10-CM

## 2023-10-24 DIAGNOSIS — E55.9 VITAMIN D DEFICIENCY: ICD-10-CM

## 2023-10-24 DIAGNOSIS — R53.83 OTHER FATIGUE: ICD-10-CM

## 2023-10-24 PROCEDURE — 99214 OFFICE O/P EST MOD 30 MIN: CPT | Performed by: PHYSICIAN ASSISTANT

## 2023-10-24 RX ORDER — MELATONIN
2000 DAILY
Qty: 90 TABLET | Refills: 3 | Status: SHIPPED | OUTPATIENT
Start: 2023-10-24

## 2023-10-24 NOTE — PATIENT INSTRUCTIONS
Headache management instructions  - When patient has a moderate to severe headache, they should seek rest, initiate relaxation and apply cold compresses to the head. - Maintain regular sleep schedule. Adults need at least 7-8 hours of uninterrupted a night. - Limit over the counter medications such as Tylenol, Ibuprofen, Aleve, Excedrin. (No more than 2- 3 times a week or max 10 a month). - Maintain headache diary. Free TARA for a smart phone, which can be used is "Migraine ry"  - Limit caffeine to 1-2 cups 8 to 16 oz a day or less. - Avoid dietary trigger. (aged cheese, peanuts, MSG, aspartame and nitrates). - Patient is to have regular frequent meals to prevent headache onset. - Please drink at least 64 ounces of water a day to help remain hydrated.
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
## 2023-10-24 NOTE — LETTER
October 24, 2023     Patient: Zo Delaney  YOB: 1975  Date of Visit: 10/24/2023      To Whom it May Concern:    Zo Delaney is under my professional care. Delroy Naranjo was seen in my office on 10/24/2023. She would benefit from tinted windows in her vehicle (sides and front windshield) due to photophobia associated with chronic migraine headaches. If you have any questions or concerns, please don't hesitate to call.          Sincerely,          Radha Epstein PA-C        CC: No Recipients

## 2023-10-26 ENCOUNTER — OFFICE VISIT (OUTPATIENT)
Dept: FAMILY MEDICINE CLINIC | Facility: CLINIC | Age: 48
End: 2023-10-26

## 2023-10-26 VITALS
BODY MASS INDEX: 38.83 KG/M2 | WEIGHT: 211 LBS | HEIGHT: 62 IN | RESPIRATION RATE: 16 BRPM | TEMPERATURE: 97.6 F | SYSTOLIC BLOOD PRESSURE: 102 MMHG | DIASTOLIC BLOOD PRESSURE: 70 MMHG | HEART RATE: 82 BPM | OXYGEN SATURATION: 98 %

## 2023-10-26 DIAGNOSIS — E03.9 ACQUIRED HYPOTHYROIDISM: ICD-10-CM

## 2023-10-26 DIAGNOSIS — F32.A ANXIETY AND DEPRESSION: ICD-10-CM

## 2023-10-26 DIAGNOSIS — F41.9 ANXIETY AND DEPRESSION: ICD-10-CM

## 2023-10-26 DIAGNOSIS — I10 BENIGN ESSENTIAL HYPERTENSION: ICD-10-CM

## 2023-10-26 DIAGNOSIS — J45.20 MILD INTERMITTENT ASTHMA WITHOUT COMPLICATION: ICD-10-CM

## 2023-10-26 DIAGNOSIS — E53.8 B12 DEFICIENCY: Primary | ICD-10-CM

## 2023-10-26 DIAGNOSIS — G47.30 SLEEP APNEA, UNSPECIFIED TYPE: ICD-10-CM

## 2023-10-26 PROCEDURE — 3078F DIAST BP <80 MM HG: CPT | Performed by: NURSE PRACTITIONER

## 2023-10-26 PROCEDURE — 3074F SYST BP LT 130 MM HG: CPT | Performed by: NURSE PRACTITIONER

## 2023-10-26 PROCEDURE — 99214 OFFICE O/P EST MOD 30 MIN: CPT | Performed by: NURSE PRACTITIONER

## 2023-10-26 NOTE — PROGRESS NOTES
Name: Anni Manning      : 1975      MRN: [id number]  Encounter Provider: Ramon Spurling, CRNP  Encounter Date: 10/26/2023   Encounter department: [facility]     1. B12 deficiency  -     Vitamin B12; Future    2. Benign essential hypertension  Assessment & Plan: Within goal  Continue lisinopril 10 mg daily and was started on amlodipine 2.5 mg daily, metoprolol 25 mg bid  Follow up with cardiology       3. Sleep apnea, unspecified type  Assessment & Plan:  Has not been using CPAP due to not tolerated full face mask  She is will to try alternative masks, e-consult to sleep medicine for assistance with ordering     Orders:  -     AMB E-CONSULT TO SLEEP MEDICINE    4. Acquired hypothyroidism  Assessment & Plan:  Lab Results   Component Value Date    [id number] 0.914 2022     Repeat TSH level   Continue with levothyroxine 88 mcg daily       5. Mild intermittent asthma without complication  Assessment & Plan:  Stable, continue current regimen   Continue Advair HFA , albuterol PRN         6. Anxiety and depression  Assessment & Plan:  Follows with psych   Anxiety exacerbated 2/2 daughter's recent passing as well as son's medical and MH problems while being incarcerated and now also taking care of her granddaughter  Provided supportive listening during visit   Continue with treatment as per psychiatry   Continue following with therapist            Subjective     Anni Manning is a 57 y.o. female who  has a past medical history of Anxiety, Asthma, Bipolar disorder ([address]), Depression, Disease of thyroid gland, Endometriosis, Psychiatric illness, Psychosis ([address]), PTSD (post-traumatic stress disorder), Right carpal tunnel syndrome, Self-injurious behavior, and Suicide attempt ([address]). who presented to the office today for follow up. Patient is quite tearful and distraught upon entering exam room.  She reports that she just received a phone call while waiting to be roomed regarding the cause of her late daughter's death which was 7 months ago. Patient reports that she is now the caregiver of her 11 month old granddaughter. Patient reports that her son, who has a medical illness, is incarcerated an not receiving proper care. She is feeling very overwhelmed and stressed. The following portions of the patient's history were reviewed and updated as appropriate: allergies, current medications, past family history, past medical history, past social history, past surgical history and problem list.        Review of Systems   Constitutional:  Positive for fatigue. Negative for chills and fever. HENT:  Negative for ear pain and sore throat. Eyes:  Negative for pain and visual disturbance. Respiratory:  Negative for cough and shortness of breath. Cardiovascular:  Negative for chest pain and palpitations. Gastrointestinal:  Negative for abdominal pain and vomiting. Genitourinary:  Negative for dysuria and hematuria. Musculoskeletal:  Negative for arthralgias and back pain. Skin:  Negative for color change and rash. Neurological:  Negative for seizures and syncope. Psychiatric/Behavioral:  Positive for dysphoric mood and sleep disturbance. Negative for self-injury and suicidal ideas. The patient is nervous/anxious. All other systems reviewed and are negative.       Past Medical History:   Diagnosis Date   • Anxiety    • Asthma    • Bipolar disorder ([address])    • Depression    • Disease of thyroid gland    • Endometriosis    • Psychiatric illness    • Psychosis ([address])    • PTSD (post-traumatic stress disorder) 6/6/2017   • Right carpal tunnel syndrome 12/26/2018   • Self-injurious behavior    • Suicide attempt [facility])      Past Surgical History:   Procedure Laterality Date   • ABDOMINAL SURGERY     • HERNIA REPAIR     • LAPAROSCOPIC TUBAL LIGATION       Family History   Problem Relation Age of Onset   • Hypertension Mother    • Asthma Mother    • Fibromyalgia Mother    • Osteoporosis Mother    • No Known Problems Father    • No Known Problems Sister    • No Known Problems Daughter    • No Known Problems Maternal Grandmother    • No Known Problems Maternal Grandfather    • No Known Problems Paternal Grandmother    • No Known Problems Paternal Grandfather    • Thyroid disease Brother    • Breast cancer Maternal Aunt    • Breast cancer Maternal Aunt      Social History     Socioeconomic History   • Marital status: Single     Spouse name: None   • Number of children: None   • Years of education: None   • Highest education level: None   Occupational History   • None   Tobacco Use   • Smoking status: Former     Types: Cigarettes     Quit date: 2021     Years since quittin.5   • Smokeless tobacco: Never   Vaping Use   • Vaping Use: Never used   Substance and Sexual Activity   • Alcohol use: No   • Drug use: No   • Sexual activity: None   Other Topics Concern   • None   Social History Narrative    Flu shot:no     Social Determinants of Health     Financial Resource Strain: Medium Risk (10/25/2023)    Overall Financial Resource Strain (CARDIA)    • Difficulty of Paying Living Expenses: Somewhat hard   Food Insecurity: Food Insecurity Present (10/25/2023)    Hunger Vital Sign    • Worried About Running Out of Food in the Last Year: Sometimes true    • Ran Out of Food in the Last Year: Sometimes true   Transportation Needs: No Transportation Needs (10/25/2023)    PRAPARE - Transportation    • Lack of Transportation (Medical): No    • Lack of Transportation (Non-Medical):  No   Physical Activity: Not on file   Stress: Not on file   Social Connections: Not on file   Intimate Partner Violence: Not on file   Housing Stability: Not on file     Current Outpatient Medications on File Prior to Visit   Medication Sig   • albuterol (PROVENTIL HFA,VENTOLIN HFA) 90 mcg/act inhaler INHALE 2 PUFFS EVERY 4 HOURS AS NEEDED FOR WHEEZING   • amLODIPine (NORVASC) 2.5 mg tablet Take 1 tablet (2.5 mg total) by mouth daily   • ARIPiprazole (ABILIFY) 10 mg tablet Take 10 mg by mouth daily   • bisacodyl (DULCOLAX) 5 mg EC tablet Use as directed by GI office for colonoscopy   • buPROPion (WELLBUTRIN SR) 150 mg 12 hr tablet Take 150 mg by mouth in the morning   • busPIRone (BUSPAR) 15 mg tablet Take 15 mg by mouth daily as needed   • busPIRone (BUSPAR) 5 mg tablet Take 5 mg by mouth 2 (two) times a day   • cholecalciferol (VITAMIN D3) 1,000 units tablet Take 2 tablets (2,000 Units total) by mouth daily   • clonazePAM (KlonoPIN) 0.5 mg tablet Take 0.5 mg by mouth daily at bedtime   • dicyclomine (BENTYL) 20 mg tablet Take 1 tablet (20 mg total) by mouth every 6 (six) hours As needed for abdominal pain   • DULoxetine (CYMBALTA) 60 mg delayed release capsule Take 60 mg by mouth daily   • fexofenadine (ALLEGRA) 180 MG tablet Take 1 tablet (180 mg total) by mouth daily (Patient not taking: Reported on 3/22/2023)   • fluticasone (FLONASE) 50 mcg/act nasal spray 1 spray into each nostril daily   • fluticasone-salmeterol (Advair HFA) 115-21 MCG/ACT inhaler Inhale 2 puffs 2 (two) times a day Rinse mouth after use.    • ipratropium-albuterol (DUO-NEB) 0.5-2.5 mg/3 mL nebulizer solution Take 3 mL by nebulization every 6 (six) hours as needed for wheezing or shortness of breath   • levothyroxine 88 mcg tablet Take 1 tablet (88 mcg total) by mouth daily   • lisinopril (ZESTRIL) 10 mg tablet Take 1 tablet (10 mg total) by mouth daily   • nitroglycerin (NITROSTAT) 0.4 mg SL tablet Place 1 tablet (0.4 mg total) under the tongue every 5 (five) minutes as needed for chest pain   • omeprazole (PriLOSEC) 40 MG capsule TAKE 1 CAPSULE BY MOUTH DAILY   • ondansetron (ZOFRAN-ODT) 4 mg disintegrating tablet DISSOLVE ONE TABLET BY MOUTH ONCE EVERY 8 HOURS   • polyethylene glycol (GLYCOLAX) 17 GM/SCOOP powder Take as directed by GI office for colonoscopy   • simethicone (MYLICON) 848 MG chewable tablet Chew 1 tablet (125 mg total) every 6 (six) hours as needed for flatulence   • topiramate (TOPAMAX) 200 MG tablet 200 mg BID     Allergies   Allergen Reactions   • Aleve [Naproxen] Shortness Of Breath   • Benzonatate Swelling     Throat closing   • Guaifenesin Other (See Comments)     Note - patient states she IS able to take robitussin. • Latuda [Lurasidone] Swelling      stuttering    • Imitrex [Sumatriptan]      Patient reports that this medication "makes me sick". Immunization History   Administered Date(s) Administered   • INFLUENZA 11/09/2009   • Influenza, recombinant, quadrivalent,injectable, preservative free 11/30/2021   • Pneumococcal Polysaccharide PPV23 04/15/2015   • Tdap 10/17/2019   • Tuberculin Skin Test-PPD Intradermal 10/12/2015, 11/30/2021, 12/13/2021, 12/27/2021       Objective     /70 (BP Location: Left arm, Patient Position: Sitting, Cuff Size: Large)   Pulse 82   Temp 97.6 °F (36.4 °C) (Temporal)   Resp 16   Ht 5' 2" (1.575 m)   Wt 95.7 kg (211 lb)   SpO2 98%   BMI 38.59 kg/m²     Physical Exam  Vitals and nursing note reviewed. Constitutional:       General: She is not in acute distress. Appearance: She is well-developed. She is not diaphoretic. HENT:      Head: Normocephalic and atraumatic. Right Ear: External ear normal.      Left Ear: External ear normal.   Eyes:      Conjunctiva/sclera: Conjunctivae normal.   Cardiovascular:      Rate and Rhythm: Normal rate and regular rhythm. Heart sounds: Normal heart sounds. Pulmonary:      Effort: Pulmonary effort is normal. No respiratory distress. Breath sounds: Normal breath sounds. No wheezing. Abdominal:      General: Bowel sounds are normal.      Palpations: Abdomen is soft. Musculoskeletal:         General: Normal range of motion. Cervical back: Normal range of motion and neck supple. Skin:     General: Skin is warm and dry. Capillary Refill: Capillary refill takes less than 2 seconds.    Neurological: Mental Status: She is alert and oriented to person, place, and time. Psychiatric:         Mood and Affect: Mood is depressed. Affect is tearful. Jolanta Felix      E-Consult Consent: Patient aware and consented that a consult is being performed on their behalf to sleep medicine. The patient is aware that they may not see the provider face to face, but the provider may review their medical record and give recommendations. The patient may elect to see the provider in person if requested.

## 2023-10-27 ENCOUNTER — E-CONSULT (OUTPATIENT)
Dept: SLEEP CENTER | Facility: CLINIC | Age: 48
End: 2023-10-27

## 2023-10-27 DIAGNOSIS — G47.33 OSA (OBSTRUCTIVE SLEEP APNEA): Primary | ICD-10-CM

## 2023-10-27 PROBLEM — M54.9 NOTALGIA: Status: RESOLVED | Noted: 2021-12-13 | Resolved: 2023-10-27

## 2023-10-27 PROBLEM — G47.19 EXCESSIVE DAYTIME SLEEPINESS: Status: RESOLVED | Noted: 2022-02-14 | Resolved: 2023-10-27

## 2023-10-27 PROBLEM — R07.9 CHEST PAIN: Status: RESOLVED | Noted: 2022-02-02 | Resolved: 2023-10-27

## 2023-10-27 PROBLEM — R53.83 OTHER FATIGUE: Status: RESOLVED | Noted: 2023-10-24 | Resolved: 2023-10-27

## 2023-10-27 PROBLEM — F41.1 GENERALIZED ANXIETY DISORDER: Status: RESOLVED | Noted: 2017-06-06 | Resolved: 2023-10-27

## 2023-10-27 PROBLEM — F32.A ANXIETY AND DEPRESSION: Status: ACTIVE | Noted: 2022-03-01

## 2023-10-27 PROBLEM — G47.21 CIRCADIAN RHYTHM SLEEP DISORDER, DELAYED SLEEP PHASE TYPE: Status: RESOLVED | Noted: 2020-05-11 | Resolved: 2023-10-27

## 2023-10-27 PROBLEM — R06.83 SNORING: Status: RESOLVED | Noted: 2022-02-14 | Resolved: 2023-10-27

## 2023-10-27 PROBLEM — R05.3 CHRONIC COUGH: Status: RESOLVED | Noted: 2021-08-05 | Resolved: 2023-10-27

## 2023-10-27 NOTE — ASSESSMENT & PLAN NOTE
Lab Results   Component Value Date    [id number] 0.914 09/06/2022     Repeat TSH level   Continue with levothyroxine 88 mcg daily

## 2023-10-27 NOTE — ASSESSMENT & PLAN NOTE
Within goal  Continue lisinopril 10 mg daily and was started on amlodipine 2.5 mg daily, metoprolol 25 mg bid  Follow up with cardiology

## 2023-10-27 NOTE — ASSESSMENT & PLAN NOTE
Has not been using CPAP due to not tolerated full face mask  She is will to try alternative masks, e-consult to sleep medicine for assistance with ordering

## 2023-10-27 NOTE — PROGRESS NOTES
E-Consult  Hari King 57 y.o. female MRN: [id number]  Encounter Date: 11/01/23      Reason for Consult / Principal Problem:     patient can not tolerate full face mask, would like to try alternative, looking for recomendations and how to place orders. thanks! Consulting Provider: Kelli Frausto MD    Requesting Provider: Jorge Malone, *       ASSESSMENT:  This is a 57year old female previously diagnosed with very mild obstructive sleep apnea, AHI 5.1 on a diagnostic polysomnogram in 6/2020, weight was 235 pounds at the time of testing, notes from her test describe the patient has been prone to panic attacks. Notes mention allergic rhinitis and I see she is prescribed fluticasone, not certain how often it is needed per chart review. She uses a nasal mask per her DME, Airfit N20 , not a full face mask    With weight loss, it is possible HARIS has resolved in her case. RECOMMENDATIONS:  Recommendations depend somewhat on clinical scenario, as with weight loss HARIS can improve or resolve     If she is no longer symptomatic (no insomnia or excessive daytime sleepiness) or if it is not clear, I would recommend  a repeat diagnostic study to see if HARIS has resolved. This would be worthwhile as if she no longer has obstructive sleep apnea, than CPAP would not be indicated    If she is still symptomatic (snoring, excessive sleepiness, witnessed apnea, etc) , continuing CPAP would be reasonable. Would recommend a refitting with the DME. You can order CPAP supplies ([id number]) and also order mask fitting ([id number]). Can route to my sleep nurses so they can help coordinate this with the DME. If she has anxiety, a small mask is generally preferred.   If rhinitis is not a major issue and she does not have problems with nasal breathing, would recommend trying nasal pillows (such as Airfit p10) or a different nasal mask (Airfit N30) with use of a chinstrap  If she cannot breathe well through her nose, a full face mask be needed, there are some that have a smaller profile (Airfit F30i)     Desensitization can also be helpful in those with anxiety. This involves "practicing" breathing with CPAP when awake and relaxing. To do this, the patient is encouraged to use CPAP for 10-15 minutes in the day when awake, doing something relaxing like watching a show or reading. This can help the patient get more comfortable with the feeling of the air pressure and mask on their face,     Ultimately if CPAP is not tolerated, other treatments can be considered such as a mandible advancing appliance. Of note, we do not always treat mild obstructive sleep apnea. These day it is mostly based on symptoms, if the patient does not have a sleep compliant, CPAP would not be needed, and she can be followed clinically    Would recommend re-referring her to sleep clinic for a followup visit so we can follow closely with her      Total time spent  21-30 minutes, >50% of the total time devoted to medical consultative verbal/EMR discussion between providers. Written report will be generated in the EMR. Keron Tena

## 2023-10-27 NOTE — ASSESSMENT & PLAN NOTE
Follows with psych   Anxiety exacerbated 2/2 daughter's recent passing as well as son's medical and MH problems while being incarcerated and now also taking care of her granddaughter  Provided supportive listening during visit   Continue with treatment as per psychiatry   Continue following with therapist

## 2023-11-07 ENCOUNTER — APPOINTMENT (OUTPATIENT)
Dept: LAB | Facility: CLINIC | Age: 48
End: 2023-11-07
Payer: COMMERCIAL

## 2023-11-07 DIAGNOSIS — R53.83 OTHER FATIGUE: ICD-10-CM

## 2023-11-07 DIAGNOSIS — E53.8 B12 DEFICIENCY: ICD-10-CM

## 2023-11-07 LAB
ALBUMIN SERPL BCP-MCNC: 4.4 G/DL (ref 3.5–5)
ALP SERPL-CCNC: 79 U/L (ref 34–104)
ALT SERPL W P-5'-P-CCNC: 17 U/L (ref 7–52)
ANION GAP SERPL CALCULATED.3IONS-SCNC: 8 MMOL/L
AST SERPL W P-5'-P-CCNC: 18 U/L (ref 13–39)
BASOPHILS # BLD AUTO: 0.04 THOUSANDS/ÂΜL (ref 0–0.1)
BASOPHILS NFR BLD AUTO: 1 % (ref 0–1)
BILIRUB SERPL-MCNC: 0.33 MG/DL (ref 0.2–1)
BUN SERPL-MCNC: 18 MG/DL (ref 5–25)
CALCIUM SERPL-MCNC: 9.6 MG/DL (ref 8.4–10.2)
CHLORIDE SERPL-SCNC: 107 MMOL/L (ref 96–108)
CO2 SERPL-SCNC: 24 MMOL/L (ref 21–32)
CREAT SERPL-MCNC: 0.97 MG/DL (ref 0.6–1.3)
EOSINOPHIL # BLD AUTO: 0.11 THOUSAND/ÂΜL (ref 0–0.61)
EOSINOPHIL NFR BLD AUTO: 2 % (ref 0–6)
ERYTHROCYTE [DISTWIDTH] IN BLOOD BY AUTOMATED COUNT: 13.5 % (ref 11.6–15.1)
FERRITIN SERPL-MCNC: 56 NG/ML (ref 11–307)
GFR SERPL CREATININE-BSD FRML MDRD: 70 ML/MIN/1.73SQ M
GLUCOSE P FAST SERPL-MCNC: 89 MG/DL (ref 65–99)
HCT VFR BLD AUTO: 41.3 % (ref 34.8–46.1)
HGB BLD-MCNC: 12.9 G/DL (ref 11.5–15.4)
IMM GRANULOCYTES # BLD AUTO: 0.02 THOUSAND/UL (ref 0–0.2)
IMM GRANULOCYTES NFR BLD AUTO: 0 % (ref 0–2)
IRON SATN MFR SERPL: 27 % (ref 15–50)
IRON SERPL-MCNC: 83 UG/DL (ref 50–212)
LYMPHOCYTES # BLD AUTO: 1.94 THOUSANDS/ÂΜL (ref 0.6–4.47)
LYMPHOCYTES NFR BLD AUTO: 34 % (ref 14–44)
MCH RBC QN AUTO: 28.4 PG (ref 26.8–34.3)
MCHC RBC AUTO-ENTMCNC: 31.2 G/DL (ref 31.4–37.4)
MCV RBC AUTO: 91 FL (ref 82–98)
MONOCYTES # BLD AUTO: 0.35 THOUSAND/ÂΜL (ref 0.17–1.22)
MONOCYTES NFR BLD AUTO: 6 % (ref 4–12)
NEUTROPHILS # BLD AUTO: 3.24 THOUSANDS/ÂΜL (ref 1.85–7.62)
NEUTS SEG NFR BLD AUTO: 57 % (ref 43–75)
NRBC BLD AUTO-RTO: 0 /100 WBCS
PLATELET # BLD AUTO: 260 THOUSANDS/UL (ref 149–390)
PMV BLD AUTO: 10.5 FL (ref 8.9–12.7)
POTASSIUM SERPL-SCNC: 3.8 MMOL/L (ref 3.5–5.3)
PROT SERPL-MCNC: 7.9 G/DL (ref 6.4–8.4)
RBC # BLD AUTO: 4.55 MILLION/UL (ref 3.81–5.12)
SODIUM SERPL-SCNC: 139 MMOL/L (ref 135–147)
TIBC SERPL-MCNC: 313 UG/DL (ref 250–450)
TSH SERPL DL<=0.05 MIU/L-ACNC: 1.42 UIU/ML (ref 0.45–4.5)
UIBC SERPL-MCNC: 230 UG/DL (ref 155–355)
VIT B12 SERPL-MCNC: 211 PG/ML (ref 180–914)
WBC # BLD AUTO: 5.7 THOUSAND/UL (ref 4.31–10.16)

## 2023-11-07 PROCEDURE — 85025 COMPLETE CBC W/AUTO DIFF WBC: CPT

## 2023-11-07 PROCEDURE — 83540 ASSAY OF IRON: CPT

## 2023-11-07 PROCEDURE — 80053 COMPREHEN METABOLIC PANEL: CPT

## 2023-11-07 PROCEDURE — 82728 ASSAY OF FERRITIN: CPT

## 2023-11-07 PROCEDURE — 82607 VITAMIN B-12: CPT

## 2023-11-07 PROCEDURE — 83550 IRON BINDING TEST: CPT

## 2023-11-07 PROCEDURE — 36415 COLL VENOUS BLD VENIPUNCTURE: CPT

## 2023-11-07 PROCEDURE — 84443 ASSAY THYROID STIM HORMONE: CPT

## 2023-11-09 ENCOUNTER — TELEPHONE (OUTPATIENT)
Dept: FAMILY MEDICINE CLINIC | Facility: CLINIC | Age: 48
End: 2023-11-09

## 2023-11-09 NOTE — TELEPHONE ENCOUNTER
[facility] Medical Certification was signed on 11/8. Faxed, confirmation received and scanned into patient chart. Patient was informed.

## 2023-11-11 ENCOUNTER — HOSPITAL ENCOUNTER (EMERGENCY)
Facility: HOSPITAL | Age: 48
Discharge: HOME | End: 2023-11-11
Attending: EMERGENCY MEDICINE
Payer: COMMERCIAL

## 2023-11-11 VITALS
HEIGHT: 62 IN | OXYGEN SATURATION: 98 % | TEMPERATURE: 98.2 F | DIASTOLIC BLOOD PRESSURE: 64 MMHG | WEIGHT: 211.64 LBS | BODY MASS INDEX: 38.95 KG/M2 | RESPIRATION RATE: 18 BRPM | SYSTOLIC BLOOD PRESSURE: 125 MMHG | HEART RATE: 89 BPM

## 2023-11-11 DIAGNOSIS — B34.9 VIRAL SYNDROME: Primary | ICD-10-CM

## 2023-11-11 LAB
FLUAV RNA RESP QL NAA+PROBE: NEGATIVE
FLUBV RNA RESP QL NAA+PROBE: NEGATIVE
RSV RNA RESP QL NAA+PROBE: NEGATIVE
S PYO DNA THROAT QL NAA+PROBE: NOT DETECTED
SARS-COV-2 RNA RESP QL NAA+PROBE: NEGATIVE

## 2023-11-11 PROCEDURE — 0241U HB NFCT DS VIR RESP RNA 4 TRGT: CPT | Performed by: EMERGENCY MEDICINE

## 2023-11-11 PROCEDURE — 87651 STREP A DNA AMP PROBE: CPT | Performed by: EMERGENCY MEDICINE

## 2023-11-11 PROCEDURE — 99284 EMERGENCY DEPT VISIT MOD MDM: CPT | Performed by: EMERGENCY MEDICINE

## 2023-11-11 PROCEDURE — 99283 EMERGENCY DEPT VISIT LOW MDM: CPT

## 2023-11-11 RX ORDER — ACETAMINOPHEN 325 MG/1
650 TABLET ORAL ONCE
Status: COMPLETED | OUTPATIENT
Start: 2023-11-11 | End: 2023-11-11

## 2023-11-11 RX ORDER — DEXTROMETHORPHAN HYDROBROMIDE AND PROMETHAZINE HYDROCHLORIDE 15; 6.25 MG/5ML; MG/5ML
5 SYRUP ORAL 4 TIMES DAILY PRN
Qty: 5 ML | Refills: 0 | Status: SHIPPED | OUTPATIENT
Start: 2023-11-11 | End: 2023-11-16 | Stop reason: SDUPTHER

## 2023-11-11 RX ADMIN — ACETAMINOPHEN 650 MG: 325 TABLET, FILM COATED ORAL at 17:27

## 2023-11-11 NOTE — ED PROVIDER NOTES
Pt Name: Felisa Robbins  MRN: [id number]  [address] [facility] [place] 1975  Age/Sex: 57 y.o. female  Date of evaluation: 2023  PCP: Damián Cyr, [address]    Chief Complaint   Patient presents with    Sore Throat     Sore throat, b/l ear pain, body aches, chills, headache for 2 days. HPI    Svetlana Humphrey presents to the Emergency Department complaining of sore throat,ear pain and cough. She was exposed to her granddaughter who was also sick.         HPI      Past Medical and Surgical History    Past Medical History:   Diagnosis Date    Anxiety     Asthma     Bipolar disorder ([address] )     Depression     Disease of thyroid gland     Endometriosis     Psychiatric illness     Psychosis ([address] [facility])     PTSD (post-traumatic stress disorder) 2017    Right carpal tunnel syndrome 2018    Self-injurious behavior     Suicide attempt [facility])        Past Surgical History:   Procedure Laterality Date    ABDOMINAL SURGERY      HERNIA REPAIR      LAPAROSCOPIC TUBAL LIGATION         Family History   Problem Relation Age of Onset    Hypertension Mother     Asthma Mother     Fibromyalgia Mother     Osteoporosis Mother     No Known Problems Father     No Known Problems Sister     No Known Problems Daughter     No Known Problems Maternal Grandmother     No Known Problems Maternal Grandfather     No Known Problems Paternal Grandmother     No Known Problems Paternal Grandfather     Thyroid disease Brother     Breast cancer Maternal Aunt     Breast cancer Maternal Aunt        Social History     Tobacco Use    Smoking status: Former     Types: Cigarettes     Quit date: 2021     Years since quittin.6    Smokeless tobacco: Never   Vaping Use    Vaping Use: Never used   Substance Use Topics    Alcohol use: No    Drug use: No         .    Allergies    Allergies   Allergen Reactions    Aleve [Naproxen] Shortness Of Breath    Benzonatate Swelling     Throat closing    Guaifenesin Other (See Comments)     Note - patient states she IS able to take robitussin. Latuda [Lurasidone] Swelling      stuttering     Imitrex [Sumatriptan]      Patient reports that this medication "makes me sick". Home Medications    Prior to Admission medications    Medication Sig Start Date End Date Taking?  Authorizing Provider   albuterol (PROVENTIL HFA,VENTOLIN HFA) 90 mcg/act inhaler INHALE 2 PUFFS EVERY 4 HOURS AS NEEDED FOR WHEEZING 3/30/23   JUSTO Nash   amLODIPine (NORVASC) 2.5 mg tablet Take 1 tablet (2.5 mg total) by mouth daily 9/2/22   Neal Walls MD   ARIPiprazole (ABILIFY) 10 mg tablet Take 10 mg by mouth daily 1/11/22   Historical Provider, MD   bisacodyl (DULCOLAX) 5 mg EC tablet Use as directed by GI office for colonoscopy 8/25/23   Tino Rivera MD   buPROPion [facility]) 150 mg 12 hr tablet Take 150 mg by mouth in the morning 1/11/22   Historical Provider, MD   busPIRone (BUSPAR) 15 mg tablet Take 15 mg by mouth daily as needed 11/9/20   Historical Provider, MD   busPIRone (BUSPAR) 5 mg tablet Take 5 mg by mouth 2 (two) times a day 9/20/19   Historical Provider, MD   cholecalciferol (VITAMIN D3) 1,000 units tablet Take 2 tablets (2,000 Units total) by mouth daily 10/24/23   Donte Presley PA-C   clonazePAM (KlonoPIN) 0.5 mg tablet Take 0.5 mg by mouth daily at bedtime 11/26/19   Historical Provider, MD   dicyclomine (BENTYL) 20 mg tablet Take 1 tablet (20 mg total) by mouth every 6 (six) hours As needed for abdominal pain 3/28/23   Lesvia Espitia MD   DULoxetine (CYMBALTA) 60 mg delayed release capsule Take 60 mg by mouth daily 11/16/20   Historical Provider, MD   fexofenadine (ALLEGRA) 180 MG tablet Take 1 tablet (180 mg total) by mouth daily  Patient not taking: Reported on 3/22/2023 11/29/22   JUSTO Nash   fluticasone [facility]) 50 mcg/act nasal spray 1 spray into each nostril daily 11/29/22   JUSTO Nash   fluticasone-salmeterol (Advair HFA) 115-21 MCG/ACT inhaler Inhale 2 puffs 2 (two) times a day Rinse mouth after use. 9/30/22   JUSTO Altamirano   ipratropium-albuterol (DUO-NEB) 0.5-2.5 mg/3 mL nebulizer solution Take 3 mL by nebulization every 6 (six) hours as needed for wheezing or shortness of breath 9/30/22   JUSTO Altamirano   levothyroxine 88 mcg tablet Take 1 tablet (88 mcg total) by mouth daily 10/17/23   JUSTO Altamirano   lisinopril (ZESTRIL) 10 mg tablet Take 1 tablet (10 mg total) by mouth daily 3/22/23   Justin Freire MD   nitroglycerin (NITROSTAT) 0.4 mg SL tablet Place 1 tablet (0.4 mg total) under the tongue every 5 (five) minutes as needed for chest pain 2/23/23   JUSTO Altamirano   omeprazole (PriLOSEC) 40 MG capsule TAKE 1 CAPSULE BY MOUTH DAILY 6/19/23   JUSTO Altamirano   ondansetron (ZOFRAN-ODT) 4 mg disintegrating tablet DISSOLVE ONE TABLET BY MOUTH ONCE EVERY 8 HOURS 8/11/23   JUSTO Altamirano   polyethylene glycol [facility]) 17 GM/SCOOP powder Take as directed by GI office for colonoscopy 8/25/23   Brock Chase MD   simethicone (MYLICON) 504 MG chewable tablet Chew 1 tablet (125 mg total) every 6 (six) hours as needed for flatulence 3/31/22   JUSTO Altamirano   topiramate (TOPAMAX) 200 MG tablet 200 mg BID 10/24/23   Shan Shelton PA-C           Review of Systems    Review of Systems   Constitutional:  Positive for chills and fatigue. Negative for fever. HENT:  Positive for congestion, ear pain and sore throat. Eyes:  Negative for pain and visual disturbance. Respiratory:  Negative for cough and shortness of breath. Cardiovascular:  Negative for chest pain and palpitations. Gastrointestinal:  Negative for abdominal pain and vomiting. Genitourinary:  Negative for dysuria and hematuria. Musculoskeletal:  Positive for myalgias. Negative for arthralgias and back pain. Skin:  Negative for color change and rash.    Neurological:  Negative for seizures and syncope. All other systems reviewed and are negative. Physical Exam      ED Triage Vitals [11/11/23 1630]   Temperature Pulse Respirations Blood Pressure SpO2   98.2 °F (36.8 °C) 89 18 125/64 98 %      Temp Source Heart Rate Source Patient Position - Orthostatic VS BP Location FiO2 (%)   Oral Monitor Sitting Right arm --      Pain Score       9               Physical Exam  Vitals and nursing note reviewed. Constitutional:       General: She is not in acute distress. Appearance: She is well-developed. HENT:      Head: Normocephalic and atraumatic. Ears:      Comments: There is a small amount of dried blood in right external auditory canal.   Eyes:      Conjunctiva/sclera: Conjunctivae normal.   Cardiovascular:      Rate and Rhythm: Normal rate and regular rhythm. Heart sounds: No murmur heard. Pulmonary:      Effort: Pulmonary effort is normal. No respiratory distress. Breath sounds: Normal breath sounds. Abdominal:      Palpations: Abdomen is soft. Tenderness: There is no abdominal tenderness. Musculoskeletal:         General: No swelling. Cervical back: Neck supple. Skin:     General: Skin is warm and dry. Capillary Refill: Capillary refill takes less than 2 seconds. Neurological:      Mental Status: She is alert. Psychiatric:         Mood and Affect: Mood normal.                Assessment and Plan    Kim Griffith is a 57 y.o. female who presents with sore throat. Physical examination unremarkable. Differential diagnosis (not completely inclusive) includes viral vs bacterial causes of illness. Plan will be to perform diagnostic testing and treat symptomatically.       MDM      Diagnostic Results        Labs:    Results for orders placed or performed during the hospital encounter of 11/11/23   FLU/RSV/COVID - if FLU/RSV clinically relevant    Specimen: Nose; Nares   Result Value Ref Range    SARS-CoV-2 Negative Negative    INFLUENZA A PCR Negative Negative INFLUENZA B PCR Negative Negative    RSV PCR Negative Negative   Strep A PCR    Specimen: Throat   Result Value Ref Range    STREP A PCR Not Detected Not Detected       All labs reviewed and utilized in the medical decision making process    Radiology:    No orders to display       All radiology studies independently viewed by me and interpreted by the radiologist.    Procedure    Procedures      ED Course of Care and Re-Assessments      Medications   acetaminophen (TYLENOL) tablet 650 mg (650 mg Oral Given 11/11/23 1727)           FINAL IMPRESSION    Final diagnoses:   Viral syndrome         DISPOSITION/PLAN    Time reflects when diagnosis was documented in both MDM as applicable and the Disposition within this note       Time User Action Codes Description Comment    11/11/2023  5:18 PM Yoanaluis Duran Add [B34.9] Viral syndrome           ED Disposition       ED Disposition   Discharge    Condition   Stable    Date/Time   Sat Nov 11, 2023  5:18 PM    Comment   Micky Starr discharge to home/self care.                    Follow-up Information       Follow up With Specialties Details Why [address], [address] [facility], Nurse Practitioner Schedule an appointment as soon as possible for a visit   [address] [address] [address]                PATIENT REFERRED TO:    Jaypasha Sheffield, [address]  [address] [facility] [address]    Schedule an appointment as soon as possible for a visit         DISCHARGE MEDICATIONS:    Discharge Medication List as of 11/11/2023  5:23 PM        START taking these medications    Details   promethazine-dextromethorphan (PHENERGAN-DM) 6.25-15 mg/5 mL oral syrup Take 5 mL by mouth 4 (four) times a day as needed for cough, Starting Sat 11/11/2023, Normal           CONTINUE these medications which have NOT CHANGED    Details   albuterol (PROVENTIL HFA,VENTOLIN HFA) 90 mcg/act inhaler INHALE 2 PUFFS EVERY 4 HOURS AS NEEDED FOR WHEEZING, Normal      amLODIPine (NORVASC) 2.5 mg tablet Take 1 tablet (2.5 mg total) by mouth daily, Starting Fri 9/2/2022, Normal      ARIPiprazole (ABILIFY) 10 mg tablet Take 10 mg by mouth daily, Starting Tue 1/11/2022, Historical Med      bisacodyl (DULCOLAX) 5 mg EC tablet Use as directed by GI office for colonoscopy, Normal      buPROPion (WELLBUTRIN SR) 150 mg 12 hr tablet Take 150 mg by mouth in the morning, Starting Tue 1/11/2022, Historical Med      !! busPIRone (BUSPAR) 15 mg tablet Take 15 mg by mouth daily as needed, Starting Mon 11/9/2020, Historical Med      !! busPIRone (BUSPAR) 5 mg tablet Take 5 mg by mouth 2 (two) times a day, Starting Fri 9/20/2019, Historical Med      cholecalciferol (VITAMIN D3) 1,000 units tablet Take 2 tablets (2,000 Units total) by mouth daily, Starting Tue 10/24/2023, Normal      clonazePAM (KlonoPIN) 0.5 mg tablet Take 0.5 mg by mouth daily at bedtime, Starting Tue 11/26/2019, Historical Med      dicyclomine (BENTYL) 20 mg tablet Take 1 tablet (20 mg total) by mouth every 6 (six) hours As needed for abdominal pain, Starting Tue 3/28/2023, Normal      DULoxetine (CYMBALTA) 60 mg delayed release capsule Take 60 mg by mouth daily, Starting Mon 11/16/2020, Historical Med      fexofenadine (ALLEGRA) 180 MG tablet Take 1 tablet (180 mg total) by mouth daily, Starting Tue 11/29/2022, Normal      fluticasone (FLONASE) 50 mcg/act nasal spray 1 spray into each nostril daily, Starting Tue 11/29/2022, Normal      fluticasone-salmeterol (Advair HFA) 115-21 MCG/ACT inhaler Inhale 2 puffs 2 (two) times a day Rinse mouth after use., Starting Fri 9/30/2022, Normal      ipratropium-albuterol (DUO-NEB) 0.5-2.5 mg/3 mL nebulizer solution Take 3 mL by nebulization every 6 (six) hours as needed for wheezing or shortness of breath, Starting Fri 9/30/2022, Normal      levothyroxine 88 mcg tablet Take 1 tablet (88 mcg total) by mouth daily, Starting Tue 10/17/2023, Normal      lisinopril (ZESTRIL) 10 mg tablet Take 1 tablet (10 mg total) by mouth daily, Starting Wed 3/22/2023, Normal      nitroglycerin (NITROSTAT) 0.4 mg SL tablet Place 1 tablet (0.4 mg total) under the tongue every 5 (five) minutes as needed for chest pain, Starting Thu 2/23/2023, Normal      omeprazole (PriLOSEC) 40 MG capsule TAKE 1 CAPSULE BY MOUTH DAILY, Normal      ondansetron (ZOFRAN-ODT) 4 mg disintegrating tablet DISSOLVE ONE TABLET BY MOUTH ONCE EVERY 8 HOURS, Normal      polyethylene glycol (GLYCOLAX) 17 GM/SCOOP powder Take as directed by GI office for colonoscopy, Normal      simethicone (MYLICON) 761 MG chewable tablet Chew 1 tablet (125 mg total) every 6 (six) hours as needed for flatulence, Starting Thu 3/31/2022, Normal      topiramate (TOPAMAX) 200 MG tablet 200 mg BID, Normal       !! - Potential duplicate medications found. Please discuss with provider. No discharge procedures on file.          Alejo Naqvi, [address], DO  11/11/23 0422

## 2023-11-13 DIAGNOSIS — J45.41 MODERATE PERSISTENT ASTHMA WITH ACUTE EXACERBATION: ICD-10-CM

## 2023-11-13 RX ORDER — IPRATROPIUM BROMIDE AND ALBUTEROL SULFATE 2.5; .5 MG/3ML; MG/3ML
3 SOLUTION RESPIRATORY (INHALATION) EVERY 6 HOURS PRN
Qty: 200 ML | Refills: 0 | Status: SHIPPED | OUTPATIENT
Start: 2023-11-13

## 2023-11-16 ENCOUNTER — OFFICE VISIT (OUTPATIENT)
Dept: FAMILY MEDICINE CLINIC | Facility: CLINIC | Age: 48
End: 2023-11-16

## 2023-11-16 VITALS
BODY MASS INDEX: 39.56 KG/M2 | HEIGHT: 62 IN | OXYGEN SATURATION: 97 % | TEMPERATURE: 97.5 F | HEART RATE: 102 BPM | WEIGHT: 215 LBS | SYSTOLIC BLOOD PRESSURE: 114 MMHG | DIASTOLIC BLOOD PRESSURE: 68 MMHG | RESPIRATION RATE: 18 BRPM

## 2023-11-16 DIAGNOSIS — I10 BENIGN ESSENTIAL HYPERTENSION: ICD-10-CM

## 2023-11-16 DIAGNOSIS — J02.9 PHARYNGITIS, UNSPECIFIED ETIOLOGY: Primary | ICD-10-CM

## 2023-11-16 DIAGNOSIS — B34.9 VIRAL SYNDROME: ICD-10-CM

## 2023-11-16 PROCEDURE — 99214 OFFICE O/P EST MOD 30 MIN: CPT | Performed by: NURSE PRACTITIONER

## 2023-11-16 PROCEDURE — 3074F SYST BP LT 130 MM HG: CPT | Performed by: NURSE PRACTITIONER

## 2023-11-16 PROCEDURE — 3078F DIAST BP <80 MM HG: CPT | Performed by: NURSE PRACTITIONER

## 2023-11-16 RX ORDER — AMOXICILLIN 500 MG/1
500 CAPSULE ORAL EVERY 8 HOURS SCHEDULED
Qty: 30 CAPSULE | Refills: 0 | Status: SHIPPED | OUTPATIENT
Start: 2023-11-16 | End: 2023-11-26

## 2023-11-16 RX ORDER — DEXTROMETHORPHAN HYDROBROMIDE AND PROMETHAZINE HYDROCHLORIDE 15; 6.25 MG/5ML; MG/5ML
5 SYRUP ORAL 4 TIMES DAILY PRN
Qty: 473 ML | Refills: 0 | Status: SHIPPED | OUTPATIENT
Start: 2023-11-16

## 2023-11-16 RX ORDER — PREDNISONE 20 MG/1
40 TABLET ORAL DAILY
Qty: 10 TABLET | Refills: 0 | Status: SHIPPED | OUTPATIENT
Start: 2023-11-16 | End: 2023-11-21

## 2023-11-16 RX ORDER — GUAIFENESIN 200 MG/1
200 TABLET ORAL EVERY 4 HOURS PRN
Qty: 30 TABLET | Refills: 0 | Status: SHIPPED | OUTPATIENT
Start: 2023-11-16

## 2023-11-16 NOTE — PROGRESS NOTES
Name: Trudy Christopher      : 1975      MRN: [id number]  Encounter Provider: JUSTO Garcia  Encounter Date: 2023   Encounter department: [address] [facility]     1. Pharyngitis, unspecified etiology  -     amoxicillin (AMOXIL) 500 mg capsule; Take 1 capsule (500 mg total) by mouth every 8 (eight) hours for 10 days  -     predniSONE 20 mg tablet; Take 2 tablets (40 mg total) by mouth daily for 5 days  -     guaiFENesin 200 MG tablet; Take 1 tablet (200 mg total) by mouth every 4 (four) hours as needed for congestion    2. Viral syndrome  -     promethazine-dextromethorphan (PHENERGAN-DM) 6.25-15 mg/5 mL oral syrup; Take 5 mL by mouth 4 (four) times a day as needed for cough    3. Benign essential hypertension  Assessment & Plan: Within goal  Continue lisinopril 10 mg daily and amlodipine 2.5 mg daily, metoprolol 25 mg bid  Follow up with cardiology              Subjective     Trudy Christopher is a 57 y.o. female who  has a past medical history of Anxiety, Asthma, Bipolar disorder ([address]), Depression, Disease of thyroid gland, Endometriosis, Psychiatric illness, Psychosis ([address]), PTSD (post-traumatic stress disorder), Right carpal tunnel syndrome, Self-injurious behavior, and Suicide attempt ([address]). who presented to the office today for follow up. Patient reports that she has had sore throat, cough, headache, ear pain, and has lost her voice for 5 days. Prior to sx onset her granddaughter was sick with similar sx and treated with antibiotics in the ED although she does not know what the girl was dx with. The following portions of the patient's history were reviewed and updated as appropriate: allergies, current medications, past family history, past medical history, past social history, past surgical history and problem list.        Review of Systems   Constitutional:  Positive for activity change, appetite change, chills, fatigue and fever. HENT:  Positive for congestion, ear pain, sore throat, trouble swallowing and voice change. Respiratory:  Positive for cough. Cardiovascular:  Negative for chest pain and palpitations. Gastrointestinal:  Negative for abdominal pain. Neurological:  Positive for headaches. Negative for dizziness, seizures and weakness.        Past Medical History:   Diagnosis Date   • Anxiety    • Asthma    • Bipolar disorder ([address])    • Depression    • Disease of thyroid gland    • Endometriosis    • Psychiatric illness    • Psychosis ([address])    • PTSD (post-traumatic stress disorder) 2017   • Right carpal tunnel syndrome 2018   • Self-injurious behavior    • Suicide attempt [facility])      Past Surgical History:   Procedure Laterality Date   • ABDOMINAL SURGERY     • HERNIA REPAIR     • LAPAROSCOPIC TUBAL LIGATION       Family History   Problem Relation Age of Onset   • Hypertension Mother    • Asthma Mother    • Fibromyalgia Mother    • Osteoporosis Mother    • No Known Problems Father    • No Known Problems Sister    • No Known Problems Daughter    • No Known Problems Maternal Grandmother    • No Known Problems Maternal Grandfather    • No Known Problems Paternal Grandmother    • No Known Problems Paternal Grandfather    • Thyroid disease Brother    • Breast cancer Maternal Aunt    • Breast cancer Maternal Aunt      Social History     Socioeconomic History   • Marital status: Single     Spouse name: None   • Number of children: None   • Years of education: None   • Highest education level: None   Occupational History   • None   Tobacco Use   • Smoking status: Former     Types: Cigarettes     Quit date: 2021     Years since quittin.6   • Smokeless tobacco: Never   Vaping Use   • Vaping Use: Never used   Substance and Sexual Activity   • Alcohol use: No   • Drug use: No   • Sexual activity: None   Other Topics Concern   • None   Social History Narrative    Flu shot:no     Social Determinants of Health Financial Resource Strain: Medium Risk (10/25/2023)    Overall Financial Resource Strain (CARDIA)    • Difficulty of Paying Living Expenses: Somewhat hard   Food Insecurity: Food Insecurity Present (10/25/2023)    Hunger Vital Sign    • Worried About Running Out of Food in the Last Year: Sometimes true    • Ran Out of Food in the Last Year: Sometimes true   Transportation Needs: No Transportation Needs (10/25/2023)    PRAPARE - Transportation    • Lack of Transportation (Medical): No    • Lack of Transportation (Non-Medical):  No   Physical Activity: Not on file   Stress: Not on file   Social Connections: Not on file   Intimate Partner Violence: Not on file   Housing Stability: Not on file     Current Outpatient Medications on File Prior to Visit   Medication Sig   • albuterol (PROVENTIL HFA,VENTOLIN HFA) 90 mcg/act inhaler INHALE 2 PUFFS EVERY 4 HOURS AS NEEDED FOR WHEEZING   • amLODIPine (NORVASC) 2.5 mg tablet Take 1 tablet (2.5 mg total) by mouth daily   • ARIPiprazole (ABILIFY) 10 mg tablet Take 10 mg by mouth daily   • bisacodyl (DULCOLAX) 5 mg EC tablet Use as directed by GI office for colonoscopy   • buPROPion (WELLBUTRIN SR) 150 mg 12 hr tablet Take 150 mg by mouth in the morning   • busPIRone (BUSPAR) 15 mg tablet Take 15 mg by mouth daily as needed   • busPIRone (BUSPAR) 5 mg tablet Take 5 mg by mouth 2 (two) times a day   • cholecalciferol (VITAMIN D3) 1,000 units tablet Take 2 tablets (2,000 Units total) by mouth daily   • clonazePAM (KlonoPIN) 0.5 mg tablet Take 0.5 mg by mouth daily at bedtime   • dicyclomine (BENTYL) 20 mg tablet Take 1 tablet (20 mg total) by mouth every 6 (six) hours As needed for abdominal pain   • DULoxetine (CYMBALTA) 60 mg delayed release capsule Take 60 mg by mouth daily   • fexofenadine (ALLEGRA) 180 MG tablet Take 1 tablet (180 mg total) by mouth daily (Patient not taking: Reported on 3/22/2023)   • fluticasone (FLONASE) 50 mcg/act nasal spray 1 spray into each nostril daily   • fluticasone-salmeterol (Advair HFA) 115-21 MCG/ACT inhaler Inhale 2 puffs 2 (two) times a day Rinse mouth after use. • ipratropium-albuterol (DUO-NEB) 0.5-2.5 mg/3 mL nebulizer solution Take 3 mL by nebulization every 6 (six) hours as needed for wheezing or shortness of breath   • levothyroxine 88 mcg tablet Take 1 tablet (88 mcg total) by mouth daily   • lisinopril (ZESTRIL) 10 mg tablet Take 1 tablet (10 mg total) by mouth daily   • nitroglycerin (NITROSTAT) 0.4 mg SL tablet Place 1 tablet (0.4 mg total) under the tongue every 5 (five) minutes as needed for chest pain   • omeprazole (PriLOSEC) 40 MG capsule TAKE 1 CAPSULE BY MOUTH DAILY   • ondansetron (ZOFRAN-ODT) 4 mg disintegrating tablet DISSOLVE ONE TABLET BY MOUTH ONCE EVERY 8 HOURS   • polyethylene glycol (GLYCOLAX) 17 GM/SCOOP powder Take as directed by GI office for colonoscopy   • simethicone (MYLICON) 315 MG chewable tablet Chew 1 tablet (125 mg total) every 6 (six) hours as needed for flatulence   • topiramate (TOPAMAX) 200 MG tablet 200 mg BID   • [DISCONTINUED] promethazine-dextromethorphan (PHENERGAN-DM) 6.25-15 mg/5 mL oral syrup Take 5 mL by mouth 4 (four) times a day as needed for cough     Allergies   Allergen Reactions   • Aleve [Naproxen] Shortness Of Breath   • Benzonatate Swelling     Throat closing   • Guaifenesin Other (See Comments)     Note - patient states she IS able to take robitussin. • Latuda [Lurasidone] Swelling      stuttering    • Imitrex [Sumatriptan]      Patient reports that this medication "makes me sick".      Immunization History   Administered Date(s) Administered   • INFLUENZA 11/09/2009   • Influenza, recombinant, quadrivalent,injectable, preservative free 11/30/2021   • Pneumococcal Polysaccharide PPV23 04/15/2015   • Tdap 10/17/2019   • Tuberculin Skin Test-PPD Intradermal 10/12/2015, 11/30/2021, 12/13/2021, 12/27/2021       Objective     /68 (BP Location: Left arm, Patient Position: Sitting, Cuff Size: Standard)   Pulse 102   Temp 97.5 °F (36.4 °C) (Temporal)   Resp 18   Ht 5' 2" (1.575 m)   Wt 97.5 kg (215 lb)   SpO2 97%   BMI 39.32 kg/m²     Physical Exam  Vitals and nursing note reviewed. Constitutional:       General: She is not in acute distress. Appearance: She is well-developed. She is not diaphoretic. HENT:      Head: Normocephalic and atraumatic. Right Ear: External ear normal. Tympanic membrane is erythematous. Left Ear: External ear normal.      Mouth/Throat:      Pharynx: Pharyngeal swelling and posterior oropharyngeal erythema present. Tonsils: 2+ on the right. 2+ on the left. Comments: +hoarse voice   Eyes:      General:         Right eye: No discharge. Left eye: No discharge. Conjunctiva/sclera: Conjunctivae normal.   Cardiovascular:      Rate and Rhythm: Normal rate and regular rhythm. Pulmonary:      Effort: Pulmonary effort is normal. No respiratory distress. Breath sounds: Normal breath sounds. No wheezing. Musculoskeletal:         General: No deformity. Normal range of motion. Cervical back: Normal range of motion and neck supple. Lymphadenopathy:      Cervical: Cervical adenopathy present. Skin:     General: Skin is warm and dry. Capillary Refill: Capillary refill takes less than 2 seconds. Findings: No rash. Neurological:      Mental Status: She is alert and oriented to person, place, and time.    Psychiatric:         Mood and Affect: Mood normal.         Behavior: Behavior normal.       Peter Del Rio

## 2023-11-16 NOTE — ASSESSMENT & PLAN NOTE
Within goal  Continue lisinopril 10 mg daily and amlodipine 2.5 mg daily, metoprolol 25 mg bid  Follow up with cardiology

## 2023-11-21 ENCOUNTER — OFFICE VISIT (OUTPATIENT)
Dept: FAMILY MEDICINE CLINIC | Facility: CLINIC | Age: 48
End: 2023-11-21

## 2023-11-21 ENCOUNTER — TELEPHONE (OUTPATIENT)
Dept: FAMILY MEDICINE CLINIC | Facility: CLINIC | Age: 48
End: 2023-11-21

## 2023-11-21 ENCOUNTER — CLINICAL SUPPORT (OUTPATIENT)
Dept: FAMILY MEDICINE CLINIC | Facility: CLINIC | Age: 48
End: 2023-11-21

## 2023-11-21 VITALS
RESPIRATION RATE: 16 BRPM | HEIGHT: 62 IN | OXYGEN SATURATION: 98 % | DIASTOLIC BLOOD PRESSURE: 70 MMHG | SYSTOLIC BLOOD PRESSURE: 120 MMHG | BODY MASS INDEX: 39.38 KG/M2 | TEMPERATURE: 98 F | HEART RATE: 94 BPM | WEIGHT: 214 LBS

## 2023-11-21 DIAGNOSIS — E53.8 B12 DEFICIENCY: Primary | ICD-10-CM

## 2023-11-21 DIAGNOSIS — G89.29 OTHER CHRONIC BACK PAIN: ICD-10-CM

## 2023-11-21 DIAGNOSIS — M54.9 OTHER CHRONIC BACK PAIN: ICD-10-CM

## 2023-11-21 DIAGNOSIS — A60.00 GENITAL HERPES SIMPLEX, UNSPECIFIED SITE: Primary | ICD-10-CM

## 2023-11-21 PROCEDURE — 96372 THER/PROPH/DIAG INJ SC/IM: CPT

## 2023-11-21 PROCEDURE — 3074F SYST BP LT 130 MM HG: CPT | Performed by: FAMILY MEDICINE

## 2023-11-21 PROCEDURE — 99213 OFFICE O/P EST LOW 20 MIN: CPT | Performed by: FAMILY MEDICINE

## 2023-11-21 PROCEDURE — 3078F DIAST BP <80 MM HG: CPT | Performed by: FAMILY MEDICINE

## 2023-11-21 RX ORDER — LIDOCAINE 50 MG/G
OINTMENT TOPICAL AS NEEDED
Qty: 50 G | Refills: 2 | Status: SHIPPED | OUTPATIENT
Start: 2023-11-21

## 2023-11-21 RX ORDER — ACYCLOVIR 50 MG/G
OINTMENT TOPICAL
Qty: 15 G | Refills: 2 | Status: SHIPPED | OUTPATIENT
Start: 2023-11-21

## 2023-11-21 RX ORDER — CYANOCOBALAMIN 1000 UG/ML
1000 INJECTION, SOLUTION INTRAMUSCULAR; SUBCUTANEOUS
Status: SHIPPED | OUTPATIENT
Start: 2023-11-21

## 2023-11-21 RX ORDER — VALACYCLOVIR HYDROCHLORIDE 500 MG/1
500 TABLET, FILM COATED ORAL 2 TIMES DAILY
Qty: 6 TABLET | Refills: 2 | Status: SHIPPED | OUTPATIENT
Start: 2023-11-21 | End: 2023-11-24

## 2023-11-21 RX ADMIN — CYANOCOBALAMIN 1000 MCG: 1000 INJECTION, SOLUTION INTRAMUSCULAR; SUBCUTANEOUS at 15:18

## 2023-11-21 NOTE — TELEPHONE ENCOUNTER
Per Vincent Vega, she can hold the oral supp until she is done with the injections and resume once she completes injections. Patient was informed.

## 2023-11-21 NOTE — ASSESSMENT & PLAN NOTE
Recurrent   In the setting of increased stress recently and recent cold    Valacyclovir 500 mg BID for 3 days, provided refill for recurrent infection   Keep are clean and dry, avoidance of wearing tight clothing  Encourage to drink plenty of water when taking Valacyclovir   Avoidance of sexual intercourse while outbreak lasting   Follow up in 2 weeks

## 2023-11-21 NOTE — PROGRESS NOTES
Name: Micky Starr      : 1975      MRN: [id number]  Encounter Provider: Jocelyne Altamirano MD  Encounter Date: 2023   Encounter department: [facility],[address]     1. Genital herpes simplex, unspecified site  Assessment & Plan:  Recurrent   In the setting of increased stress recently and recent cold    Valacyclovir 500 mg BID for 3 days, provided refill for recurrent infection   Keep are clean and dry, avoidance of wearing tight clothing  Encourage to drink plenty of water when taking Valacyclovir   Avoidance of sexual intercourse while outbreak lasting   Follow up in 2 weeks     Orders:  -     valACYclovir (VALTREX) 500 mg tablet; Take 1 tablet (500 mg total) by mouth 2 (two) times a day for 3 days  -     acyclovir (ZOVIRAX) 5 % ointment; Apply topically every 3 (three) hours    2. Other chronic back pain  -     lidocaine (XYLOCAINE) 5 % ointment; Apply topically as needed for mild pain           Subjective      58 yo female patient comes to the office for a right sided vaginal bump. She has had this lump for the past couple of days, is painful to touch. No injures, no sexual intercourse. History of genital herpes. She reports it feels like a paper cut. She also reports she has been under more stress lately, as well as having a recent cold. No fever, chills, headache, blurry vision, soar throat, chest pain, SOB, wheezing, n/v/c/d, abdominal pain or urinary symptoms. Review of Systems   Constitutional:  Negative for chills, fatigue and fever. HENT:  Negative for congestion, ear pain, rhinorrhea and sore throat. Eyes:  Negative for visual disturbance. Respiratory:  Negative for cough, chest tightness and shortness of breath. Cardiovascular:  Negative for chest pain and palpitations. Gastrointestinal:  Negative for abdominal pain, constipation, diarrhea, nausea and vomiting.    Genitourinary:  Negative for decreased urine volume, difficulty urinating, dysuria, hematuria, vaginal bleeding, vaginal discharge and vaginal pain. Painful lump in vaginal area   Musculoskeletal:  Negative for arthralgias and back pain. Skin:  Negative for rash. Neurological:  Negative for seizures, syncope, light-headedness and headaches. All other systems reviewed and are negative. Current Outpatient Medications on File Prior to Visit   Medication Sig    albuterol (PROVENTIL HFA,VENTOLIN HFA) 90 mcg/act inhaler INHALE 2 PUFFS EVERY 4 HOURS AS NEEDED FOR WHEEZING    amLODIPine (NORVASC) 2.5 mg tablet Take 1 tablet (2.5 mg total) by mouth daily    amoxicillin (AMOXIL) 500 mg capsule Take 1 capsule (500 mg total) by mouth every 8 (eight) hours for 10 days    ARIPiprazole (ABILIFY) 10 mg tablet Take 10 mg by mouth daily    bisacodyl (DULCOLAX) 5 mg EC tablet Use as directed by GI office for colonoscopy    buPROPion (WELLBUTRIN SR) 150 mg 12 hr tablet Take 150 mg by mouth in the morning    busPIRone (BUSPAR) 15 mg tablet Take 15 mg by mouth daily as needed    busPIRone (BUSPAR) 5 mg tablet Take 5 mg by mouth 2 (two) times a day    cholecalciferol (VITAMIN D3) 1,000 units tablet Take 2 tablets (2,000 Units total) by mouth daily    clonazePAM (KlonoPIN) 0.5 mg tablet Take 0.5 mg by mouth daily at bedtime    dicyclomine (BENTYL) 20 mg tablet Take 1 tablet (20 mg total) by mouth every 6 (six) hours As needed for abdominal pain    DULoxetine (CYMBALTA) 60 mg delayed release capsule Take 60 mg by mouth daily    fexofenadine (ALLEGRA) 180 MG tablet Take 1 tablet (180 mg total) by mouth daily (Patient not taking: Reported on 3/22/2023)    fluticasone (FLONASE) 50 mcg/act nasal spray 1 spray into each nostril daily    fluticasone-salmeterol (Advair HFA) 115-21 MCG/ACT inhaler Inhale 2 puffs 2 (two) times a day Rinse mouth after use.     guaiFENesin 200 MG tablet Take 1 tablet (200 mg total) by mouth every 4 (four) hours as needed for congestion ipratropium-albuterol (DUO-NEB) 0.5-2.5 mg/3 mL nebulizer solution Take 3 mL by nebulization every 6 (six) hours as needed for wheezing or shortness of breath    levothyroxine 88 mcg tablet Take 1 tablet (88 mcg total) by mouth daily    lisinopril (ZESTRIL) 10 mg tablet Take 1 tablet (10 mg total) by mouth daily    nitroglycerin (NITROSTAT) 0.4 mg SL tablet Place 1 tablet (0.4 mg total) under the tongue every 5 (five) minutes as needed for chest pain    omeprazole (PriLOSEC) 40 MG capsule TAKE 1 CAPSULE BY MOUTH DAILY    ondansetron (ZOFRAN-ODT) 4 mg disintegrating tablet DISSOLVE ONE TABLET BY MOUTH ONCE EVERY 8 HOURS    polyethylene glycol (GLYCOLAX) 17 GM/SCOOP powder Take as directed by GI office for colonoscopy    predniSONE 20 mg tablet Take 2 tablets (40 mg total) by mouth daily for 5 days    promethazine-dextromethorphan (PHENERGAN-DM) 6.25-15 mg/5 mL oral syrup Take 5 mL by mouth 4 (four) times a day as needed for cough    simethicone (MYLICON) 401 MG chewable tablet Chew 1 tablet (125 mg total) every 6 (six) hours as needed for flatulence    topiramate (TOPAMAX) 200 MG tablet 200 mg BID       Objective     /70 (BP Location: Left arm, Patient Position: Sitting, Cuff Size: Large)   Pulse 94   Temp 98 °F (36.7 °C) (Temporal)   Resp 16   Ht 5' 2" (1.575 m)   Wt 97.1 kg (214 lb)   SpO2 98%   BMI 39.14 kg/m²     Physical Exam  Vitals and nursing note reviewed. Constitutional:       General: She is not in acute distress. Appearance: Normal appearance. She is not toxic-appearing. HENT:      Head: Normocephalic and atraumatic. Right Ear: External ear normal.      Left Ear: External ear normal.      Nose: Nose normal. No congestion or rhinorrhea. Mouth/Throat:      Mouth: Mucous membranes are moist.      Pharynx: Oropharynx is clear. Eyes:      Extraocular Movements: Extraocular movements intact. Pupils: Pupils are equal, round, and reactive to light.    Neck:      Vascular: No carotid bruit. Cardiovascular:      Rate and Rhythm: Normal rate and regular rhythm. Pulses: Normal pulses. Heart sounds: Normal heart sounds. No murmur heard. No gallop. Pulmonary:      Effort: Pulmonary effort is normal. No respiratory distress. Breath sounds: Normal breath sounds. No stridor. No wheezing. Abdominal:      General: Abdomen is flat. Bowel sounds are normal. There is no distension. Palpations: Abdomen is soft. Tenderness: There is no abdominal tenderness. Hernia: No hernia is present. Genitourinary:     General: Normal vulva. Rectum: Normal.      Comments: A couple of 2-3 mm sized papules, bursted, however not yet crusted over. Painful to touch. Musculoskeletal:         General: Normal range of motion. Cervical back: Normal range of motion and neck supple. Right lower leg: No edema. Left lower leg: No edema. Lymphadenopathy:      Cervical: No cervical adenopathy. Skin:     General: Skin is warm. Coloration: Skin is not jaundiced. Findings: No erythema or rash. Neurological:      General: No focal deficit present. Mental Status: She is alert and oriented to person, place, and time.        Dianne Eisenberg MD

## 2023-11-21 NOTE — TELEPHONE ENCOUNTER
Pt is scheduled for a b12 injection today and she would like to know if she should keep taking her pills, she would also like to know how long it takes for the b12 injection to start working.

## 2023-11-27 DIAGNOSIS — K21.9 GASTROESOPHAGEAL REFLUX DISEASE WITHOUT ESOPHAGITIS: ICD-10-CM

## 2023-11-27 DIAGNOSIS — G43.009 MIGRAINE WITHOUT AURA AND WITHOUT STATUS MIGRAINOSUS, NOT INTRACTABLE: ICD-10-CM

## 2023-11-27 RX ORDER — OMEPRAZOLE 40 MG/1
40 CAPSULE, DELAYED RELEASE ORAL DAILY
Qty: 30 CAPSULE | Refills: 0 | Status: SHIPPED | OUTPATIENT
Start: 2023-11-27

## 2023-11-27 RX ORDER — ONDANSETRON 4 MG/1
TABLET, ORALLY DISINTEGRATING ORAL
Qty: 20 TABLET | Refills: 0 | Status: SHIPPED | OUTPATIENT
Start: 2023-11-27

## 2023-11-29 ENCOUNTER — VBI (OUTPATIENT)
Dept: ADMINISTRATIVE | Facility: OTHER | Age: 48
End: 2023-11-29

## 2023-12-21 ENCOUNTER — CLINICAL SUPPORT (OUTPATIENT)
Dept: FAMILY MEDICINE CLINIC | Facility: CLINIC | Age: 48
End: 2023-12-21

## 2023-12-21 DIAGNOSIS — E53.8 B12 DEFICIENCY: Primary | ICD-10-CM

## 2023-12-21 PROCEDURE — 96372 THER/PROPH/DIAG INJ SC/IM: CPT

## 2023-12-21 RX ADMIN — CYANOCOBALAMIN 1000 MCG: 1000 INJECTION, SOLUTION INTRAMUSCULAR; SUBCUTANEOUS at 16:02

## 2023-12-27 ENCOUNTER — TELEPHONE (OUTPATIENT)
Dept: FAMILY MEDICINE CLINIC | Facility: CLINIC | Age: 48
End: 2023-12-27

## 2023-12-27 NOTE — TELEPHONE ENCOUNTER
Hi, my name is Nika Pedroza. S as in [facility] was trying to get in touch with Afs[employer]. If she can, please give me a call back [phone]. Thank you. Have a good day.        Spoke with patient and advise PPL form was faxed over and taking care off

## 2023-12-27 NOTE — TELEPHONE ENCOUNTER
[employer] (PPL or [employer]): PPL    Name on Account: VENTURA IRIZARRY    Address on [facility]: [address] [place] [address], [place] SCHUMACHER     Phone number: [phone]    Account number: [id number]      SEND THIS REQUEST TO CLINICAL TEAM AND PCP ONLY  **SW WILL BE ADVISED BY CLINICAL TEAM/PCP**

## 2024-01-11 DIAGNOSIS — J45.41 MODERATE PERSISTENT ASTHMA WITH ACUTE EXACERBATION: ICD-10-CM

## 2024-01-11 DIAGNOSIS — J06.9 UPPER RESPIRATORY TRACT INFECTION, UNSPECIFIED TYPE: ICD-10-CM

## 2024-01-11 RX ORDER — ALBUTEROL SULFATE 90 UG/1
AEROSOL, METERED RESPIRATORY (INHALATION)
Qty: 18 G | Refills: 0 | Status: SHIPPED | OUTPATIENT
Start: 2024-01-11

## 2024-01-11 RX ORDER — FLUTICASONE PROPIONATE 50 MCG
1 SPRAY, SUSPENSION (ML) NASAL DAILY
Qty: 16 G | Refills: 0 | Status: SHIPPED | OUTPATIENT
Start: 2024-01-11

## 2024-01-11 RX ORDER — CETIRIZINE HYDROCHLORIDE 10 MG/1
10 TABLET ORAL DAILY
Qty: 90 TABLET | Refills: 0 | Status: SHIPPED | OUTPATIENT
Start: 2024-01-11

## 2024-01-12 DIAGNOSIS — E03.9 ACQUIRED HYPOTHYROIDISM: ICD-10-CM

## 2024-01-12 RX ORDER — LEVOTHYROXINE SODIUM 88 UG/1
88 TABLET ORAL DAILY
Qty: 90 TABLET | Refills: 0 | Status: SHIPPED | OUTPATIENT
Start: 2024-01-12

## 2024-01-15 DIAGNOSIS — I10 PRIMARY HYPERTENSION: ICD-10-CM

## 2024-01-15 RX ORDER — LISINOPRIL 10 MG/1
10 TABLET ORAL DAILY
Qty: 90 TABLET | Refills: 0 | Status: SHIPPED | OUTPATIENT
Start: 2024-01-15

## 2024-01-22 ENCOUNTER — CLINICAL SUPPORT (OUTPATIENT)
Dept: FAMILY MEDICINE CLINIC | Facility: CLINIC | Age: 49
End: 2024-01-22

## 2024-01-22 DIAGNOSIS — E53.8 B12 DEFICIENCY: Primary | ICD-10-CM

## 2024-01-22 PROCEDURE — 96372 THER/PROPH/DIAG INJ SC/IM: CPT

## 2024-01-22 RX ADMIN — CYANOCOBALAMIN 1000 MCG: 1000 INJECTION, SOLUTION INTRAMUSCULAR; SUBCUTANEOUS at 15:51

## 2024-01-24 ENCOUNTER — TELEPHONE (OUTPATIENT)
Dept: FAMILY MEDICINE CLINIC | Facility: CLINIC | Age: 49
End: 2024-01-24

## 2024-01-26 ENCOUNTER — OFFICE VISIT (OUTPATIENT)
Dept: FAMILY MEDICINE CLINIC | Facility: CLINIC | Age: 49
End: 2024-01-26

## 2024-01-26 VITALS
SYSTOLIC BLOOD PRESSURE: 122 MMHG | BODY MASS INDEX: 39.2 KG/M2 | DIASTOLIC BLOOD PRESSURE: 80 MMHG | WEIGHT: 213 LBS | HEART RATE: 80 BPM | OXYGEN SATURATION: 99 % | RESPIRATION RATE: 16 BRPM | HEIGHT: 62 IN | TEMPERATURE: 98.7 F

## 2024-01-26 DIAGNOSIS — E55.9 VITAMIN D DEFICIENCY: ICD-10-CM

## 2024-01-26 DIAGNOSIS — T75.3XXA MOTION SICKNESS, INITIAL ENCOUNTER: ICD-10-CM

## 2024-01-26 DIAGNOSIS — E03.9 ACQUIRED HYPOTHYROIDISM: Primary | ICD-10-CM

## 2024-01-26 DIAGNOSIS — G43.009 MIGRAINE WITHOUT AURA AND WITHOUT STATUS MIGRAINOSUS, NOT INTRACTABLE: ICD-10-CM

## 2024-01-26 DIAGNOSIS — E03.9 ACQUIRED HYPOTHYROIDISM: ICD-10-CM

## 2024-01-26 DIAGNOSIS — I10 BENIGN ESSENTIAL HYPERTENSION: ICD-10-CM

## 2024-01-26 PROBLEM — IMO0002 CHRONIC MIGRAINE: Status: RESOLVED | Noted: 2021-07-22 | Resolved: 2024-01-26

## 2024-01-26 PROCEDURE — 3079F DIAST BP 80-89 MM HG: CPT | Performed by: NURSE PRACTITIONER

## 2024-01-26 PROCEDURE — 3074F SYST BP LT 130 MM HG: CPT | Performed by: NURSE PRACTITIONER

## 2024-01-26 PROCEDURE — 99214 OFFICE O/P EST MOD 30 MIN: CPT | Performed by: NURSE PRACTITIONER

## 2024-01-26 RX ORDER — MELATONIN
2000 DAILY
Qty: 90 TABLET | Refills: 3 | Status: SHIPPED | OUTPATIENT
Start: 2024-01-26

## 2024-01-26 RX ORDER — LEVOTHYROXINE SODIUM 88 UG/1
88 TABLET ORAL DAILY
Qty: 30 TABLET | Refills: 0 | Status: SHIPPED | OUTPATIENT
Start: 2024-01-26

## 2024-01-26 RX ORDER — ONDANSETRON 4 MG/1
4 TABLET, ORALLY DISINTEGRATING ORAL EVERY 8 HOURS PRN
Qty: 20 TABLET | Refills: 1 | Status: SHIPPED | OUTPATIENT
Start: 2024-01-26

## 2024-01-26 RX ORDER — MECLIZINE HCL 12.5 MG/1
12.5 TABLET ORAL 3 TIMES DAILY PRN
Qty: 30 TABLET | Refills: 0 | Status: SHIPPED | OUTPATIENT
Start: 2024-01-26

## 2024-01-26 NOTE — PROGRESS NOTES
Name: Nika Shoemaker      : 1975      MRN: [id number]  Encounter Provider: JUSTO Parekh  Encounter Date: 2024   Encounter department: [facility]    Assessment & Plan     1. Acquired hypothyroidism  Assessment & Plan:  Lab Results   Component Value Date    [id number] 1.421 2023     Repeat TSH level   Continue with levothyroxine 88 mcg daily     Orders:  -     levothyroxine 88 mcg tablet; Take 1 tablet (88 mcg total) by mouth daily    2. Vitamin D deficiency  -     cholecalciferol (VITAMIN D3) 1,000 units tablet; Take 2 tablets (2,000 Units total) by mouth daily    3. Acquired hypothyroidism  Comments:  TSH is 8.7.  Will adjust meds to 100mcg of levothyroxine.  Will repeat TSH in 4 weeks.   Assessment & Plan:  Lab Results   Component Value Date    EWP1XMEXEZOQ 1.421 2023     Repeat TSH level   Continue with levothyroxine 88 mcg daily     Orders:  -     levothyroxine 88 mcg tablet; Take 1 tablet (88 mcg total) by mouth daily    4. Migraine without aura and without status migrainosus, not intractable  Assessment & Plan:  Stable  Continue topiramate 200 mg bid   Continue following with neuro    Orders:  -     ondansetron (ZOFRAN-ODT) 4 mg disintegrating tablet; Take 1 tablet (4 mg total) by mouth every 8 (eight) hours as needed for nausea or vomiting    5. Motion sickness, initial encounter  -     meclizine (ANTIVERT) 12.5 MG tablet; Take 1 tablet (12.5 mg total) by mouth 3 (three) times a day as needed for dizziness    6. Benign essential hypertension  Assessment & Plan:  Within goal  Continue lisinopril 10 mg daily and amlodipine 2.5 mg daily, metoprolol 25 mg bid  Follow up with cardiology         BMI Counseling: Body mass index is 38.96 kg/m². The BMI is above normal. Nutrition recommendations include decreasing portion sizes, encouraging healthy choices of fruits and vegetables, decreasing fast food intake, consuming healthier snacks and  limiting drinks that contain sugar. Exercise recommendations include exercising 3-5 times per week. Rationale for BMI follow-up plan is due to patient being overweight or obese.         Subjective     Nika Shoemaker is a 48 y.o. female who  has a past medical history of Anxiety, Asthma, Bipolar disorder ([facility]), Depression, Disease of thyroid gland, Endometriosis, Psychiatric illness, Psychosis ([facility]), PTSD (post-traumatic stress disorder), Right carpal tunnel syndrome, Self-injurious behavior, and Suicide attempt ([facility]). who presented to the office today for follow up.      Patient reports that she is having difficulty with her grandchild's father. She has court order to see the child but he is trying to keep her away. She also reports that she has not been taking her MH medications because she does not like taking medications. She has not discussed this with her psychiatrist.      The following portions of the patient's history were reviewed and updated as appropriate: allergies, current medications, past family history, past medical history, past social history, past surgical history and problem list.        Review of Systems   Constitutional:  Negative for chills and fever.   HENT:  Negative for ear pain and sore throat.    Eyes:  Negative for pain and visual disturbance.   Respiratory:  Negative for cough and shortness of breath.    Cardiovascular:  Negative for chest pain and palpitations.   Gastrointestinal:  Negative for abdominal pain and vomiting.   Genitourinary:  Negative for dysuria and hematuria.   Musculoskeletal:  Positive for arthralgias and back pain.   Skin:  Negative for color change and rash.   Neurological:  Negative for seizures and syncope.   Psychiatric/Behavioral:  Positive for dysphoric mood. The patient is nervous/anxious.    All other systems reviewed and are negative.      Past Medical History:   Diagnosis Date    Anxiety     Asthma     Bipolar disorder ([facility])     Depression     Disease of  thyroid gland     Endometriosis     Psychiatric illness     Psychosis (HCC)     PTSD (post-traumatic stress disorder) 2017    Right carpal tunnel syndrome 2018    Self-injurious behavior     Suicide attempt (HCC)      Past Surgical History:   Procedure Laterality Date    ABDOMINAL SURGERY      HERNIA REPAIR      LAPAROSCOPIC TUBAL LIGATION       Family History   Problem Relation Age of Onset    Hypertension Mother     Asthma Mother     Fibromyalgia Mother     Osteoporosis Mother     No Known Problems Father     No Known Problems Sister     No Known Problems Daughter     No Known Problems Maternal Grandmother     No Known Problems Maternal Grandfather     No Known Problems Paternal Grandmother     No Known Problems Paternal Grandfather     Thyroid disease Brother     Breast cancer Maternal Aunt     Breast cancer Maternal Aunt      Social History     Socioeconomic History    Marital status: Single     Spouse name: Not on file    Number of children: Not on file    Years of education: Not on file    Highest education level: Not on file   Occupational History    Not on file   Tobacco Use    Smoking status: Former     Current packs/day: 0.00     Types: Cigarettes     Quit date: 2021     Years since quittin.8    Smokeless tobacco: Never   Vaping Use    Vaping status: Never Used   Substance and Sexual Activity    Alcohol use: No    Drug use: No    Sexual activity: Not on file   Other Topics Concern    Not on file   Social History Narrative    Flu shot:no     Social Determinants of Health     Financial Resource Strain: Medium Risk (10/25/2023)    Overall Financial Resource Strain (CARDIA)     Difficulty of Paying Living Expenses: Somewhat hard   Food Insecurity: Food Insecurity Present (10/25/2023)    Hunger Vital Sign     Worried About Running Out of Food in the Last Year: Sometimes true     Ran Out of Food in the Last Year: Sometimes true   Transportation Needs: No Transportation Needs (10/25/2023)     PRAPARE - Transportation     Lack of Transportation (Medical): No     Lack of Transportation (Non-Medical): No   Physical Activity: Not on file   Stress: Not on file   Social Connections: Not on file   Intimate Partner Violence: Not on file   Housing Stability: Not on file     Current Outpatient Medications on File Prior to Visit   Medication Sig    acyclovir (ZOVIRAX) 5 % ointment Apply topically every 3 (three) hours    albuterol (PROVENTIL HFA,VENTOLIN HFA) 90 mcg/act inhaler INHALE 2 PUFFS EVERY 4 HOURS AS NEEDED FOR WHEEZING    amLODIPine (NORVASC) 2.5 mg tablet Take 1 tablet (2.5 mg total) by mouth daily    ARIPiprazole (ABILIFY) 10 mg tablet Take 10 mg by mouth daily    bisacodyl (DULCOLAX) 5 mg EC tablet Use as directed by GI office for colonoscopy    buPROPion (WELLBUTRIN SR) 150 mg 12 hr tablet Take 150 mg by mouth in the morning    busPIRone (BUSPAR) 15 mg tablet Take 15 mg by mouth daily as needed    busPIRone (BUSPAR) 5 mg tablet Take 5 mg by mouth 2 (two) times a day    cetirizine (ZyrTEC) 10 mg tablet TAKE ONE TABLET BY MOUTH EVERY DAY.    clonazePAM (KlonoPIN) 0.5 mg tablet Take 0.5 mg by mouth daily at bedtime    dicyclomine (BENTYL) 20 mg tablet Take 1 tablet (20 mg total) by mouth every 6 (six) hours As needed for abdominal pain    DULoxetine (CYMBALTA) 60 mg delayed release capsule Take 60 mg by mouth daily    fexofenadine (ALLEGRA) 180 MG tablet Take 1 tablet (180 mg total) by mouth daily (Patient not taking: Reported on 3/22/2023)    fluticasone (FLONASE) 50 mcg/act nasal spray USE 1 SPRAY INTO EACH NOSTRIL DAILY    fluticasone-salmeterol (Advair HFA) 115-21 MCG/ACT inhaler Inhale 2 puffs 2 (two) times a day Rinse mouth after use.    guaiFENesin 200 MG tablet Take 1 tablet (200 mg total) by mouth every 4 (four) hours as needed for congestion    ipratropium-albuterol (DUO-NEB) 0.5-2.5 mg/3 mL nebulizer solution Take 3 mL by nebulization every 6 (six) hours as needed for wheezing or shortness of  "breath    lidocaine (XYLOCAINE) 5 % ointment Apply topically as needed for mild pain    lisinopril (ZESTRIL) 10 mg tablet Take 1 tablet (10 mg total) by mouth daily    nitroglycerin (NITROSTAT) 0.4 mg SL tablet Place 1 tablet (0.4 mg total) under the tongue every 5 (five) minutes as needed for chest pain    omeprazole (PriLOSEC) 40 MG capsule Take 1 capsule (40 mg total) by mouth daily    polyethylene glycol (GLYCOLAX) 17 GM/SCOOP powder Take as directed by GI office for colonoscopy    simethicone (MYLICON) 125 MG chewable tablet Chew 1 tablet (125 mg total) every 6 (six) hours as needed for flatulence    topiramate (TOPAMAX) 200 MG tablet 200 mg BID    valACYclovir (VALTREX) 500 mg tablet Take 1 tablet (500 mg total) by mouth 2 (two) times a day for 3 days    [DISCONTINUED] cholecalciferol (VITAMIN D3) 1,000 units tablet Take 2 tablets (2,000 Units total) by mouth daily    [DISCONTINUED] levothyroxine 88 mcg tablet Take 1 tablet (88 mcg total) by mouth daily    [DISCONTINUED] ondansetron (ZOFRAN-ODT) 4 mg disintegrating tablet DISSOLVE ONE TABLET BY MOUTH ONCE EVERY 8 HOURS    [DISCONTINUED] promethazine-dextromethorphan (PHENERGAN-DM) 6.25-15 mg/5 mL oral syrup Take 5 mL by mouth 4 (four) times a day as needed for cough     Allergies   Allergen Reactions    Aleve [Naproxen] Shortness Of Breath    Benzonatate Swelling     Throat closing    Guaifenesin Other (See Comments)     Note - patient states she IS able to take robitussin.    Latuda [Lurasidone] Swelling      stuttering     Imitrex [Sumatriptan]      Patient reports that this medication \"makes me sick\".     Immunization History   Administered Date(s) Administered    INFLUENZA 11/09/2009    Influenza, recombinant, quadrivalent,injectable, preservative free 11/30/2021    Pneumococcal Polysaccharide PPV23 04/15/2015    Tdap 10/17/2019    Tuberculin Skin Test-PPD Intradermal 10/12/2015, 11/30/2021, 12/13/2021, 12/27/2021       Objective     /80 (BP " "Location: Right arm, Patient Position: Sitting, Cuff Size: Standard)   Pulse 80   Temp 98.7 °F (37.1 °C) (Temporal)   Resp 16   Ht 5' 2\" (1.575 m)   Wt 96.6 kg (213 lb)   SpO2 99%   BMI 38.96 kg/m²     Physical Exam  Vitals and nursing note reviewed.   Constitutional:       General: She is not in acute distress.     Appearance: She is well-developed. She is not diaphoretic.   HENT:      Head: Normocephalic and atraumatic.      Right Ear: External ear normal.      Left Ear: External ear normal.   Eyes:      Conjunctiva/sclera: Conjunctivae normal.   Cardiovascular:      Rate and Rhythm: Normal rate and regular rhythm.      Heart sounds: Normal heart sounds.   Pulmonary:      Effort: Pulmonary effort is normal. No respiratory distress.      Breath sounds: Normal breath sounds. No wheezing.   Abdominal:      General: Bowel sounds are normal.      Palpations: Abdomen is soft.   Musculoskeletal:         General: Normal range of motion.      Cervical back: Normal range of motion and neck supple.   Skin:     General: Skin is warm and dry.      Capillary Refill: Capillary refill takes less than 2 seconds.   Neurological:      Mental Status: She is alert and oriented to person, place, and time.   Psychiatric:         Mood and Affect: Mood is depressed. Affect is tearful.       JUSTO Parekh    "

## 2024-01-26 NOTE — ASSESSMENT & PLAN NOTE
Lab Results   Component Value Date    [id number] 1.421 11/07/2023     Repeat TSH level   Continue with levothyroxine 88 mcg daily

## 2024-02-09 ENCOUNTER — HOSPITAL ENCOUNTER (OUTPATIENT)
Dept: MAMMOGRAPHY | Facility: CLINIC | Age: 49
End: 2024-02-09
Payer: COMMERCIAL

## 2024-02-09 VITALS — WEIGHT: 213 LBS | HEIGHT: 62 IN | BODY MASS INDEX: 39.2 KG/M2

## 2024-02-09 DIAGNOSIS — Z12.31 ENCOUNTER FOR SCREENING MAMMOGRAM FOR BREAST CANCER: ICD-10-CM

## 2024-02-09 PROCEDURE — 77067 SCR MAMMO BI INCL CAD: CPT

## 2024-02-09 PROCEDURE — 77063 BREAST TOMOSYNTHESIS BI: CPT

## 2024-02-27 DIAGNOSIS — E03.9 ACQUIRED HYPOTHYROIDISM: ICD-10-CM

## 2024-02-27 RX ORDER — LEVOTHYROXINE SODIUM 88 UG/1
88 TABLET ORAL DAILY
Qty: 30 TABLET | Refills: 0 | Status: SHIPPED | OUTPATIENT
Start: 2024-02-27

## 2024-03-11 DIAGNOSIS — K21.9 GASTROESOPHAGEAL REFLUX DISEASE WITHOUT ESOPHAGITIS: ICD-10-CM

## 2024-03-11 RX ORDER — OMEPRAZOLE 40 MG/1
40 CAPSULE, DELAYED RELEASE ORAL DAILY
Qty: 30 CAPSULE | Refills: 0 | Status: SHIPPED | OUTPATIENT
Start: 2024-03-11

## 2024-03-14 ENCOUNTER — ANNUAL EXAM (OUTPATIENT)
Dept: GYNECOLOGY | Facility: CLINIC | Age: 49
End: 2024-03-14
Payer: COMMERCIAL

## 2024-03-14 VITALS
SYSTOLIC BLOOD PRESSURE: 124 MMHG | WEIGHT: 215.4 LBS | DIASTOLIC BLOOD PRESSURE: 72 MMHG | HEIGHT: 62 IN | BODY MASS INDEX: 39.64 KG/M2

## 2024-03-14 DIAGNOSIS — Z01.419 ENCOUNTER FOR ANNUAL ROUTINE GYNECOLOGICAL EXAMINATION: Primary | ICD-10-CM

## 2024-03-14 DIAGNOSIS — A60.1 HERPES SIMPLEX INFECTION OF PERIANAL SKIN: ICD-10-CM

## 2024-03-14 DIAGNOSIS — Z12.31 ENCOUNTER FOR SCREENING MAMMOGRAM FOR BREAST CANCER: ICD-10-CM

## 2024-03-14 PROCEDURE — 99396 PREV VISIT EST AGE 40-64: CPT | Performed by: OBSTETRICS & GYNECOLOGY

## 2024-03-14 RX ORDER — BUPROPION HYDROCHLORIDE 200 MG/1
TABLET, EXTENDED RELEASE ORAL
COMMUNITY
Start: 2024-01-25

## 2024-03-14 RX ORDER — ARIPIPRAZOLE 30 MG/1
TABLET ORAL
COMMUNITY
Start: 2024-01-25

## 2024-03-14 NOTE — PROGRESS NOTES
Assessment/Plan:    pap is up to date    mammogram reviewed with her including breast density.  RX given for next year  Discussed self breast exams    colon cancer screening-referral placed for colonoscopy by her family doctor    HSV outbreak-she already called the pharmacy to refill her Valtrex and she is going to pick it up today.  She will take this for 3 days and if it is not resolved, she can extend it.  If she finds that she is having more frequent outbreaks, she will call and we can start daily suppression.    Offered condolences on the loss of her daughter.    discussed preventive care, regular exercise and a healthy diet      No problem-specific Assessment & Plan notes found for this encounter.       Diagnoses and all orders for this visit:    Encounter for annual routine gynecological examination    Encounter for screening mammogram for breast cancer  -     Mammo screening bilateral w 3d & cad; Future    Other orders  -     ARIPiprazole (ABILIFY) 30 mg tablet  -     buPROPion (WELLBUTRIN SR) 200 MG 12 hr tablet          Subjective:      Patient ID: Nika Shoemaker is a 48 y.o. female.    Patient here for yearly.  No menstrual cycles since her ablation 10 years ago.  Hot flashes not as severe as last year.  Mostly gets them at night.  A few days ago, she noticed a bump externally while showering.  It is tender.  No obvious drainage.      Normal 3D mammogram last month with scattered fibroglandular densities and average risk    Normal Pap, negative HPV and negative chlamydia and gonorrhea in February 2022      Status post tubal    She is under a great deal of stress as her daughter passed away almost a year ago and she is raising her 2-year-old granddaughter.  Also she feels that her other 2 grand children are being abused by their father.  She has the authorities involved.        The following portions of the patient's history were reviewed and updated as appropriate: allergies, current medications, past  "family history, past medical history, past social history, past surgical history, and problem list.    Review of Systems   Constitutional: Negative.    Gastrointestinal: Negative.    Genitourinary: Negative.         Vulvar \"bump\"         Objective:      /72 (BP Location: Left arm, Patient Position: Sitting)   Ht 5' 2\" (1.575 m)   Wt 97.7 kg (215 lb 6.4 oz)   BMI 39.40 kg/m²          Physical Exam  Vitals reviewed.   Constitutional:       Appearance: Normal appearance. She is well-developed.   Neck:      Thyroid: No thyromegaly.      Trachea: No tracheal deviation.   Cardiovascular:      Rate and Rhythm: Normal rate and regular rhythm.   Pulmonary:      Effort: Pulmonary effort is normal.      Breath sounds: Normal breath sounds.   Chest:   Breasts:     Breasts are symmetrical.      Right: No inverted nipple, mass, nipple discharge, skin change or tenderness.      Left: No inverted nipple, mass, nipple discharge, skin change or tenderness.   Abdominal:      General: There is no distension.      Palpations: Abdomen is soft. There is no mass.      Tenderness: There is no abdominal tenderness.   Genitourinary:     Labia:         Right: No rash, tenderness, lesion or injury.         Left: No rash, tenderness, lesion or injury.       Vagina: Normal.      Cervix: Normal. No cervical motion tenderness, discharge or friability.      Uterus: Normal.       Adnexa: Right adnexa normal and left adnexa normal.        Right: No mass, tenderness or fullness.          Left: No mass, tenderness or fullness.        Rectum: Normal.          Comments: Erythematous raised area on right vulva/buttock, no obvious drainage although 2 areas appear herpetic  Neurological:      Mental Status: She is alert.           "

## 2024-04-05 ENCOUNTER — TELEPHONE (OUTPATIENT)
Dept: FAMILY MEDICINE CLINIC | Facility: CLINIC | Age: 49
End: 2024-04-05

## 2024-04-09 DIAGNOSIS — I10 PRIMARY HYPERTENSION: ICD-10-CM

## 2024-04-15 RX ORDER — LISINOPRIL 10 MG/1
10 TABLET ORAL DAILY
Qty: 90 TABLET | Refills: 0 | Status: SHIPPED | OUTPATIENT
Start: 2024-04-15

## 2024-04-23 ENCOUNTER — OFFICE VISIT (OUTPATIENT)
Dept: NEUROLOGY | Facility: CLINIC | Age: 49
End: 2024-04-23
Payer: COMMERCIAL

## 2024-04-23 VITALS
BODY MASS INDEX: 39.2 KG/M2 | WEIGHT: 213 LBS | TEMPERATURE: 97.9 F | SYSTOLIC BLOOD PRESSURE: 119 MMHG | HEIGHT: 62 IN | DIASTOLIC BLOOD PRESSURE: 76 MMHG | HEART RATE: 63 BPM

## 2024-04-23 DIAGNOSIS — G43.009 MIGRAINE WITHOUT AURA AND WITHOUT STATUS MIGRAINOSUS, NOT INTRACTABLE: Primary | ICD-10-CM

## 2024-04-23 DIAGNOSIS — G89.29 OTHER CHRONIC BACK PAIN: ICD-10-CM

## 2024-04-23 DIAGNOSIS — M54.9 OTHER CHRONIC BACK PAIN: ICD-10-CM

## 2024-04-23 DIAGNOSIS — M79.18 MYOFASCIAL MUSCLE PAIN: ICD-10-CM

## 2024-04-23 PROCEDURE — 99214 OFFICE O/P EST MOD 30 MIN: CPT | Performed by: PHYSICIAN ASSISTANT

## 2024-04-23 RX ORDER — LIDOCAINE 50 MG/G
OINTMENT TOPICAL AS NEEDED
Qty: 50 G | Refills: 2 | Status: SHIPPED | OUTPATIENT
Start: 2024-04-23

## 2024-04-23 RX ORDER — ONDANSETRON 4 MG/1
4 TABLET, ORALLY DISINTEGRATING ORAL EVERY 8 HOURS PRN
Qty: 20 TABLET | Refills: 2 | Status: SHIPPED | OUTPATIENT
Start: 2024-04-23

## 2024-04-23 RX ORDER — TOPIRAMATE 50 MG/1
TABLET, FILM COATED ORAL
Qty: 270 TABLET | Refills: 0 | Status: SHIPPED | OUTPATIENT
Start: 2024-04-23
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
## 2024-04-26 ENCOUNTER — OFFICE VISIT (OUTPATIENT)
Dept: FAMILY MEDICINE CLINIC | Facility: CLINIC | Age: 49
End: 2024-04-26

## 2024-04-26 VITALS
HEART RATE: 73 BPM | OXYGEN SATURATION: 99 % | SYSTOLIC BLOOD PRESSURE: 107 MMHG | BODY MASS INDEX: 39.56 KG/M2 | RESPIRATION RATE: 16 BRPM | HEIGHT: 62 IN | TEMPERATURE: 98 F | WEIGHT: 215 LBS | DIASTOLIC BLOOD PRESSURE: 73 MMHG

## 2024-04-26 DIAGNOSIS — F17.200 TOBACCO DEPENDENCE: ICD-10-CM

## 2024-04-26 DIAGNOSIS — M54.40 CHRONIC BILATERAL LOW BACK PAIN WITH SCIATICA, SCIATICA LATERALITY UNSPECIFIED: ICD-10-CM

## 2024-04-26 DIAGNOSIS — E53.8 B12 DEFICIENCY: ICD-10-CM

## 2024-04-26 DIAGNOSIS — E55.9 VITAMIN D DEFICIENCY: ICD-10-CM

## 2024-04-26 DIAGNOSIS — G89.29 CHRONIC BILATERAL LOW BACK PAIN WITH BILATERAL SCIATICA: ICD-10-CM

## 2024-04-26 DIAGNOSIS — G89.29 CHRONIC BILATERAL LOW BACK PAIN WITH SCIATICA, SCIATICA LATERALITY UNSPECIFIED: ICD-10-CM

## 2024-04-26 DIAGNOSIS — E03.9 ACQUIRED HYPOTHYROIDISM: ICD-10-CM

## 2024-04-26 DIAGNOSIS — M54.42 CHRONIC BILATERAL LOW BACK PAIN WITH BILATERAL SCIATICA: ICD-10-CM

## 2024-04-26 DIAGNOSIS — I10 BENIGN ESSENTIAL HYPERTENSION: Primary | ICD-10-CM

## 2024-04-26 DIAGNOSIS — J45.20 MILD INTERMITTENT ASTHMA WITHOUT COMPLICATION: ICD-10-CM

## 2024-04-26 DIAGNOSIS — J45.41 MODERATE PERSISTENT ASTHMA WITH ACUTE EXACERBATION: ICD-10-CM

## 2024-04-26 DIAGNOSIS — M54.41 CHRONIC BILATERAL LOW BACK PAIN WITH BILATERAL SCIATICA: ICD-10-CM

## 2024-04-26 DIAGNOSIS — E78.2 MIXED HYPERLIPIDEMIA: ICD-10-CM

## 2024-04-26 PROBLEM — M54.9 NOTALGIA: Status: ACTIVE | Noted: 2024-04-26

## 2024-04-26 PROBLEM — D64.9 ANEMIA: Status: ACTIVE | Noted: 2024-04-26

## 2024-04-26 PROBLEM — M79.18 MYOFASCIAL MUSCLE PAIN: Status: ACTIVE | Noted: 2024-04-26

## 2024-04-26 PROBLEM — G56.01 RIGHT CARPAL TUNNEL SYNDROME: Status: RESOLVED | Noted: 2018-12-26 | Resolved: 2024-04-26

## 2024-04-26 PROBLEM — D64.9 ANEMIA: Status: RESOLVED | Noted: 2024-04-26 | Resolved: 2024-04-26

## 2024-04-26 PROCEDURE — 99214 OFFICE O/P EST MOD 30 MIN: CPT | Performed by: NURSE PRACTITIONER

## 2024-04-26 PROCEDURE — 3074F SYST BP LT 130 MM HG: CPT | Performed by: NURSE PRACTITIONER

## 2024-04-26 PROCEDURE — 3078F DIAST BP <80 MM HG: CPT | Performed by: NURSE PRACTITIONER

## 2024-04-26 PROCEDURE — 96372 THER/PROPH/DIAG INJ SC/IM: CPT | Performed by: NURSE PRACTITIONER

## 2024-04-26 RX ORDER — ALBUTEROL SULFATE 90 UG/1
2 AEROSOL, METERED RESPIRATORY (INHALATION) EVERY 4 HOURS PRN
Qty: 18 G | Refills: 0 | Status: SHIPPED | OUTPATIENT
Start: 2024-04-26

## 2024-04-26 RX ORDER — CYANOCOBALAMIN 1000 UG/ML
1000 INJECTION, SOLUTION INTRAMUSCULAR; SUBCUTANEOUS
Status: SHIPPED | OUTPATIENT
Start: 2024-04-26

## 2024-04-26 RX ADMIN — CYANOCOBALAMIN 1000 MCG: 1000 INJECTION, SOLUTION INTRAMUSCULAR; SUBCUTANEOUS at 16:52

## 2024-04-26 NOTE — PROGRESS NOTES
Name: Nika Shoemaker      : 1975      MRN: [id number]  Encounter Provider: JUSTO Parekh  Encounter Date: 2024   Encounter department: [facility]    Assessment & Plan     1. Benign essential hypertension  Assessment & Plan:  Within goal  Continue lisinopril 10 mg daily and amlodipine 2.5 mg daily, metoprolol 25 mg bid  Follow up with cardiology     Orders:  -     Comprehensive metabolic panel; Future  -     CBC and differential; Future    2. Mild intermittent asthma without complication  Assessment & Plan:  Stable, continue current regimen   Continue Advair HFA , albuterol PRN         3. Acquired hypothyroidism  Assessment & Plan:  Lab Results   Component Value Date    [id number] 1.421 2023     Repeat TSH level   Continue with levothyroxine 88 mcg daily     Orders:  -     TSH, 3rd generation with Free T4 reflex; Future    4. Tobacco dependence  Assessment & Plan:  Quit smoking > 1 year ago       5. Mixed hyperlipidemia  Assessment & Plan:  Lab Results   Component Value Date    CHOLESTEROL 199 2022    CHOLESTEROL 183 2022    CHOLESTEROL 195 2020     Lab Results   Component Value Date    HDL 47 (L) 2022    HDL 44 (L) 2022    HDL 41 2020     Lab Results   Component Value Date    TRIG 88 2022    TRIG 166 (H) 2022    TRIG 238 (H) 2020     Lab Results   Component Value Date    NONHDLC 152 2022    NONHDLC 139 2022     Lab Results   Component Value Date    LDLCALC 134 (H) 2022     ASCVD score 1.4%  Recommend heart healthy diet, exercise, weight loss       6. BMI 40.0-44.9, adult (HCC)  Assessment & Plan:  Wt Readings from Last 3 Encounters:   24 97.5 kg (215 lb)   24 96.6 kg (213 lb)   24 97.7 kg (215 lb 6.4 oz)     -Encouraged diet and lifestyle changes: decrease processed foods (cakes, cookies, chips, soda), decrease total carbohydrate intake, decrease fried/fatty  foods, increase fruits and vegetables, increase lean proteins (chicken, turkey), increase healthy fats (avocado, fish, nuts), drink plenty of water (at least four 16 oz bottles per day)        7. Vitamin D deficiency  Assessment & Plan:  Continue daily supplement     Orders:  -     cholecalciferol 1,000 units tablet; Take 2 tablets (2,000 Units total) by mouth daily    8. B12 deficiency  Assessment & Plan:  Start oral B12 daily     Orders:  -     cyanocobalamin injection 1,000 mcg    9. Chronic bilateral low back pain with sciatica, sciatica laterality unspecified  Assessment & Plan:  Patient with chronic midline thoracic and bilateral lumbar pain with bilateral radiation x >1 year, previously had imaging ordered but did not complete, will re-enter orders   There are no red flag sx/sx   Recommend continue with acetaminophen, ibuprofen PRN   Referral to PT    Orders:  -     XR spine lumbar minimum 4 views non injury; Future; Expected date: 04/26/2024  -     XR spine thoracic 3 vw; Future; Expected date: 04/26/2024  -     Ambulatory Referral to Physical Therapy; Future    10. Chronic bilateral low back pain with bilateral sciatica  Assessment & Plan:  Patient with chronic midline thoracic and bilateral lumbar pain with bilateral radiation x >1 year, previously had imaging ordered but did not complete, will re-enter orders   There are no red flag sx/sx   Recommend continue with acetaminophen, ibuprofen PRN   Referral to PT    Orders:  -     Ambulatory Referral to Physical Therapy; Future           Subjective     Nika Shoemaker is a 49 y.o. female who  has a past medical history of Anxiety, Asthma, Bipolar disorder ([facility]), Depression, Disease of thyroid gland, Endometriosis, Psychiatric illness, Psychosis ([facility]), PTSD (post-traumatic stress disorder), Right carpal tunnel syndrome, Self-injurious behavior, and Suicide attempt ([facility]). who presented to the office today for follow up.       The following portions of the  patient's history were reviewed and updated as appropriate: allergies, current medications, past family history, past medical history, past social history, past surgical history and problem list.      Back Pain  This is a recurrent problem. The current episode started in the past 7 days. The problem occurs intermittently. The problem has been waxing and waning since onset. The pain is present in the lumbar spine and thoracic spine. The quality of the pain is described as aching and burning. The pain radiates to the left thigh and right thigh. The pain is at a severity of 8/10. The pain is moderate. The pain is Worse during the day. The symptoms are aggravated by bending, sitting and twisting. Pertinent negatives include no abdominal pain, chest pain, dysuria, fever, headaches, numbness or weakness. Risk factors include lack of exercise, obesity, poor posture and sedentary lifestyle. She has tried analgesics and NSAIDs for the symptoms. The treatment provided mild relief.     Review of Systems   Constitutional:  Negative for activity change, appetite change, chills, fatigue, fever and unexpected weight change.   HENT:  Negative for hearing loss, nosebleeds, sinus pain, sneezing, sore throat and trouble swallowing.    Eyes:  Negative for photophobia and visual disturbance.   Respiratory:  Negative for cough, chest tightness, shortness of breath and wheezing.    Cardiovascular:  Negative for chest pain, palpitations and leg swelling.   Gastrointestinal:  Negative for abdominal pain, constipation, nausea and vomiting.   Genitourinary:  Negative for decreased urine volume, difficulty urinating, dysuria, flank pain, genital sores, hematuria and urgency.   Musculoskeletal:  Positive for back pain. Negative for gait problem.   Skin:  Negative for pallor, rash and wound.   Neurological:  Negative for dizziness, seizures, syncope, weakness, numbness and headaches.   Hematological:  Negative for adenopathy. Does not  bruise/bleed easily.   Psychiatric/Behavioral:  Negative for confusion, hallucinations, self-injury, sleep disturbance and suicidal ideas. The patient is not nervous/anxious.        Past Medical History:   Diagnosis Date    Anxiety     Asthma     Bipolar disorder (HCC)     Depression     Disease of thyroid gland     Endometriosis     Psychiatric illness     Psychosis (HCC)     PTSD (post-traumatic stress disorder) 6/6/2017    Right carpal tunnel syndrome 12/26/2018    Self-injurious behavior     Suicide attempt ([facility])      Past Surgical History:   Procedure Laterality Date    ABDOMINAL SURGERY      HERNIA REPAIR      LAPAROSCOPIC TUBAL LIGATION      TUBAL LIGATION       Family History   Problem Relation Age of Onset    Hypertension Mother     Asthma Mother     Fibromyalgia Mother     Osteoporosis Mother     Cancer Mother     No Known Problems Father     No Known Problems Sister     No Known Problems Daughter     No Known Problems Maternal Grandmother     No Known Problems Maternal Grandfather     No Known Problems Paternal Grandmother     No Known Problems Paternal Grandfather     Thyroid disease Brother     Breast cancer Maternal Aunt 65    Breast cancer Maternal Aunt 55     Social History     Socioeconomic History    Marital status: Single     Spouse name: None    Number of children: None    Years of education: None    Highest education level: None   Occupational History    None   Tobacco Use    Smoking status: Former     Current packs/day: 0.00     Types: Cigarettes     Quit date: 4/1/2021     Years since quitting: 3.0     Passive exposure: Never    Smokeless tobacco: Never   Vaping Use    Vaping status: Never Used   Substance and Sexual Activity    Alcohol use: No    Drug use: No    Sexual activity: None   Other Topics Concern    None   Social History Narrative    Flu shot:no     Social Determinants of Health     Financial Resource Strain: Medium Risk (4/19/2024)    Overall Financial Resource Strain (CARDIA)      Difficulty of Paying Living Expenses: Somewhat hard   Food Insecurity: Food Insecurity Present (4/19/2024)    Hunger Vital Sign     Worried About Running Out of Food in the Last Year: Sometimes true     Ran Out of Food in the Last Year: Sometimes true   Transportation Needs: No Transportation Needs (4/19/2024)    PRAPARE - Transportation     Lack of Transportation (Medical): No     Lack of Transportation (Non-Medical): No   Physical Activity: Not on file   Stress: Not on file   Social Connections: Not on file   Intimate Partner Violence: Not on file   Housing Stability: Unknown (4/19/2024)    Housing Stability Vital Sign     Unable to Pay for Housing in the Last Year: No     Number of Places Lived in the Last Year: Not on file     Unstable Housing in the Last Year: No     Current Outpatient Medications on File Prior to Visit   Medication Sig    acyclovir (ZOVIRAX) 5 % ointment Apply topically every 3 (three) hours (Patient not taking: Reported on 4/23/2024)    albuterol (PROVENTIL HFA,VENTOLIN HFA) 90 mcg/act inhaler INHALE 2 PUFFS EVERY 4 HOURS AS NEEDED FOR WHEEZING    amLODIPine (NORVASC) 2.5 mg tablet Take 1 tablet (2.5 mg total) by mouth daily    ARIPiprazole (ABILIFY) 10 mg tablet Take 10 mg by mouth daily    ARIPiprazole (ABILIFY) 30 mg tablet     bisacodyl (DULCOLAX) 5 mg EC tablet Use as directed by GI office for colonoscopy (Patient not taking: Reported on 4/23/2024)    buPROPion (WELLBUTRIN SR) 150 mg 12 hr tablet Take 150 mg by mouth in the morning    buPROPion (WELLBUTRIN SR) 200 MG 12 hr tablet     busPIRone (BUSPAR) 15 mg tablet Take 15 mg by mouth daily as needed    busPIRone (BUSPAR) 5 mg tablet Take 5 mg by mouth 2 (two) times a day    cetirizine (ZyrTEC) 10 mg tablet TAKE ONE TABLET BY MOUTH EVERY DAY.    clonazePAM (KlonoPIN) 0.5 mg tablet Take 0.5 mg by mouth daily at bedtime    dicyclomine (BENTYL) 20 mg tablet Take 1 tablet (20 mg total) by mouth every 6 (six) hours As needed for abdominal  pain    DULoxetine (CYMBALTA) 60 mg delayed release capsule Take 60 mg by mouth daily    fexofenadine (ALLEGRA) 180 MG tablet Take 1 tablet (180 mg total) by mouth daily (Patient not taking: Reported on 3/22/2023)    fluticasone (FLONASE) 50 mcg/act nasal spray USE 1 SPRAY INTO EACH NOSTRIL DAILY    fluticasone-salmeterol (Advair HFA) 115-21 MCG/ACT inhaler Inhale 2 puffs 2 (two) times a day Rinse mouth after use.    guaiFENesin 200 MG tablet Take 1 tablet (200 mg total) by mouth every 4 (four) hours as needed for congestion (Patient not taking: Reported on 4/23/2024)    ipratropium-albuterol (DUO-NEB) 0.5-2.5 mg/3 mL nebulizer solution Take 3 mL by nebulization every 6 (six) hours as needed for wheezing or shortness of breath (Patient not taking: Reported on 4/23/2024)    levothyroxine 88 mcg tablet Take 1 tablet (88 mcg total) by mouth daily    lidocaine (XYLOCAINE) 5 % ointment Apply topically as needed for mild pain    lisinopril (ZESTRIL) 10 mg tablet Take 1 tablet (10 mg total) by mouth daily    nitroglycerin (NITROSTAT) 0.4 mg SL tablet Place 1 tablet (0.4 mg total) under the tongue every 5 (five) minutes as needed for chest pain (Patient not taking: Reported on 4/23/2024)    omeprazole (PriLOSEC) 40 MG capsule TAKE 1 CAPSULE BY MOUTH DAILY (Patient not taking: Reported on 4/23/2024)    ondansetron (ZOFRAN-ODT) 4 mg disintegrating tablet Take 1 tablet (4 mg total) by mouth every 8 (eight) hours as needed for nausea or vomiting    polyethylene glycol (GLYCOLAX) 17 GM/SCOOP powder Take as directed by GI office for colonoscopy (Patient not taking: Reported on 4/23/2024)    simethicone (MYLICON) 125 MG chewable tablet Chew 1 tablet (125 mg total) every 6 (six) hours as needed for flatulence (Patient not taking: Reported on 4/23/2024)    topiramate (TOPAMAX) 50 MG tablet 3 tabs qd or 150 mg daily    valACYclovir (VALTREX) 500 mg tablet Take 1 tablet (500 mg total) by mouth 2 (two) times a day for 3 days     "[DISCONTINUED] cholecalciferol (VITAMIN D3) 1,000 units tablet Take 2 tablets (2,000 Units total) by mouth daily (Patient taking differently: Take 1,000 Units by mouth daily)     Allergies   Allergen Reactions    Aleve [Naproxen] Shortness Of Breath    Benzonatate Swelling     Throat closing    Guaifenesin Other (See Comments)     Note - patient states she IS able to take robitussin.    Latuda [Lurasidone] Swelling      stuttering     Imitrex [Sumatriptan]      Patient reports that this medication \"makes me sick\".     Immunization History   Administered Date(s) Administered    INFLUENZA 11/09/2009    Influenza, recombinant, quadrivalent,injectable, preservative free 11/30/2021    Pneumococcal Polysaccharide PPV23 04/15/2015    Tdap 10/17/2019    Tuberculin Skin Test-PPD Intradermal 10/12/2015, 11/30/2021, 12/13/2021, 12/27/2021       Objective     /73 (BP Location: Right arm, Patient Position: Sitting, Cuff Size: Large)   Pulse 73   Temp 98 °F (36.7 °C) (Temporal)   Resp 16   Ht 5' 2\" (1.575 m)   Wt 97.5 kg (215 lb)   SpO2 99%   BMI 39.32 kg/m²     Physical Exam  Vitals and nursing note reviewed.   Constitutional:       General: She is not in acute distress.     Appearance: She is well-developed. She is not diaphoretic.   HENT:      Head: Normocephalic and atraumatic.      Right Ear: External ear normal.      Left Ear: External ear normal.   Eyes:      General:         Right eye: No discharge.         Left eye: No discharge.      Conjunctiva/sclera: Conjunctivae normal.   Cardiovascular:      Rate and Rhythm: Normal rate and regular rhythm.   Pulmonary:      Effort: Pulmonary effort is normal. No respiratory distress.      Breath sounds: Normal breath sounds. No wheezing.   Abdominal:      General: Bowel sounds are normal. There is no distension.      Palpations: Abdomen is soft.      Tenderness: There is no abdominal tenderness.   Musculoskeletal:         General: No deformity.      Cervical back: " Normal range of motion and neck supple.      Thoracic back: Tenderness present. No spasms or bony tenderness. Normal range of motion.      Lumbar back: Tenderness present. Normal range of motion. Positive right straight leg raise test and positive left straight leg raise test.      Comments: +NVI   Lymphadenopathy:      Cervical: No cervical adenopathy.   Skin:     General: Skin is warm and dry.      Capillary Refill: Capillary refill takes less than 2 seconds.      Findings: No rash.   Neurological:      General: No focal deficit present.      Mental Status: She is alert and oriented to person, place, and time.      Sensory: No sensory deficit.      Coordination: Coordination normal.      Deep Tendon Reflexes: Reflexes normal.   Psychiatric:         Mood and Affect: Mood normal.         Behavior: Behavior normal.       JUSTO Parekh

## 2024-04-26 NOTE — ASSESSMENT & PLAN NOTE
Wt Readings from Last 3 Encounters:   04/26/24 97.5 kg (215 lb)   04/23/24 96.6 kg (213 lb)   03/14/24 97.7 kg (215 lb 6.4 oz)     -Encouraged diet and lifestyle changes: decrease processed foods (cakes, cookies, chips, soda), decrease total carbohydrate intake, decrease fried/fatty foods, increase fruits and vegetables, increase lean proteins (chicken, turkey), increase healthy fats (avocado, fish, nuts), drink plenty of water (at least four 16 oz bottles per day)

## 2024-04-26 NOTE — ASSESSMENT & PLAN NOTE
Lab Results   Component Value Date    [id number] 1.421 11/07/2023     Repeat TSH level   Continue with levothyroxine 88 mcg daily

## 2024-04-26 NOTE — ASSESSMENT & PLAN NOTE
Patient with chronic midline thoracic and bilateral lumbar pain with bilateral radiation x >1 year, previously had imaging ordered but did not complete, will re-enter orders   There are no red flag sx/sx   Recommend continue with acetaminophen, ibuprofen PRN   Referral to PT

## 2024-04-26 NOTE — ASSESSMENT & PLAN NOTE
Lab Results   Component Value Date    CHOLESTEROL 199 09/06/2022    CHOLESTEROL 183 03/21/2022    CHOLESTEROL 195 02/17/2020     Lab Results   Component Value Date    HDL 47 (L) 09/06/2022    HDL 44 (L) 03/21/2022    HDL 41 02/17/2020     Lab Results   Component Value Date    TRIG 88 09/06/2022    TRIG 166 (H) 03/21/2022    TRIG 238 (H) 02/17/2020     Lab Results   Component Value Date    NONHDLC 152 09/06/2022    NONHDLC 139 03/21/2022     Lab Results   Component Value Date    LDLCALC 134 (H) 09/06/2022     ASCVD score 1.4%  Recommend heart healthy diet, exercise, weight loss

## 2024-04-29 ENCOUNTER — TELEPHONE (OUTPATIENT)
Dept: FAMILY MEDICINE CLINIC | Facility: CLINIC | Age: 49
End: 2024-04-29

## 2024-04-29 NOTE — TELEPHONE ENCOUNTER
[employer] Medical certification form was signed by PCP and faxed. Confirmation received and scanned into chart. Patient was made aware.

## 2024-05-16 DIAGNOSIS — J45.41 MODERATE PERSISTENT ASTHMA WITH ACUTE EXACERBATION: ICD-10-CM

## 2024-05-16 RX ORDER — CETIRIZINE HYDROCHLORIDE 10 MG/1
10 TABLET ORAL DAILY
Qty: 90 TABLET | Refills: 0 | Status: SHIPPED | OUTPATIENT
Start: 2024-05-16

## 2024-05-22 DIAGNOSIS — E03.9 ACQUIRED HYPOTHYROIDISM: ICD-10-CM

## 2024-05-22 DIAGNOSIS — I10 PRIMARY HYPERTENSION: ICD-10-CM

## 2024-05-22 DIAGNOSIS — K21.9 GASTROESOPHAGEAL REFLUX DISEASE WITHOUT ESOPHAGITIS: ICD-10-CM

## 2024-05-22 RX ORDER — OMEPRAZOLE 40 MG/1
40 CAPSULE, DELAYED RELEASE ORAL DAILY
Qty: 30 CAPSULE | Refills: 0 | Status: SHIPPED | OUTPATIENT
Start: 2024-05-22

## 2024-05-22 RX ORDER — LEVOTHYROXINE SODIUM 88 UG/1
88 TABLET ORAL DAILY
Qty: 30 TABLET | Refills: 0 | Status: SHIPPED | OUTPATIENT
Start: 2024-05-22

## 2024-05-30 ENCOUNTER — APPOINTMENT (OUTPATIENT)
Dept: LAB | Facility: CLINIC | Age: 49
End: 2024-05-30
Payer: COMMERCIAL

## 2024-05-30 ENCOUNTER — CLINICAL SUPPORT (OUTPATIENT)
Dept: FAMILY MEDICINE CLINIC | Facility: CLINIC | Age: 49
End: 2024-05-30

## 2024-05-30 DIAGNOSIS — E53.8 B12 DEFICIENCY: Primary | ICD-10-CM

## 2024-05-30 DIAGNOSIS — I10 BENIGN ESSENTIAL HYPERTENSION: ICD-10-CM

## 2024-05-30 DIAGNOSIS — E03.9 ACQUIRED HYPOTHYROIDISM: ICD-10-CM

## 2024-05-30 LAB
ALBUMIN SERPL BCP-MCNC: 4.3 G/DL (ref 3.5–5)
ALP SERPL-CCNC: 80 U/L (ref 34–104)
ALT SERPL W P-5'-P-CCNC: 18 U/L (ref 7–52)
ANION GAP SERPL CALCULATED.3IONS-SCNC: 10 MMOL/L (ref 4–13)
AST SERPL W P-5'-P-CCNC: 18 U/L (ref 13–39)
BASOPHILS # BLD AUTO: 0.06 THOUSANDS/ÂΜL (ref 0–0.1)
BASOPHILS NFR BLD AUTO: 1 % (ref 0–1)
BILIRUB SERPL-MCNC: 0.21 MG/DL (ref 0.2–1)
BUN SERPL-MCNC: 18 MG/DL (ref 5–25)
CALCIUM SERPL-MCNC: 8.9 MG/DL (ref 8.4–10.2)
CHLORIDE SERPL-SCNC: 110 MMOL/L (ref 96–108)
CO2 SERPL-SCNC: 22 MMOL/L (ref 21–32)
CREAT SERPL-MCNC: 1.06 MG/DL (ref 0.6–1.3)
EOSINOPHIL # BLD AUTO: 0.43 THOUSAND/ÂΜL (ref 0–0.61)
EOSINOPHIL NFR BLD AUTO: 6 % (ref 0–6)
ERYTHROCYTE [DISTWIDTH] IN BLOOD BY AUTOMATED COUNT: 13.3 % (ref 11.6–15.1)
GFR SERPL CREATININE-BSD FRML MDRD: 62 ML/MIN/1.73SQ M
GLUCOSE P FAST SERPL-MCNC: 86 MG/DL (ref 65–99)
HCT VFR BLD AUTO: 40.4 % (ref 34.8–46.1)
HGB BLD-MCNC: 12.9 G/DL (ref 11.5–15.4)
IMM GRANULOCYTES # BLD AUTO: 0.02 THOUSAND/UL (ref 0–0.2)
IMM GRANULOCYTES NFR BLD AUTO: 0 % (ref 0–2)
LYMPHOCYTES # BLD AUTO: 1.97 THOUSANDS/ÂΜL (ref 0.6–4.47)
LYMPHOCYTES NFR BLD AUTO: 29 % (ref 14–44)
MCH RBC QN AUTO: 29.2 PG (ref 26.8–34.3)
MCHC RBC AUTO-ENTMCNC: 31.9 G/DL (ref 31.4–37.4)
MCV RBC AUTO: 91 FL (ref 82–98)
MONOCYTES # BLD AUTO: 0.47 THOUSAND/ÂΜL (ref 0.17–1.22)
MONOCYTES NFR BLD AUTO: 7 % (ref 4–12)
NEUTROPHILS # BLD AUTO: 3.76 THOUSANDS/ÂΜL (ref 1.85–7.62)
NEUTS SEG NFR BLD AUTO: 57 % (ref 43–75)
NRBC BLD AUTO-RTO: 0 /100 WBCS
PLATELET # BLD AUTO: 226 THOUSANDS/UL (ref 149–390)
PMV BLD AUTO: 10.7 FL (ref 8.9–12.7)
POTASSIUM SERPL-SCNC: 3.9 MMOL/L (ref 3.5–5.3)
PROT SERPL-MCNC: 7.5 G/DL (ref 6.4–8.4)
RBC # BLD AUTO: 4.42 MILLION/UL (ref 3.81–5.12)
SODIUM SERPL-SCNC: 142 MMOL/L (ref 135–147)
TSH SERPL DL<=0.05 MIU/L-ACNC: 0.75 UIU/ML (ref 0.45–4.5)
WBC # BLD AUTO: 6.71 THOUSAND/UL (ref 4.31–10.16)

## 2024-05-30 PROCEDURE — 85025 COMPLETE CBC W/AUTO DIFF WBC: CPT

## 2024-05-30 PROCEDURE — 36415 COLL VENOUS BLD VENIPUNCTURE: CPT

## 2024-05-30 PROCEDURE — 84443 ASSAY THYROID STIM HORMONE: CPT

## 2024-05-30 PROCEDURE — 80053 COMPREHEN METABOLIC PANEL: CPT

## 2024-05-30 PROCEDURE — 96372 THER/PROPH/DIAG INJ SC/IM: CPT

## 2024-05-30 RX ORDER — CYANOCOBALAMIN 1000 UG/ML
1000 INJECTION, SOLUTION INTRAMUSCULAR; SUBCUTANEOUS
Status: SHIPPED | OUTPATIENT
Start: 2024-05-30

## 2024-05-30 RX ADMIN — CYANOCOBALAMIN 1000 MCG: 1000 INJECTION, SOLUTION INTRAMUSCULAR; SUBCUTANEOUS at 08:40

## 2024-05-30 RX ADMIN — CYANOCOBALAMIN 1000 MCG: 1000 INJECTION, SOLUTION INTRAMUSCULAR; SUBCUTANEOUS at 08:50

## 2024-06-10 RX ORDER — LISINOPRIL 10 MG/1
10 TABLET ORAL DAILY
Qty: 90 TABLET | Refills: 1 | Status: SHIPPED | OUTPATIENT
Start: 2024-06-10

## 2024-06-22 DIAGNOSIS — E03.9 ACQUIRED HYPOTHYROIDISM: ICD-10-CM

## 2024-06-24 RX ORDER — LEVOTHYROXINE SODIUM 88 UG/1
88 TABLET ORAL DAILY
Qty: 30 TABLET | Refills: 0 | Status: SHIPPED | OUTPATIENT
Start: 2024-06-24

## 2024-06-28 ENCOUNTER — CLINICAL SUPPORT (OUTPATIENT)
Dept: FAMILY MEDICINE CLINIC | Facility: CLINIC | Age: 49
End: 2024-06-28

## 2024-06-28 DIAGNOSIS — E53.8 B12 DEFICIENCY: Primary | ICD-10-CM

## 2024-06-28 PROCEDURE — 96372 THER/PROPH/DIAG INJ SC/IM: CPT

## 2024-06-28 RX ADMIN — CYANOCOBALAMIN 1000 MCG: 1000 INJECTION, SOLUTION INTRAMUSCULAR; SUBCUTANEOUS at 13:03

## 2024-07-22 DIAGNOSIS — E03.9 ACQUIRED HYPOTHYROIDISM: ICD-10-CM

## 2024-07-22 DIAGNOSIS — I10 PRIMARY HYPERTENSION: ICD-10-CM

## 2024-07-23 RX ORDER — LISINOPRIL 10 MG/1
10 TABLET ORAL DAILY
Qty: 90 TABLET | Refills: 0 | Status: SHIPPED | OUTPATIENT
Start: 2024-07-23

## 2024-07-23 RX ORDER — LEVOTHYROXINE SODIUM 88 UG/1
88 TABLET ORAL DAILY
Qty: 30 TABLET | Refills: 0 | Status: SHIPPED | OUTPATIENT
Start: 2024-07-23

## 2024-07-23 NOTE — TELEPHONE ENCOUNTER
Refill must be reviewed and completed by the office or provider. The refill is unable to be approved or denied by the medication management team.      Last seen 03.2023 - Please review to see if the refill is appropriate.

## 2024-07-29 ENCOUNTER — CLINICAL SUPPORT (OUTPATIENT)
Dept: FAMILY MEDICINE CLINIC | Facility: CLINIC | Age: 49
End: 2024-07-29

## 2024-07-29 DIAGNOSIS — E53.8 B12 DEFICIENCY: Primary | ICD-10-CM

## 2024-07-29 PROCEDURE — 96372 THER/PROPH/DIAG INJ SC/IM: CPT

## 2024-07-29 RX ADMIN — CYANOCOBALAMIN 1000 MCG: 1000 INJECTION, SOLUTION INTRAMUSCULAR; SUBCUTANEOUS at 15:23

## 2024-08-01 ENCOUNTER — OFFICE VISIT (OUTPATIENT)
Dept: FAMILY MEDICINE CLINIC | Facility: CLINIC | Age: 49
End: 2024-08-01

## 2024-08-01 VITALS
OXYGEN SATURATION: 98 % | HEART RATE: 84 BPM | BODY MASS INDEX: 38.98 KG/M2 | TEMPERATURE: 97.3 F | WEIGHT: 211.8 LBS | SYSTOLIC BLOOD PRESSURE: 113 MMHG | RESPIRATION RATE: 16 BRPM | HEIGHT: 62 IN | DIASTOLIC BLOOD PRESSURE: 78 MMHG

## 2024-08-01 DIAGNOSIS — J45.20 MILD INTERMITTENT ASTHMA WITHOUT COMPLICATION: ICD-10-CM

## 2024-08-01 DIAGNOSIS — I10 BENIGN ESSENTIAL HYPERTENSION: Primary | ICD-10-CM

## 2024-08-01 DIAGNOSIS — F17.200 TOBACCO DEPENDENCE: ICD-10-CM

## 2024-08-01 DIAGNOSIS — E53.8 B12 DEFICIENCY: ICD-10-CM

## 2024-08-01 PROCEDURE — 3078F DIAST BP <80 MM HG: CPT | Performed by: NURSE PRACTITIONER

## 2024-08-01 PROCEDURE — 3074F SYST BP LT 130 MM HG: CPT | Performed by: NURSE PRACTITIONER

## 2024-08-01 PROCEDURE — 99214 OFFICE O/P EST MOD 30 MIN: CPT | Performed by: NURSE PRACTITIONER

## 2024-08-01 NOTE — ASSESSMENT & PLAN NOTE
Continue lisinopril 10 mg daily and amlodipine 2.5 mg daily, metoprolol 25 mg bid  Follow up with cardiology

## 2024-08-01 NOTE — PROGRESS NOTES
Ambulatory Visit  Name: Nika Shoemaker      : 1975      MRN: [id number]  Encounter Provider: JUSTO Parekh  Encounter Date: 2024   Encounter department: [facility]    Assessment & Plan   1. Benign essential hypertension  Assessment & Plan:    Continue lisinopril 10 mg daily and amlodipine 2.5 mg daily, metoprolol 25 mg bid  Follow up with cardiology   2. Mild intermittent asthma without complication  Assessment & Plan:  Stable, continue current regimen   Continue Advair HFA , albuterol PRN     3. Tobacco dependence  Assessment & Plan:  Quit smoking > 1 year ago   4. BMI 40.0-44.9, adult ([facility])  Assessment & Plan:  Wt Readings from Last 3 Encounters:   24 96.1 kg (211 lb 12.8 oz)   24 97.5 kg (215 lb)   24 96.6 kg (213 lb)     -continue Zepound as prescribed by Weight management   -Encouraged diet and lifestyle changes: decrease processed foods (cakes, cookies, chips, soda), decrease total carbohydrate intake, decrease fried/fatty foods, increase fruits and vegetables, increase lean proteins (chicken, turkey), increase healthy fats (avocado, fish, nuts), drink plenty of water (at least four 16 oz bottles per day)    Orders:  -     CBC and differential; Future  -     Comprehensive metabolic panel; Future  -     Hemoglobin A1C; Future  -     Lipid panel; Future  5. B12 deficiency  -     Vitamin B12; Future         History of Present Illness     Nika Shoemaker is a 49 y.o. female who  has a past medical history of Anxiety, Asthma, Bipolar disorder ([facility]), Depression, Disease of thyroid gland, Endometriosis, Psychiatric illness, Psychosis ([facility]), PTSD (post-traumatic stress disorder), Right carpal tunnel syndrome, Self-injurious behavior, and Suicide attempt ([facility]). who presented to the office today for follow up.      *She started taking Zepound prescribed through Dr. Amor 's office. Currently doing well w/o side effects.      The following  portions of the patient's history were reviewed and updated as appropriate: allergies, current medications, past family history, past medical history, past social history, past surgical history and problem list.        Review of Systems   Constitutional:  Negative for activity change, appetite change, chills, fatigue, fever and unexpected weight change.   HENT:  Negative for hearing loss, nosebleeds, sinus pain, sneezing, sore throat and trouble swallowing.    Eyes:  Negative for photophobia and visual disturbance.   Respiratory:  Negative for cough, chest tightness, shortness of breath and wheezing.    Cardiovascular:  Negative for chest pain, palpitations and leg swelling.   Gastrointestinal:  Negative for abdominal pain, constipation, nausea and vomiting.   Genitourinary:  Negative for decreased urine volume, difficulty urinating, dysuria, flank pain, genital sores, hematuria and urgency.   Musculoskeletal:  Negative for back pain and gait problem.   Skin:  Negative for pallor, rash and wound.   Neurological:  Negative for dizziness, seizures, syncope, weakness, numbness and headaches.   Hematological:  Negative for adenopathy. Does not bruise/bleed easily.   Psychiatric/Behavioral:  Negative for confusion, hallucinations, self-injury, sleep disturbance and suicidal ideas. The patient is not nervous/anxious.      Past Medical History:   Diagnosis Date    Anxiety     Asthma     Bipolar disorder (HCC)     Depression     Disease of thyroid gland     Endometriosis     Psychiatric illness     Psychosis (HCC)     PTSD (post-traumatic stress disorder) 6/6/2017    Right carpal tunnel syndrome 12/26/2018    Self-injurious behavior     Suicide attempt ([facility])      Past Surgical History:   Procedure Laterality Date    ABDOMINAL SURGERY      HERNIA REPAIR      LAPAROSCOPIC TUBAL LIGATION      TUBAL LIGATION       Family History   Problem Relation Age of Onset    Hypertension Mother     Asthma Mother     Fibromyalgia Mother      Osteoporosis Mother     Cancer Mother     No Known Problems Father     No Known Problems Sister     No Known Problems Daughter     No Known Problems Maternal Grandmother     No Known Problems Maternal Grandfather     No Known Problems Paternal Grandmother     No Known Problems Paternal Grandfather     Thyroid disease Brother     Breast cancer Maternal Aunt 65    Breast cancer Maternal Aunt 55     Social History     Tobacco Use    Smoking status: Former     Current packs/day: 0.00     Types: Cigarettes     Quit date: 4/1/2021     Years since quitting: 3.3     Passive exposure: Never    Smokeless tobacco: Never   Vaping Use    Vaping status: Never Used   Substance and Sexual Activity    Alcohol use: No    Drug use: No    Sexual activity: Not on file     Current Outpatient Medications on File Prior to Visit   Medication Sig    albuterol (PROVENTIL HFA,VENTOLIN HFA) 90 mcg/act inhaler Inhale 2 puffs every 4 (four) hours as needed for wheezing    amLODIPine (NORVASC) 2.5 mg tablet Take 1 tablet (2.5 mg total) by mouth daily    ARIPiprazole (ABILIFY) 10 mg tablet Take 10 mg by mouth daily    ARIPiprazole (ABILIFY) 30 mg tablet     buPROPion (WELLBUTRIN SR) 150 mg 12 hr tablet Take 150 mg by mouth in the morning    buPROPion (WELLBUTRIN SR) 200 MG 12 hr tablet     busPIRone (BUSPAR) 15 mg tablet Take 15 mg by mouth daily as needed    busPIRone (BUSPAR) 5 mg tablet Take 5 mg by mouth 2 (two) times a day    cetirizine (ZyrTEC) 10 mg tablet TAKE ONE TABLET BY MOUTH EVERY DAY.    cholecalciferol 1,000 units tablet Take 2 tablets (2,000 Units total) by mouth daily    clonazePAM (KlonoPIN) 0.5 mg tablet Take 0.5 mg by mouth daily at bedtime    dicyclomine (BENTYL) 20 mg tablet Take 1 tablet (20 mg total) by mouth every 6 (six) hours As needed for abdominal pain    DULoxetine (CYMBALTA) 60 mg delayed release capsule Take 60 mg by mouth daily    fluticasone (FLONASE) 50 mcg/act nasal spray USE 1 SPRAY INTO EACH NOSTRIL DAILY     fluticasone-salmeterol (Advair HFA) 115-21 MCG/ACT inhaler Inhale 2 puffs 2 (two) times a day Rinse mouth after use.    levothyroxine 88 mcg tablet Take 1 tablet (88 mcg total) by mouth daily    lidocaine (XYLOCAINE) 5 % ointment Apply topically as needed for mild pain    lisinopril (ZESTRIL) 10 mg tablet Take 1 tablet (10 mg total) by mouth daily    omeprazole (PriLOSEC) 40 MG capsule Take 1 capsule (40 mg total) by mouth daily    ondansetron (ZOFRAN-ODT) 4 mg disintegrating tablet Take 1 tablet (4 mg total) by mouth every 8 (eight) hours as needed for nausea or vomiting    topiramate (TOPAMAX) 50 MG tablet 3 tabs qd or 150 mg daily    valACYclovir (VALTREX) 500 mg tablet Take 1 tablet (500 mg total) by mouth 2 (two) times a day for 3 days    [DISCONTINUED] acyclovir (ZOVIRAX) 5 % ointment Apply topically every 3 (three) hours (Patient not taking: Reported on 4/23/2024)    [DISCONTINUED] bisacodyl (DULCOLAX) 5 mg EC tablet Use as directed by GI office for colonoscopy (Patient not taking: Reported on 4/23/2024)    [DISCONTINUED] fexofenadine (ALLEGRA) 180 MG tablet Take 1 tablet (180 mg total) by mouth daily (Patient not taking: Reported on 3/22/2023)    [DISCONTINUED] guaiFENesin 200 MG tablet Take 1 tablet (200 mg total) by mouth every 4 (four) hours as needed for congestion (Patient not taking: Reported on 4/23/2024)    [DISCONTINUED] ipratropium-albuterol (DUO-NEB) 0.5-2.5 mg/3 mL nebulizer solution Take 3 mL by nebulization every 6 (six) hours as needed for wheezing or shortness of breath (Patient not taking: Reported on 4/23/2024)    [DISCONTINUED] nitroglycerin (NITROSTAT) 0.4 mg SL tablet Place 1 tablet (0.4 mg total) under the tongue every 5 (five) minutes as needed for chest pain (Patient not taking: Reported on 4/23/2024)    [DISCONTINUED] polyethylene glycol (GLYCOLAX) 17 GM/SCOOP powder Take as directed by GI office for colonoscopy (Patient not taking: Reported on 4/23/2024)    [DISCONTINUED]  "simethicone (MYLICON) 125 MG chewable tablet Chew 1 tablet (125 mg total) every 6 (six) hours as needed for flatulence (Patient not taking: Reported on 4/23/2024)     Allergies   Allergen Reactions    Aleve [Naproxen] Shortness Of Breath    Benzonatate Swelling     Throat closing    Guaifenesin Other (See Comments)     Note - patient states she IS able to take robitussin.    Latuda [Lurasidone] Swelling      stuttering     Imitrex [Sumatriptan]      Patient reports that this medication \"makes me sick\".     Immunization History   Administered Date(s) Administered    INFLUENZA 11/09/2009    Influenza, recombinant, quadrivalent,injectable, preservative free 11/30/2021    Pneumococcal Polysaccharide PPV23 04/15/2015    Tdap 10/17/2019    Tuberculin Skin Test-PPD Intradermal 10/12/2015, 11/30/2021, 12/13/2021, 12/27/2021     Objective     /78 (BP Location: Left arm, Patient Position: Sitting, Cuff Size: Large)   Pulse 84   Temp (!) 97.3 °F (36.3 °C) (Temporal)   Resp 16   Ht 5' 2\" (1.575 m)   Wt 96.1 kg (211 lb 12.8 oz)   SpO2 98%   BMI 38.74 kg/m²     Physical Exam  Vitals and nursing note reviewed.   Constitutional:       General: She is not in acute distress.     Appearance: Normal appearance. She is not diaphoretic.   HENT:      Head: Normocephalic.      Right Ear: External ear normal.      Left Ear: External ear normal.   Eyes:      General:         Right eye: No discharge.         Left eye: No discharge.      Conjunctiva/sclera: Conjunctivae normal.   Cardiovascular:      Rate and Rhythm: Normal rate.   Pulmonary:      Effort: Pulmonary effort is normal. No respiratory distress.   Musculoskeletal:         General: Normal range of motion.      Cervical back: Neck supple.      Right lower leg: No edema.      Left lower leg: No edema.   Skin:     General: Skin is warm and dry.      Capillary Refill: Capillary refill takes less than 2 seconds.      Findings: No rash.   Neurological:      Mental Status: She " is alert and oriented to person, place, and time.   Psychiatric:         Mood and Affect: Mood normal.         Behavior: Behavior normal.

## 2024-08-01 NOTE — ASSESSMENT & PLAN NOTE
Wt Readings from Last 3 Encounters:   08/01/24 96.1 kg (211 lb 12.8 oz)   04/26/24 97.5 kg (215 lb)   04/23/24 96.6 kg (213 lb)     -continue Zepound as prescribed by Weight management   -Encouraged diet and lifestyle changes: decrease processed foods (cakes, cookies, chips, soda), decrease total carbohydrate intake, decrease fried/fatty foods, increase fruits and vegetables, increase lean proteins (chicken, turkey), increase healthy fats (avocado, fish, nuts), drink plenty of water (at least four 16 oz bottles per day)

## 2024-08-02 DIAGNOSIS — K21.9 GASTROESOPHAGEAL REFLUX DISEASE WITHOUT ESOPHAGITIS: ICD-10-CM

## 2024-08-02 RX ORDER — OMEPRAZOLE 40 MG/1
40 CAPSULE, DELAYED RELEASE ORAL DAILY
Qty: 30 CAPSULE | Refills: 0 | Status: SHIPPED | OUTPATIENT
Start: 2024-08-02

## 2024-08-22 DIAGNOSIS — E03.9 ACQUIRED HYPOTHYROIDISM: ICD-10-CM

## 2024-08-22 RX ORDER — LEVOTHYROXINE SODIUM 88 UG/1
88 TABLET ORAL DAILY
Qty: 30 TABLET | Refills: 0 | Status: SHIPPED | OUTPATIENT
Start: 2024-08-22

## 2024-08-29 ENCOUNTER — TELEPHONE (OUTPATIENT)
Dept: FAMILY MEDICINE CLINIC | Facility: CLINIC | Age: 49
End: 2024-08-29

## 2024-08-29 ENCOUNTER — CLINICAL SUPPORT (OUTPATIENT)
Dept: FAMILY MEDICINE CLINIC | Facility: CLINIC | Age: 49
End: 2024-08-29

## 2024-08-29 DIAGNOSIS — E53.8 B12 DEFICIENCY: Primary | ICD-10-CM

## 2024-08-29 PROCEDURE — 96372 THER/PROPH/DIAG INJ SC/IM: CPT

## 2024-08-29 RX ORDER — CYANOCOBALAMIN 1000 UG/ML
1000 INJECTION, SOLUTION INTRAMUSCULAR; SUBCUTANEOUS
Status: SHIPPED | OUTPATIENT
Start: 2024-08-29

## 2024-08-29 RX ADMIN — CYANOCOBALAMIN 1000 MCG: 1000 INJECTION, SOLUTION INTRAMUSCULAR; SUBCUTANEOUS at 15:06

## 2024-09-03 ENCOUNTER — TELEMEDICINE (OUTPATIENT)
Dept: NEUROLOGY | Facility: CLINIC | Age: 49
End: 2024-09-03
Payer: COMMERCIAL

## 2024-09-03 DIAGNOSIS — G43.009 MIGRAINE WITHOUT AURA AND WITHOUT STATUS MIGRAINOSUS, NOT INTRACTABLE: Primary | ICD-10-CM

## 2024-09-03 DIAGNOSIS — M79.18 MYOFASCIAL MUSCLE PAIN: ICD-10-CM

## 2024-09-03 PROCEDURE — 99213 OFFICE O/P EST LOW 20 MIN: CPT | Performed by: PHYSICIAN ASSISTANT

## 2024-09-03 RX ORDER — TOPIRAMATE 50 MG/1
TABLET, FILM COATED ORAL
Qty: 270 TABLET | Refills: 1 | Status: SHIPPED | OUTPATIENT
Start: 2024-09-03

## 2024-09-03 RX ORDER — TIRZEPATIDE 2.5 MG/.5ML
INJECTION, SOLUTION SUBCUTANEOUS WEEKLY
COMMUNITY
Start: 2024-08-08
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
## 2024-09-12 DIAGNOSIS — K21.9 GASTROESOPHAGEAL REFLUX DISEASE WITHOUT ESOPHAGITIS: ICD-10-CM

## 2024-09-13 DIAGNOSIS — K21.9 GASTROESOPHAGEAL REFLUX DISEASE WITHOUT ESOPHAGITIS: ICD-10-CM

## 2024-09-14 RX ORDER — OMEPRAZOLE 40 MG/1
40 CAPSULE, DELAYED RELEASE ORAL DAILY
Qty: 30 CAPSULE | Refills: 0 | Status: SHIPPED | OUTPATIENT
Start: 2024-09-14

## 2024-09-20 DIAGNOSIS — J45.41 MODERATE PERSISTENT ASTHMA WITH ACUTE EXACERBATION: ICD-10-CM

## 2024-09-20 DIAGNOSIS — E03.9 ACQUIRED HYPOTHYROIDISM: ICD-10-CM

## 2024-09-21 RX ORDER — CETIRIZINE HYDROCHLORIDE 10 MG/1
10 TABLET ORAL DAILY
Qty: 90 TABLET | Refills: 0 | Status: SHIPPED | OUTPATIENT
Start: 2024-09-21

## 2024-09-21 RX ORDER — LEVOTHYROXINE SODIUM 88 UG/1
88 TABLET ORAL DAILY
Qty: 30 TABLET | Refills: 0 | Status: SHIPPED | OUTPATIENT
Start: 2024-09-21

## 2024-09-26 DIAGNOSIS — I10 PRIMARY HYPERTENSION: ICD-10-CM

## 2024-09-26 RX ORDER — LISINOPRIL 10 MG/1
10 TABLET ORAL DAILY
Qty: 90 TABLET | Refills: 0 | Status: SHIPPED | OUTPATIENT
Start: 2024-09-26

## 2024-09-27 DIAGNOSIS — J45.41 MODERATE PERSISTENT ASTHMA WITH ACUTE EXACERBATION: ICD-10-CM

## 2024-09-30 RX ORDER — ALBUTEROL SULFATE 90 UG/1
2 INHALANT RESPIRATORY (INHALATION) EVERY 4 HOURS PRN
Qty: 18 G | Refills: 0 | Status: SHIPPED | OUTPATIENT
Start: 2024-09-30

## 2024-10-11 ENCOUNTER — APPOINTMENT (OUTPATIENT)
Dept: LAB | Facility: CLINIC | Age: 49
End: 2024-10-11
Payer: COMMERCIAL

## 2024-10-11 DIAGNOSIS — E53.8 B12 DEFICIENCY: ICD-10-CM

## 2024-10-11 LAB
ALBUMIN SERPL BCG-MCNC: 4.5 G/DL (ref 3.5–5)
ALP SERPL-CCNC: 68 U/L (ref 34–104)
ALT SERPL W P-5'-P-CCNC: 22 U/L (ref 7–52)
ANION GAP SERPL CALCULATED.3IONS-SCNC: 8 MMOL/L (ref 4–13)
AST SERPL W P-5'-P-CCNC: 19 U/L (ref 13–39)
BASOPHILS # BLD AUTO: 0.06 THOUSANDS/ΜL (ref 0–0.1)
BASOPHILS NFR BLD AUTO: 1 % (ref 0–1)
BILIRUB SERPL-MCNC: 0.39 MG/DL (ref 0.2–1)
BUN SERPL-MCNC: 17 MG/DL (ref 5–25)
CALCIUM SERPL-MCNC: 9.1 MG/DL (ref 8.4–10.2)
CHLORIDE SERPL-SCNC: 109 MMOL/L (ref 96–108)
CHOLEST SERPL-MCNC: 187 MG/DL
CO2 SERPL-SCNC: 25 MMOL/L (ref 21–32)
CREAT SERPL-MCNC: 1.04 MG/DL (ref 0.6–1.3)
EOSINOPHIL # BLD AUTO: 0.2 THOUSAND/ΜL (ref 0–0.61)
EOSINOPHIL NFR BLD AUTO: 3 % (ref 0–6)
ERYTHROCYTE [DISTWIDTH] IN BLOOD BY AUTOMATED COUNT: 13.6 % (ref 11.6–15.1)
EST. AVERAGE GLUCOSE BLD GHB EST-MCNC: 117 MG/DL
GFR SERPL CREATININE-BSD FRML MDRD: 64 ML/MIN/1.73SQ M
GLUCOSE P FAST SERPL-MCNC: 83 MG/DL (ref 65–99)
HBA1C MFR BLD: 5.7 %
HCT VFR BLD AUTO: 40.9 % (ref 34.8–46.1)
HDLC SERPL-MCNC: 41 MG/DL
HGB BLD-MCNC: 12.9 G/DL (ref 11.5–15.4)
IMM GRANULOCYTES # BLD AUTO: 0.01 THOUSAND/UL (ref 0–0.2)
IMM GRANULOCYTES NFR BLD AUTO: 0 % (ref 0–2)
LDLC SERPL CALC-MCNC: 130 MG/DL (ref 0–100)
LYMPHOCYTES # BLD AUTO: 2.2 THOUSANDS/ΜL (ref 0.6–4.47)
LYMPHOCYTES NFR BLD AUTO: 35 % (ref 14–44)
MCH RBC QN AUTO: 28.3 PG (ref 26.8–34.3)
MCHC RBC AUTO-ENTMCNC: 31.5 G/DL (ref 31.4–37.4)
MCV RBC AUTO: 90 FL (ref 82–98)
MONOCYTES # BLD AUTO: 0.46 THOUSAND/ΜL (ref 0.17–1.22)
MONOCYTES NFR BLD AUTO: 7 % (ref 4–12)
NEUTROPHILS # BLD AUTO: 3.4 THOUSANDS/ΜL (ref 1.85–7.62)
NEUTS SEG NFR BLD AUTO: 54 % (ref 43–75)
NONHDLC SERPL-MCNC: 146 MG/DL
NRBC BLD AUTO-RTO: 0 /100 WBCS
PLATELET # BLD AUTO: 247 THOUSANDS/UL (ref 149–390)
PMV BLD AUTO: 10.8 FL (ref 8.9–12.7)
POTASSIUM SERPL-SCNC: 3.8 MMOL/L (ref 3.5–5.3)
PROT SERPL-MCNC: 7.4 G/DL (ref 6.4–8.4)
RBC # BLD AUTO: 4.56 MILLION/UL (ref 3.81–5.12)
SODIUM SERPL-SCNC: 142 MMOL/L (ref 135–147)
TRIGL SERPL-MCNC: 78 MG/DL
VIT B12 SERPL-MCNC: 467 PG/ML (ref 180–914)
WBC # BLD AUTO: 6.33 THOUSAND/UL (ref 4.31–10.16)

## 2024-10-11 PROCEDURE — 80061 LIPID PANEL: CPT

## 2024-10-11 PROCEDURE — 82607 VITAMIN B-12: CPT

## 2024-10-11 PROCEDURE — 36415 COLL VENOUS BLD VENIPUNCTURE: CPT

## 2024-10-11 PROCEDURE — 85025 COMPLETE CBC W/AUTO DIFF WBC: CPT

## 2024-10-11 PROCEDURE — 80053 COMPREHEN METABOLIC PANEL: CPT

## 2024-10-11 PROCEDURE — 83036 HEMOGLOBIN GLYCOSYLATED A1C: CPT

## 2024-10-23 ENCOUNTER — HOSPITAL ENCOUNTER (EMERGENCY)
Facility: HOSPITAL | Age: 49
Discharge: HOME | End: 2024-10-24
Attending: EMERGENCY MEDICINE
Payer: COMMERCIAL

## 2024-10-23 DIAGNOSIS — J45.41 MODERATE PERSISTENT ASTHMA WITH ACUTE EXACERBATION: ICD-10-CM

## 2024-10-23 DIAGNOSIS — J45.901 ASTHMA EXACERBATION: ICD-10-CM

## 2024-10-23 DIAGNOSIS — R05.1 ACUTE COUGH: Primary | ICD-10-CM

## 2024-10-23 PROCEDURE — 94640 AIRWAY INHALATION TREATMENT: CPT

## 2024-10-23 PROCEDURE — 99284 EMERGENCY DEPT VISIT MOD MDM: CPT | Performed by: EMERGENCY MEDICINE

## 2024-10-23 PROCEDURE — 99285 EMERGENCY DEPT VISIT HI MDM: CPT

## 2024-10-23 RX ORDER — ALBUTEROL SULFATE 90 UG/1
2 INHALANT RESPIRATORY (INHALATION) EVERY 4 HOURS PRN
Qty: 18 G | Refills: 0 | Status: SHIPPED | OUTPATIENT
Start: 2024-10-23

## 2024-10-23 RX ORDER — IPRATROPIUM BROMIDE AND ALBUTEROL SULFATE 2.5; .5 MG/3ML; MG/3ML
3 SOLUTION RESPIRATORY (INHALATION) ONCE
Status: COMPLETED | OUTPATIENT
Start: 2024-10-23 | End: 2024-10-23

## 2024-10-23 RX ADMIN — IPRATROPIUM BROMIDE AND ALBUTEROL SULFATE 3 ML: 2.5; .5 SOLUTION RESPIRATORY (INHALATION) at 23:37

## 2024-10-23 RX ADMIN — IPRATROPIUM BROMIDE AND ALBUTEROL SULFATE 3 ML: 2.5; .5 SOLUTION RESPIRATORY (INHALATION) at 23:17

## 2024-10-23 NOTE — Clinical Note
Nika Shoemaker was seen and treated in our emergency department on 10/23/2024.                Diagnosis:     Nika  may return to work on return date.    She may return on this date: 10/25/2024         If you have any questions or concerns, please don't hesitate to call.      Dylon Yarbrough MD    ______________________________           _______________          _______________  Hospital Representative                              Date                                Time

## 2024-10-24 VITALS
DIASTOLIC BLOOD PRESSURE: 82 MMHG | BODY MASS INDEX: 38.75 KG/M2 | HEART RATE: 80 BPM | OXYGEN SATURATION: 95 % | SYSTOLIC BLOOD PRESSURE: 132 MMHG | RESPIRATION RATE: 18 BRPM | TEMPERATURE: 97.8 F | WEIGHT: 211.86 LBS

## 2024-10-24 DIAGNOSIS — E03.9 ACQUIRED HYPOTHYROIDISM: ICD-10-CM

## 2024-10-24 RX ORDER — LEVOTHYROXINE SODIUM 88 UG/1
88 TABLET ORAL DAILY
Qty: 30 TABLET | Refills: 0 | Status: SHIPPED | OUTPATIENT
Start: 2024-10-24

## 2024-10-24 NOTE — ED NOTES
Breath sounds improved - patient reports feeling better but still a little tight - dry cough noted. Repeat breathing treatment given.     Bruna Crawley RN  10/23/24 5633

## 2024-10-24 NOTE — ED NOTES
Patient was seen by the provider with discharge and follow up instructions given to patient.     Bruna Crawley RN  10/24/24 0013

## 2024-10-24 NOTE — ED PROVIDER NOTES
Time reflects when diagnosis was documented in both MDM as applicable and the Disposition within this note       Time User Action Codes Description Comment    10/23/2024 11:47 PM YarbroughTete husseinosman Add [R05.1] Acute cough     10/23/2024 11:47 PM YarbroughTete husseinosman Add [J45.901] Asthma exacerbation     10/23/2024 11:47 PM Tete Yarbroughosman Add [J45.41] Moderate persistent asthma with acute exacerbation           ED Disposition       ED Disposition   Discharge    Condition   Stable    Date/Time   Wed Oct 23, 2024 11:47 PM    Comment   Nika Palaciosarez discharge to home/self care.                   Assessment & Plan       Medical Decision Making  49-year-old female with shortness of breath and cough.  Likely asthma exacerbation.  Will treat with albuterol ipratropium nebulization.  And reevaluate.    Risk  Prescription drug management.             Medications   ipratropium-albuterol (DUO-NEB) 0.5-2.5 mg/3 mL inhalation solution 3 mL (3 mL Nebulization Given 10/23/24 2317)   ipratropium-albuterol (DUO-NEB) 0.5-2.5 mg/3 mL inhalation solution 3 mL (3 mL Nebulization Given 10/23/24 2337)       ED Risk Strat Scores                           SBIRT 20yo+      Flowsheet Row Most Recent Value   Initial Alcohol Screen: US AUDIT-C     1. How often do you have a drink containing alcohol? 0 Filed at: 10/23/2024 2310   2. How many drinks containing alcohol do you have on a typical day you are drinking?  0 Filed at: 10/23/2024 2310   3a. Male UNDER 65: How often do you have five or more drinks on one occasion? 0 Filed at: 10/23/2024 2310   3b. FEMALE Any Age, or MALE 65+: How often do you have 4 or more drinks on one occassion? 0 Filed at: 10/23/2024 2310   Audit-C Score 0 Filed at: 10/23/2024 2310   [place]: How many times in the past year have you...    Used an illegal drug or used a prescription medication for non-medical reasons? Never Filed at: 10/23/2024 2310                            History of Present Illness       Chief Complaint   Patient  presents with    Shortness of Breath     Feels short of breath with chest tightness and cough for the past 2-3 days. No fever. Last used inhaler a few hours prior to arrival.       Past Medical History:   Diagnosis Date    Anxiety     Asthma     Bipolar disorder (HCC)     Depression     Disease of thyroid gland     Endometriosis     Psychiatric illness     Psychosis (HCC)     PTSD (post-traumatic stress disorder) 6/6/2017    Right carpal tunnel syndrome 12/26/2018    Self-injurious behavior     Suicide attempt ([facility])       Past Surgical History:   Procedure Laterality Date    ABDOMINAL SURGERY      HERNIA REPAIR      LAPAROSCOPIC TUBAL LIGATION      TUBAL LIGATION        Family History   Problem Relation Age of Onset    Hypertension Mother     Asthma Mother     Fibromyalgia Mother     Osteoporosis Mother     Cancer Mother     No Known Problems Father     No Known Problems Sister     No Known Problems Daughter     No Known Problems Maternal Grandmother     No Known Problems Maternal Grandfather     No Known Problems Paternal Grandmother     No Known Problems Paternal Grandfather     Thyroid disease Brother     Breast cancer Maternal Aunt 65    Breast cancer Maternal Aunt 55      Social History     Tobacco Use    Smoking status: Former     Current packs/day: 0.00     Types: Cigarettes     Quit date: 4/1/2021     Years since quitting: 3.5     Passive exposure: Never    Smokeless tobacco: Never   Vaping Use    Vaping status: Never Used   Substance Use Topics    Alcohol use: No    Drug use: No      E-Cigarette/Vaping    E-Cigarette Use Never User       E-Cigarette/Vaping Substances    Nicotine No     THC No     CBD No     Flavoring No     Other No     Unknown No       I have reviewed and agree with the history as documented.     49-year-old female presenting to the emerged department with shortness of breath and cough for the past few days.  Use her inhaler without improvement.  No fevers chills.  Dry cough.        Review  of Systems   Constitutional:  Negative for chills and fever.   HENT:  Negative for ear pain and sore throat.    Eyes:  Negative for pain and visual disturbance.   Respiratory:  Positive for cough, shortness of breath and wheezing.    Cardiovascular:  Negative for chest pain and palpitations.   Gastrointestinal:  Negative for abdominal pain and vomiting.   Genitourinary:  Negative for dysuria and hematuria.   Musculoskeletal:  Negative for arthralgias and back pain.   Skin:  Negative for color change and rash.   Neurological:  Negative for seizures and syncope.   All other systems reviewed and are negative.          Objective       ED Triage Vitals [10/23/24 2308]   Temperature Pulse Blood Pressure Respirations SpO2 Patient Position - Orthostatic VS   97.8 °F (36.6 °C) 85 132/82 21 99 % Sitting      Temp Source Heart Rate Source BP Location FiO2 (%) Pain Score    Oral Monitor Left arm -- --      Vitals      Date and Time Temp Pulse SpO2 Resp BP Pain Score FACES Pain Rating User   10/23/24 2334 -- 77 98 % 18 -- -- -- EY   10/23/24 2308 97.8 °F (36.6 °C) 85 99 % 21 132/82 -- -- MM            Physical Exam  Vitals and nursing note reviewed.   Constitutional:       General: She is not in acute distress.     Appearance: She is well-developed.   HENT:      Head: Normocephalic and atraumatic.   Eyes:      Conjunctiva/sclera: Conjunctivae normal.   Cardiovascular:      Rate and Rhythm: Normal rate and regular rhythm.      Heart sounds: No murmur heard.  Pulmonary:      Effort: Pulmonary effort is normal. No respiratory distress.      Breath sounds: Wheezing present.   Abdominal:      Palpations: Abdomen is soft.      Tenderness: There is no abdominal tenderness.   Musculoskeletal:         General: No swelling.      Cervical back: Neck supple.   Skin:     General: Skin is warm and dry.      Capillary Refill: Capillary refill takes less than 2 seconds.   Neurological:      Mental Status: She is alert.   Psychiatric:          Mood and Affect: Mood normal.         Results Reviewed       None            No orders to display       Procedures    ED Medication and Procedure Management   Prior to Admission Medications   Prescriptions Last Dose Informant Patient Reported? Taking?   ARIPiprazole (ABILIFY) 10 mg tablet  Self Yes No   Sig: Take 10 mg by mouth daily   ARIPiprazole (ABILIFY) 30 mg tablet  Self Yes No   DULoxetine (CYMBALTA) 60 mg delayed release capsule  Self Yes No   Sig: Take 60 mg by mouth daily   Zepbound 2.5 MG/0.5ML auto-injector   Yes No   Sig: Inject under the skin once a week   albuterol (PROVENTIL HFA,VENTOLIN HFA) 90 mcg/act inhaler   No No   Sig: Inhale 2 puffs every 4 (four) hours as needed for wheezing   albuterol (PROVENTIL HFA,VENTOLIN HFA) 90 mcg/act inhaler   No Yes   Sig: Inhale 2 puffs every 4 (four) hours as needed for wheezing   amLODIPine (NORVASC) 2.5 mg tablet  Self No No   Sig: Take 1 tablet (2.5 mg total) by mouth daily   buPROPion (WELLBUTRIN SR) 150 mg 12 hr tablet  Self Yes No   Sig: Take 150 mg by mouth in the morning   buPROPion (WELLBUTRIN SR) 200 MG 12 hr tablet  Self Yes No   busPIRone (BUSPAR) 15 mg tablet  Self Yes No   Sig: Take 15 mg by mouth daily as needed   busPIRone (BUSPAR) 5 mg tablet  Self Yes No   Sig: Take 5 mg by mouth 2 (two) times a day   cetirizine (ZyrTEC) 10 mg tablet   No No   Sig: Take 1 tablet (10 mg total) by mouth daily   cholecalciferol 1,000 units tablet  Self No No   Sig: Take 2 tablets (2,000 Units total) by mouth daily   clonazePAM (KlonoPIN) 0.5 mg tablet  Self Yes No   Sig: Take 0.5 mg by mouth daily at bedtime   dicyclomine (BENTYL) 20 mg tablet  Self No No   Sig: Take 1 tablet (20 mg total) by mouth every 6 (six) hours As needed for abdominal pain   Patient not taking: Reported on 9/3/2024   fluticasone (FLONASE) 50 mcg/act nasal spray  Self No No   Sig: USE 1 SPRAY INTO EACH NOSTRIL DAILY   fluticasone-salmeterol (Advair HFA) 115-21 MCG/ACT inhaler  Self No No    Sig: Inhale 2 puffs 2 (two) times a day Rinse mouth after use.   levothyroxine 88 mcg tablet   No No   Sig: Take 1 tablet (88 mcg total) by mouth daily   lidocaine (XYLOCAINE) 5 % ointment  Self No No   Sig: Apply topically as needed for mild pain   lisinopril (ZESTRIL) 10 mg tablet   No No   Sig: Take 1 tablet (10 mg total) by mouth daily   omeprazole (PriLOSEC) 40 MG capsule   No No   Sig: Take 1 capsule (40 mg total) by mouth daily   omeprazole (PriLOSEC) 40 MG capsule   No No   Sig: Take 1 capsule (40 mg total) by mouth daily   ondansetron (ZOFRAN-ODT) 4 mg disintegrating tablet  Self No No   Sig: Take 1 tablet (4 mg total) by mouth every 8 (eight) hours as needed for nausea or vomiting   topiramate (TOPAMAX) 50 MG tablet   No No   Sig: 3 tabs qd or 150 mg daily   valACYclovir (VALTREX) 500 mg tablet  Self No No   Sig: Take 1 tablet (500 mg total) by mouth 2 (two) times a day for 3 days      Facility-Administered Medications Last Administration Doses Remaining   cyanocobalamin injection 1,000 mcg 8/29/2024  3:06 PM         Current Discharge Medication List        CONTINUE these medications which have CHANGED    Details   albuterol (PROVENTIL HFA,VENTOLIN HFA) 90 mcg/act inhaler Inhale 2 puffs every 4 (four) hours as needed for wheezing  Qty: 18 g, Refills: 0    Comments: Substitution to a formulary equivalent within the same pharmaceutical class is authorized.  Associated Diagnoses: Moderate persistent asthma with acute exacerbation           CONTINUE these medications which have NOT CHANGED    Details   amLODIPine (NORVASC) 2.5 mg tablet Take 1 tablet (2.5 mg total) by mouth daily  Qty: 30 tablet, Refills: 3    Associated Diagnoses: Stable angina (HCC); Benign essential hypertension      !! ARIPiprazole (ABILIFY) 10 mg tablet Take 10 mg by mouth daily      !! ARIPiprazole (ABILIFY) 30 mg tablet       !! buPROPion (WELLBUTRIN SR) 150 mg 12 hr tablet Take 150 mg by mouth in the morning      !! buPROPion  (WELLBUTRIN SR) 200 MG 12 hr tablet       !! busPIRone (BUSPAR) 15 mg tablet Take 15 mg by mouth daily as needed      !! busPIRone (BUSPAR) 5 mg tablet Take 5 mg by mouth 2 (two) times a day  Refills: 1      cetirizine (ZyrTEC) 10 mg tablet Take 1 tablet (10 mg total) by mouth daily  Qty: 90 tablet, Refills: 0    Associated Diagnoses: Moderate persistent asthma with acute exacerbation      cholecalciferol 1,000 units tablet Take 2 tablets (2,000 Units total) by mouth daily  Qty: 90 tablet, Refills: 3    Associated Diagnoses: Vitamin D deficiency      clonazePAM (KlonoPIN) 0.5 mg tablet Take 0.5 mg by mouth daily at bedtime      dicyclomine (BENTYL) 20 mg tablet Take 1 tablet (20 mg total) by mouth every 6 (six) hours As needed for abdominal pain  Qty: 120 tablet, Refills: 1    Associated Diagnoses: RUQ pain      DULoxetine (CYMBALTA) 60 mg delayed release capsule Take 60 mg by mouth daily      fluticasone (FLONASE) 50 mcg/act nasal spray USE 1 SPRAY INTO EACH NOSTRIL DAILY  Qty: 16 g, Refills: 0    Associated Diagnoses: Moderate persistent asthma with acute exacerbation; Upper respiratory tract infection, unspecified type      fluticasone-salmeterol (Advair HFA) 115-21 MCG/ACT inhaler Inhale 2 puffs 2 (two) times a day Rinse mouth after use.  Qty: 12 g, Refills: 2    Comments: Substitution to a formulary equivalent within the same pharmaceutical class is authorized.  Associated Diagnoses: Moderate persistent asthma with acute exacerbation      levothyroxine 88 mcg tablet Take 1 tablet (88 mcg total) by mouth daily  Qty: 30 tablet, Refills: 0    Associated Diagnoses: Acquired hypothyroidism      lidocaine (XYLOCAINE) 5 % ointment Apply topically as needed for mild pain  Qty: 50 g, Refills: 2    Associated Diagnoses: Other chronic back pain      lisinopril (ZESTRIL) 10 mg tablet Take 1 tablet (10 mg total) by mouth daily  Qty: 90 tablet, Refills: 0    Associated Diagnoses: Primary hypertension      !! omeprazole  (PriLOSEC) 40 MG capsule Take 1 capsule (40 mg total) by mouth daily  Qty: 30 capsule, Refills: 0    Associated Diagnoses: Gastroesophageal reflux disease without esophagitis      !! omeprazole (PriLOSEC) 40 MG capsule Take 1 capsule (40 mg total) by mouth daily  Qty: 30 capsule, Refills: 0    Associated Diagnoses: Gastroesophageal reflux disease without esophagitis      ondansetron (ZOFRAN-ODT) 4 mg disintegrating tablet Take 1 tablet (4 mg total) by mouth every 8 (eight) hours as needed for nausea or vomiting  Qty: 20 tablet, Refills: 2    Associated Diagnoses: Migraine without aura and without status migrainosus, not intractable      topiramate (TOPAMAX) 50 MG tablet 3 tabs qd or 150 mg daily  Qty: 270 tablet, Refills: 1    Associated Diagnoses: Migraine without aura and without status migrainosus, not intractable      valACYclovir (VALTREX) 500 mg tablet Take 1 tablet (500 mg total) by mouth 2 (two) times a day for 3 days  Qty: 6 tablet, Refills: 2    Associated Diagnoses: Genital herpes simplex, unspecified site      Zepbound 2.5 MG/0.5ML auto-injector Inject under the skin once a week       !! - Potential duplicate medications found. Please discuss with provider.        No discharge procedures on file.  ED SEPSIS DOCUMENTATION   Time reflects when diagnosis was documented in both MDM as applicable and the Disposition within this note       Time User Action Codes Description Comment    10/23/2024 11:47 PM Dylon Yarbrough [R05.1] Acute cough     10/23/2024 11:47 PM Dylon Yarbrough [J45.901] Asthma exacerbation     10/23/2024 11:47 PM Dylon Yarbrough [J45.41] Moderate persistent asthma with acute exacerbation                  Dylon Yarbrough MD  10/23/24 8869

## 2024-10-29 DIAGNOSIS — K21.9 GASTROESOPHAGEAL REFLUX DISEASE WITHOUT ESOPHAGITIS: ICD-10-CM

## 2024-10-29 RX ORDER — OMEPRAZOLE 40 MG/1
40 CAPSULE, DELAYED RELEASE ORAL DAILY
Qty: 30 CAPSULE | Refills: 0 | Status: SHIPPED | OUTPATIENT
Start: 2024-10-29

## 2024-11-18 ENCOUNTER — VBI (OUTPATIENT)
Dept: ADMINISTRATIVE | Facility: OTHER | Age: 49
End: 2024-11-18

## 2024-11-18 NOTE — TELEPHONE ENCOUNTER
11/18/24 3:13 PM     Chart reviewed for CRC: Colonoscopy ; nothing is submitted to the patient's insurance at this time.     Fern Hoffman MA   [facility]

## 2024-11-26 ENCOUNTER — TELEPHONE (OUTPATIENT)
Dept: FAMILY MEDICINE CLINIC | Facility: CLINIC | Age: 49
End: 2024-11-26

## 2024-11-26 DIAGNOSIS — E03.9 ACQUIRED HYPOTHYROIDISM: ICD-10-CM

## 2024-11-26 DIAGNOSIS — J45.41 MODERATE PERSISTENT ASTHMA WITH ACUTE EXACERBATION: ICD-10-CM

## 2024-11-26 RX ORDER — LEVOTHYROXINE SODIUM 88 UG/1
88 TABLET ORAL DAILY
Qty: 30 TABLET | Refills: 0 | Status: SHIPPED | OUTPATIENT
Start: 2024-11-26

## 2024-11-26 RX ORDER — CETIRIZINE HYDROCHLORIDE 10 MG/1
10 TABLET ORAL DAILY
Qty: 90 TABLET | Refills: 0 | Status: SHIPPED | OUTPATIENT
Start: 2024-11-26

## 2024-11-26 NOTE — TELEPHONE ENCOUNTER
Patient cam in and stated she needs a new script sent to pharmacy for following medication.     cetirizine (ZyrTEC) 10 mg tablet

## 2024-12-03 DIAGNOSIS — M54.89 OTHER CHRONIC BACK PAIN: ICD-10-CM

## 2024-12-03 DIAGNOSIS — G89.29 OTHER CHRONIC BACK PAIN: ICD-10-CM

## 2024-12-03 DIAGNOSIS — G43.009 MIGRAINE WITHOUT AURA AND WITHOUT STATUS MIGRAINOSUS, NOT INTRACTABLE: ICD-10-CM

## 2024-12-03 RX ORDER — TOPIRAMATE 50 MG/1
TABLET, FILM COATED ORAL
Qty: 270 TABLET | Refills: 0 | Status: CANCELLED | OUTPATIENT
Start: 2024-12-03

## 2024-12-04 RX ORDER — LIDOCAINE 50 MG/G
OINTMENT TOPICAL
Qty: 50 G | Refills: 2 | Status: SHIPPED | OUTPATIENT
Start: 2024-12-04

## 2024-12-04 RX ORDER — ONDANSETRON 4 MG/1
4 TABLET, ORALLY DISINTEGRATING ORAL EVERY 8 HOURS PRN
Qty: 20 TABLET | Refills: 2 | Status: SHIPPED | OUTPATIENT
Start: 2024-12-04

## 2024-12-10 DIAGNOSIS — E55.9 VITAMIN D DEFICIENCY: ICD-10-CM

## 2024-12-10 RX ORDER — MULTIVITAMIN
1 CAPSULE ORAL DAILY
Qty: 30 CAPSULE | Refills: 11 | Status: SHIPPED | OUTPATIENT
Start: 2024-12-10

## 2024-12-22 DIAGNOSIS — E03.9 ACQUIRED HYPOTHYROIDISM: ICD-10-CM

## 2024-12-23 RX ORDER — LEVOTHYROXINE SODIUM 88 UG/1
88 TABLET ORAL DAILY
Qty: 30 TABLET | Refills: 0 | Status: SHIPPED | OUTPATIENT
Start: 2024-12-23

## 2025-01-03 DIAGNOSIS — K21.9 GASTROESOPHAGEAL REFLUX DISEASE WITHOUT ESOPHAGITIS: ICD-10-CM

## 2025-01-03 RX ORDER — OMEPRAZOLE 40 MG/1
40 CAPSULE, DELAYED RELEASE ORAL DAILY
Qty: 30 CAPSULE | Refills: 0 | Status: SHIPPED | OUTPATIENT
Start: 2025-01-03